# Patient Record
Sex: MALE | Race: WHITE | Employment: OTHER | URBAN - METROPOLITAN AREA
[De-identification: names, ages, dates, MRNs, and addresses within clinical notes are randomized per-mention and may not be internally consistent; named-entity substitution may affect disease eponyms.]

---

## 2017-07-06 ENCOUNTER — HOSPITAL ENCOUNTER (OUTPATIENT)
Dept: PREADMISSION TESTING | Age: 77
Discharge: HOME | End: 2017-07-06
Payer: MEDICARE

## 2017-07-06 VITALS — BODY MASS INDEX: 35.01 KG/M2 | WEIGHT: 231 LBS | HEIGHT: 68 IN

## 2017-07-06 LAB
ALBUMIN SERPL BCP-MCNC: 3.6 G/DL (ref 3.4–5)
ALBUMIN/GLOB SERPL: 1.2 {RATIO} (ref 0.8–1.7)
ALP SERPL-CCNC: 60 U/L (ref 45–117)
ALT SERPL-CCNC: 37 U/L (ref 16–61)
ANION GAP BLD CALC-SCNC: 9 MMOL/L (ref 3–18)
AST SERPL W P-5'-P-CCNC: 38 U/L (ref 15–37)
BASOPHILS # BLD AUTO: 0 K/UL (ref 0–0.06)
BASOPHILS # BLD: 1 % (ref 0–2)
BILIRUB SERPL-MCNC: 0.5 MG/DL (ref 0.2–1)
BUN SERPL-MCNC: 24 MG/DL (ref 7–18)
BUN/CREAT SERPL: 15 (ref 12–20)
CALCIUM SERPL-MCNC: 8.6 MG/DL (ref 8.5–10.1)
CHLORIDE SERPL-SCNC: 103 MMOL/L (ref 100–108)
CO2 SERPL-SCNC: 29 MMOL/L (ref 21–32)
CREAT SERPL-MCNC: 1.55 MG/DL (ref 0.6–1.3)
DIFFERENTIAL METHOD BLD: ABNORMAL
EOSINOPHIL # BLD: 0.2 K/UL (ref 0–0.4)
EOSINOPHIL NFR BLD: 4 % (ref 0–5)
ERYTHROCYTE [DISTWIDTH] IN BLOOD BY AUTOMATED COUNT: 13.5 % (ref 11.6–14.5)
GLOBULIN SER CALC-MCNC: 3 G/DL (ref 2–4)
GLUCOSE SERPL-MCNC: 155 MG/DL (ref 74–99)
HBA1C MFR BLD: 6.3 % (ref 4.5–5.6)
HCT VFR BLD AUTO: 28.4 % (ref 36–48)
HGB BLD-MCNC: 9.7 G/DL (ref 13–16)
LYMPHOCYTES # BLD AUTO: 12 % (ref 21–52)
LYMPHOCYTES # BLD: 0.6 K/UL (ref 0.9–3.6)
MCH RBC QN AUTO: 35.7 PG (ref 24–34)
MCHC RBC AUTO-ENTMCNC: 34.2 G/DL (ref 31–37)
MCV RBC AUTO: 104.4 FL (ref 74–97)
MONOCYTES # BLD: 0.7 K/UL (ref 0.05–1.2)
MONOCYTES NFR BLD AUTO: 14 % (ref 3–10)
NEUTS SEG # BLD: 3.3 K/UL (ref 1.8–8)
NEUTS SEG NFR BLD AUTO: 69 % (ref 40–73)
PLATELET # BLD AUTO: 100 K/UL (ref 135–420)
PMV BLD AUTO: 11.4 FL (ref 9.2–11.8)
POTASSIUM SERPL-SCNC: 4.2 MMOL/L (ref 3.5–5.5)
PROT SERPL-MCNC: 6.6 G/DL (ref 6.4–8.2)
RBC # BLD AUTO: 2.72 M/UL (ref 4.7–5.5)
SODIUM SERPL-SCNC: 141 MMOL/L (ref 136–145)
WBC # BLD AUTO: 4.8 K/UL (ref 4.6–13.2)

## 2017-07-06 PROCEDURE — 83036 HEMOGLOBIN GLYCOSYLATED A1C: CPT | Performed by: UROLOGY

## 2017-07-06 PROCEDURE — 80053 COMPREHEN METABOLIC PANEL: CPT | Performed by: UROLOGY

## 2017-07-06 PROCEDURE — 85025 COMPLETE CBC W/AUTO DIFF WBC: CPT | Performed by: UROLOGY

## 2017-07-06 RX ORDER — LEVOFLOXACIN 5 MG/ML
500 INJECTION, SOLUTION INTRAVENOUS ONCE
Status: CANCELLED | OUTPATIENT
Start: 2017-07-06 | End: 2017-07-06

## 2017-07-06 RX ORDER — SODIUM CHLORIDE, SODIUM LACTATE, POTASSIUM CHLORIDE, CALCIUM CHLORIDE 600; 310; 30; 20 MG/100ML; MG/100ML; MG/100ML; MG/100ML
125 INJECTION, SOLUTION INTRAVENOUS CONTINUOUS
Status: CANCELLED | OUTPATIENT
Start: 2017-07-06

## 2017-07-06 RX ORDER — NYSTATIN 100000 U/G
CREAM TOPICAL
COMMUNITY
End: 2018-06-01

## 2017-07-06 RX ORDER — TORSEMIDE 100 MG/1
100 TABLET ORAL DAILY
COMMUNITY
End: 2021-01-01

## 2017-07-06 RX ORDER — ASPIRIN 325 MG
81 TABLET ORAL DAILY
COMMUNITY
End: 2018-08-10

## 2017-07-06 RX ORDER — IMATINIB MESYLATE 400 MG/1
400 TABLET, FILM COATED ORAL DAILY
COMMUNITY
End: 2020-12-12

## 2017-07-19 ENCOUNTER — ANESTHESIA EVENT (OUTPATIENT)
Dept: SURGERY | Age: 77
End: 2017-07-19
Payer: MEDICARE

## 2017-07-20 ENCOUNTER — HOSPITAL ENCOUNTER (OUTPATIENT)
Age: 77
Setting detail: OUTPATIENT SURGERY
Discharge: HOME | End: 2017-07-20
Attending: UROLOGY | Admitting: UROLOGY
Payer: MEDICARE

## 2017-07-20 ENCOUNTER — APPOINTMENT (OUTPATIENT)
Dept: GENERAL RADIOLOGY | Age: 77
End: 2017-07-20
Attending: UROLOGY
Payer: MEDICARE

## 2017-07-20 ENCOUNTER — ANESTHESIA (OUTPATIENT)
Dept: SURGERY | Age: 77
End: 2017-07-20
Payer: MEDICARE

## 2017-07-20 VITALS
DIASTOLIC BLOOD PRESSURE: 61 MMHG | TEMPERATURE: 97.5 F | HEIGHT: 68 IN | OXYGEN SATURATION: 100 % | RESPIRATION RATE: 18 BRPM | WEIGHT: 229.7 LBS | SYSTOLIC BLOOD PRESSURE: 135 MMHG | HEART RATE: 55 BPM | BODY MASS INDEX: 34.81 KG/M2

## 2017-07-20 LAB
GLUCOSE BLD STRIP.AUTO-MCNC: 116 MG/DL (ref 70–110)
GLUCOSE BLD STRIP.AUTO-MCNC: 151 MG/DL (ref 70–110)

## 2017-07-20 PROCEDURE — 82962 GLUCOSE BLOOD TEST: CPT

## 2017-07-20 PROCEDURE — 76210000006 HC OR PH I REC 0.5 TO 1 HR: Performed by: UROLOGY

## 2017-07-20 PROCEDURE — 77030010103 HC SEAL PRT ENDOSC OCOA -B: Performed by: UROLOGY

## 2017-07-20 PROCEDURE — C1769 GUIDE WIRE: HCPCS | Performed by: UROLOGY

## 2017-07-20 PROCEDURE — 74011250636 HC RX REV CODE- 250/636: Performed by: UROLOGY

## 2017-07-20 PROCEDURE — 74011000250 HC RX REV CODE- 250

## 2017-07-20 PROCEDURE — 76210000026 HC REC RM PH II 1 TO 1.5 HR: Performed by: UROLOGY

## 2017-07-20 PROCEDURE — 77030020782 HC GWN BAIR PAWS FLX 3M -B: Performed by: UROLOGY

## 2017-07-20 PROCEDURE — 77030012508 HC MSK AIRWY LMA AMBU -A: Performed by: NURSE ANESTHETIST, CERTIFIED REGISTERED

## 2017-07-20 PROCEDURE — C1758 CATHETER, URETERAL: HCPCS | Performed by: UROLOGY

## 2017-07-20 PROCEDURE — 77030005543 HC CATH URETH FOL44 COVD -A: Performed by: UROLOGY

## 2017-07-20 PROCEDURE — 74420 UROGRAPHY RTRGR +-KUB: CPT

## 2017-07-20 PROCEDURE — 74011636320 HC RX REV CODE- 636/320: Performed by: UROLOGY

## 2017-07-20 PROCEDURE — 76060000032 HC ANESTHESIA 0.5 TO 1 HR: Performed by: UROLOGY

## 2017-07-20 PROCEDURE — 74011250636 HC RX REV CODE- 250/636

## 2017-07-20 PROCEDURE — 74011636637 HC RX REV CODE- 636/637: Performed by: ANESTHESIOLOGY

## 2017-07-20 PROCEDURE — 77030014023 HC SYR INFL LVEEN BSC -B: Performed by: UROLOGY

## 2017-07-20 PROCEDURE — C2617 STENT, NON-COR, TEM W/O DEL: HCPCS | Performed by: UROLOGY

## 2017-07-20 PROCEDURE — 77030019927 HC TBNG IRR CYSTO BAXT -A: Performed by: UROLOGY

## 2017-07-20 PROCEDURE — 77030018836 HC SOL IRR NACL ICUM -A: Performed by: UROLOGY

## 2017-07-20 PROCEDURE — 76010000138 HC OR TIME 0.5 TO 1 HR: Performed by: UROLOGY

## 2017-07-20 DEVICE — URETERAL STENT
Type: IMPLANTABLE DEVICE | Site: URETER | Status: FUNCTIONAL
Brand: POLARIS™ ULTRA

## 2017-07-20 RX ORDER — DEXAMETHASONE SODIUM PHOSPHATE 4 MG/ML
INJECTION, SOLUTION INTRA-ARTICULAR; INTRALESIONAL; INTRAMUSCULAR; INTRAVENOUS; SOFT TISSUE AS NEEDED
Status: DISCONTINUED | OUTPATIENT
Start: 2017-07-20 | End: 2017-07-20 | Stop reason: HOSPADM

## 2017-07-20 RX ORDER — MAGNESIUM SULFATE 100 %
4 CRYSTALS MISCELLANEOUS AS NEEDED
Status: DISCONTINUED | OUTPATIENT
Start: 2017-07-20 | End: 2017-07-20 | Stop reason: HOSPADM

## 2017-07-20 RX ORDER — GLYCOPYRROLATE 0.2 MG/ML
INJECTION INTRAMUSCULAR; INTRAVENOUS AS NEEDED
Status: DISCONTINUED | OUTPATIENT
Start: 2017-07-20 | End: 2017-07-20 | Stop reason: HOSPADM

## 2017-07-20 RX ORDER — PROPOFOL 10 MG/ML
INJECTION, EMULSION INTRAVENOUS AS NEEDED
Status: DISCONTINUED | OUTPATIENT
Start: 2017-07-20 | End: 2017-07-20 | Stop reason: HOSPADM

## 2017-07-20 RX ORDER — ONDANSETRON 2 MG/ML
INJECTION INTRAMUSCULAR; INTRAVENOUS AS NEEDED
Status: DISCONTINUED | OUTPATIENT
Start: 2017-07-20 | End: 2017-07-20 | Stop reason: HOSPADM

## 2017-07-20 RX ORDER — DEXTROSE 50 % IN WATER (D50W) INTRAVENOUS SYRINGE
25-50 AS NEEDED
Status: DISCONTINUED | OUTPATIENT
Start: 2017-07-20 | End: 2017-07-20 | Stop reason: HOSPADM

## 2017-07-20 RX ORDER — MIDAZOLAM HYDROCHLORIDE 1 MG/ML
INJECTION, SOLUTION INTRAMUSCULAR; INTRAVENOUS AS NEEDED
Status: DISCONTINUED | OUTPATIENT
Start: 2017-07-20 | End: 2017-07-20 | Stop reason: HOSPADM

## 2017-07-20 RX ORDER — LIDOCAINE HYDROCHLORIDE 20 MG/ML
INJECTION, SOLUTION EPIDURAL; INFILTRATION; INTRACAUDAL; PERINEURAL AS NEEDED
Status: DISCONTINUED | OUTPATIENT
Start: 2017-07-20 | End: 2017-07-20 | Stop reason: HOSPADM

## 2017-07-20 RX ORDER — NALOXONE HYDROCHLORIDE 0.4 MG/ML
0.1 INJECTION, SOLUTION INTRAMUSCULAR; INTRAVENOUS; SUBCUTANEOUS AS NEEDED
Status: DISCONTINUED | OUTPATIENT
Start: 2017-07-20 | End: 2017-07-20 | Stop reason: HOSPADM

## 2017-07-20 RX ORDER — SODIUM CHLORIDE, SODIUM LACTATE, POTASSIUM CHLORIDE, CALCIUM CHLORIDE 600; 310; 30; 20 MG/100ML; MG/100ML; MG/100ML; MG/100ML
125 INJECTION, SOLUTION INTRAVENOUS CONTINUOUS
Status: DISCONTINUED | OUTPATIENT
Start: 2017-07-20 | End: 2017-07-20 | Stop reason: HOSPADM

## 2017-07-20 RX ORDER — SODIUM CHLORIDE 0.9 % (FLUSH) 0.9 %
5-10 SYRINGE (ML) INJECTION AS NEEDED
Status: CANCELLED | OUTPATIENT
Start: 2017-07-20

## 2017-07-20 RX ORDER — LEVOFLOXACIN 5 MG/ML
500 INJECTION, SOLUTION INTRAVENOUS ONCE
Status: COMPLETED | OUTPATIENT
Start: 2017-07-20 | End: 2017-07-20

## 2017-07-20 RX ORDER — SODIUM CHLORIDE 0.9 % (FLUSH) 0.9 %
5-10 SYRINGE (ML) INJECTION AS NEEDED
Status: DISCONTINUED | OUTPATIENT
Start: 2017-07-20 | End: 2017-07-20 | Stop reason: HOSPADM

## 2017-07-20 RX ORDER — SODIUM CHLORIDE 0.9 % (FLUSH) 0.9 %
5-10 SYRINGE (ML) INJECTION EVERY 8 HOURS
Status: CANCELLED | OUTPATIENT
Start: 2017-07-20

## 2017-07-20 RX ORDER — FENTANYL CITRATE 50 UG/ML
INJECTION, SOLUTION INTRAMUSCULAR; INTRAVENOUS AS NEEDED
Status: DISCONTINUED | OUTPATIENT
Start: 2017-07-20 | End: 2017-07-20 | Stop reason: HOSPADM

## 2017-07-20 RX ORDER — HYDROMORPHONE HYDROCHLORIDE 1 MG/ML
0.5 INJECTION, SOLUTION INTRAMUSCULAR; INTRAVENOUS; SUBCUTANEOUS
Status: DISCONTINUED | OUTPATIENT
Start: 2017-07-20 | End: 2017-07-20 | Stop reason: HOSPADM

## 2017-07-20 RX ORDER — INSULIN LISPRO 100 [IU]/ML
INJECTION, SOLUTION INTRAVENOUS; SUBCUTANEOUS ONCE
Status: COMPLETED | OUTPATIENT
Start: 2017-07-20 | End: 2017-07-20

## 2017-07-20 RX ORDER — FLUMAZENIL 0.1 MG/ML
0.2 INJECTION INTRAVENOUS
Status: DISCONTINUED | OUTPATIENT
Start: 2017-07-20 | End: 2017-07-20 | Stop reason: HOSPADM

## 2017-07-20 RX ORDER — SODIUM CHLORIDE, SODIUM LACTATE, POTASSIUM CHLORIDE, CALCIUM CHLORIDE 600; 310; 30; 20 MG/100ML; MG/100ML; MG/100ML; MG/100ML
1000 INJECTION, SOLUTION INTRAVENOUS CONTINUOUS
Status: DISCONTINUED | OUTPATIENT
Start: 2017-07-20 | End: 2017-07-20 | Stop reason: HOSPADM

## 2017-07-20 RX ADMIN — SODIUM CHLORIDE, SODIUM LACTATE, POTASSIUM CHLORIDE, AND CALCIUM CHLORIDE: 600; 310; 30; 20 INJECTION, SOLUTION INTRAVENOUS at 13:13

## 2017-07-20 RX ADMIN — GLYCOPYRROLATE 0.2 MG: 0.2 INJECTION INTRAMUSCULAR; INTRAVENOUS at 12:47

## 2017-07-20 RX ADMIN — MIDAZOLAM HYDROCHLORIDE 2 MG: 1 INJECTION, SOLUTION INTRAMUSCULAR; INTRAVENOUS at 12:33

## 2017-07-20 RX ADMIN — INSULIN LISPRO 2 UNITS: 100 INJECTION, SOLUTION INTRAVENOUS; SUBCUTANEOUS at 02:00

## 2017-07-20 RX ADMIN — PROPOFOL 100 MG: 10 INJECTION, EMULSION INTRAVENOUS at 12:33

## 2017-07-20 RX ADMIN — FENTANYL CITRATE 25 MCG: 50 INJECTION, SOLUTION INTRAMUSCULAR; INTRAVENOUS at 12:41

## 2017-07-20 RX ADMIN — LIDOCAINE HYDROCHLORIDE 60 MG: 20 INJECTION, SOLUTION EPIDURAL; INFILTRATION; INTRACAUDAL; PERINEURAL at 12:33

## 2017-07-20 RX ADMIN — FENTANYL CITRATE 25 MCG: 50 INJECTION, SOLUTION INTRAMUSCULAR; INTRAVENOUS at 12:40

## 2017-07-20 RX ADMIN — LEVOFLOXACIN 500 MG: 5 INJECTION, SOLUTION INTRAVENOUS at 12:35

## 2017-07-20 RX ADMIN — FENTANYL CITRATE 25 MCG: 50 INJECTION, SOLUTION INTRAMUSCULAR; INTRAVENOUS at 13:08

## 2017-07-20 RX ADMIN — FENTANYL CITRATE 25 MCG: 50 INJECTION, SOLUTION INTRAMUSCULAR; INTRAVENOUS at 13:09

## 2017-07-20 RX ADMIN — DEXAMETHASONE SODIUM PHOSPHATE 4 MG: 4 INJECTION, SOLUTION INTRA-ARTICULAR; INTRALESIONAL; INTRAMUSCULAR; INTRAVENOUS; SOFT TISSUE at 12:47

## 2017-07-20 RX ADMIN — ONDANSETRON 4 MG: 2 INJECTION INTRAMUSCULAR; INTRAVENOUS at 12:47

## 2017-07-20 RX ADMIN — SODIUM CHLORIDE, SODIUM LACTATE, POTASSIUM CHLORIDE, AND CALCIUM CHLORIDE 125 ML/HR: 600; 310; 30; 20 INJECTION, SOLUTION INTRAVENOUS at 11:05

## 2017-07-20 NOTE — ANESTHESIA PREPROCEDURE EVALUATION
Anesthetic History     PONV     Comments: Pt has allergy to adhesives and afib rvr so we discussed the options     Review of Systems / Medical History  Patient summary reviewed, nursing notes reviewed and pertinent labs reviewed    Pulmonary  Within defined limits                 Neuro/Psych       CVA  TIA    Comments: History of CEA 8 weeks ago for left carotid but right side is approx 10 % . Doing well. Had ponv with that ga/oett.    Cardiovascular    Hypertension: well controlled        Dysrhythmias : atrial fibrillation  CAD and CABG    Exercise tolerance: >4 METS     GI/Hepatic/Renal  Within defined limits              Endo/Other    Diabetes: well controlled      Pertinent negatives: No hypothyroidism, hyperthyroidism and obesity   Other Findings              Physical Exam    Airway  Mallampati: II  TM Distance: 4 - 6 cm  Neck ROM: normal range of motion   Mouth opening: Normal     Cardiovascular    Rhythm: regular  Rate: normal         Dental  No notable dental hx       Pulmonary  Breath sounds clear to auscultation               Abdominal  GI exam deferred       Other Findings            Anesthetic Plan    ASA: 3  Anesthesia type: general          Induction: Intravenous  Anesthetic plan and risks discussed with: Patient

## 2017-07-20 NOTE — IP AVS SNAPSHOT
Alexandrea Beckham 
 
 
 [address] [place] [id number] 
[phone] Patient: Glenn Cerda MRN: [id number] You are allergic to the following Allergen Reactions Adhesive Tape-Silicones Other (comments) Raw skin Codeine Nausea and Vomiting Iodinated Contrast- Oral And Iv Dye Rash  
 patient states this was years ago - he states that he can tolerate the newer agents Recent Documentation Height Weight BMI Smoking Status 1.727 m 104.2 kg 34.93 kg/m2 Former Smoker Emergency Contacts Name Discharge Info Relation Home Work Mobile [address] [facility] CAREGIVER [3] Spouse [3] [phone] About your hospitalization You were admitted on:  2017 [facility] You were discharged on:  2017 Unit phone number:  [phone] Why you were hospitalized Your primary diagnosis was:  Not on File Providers Seen During Your Hospitalizations Provider Role Specialty Primary office phone Anna Benavidez MD Attending Provider Urology [phone] Your Primary Care Physician (PCP) Primary Care Physician Office Phone Office Fax Bambi Jaimes [phone] Follow-up Information Follow up With Details Comments Contact Info [address] [facility] 
[phone] Anna Benavidez MD  office will call [address] [facility] [address] [facility] 
[phone] Current Discharge Medication List  
  
CONTINUE these medications which have CHANGED Dose & Instructions Dispensing Information Comments Morning Noon Evening Bedtime  
 insulin glargine 100 unit/mL injection Commonly known as:  LANTUS What changed:  when to take this Your last dose was: Your next dose is:    
   
   
 Dose:  20 Units 20 Units by SubCUTAneous route nightly. Indications: TYPE 2 DIABETES MELLITUS Quantity:  1 Vial  
Refills:  0  
     
   
   
   
  
 insulin lispro 100 unit/mL injection Commonly known as:  HUMALOG What changed:  when to take this Your last dose was: Your next dose is:    
   
   
 Dose:  10 Units 10 Units by SubCUTAneous route Before breakfast, lunch, and dinner. Indications: TYPE 2 DIABETES MELLITUS Quantity:  1 Vial  
Refills:  0 CONTINUE these medications which have NOT CHANGED Dose & Instructions Dispensing Information Comments Morning Noon Evening Bedtime  
 aspirin 325 mg tablet Commonly known as:  ASPIRIN Your last dose was: Your next dose is:    
   
   
 Dose:  325 mg Take 325 mg by mouth daily. Indications: myocardial infarction prevention Refills:  0 COREG 6.25 mg tablet Generic drug:  carvedilol Your last dose was: Your next dose is:    
   
   
 Dose:  6.25 mg Take 6.25 mg by mouth two (2) times a day. Refills:  0  
     
   
   
   
  
 dilTIAZem  mg ER capsule Commonly known as:  CARDIZEM CD Your last dose was: Your next dose is:    
   
   
 Dose:  180 mg Take 1 Cap by mouth daily. Quantity:  30 Cap Refills:  0 GLEEVEC 400 mg tablet Generic drug:  imatinib Your last dose was: Your next dose is:    
   
   
 Dose:  400 mg Take 400 mg by mouth daily. 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    
   
   
 Dose:  80 mg Take 80 mg by mouth nightly. Indications: hypercholesterolemia Refills:  0  
     
   
   
   
  
 losartan 100 mg tablet Commonly known as:  COZAAR Your last dose was: Your next dose is: Dose:  100 mg Take 100 mg by mouth daily. Refills:  0  
     
   
   
   
  
 magnesium oxide 400 mg tablet Commonly known as:  MAG-OX Your last dose was: Your next dose is:    
   
   
 Dose:  400 mg Take 400 mg by mouth two (2) times a day. Refills:  0  
     
   
   
   
  
 nystatin topical cream  
Commonly known as:  MYCOSTATIN Your last dose was: Your next dose is:    
   
   
 Apply  to affected area two (2) times daily as needed for Skin Irritation. Refills:  0  
     
   
   
   
  
 sotalol 80 mg tablet Commonly known as:  [place] Your last dose was: Your next dose is:    
   
   
 Dose:  80 mg Take 80 mg by mouth two (2) times a day. Refills:  0  
     
   
   
   
  
 torsemide 100 mg tablet Commonly known as:  DEMADEX Your last dose was: Your next dose is:    
   
   
 Dose:  100 mg Take 100 mg by mouth daily. Indications: Edema, hypertension Refills:  0 Discharge Instructions Cystoscopy: What to Expect at [facility] Your Recovery A cystoscopy is a procedure that lets a doctor look inside of the bladder and the urethra. The urethra is the tube that carries urine from the bladder to outside the body. The doctor uses a thin, lighted tool called a cystoscope. Your bladder is filled with fluid. This stretches the bladder so that your doctor can look closely at the inside of your bladder. After the cystoscopy, your urethra may be sore at first, and it may burn when you urinate for the first few days after the procedure. You may feel the need to urinate more often, and your urine may be pink. These symptoms should get better in 1 or 2 days. You will probably be able to go back to most of your usual activities in 1 or 2 days. This care sheet gives you a general idea about how long it will take for you to recover. But each person recovers at a different pace.  Follow the steps below to get better as quickly as possible. How can you care for yourself at home? Activity · Rest when you feel tired. Getting enough sleep will help you recover. · Try to walk each day. Start by walking a little more than you did the day before. Bit by bit, increase the amount you walk. Walking boosts blood flow and helps prevent pneumonia and constipation. · Avoid strenuous activities, such as bicycle riding, jogging, weight lifting, or aerobic exercise, until your doctor says it is okay. · Ask your doctor when you can drive again. · Most people are able to return to work within 1 or 2 days after the procedure. · You may shower and take baths as usual. 
· Ask your doctor when it is okay for you to have sex. Diet · You can eat your normal diet. If your stomach is upset, try bland, low-fat foods like plain rice, broiled chicken, toast, and yogurt. · Drink plenty of fluids (unless your doctor tells you not to). Medicines · Take pain medicines exactly as directed. ¨ If the doctor gave you a prescription medicine for pain, take it as prescribed. ¨ If you are not taking a prescription pain medicine, ask your doctor if you can take an over-the-counter medicine. · If you think your pain medicine is making you sick to your stomach: 
¨ Take your medicine after meals (unless your doctor has told you not to). ¨ Ask your doctor for a different pain medicine. · If your doctor prescribed antibiotics, take them as directed. Do not stop taking them just because you feel better. You need to take the full course of antibiotics. Follow-up care is a key part of your treatment and safety. Be sure to make and go to all appointments, and call your doctor if you are having problems. It's also a good idea to know your test results and keep a list of the medicines you take. When should you call for help? Call 911 anytime you think you may need emergency care. For example, call if: · You passed out (lost consciousness). · You have severe trouble breathing. · You have sudden chest pain and shortness of breath, or you cough up blood. · You have severe belly pain. Call your doctor now or seek immediate medical care if: 
· You are sick to your stomach or cannot keep fluids down. · Your urine is still red or you see blood clots after you have urinated several times. · You have trouble passing urine or stool, especially if you have pain or swelling in your lower belly. · You have signs of a blood clot, such as: 
¨ Pain in your calf, back of the knee, thigh, or groin. ¨ Redness and swelling in your leg or groin. · You develop a fever or severe chills. · You have pain in your back just below your rib cage. This is called flank pain. Watch closely for changes in your health, and be sure to contact your doctor if: 
· You have pain or burning when you urinate. A burning feeling is normal for a day or two after the test, but call if it does not get better. · You have a frequent urge to urinate but can pass only small amounts of urine. · Your urine is pink, red, or cloudy, or smells bad. It is normal for the urine to have a pinkish color for a few days after the test, but call if it does not get better. Where can you learn more? Go to http://dio-mat.info/. Enter T858 in the search box to learn more about \"Cystoscopy: What to Expect at Home. \" Current as of: November 11, 2016 Content Version: 11.3 © 9082-7309 [employer]. Care instructions adapted under license by [employer] (which disclaims liability or warranty for this information). If you have questions about a medical condition or this instruction, always ask your healthcare professional. [address] any warranty or liability for your use of this information. DISCHARGE SUMMARY from Nurse The following personal items are in your possession at time of discharge: 
 
Dental Appliances: None Visual Aid: Glasses Home Medications: None Jewelry: None Clothing: Footwear, Shirt, [address], Undergarments, [place] Other Valuables: Eyeglasses (will give to wife) PATIENT INSTRUCTIONS: 
 
After general anesthesia or intravenous sedation, for 24 hours or while taking prescription Narcotics: · Limit your activities · Do not drive and operate hazardous machinery · Do not make important personal or business decisions · Do  not drink alcoholic beverages · If you have not urinated within 8 hours after discharge, please contact your surgeon on call. Report the following to your surgeon: 
· Excessive pain, swelling, redness or odor of or around the surgical area · Temperature over 100.5 · Nausea and vomiting lasting longer than 4 hours or if unable to take medications · Any signs of decreased circulation or nerve impairment to extremity: change in color, persistent  numbness, tingling, coldness or increase pain · Any questions What to do at Home: 
Recommended activity: Activity as tolerated and no driving for today, Ambulate in house, No lifting, Driving, or Strenuous exercise until advised and No driving while on analgesics. If you experience any of the following symptoms: Fever, chills, uncontrolled pain, circulation changes, active bleeding or drainage, please follow up with Dr. Ernesto Shah. *  Please give a list of your current medications to your Primary Care Provider. *  Please update this list whenever your medications are discontinued, doses are 
    changed, or new medications (including over-the-counter products) are added. *  Please carry medication information at all times in case of emergency situations. These are general instructions for a healthy lifestyle: No smoking/ No tobacco products/ Avoid exposure to second hand smoke Surgeon General's Warning:  Quitting smoking now greatly reduces serious risk to your health. Obesity, smoking, and sedentary lifestyle greatly increases your risk for illness A healthy diet, regular physical exercise & weight monitoring are important for maintaining a healthy lifestyle You may be retaining fluid if you have a history of heart failure or if you experience any of the following symptoms:  Weight gain of 3 pounds or more overnight or 5 pounds in a week, increased swelling in our hands or feet or shortness of breath while lying flat in bed. Please call your doctor as soon as you notice any of these symptoms; do not wait until your next office visit. Recognize signs and symptoms of STROKE: 
 
F-face looks uneven A-arms unable to move or move unevenly S-speech slurred or non-existent T-time-call 911 as soon as signs and symptoms begin-DO NOT go Back to bed or wait to see if you get better-TIME IS BRAIN. Warning Signs of HEART ATTACK Call 911 if you have these symptoms: 
? Chest discomfort. Most heart attacks involve discomfort in the center of the chest that lasts more than a few minutes, or that goes away and comes back. It can feel like uncomfortable pressure, squeezing, fullness, or pain. ? Discomfort in other areas of the upper body. Symptoms can include pain or discomfort in one or both arms, the back, neck, jaw, or stomach. ? Shortness of breath with or without chest discomfort. ? Other signs may include breaking out in a cold sweat, nausea, or lightheadedness. Don't wait more than five minutes to call [address]! Fast action can save your life. Calling 911 is almost always the fastest way to get lifesaving treatment. Emergency Medical Services staff can begin treatment when they arrive  up to an hour sooner than if someone gets to the hospital by car.   
 
 
The discharge information has been reviewed with the patient and caregiver. The patient and caregiver verbalized understanding. Discharge medications reviewed with the patient and caregiver and appropriate educational materials and side effects teaching were provided. Patient armband removed and shredded Discharge Orders None Introducing [facility]! [employer] introduces [employer] [facility] [facility] patient portal. Now you can access parts of your medical record, email your doctor's office, and request medication refills online. 1. In your internet browser, go to [email or web address] 2. Click on the First Time User? Click Here link in the Sign In box. You will see the New Member Sign Up page. 3. Enter your [facility] Access Code exactly as it appears below. You will not need to use this code after youve completed the sign-up process. If you do not sign up before the expiration date, you must request a new code. · [facility] Access Code: [email or web address] Expires: 10/1/2017  4:01 PM 
 
4. Enter the last four digits of your Social Security Number (xxxx) and Date of Birth (mm/dd/yyyy) as indicated and click Submit. You will be taken to the next sign-up page. 5. Create a [facility] ID. This will be your [facility] login ID and cannot be changed, so think of one that is secure and easy to remember. 6. Create a [facility] password. You can change your password at any time. 7. Enter your Password Reset Question and Answer. This can be used at a later time if you forget your password. 8. Enter your e-mail address. You will receive e-mail notification when new information is available in [address]. 9. Click Sign Up. You can now view and download portions of your medical record. 10. Click the Download Summary menu link to download a portable copy of your medical information. If you have questions, please visit the Frequently Asked Questions section of the [facility] website.  Remember, [facility] is NOT to be used for urgent needs. For medical emergencies, dial 911. Now available from your iPhone and Android! General Information Please provide this summary of care documentation to your next provider. Patient Signature:  ____________________________________________________________ Date:  ____________________________________________________________  
  
Umang Endo Provider Signature:  ____________________________________________________________ Date:  ____________________________________________________________

## 2017-07-20 NOTE — H&P
A    Urology [facility] Rama Mendoza  [address] [place] [address], [address], [address] [facility]  phone: [phone]  fax: [phone]          Patient: Hai Murphy  YOB: 1940  Date: 06/28/2017 11:00 AM   Visit Type: Office Visit  Historian: self      Assessment/Plan  # Detail Type Description    1. Assessment Hydronephrosis w/ ureteral calculous obstruction (N13.2). Patient Plan Pt with a right UVJ stone on Ct in March. He did not undergo any intervention and a repeat CT on 6/19/17 remains unchanged. I reviewed options with the pt who wanted to undergo lithotripsy, however he does not want to take a chance at having to do a repeat procedure so he would lie to undergo ureteral stone extraction. He has undergone this before as well as the lithotripsy in the past.  All risks including pain, infection, bleeding, ureteral injury, need for a stent were reviewed. He understood and agreed. Most likely we need to get at least cardiac clearance prior to the procedure. 2. Assessment Calculus of kidney (N20.0). Patient Plan Pt with 3 large renal stones on CT scan. He is asymptomatic         3. Assessment Abnormal radiologic findings on diagnostic imaging of renal pelvis, ureter, or bladder (R93.41). Patient Plan Pt's CT scan revealed. \"Stranding in the fat surrounding the mid left ureter; this segment of ureter is poorly opacified on excretory phase  imaging. Findings are suggestive of an inflammatory process. Neoplastic process is thought less likely but not entirely excluded. \"  I reviewed these finidngs and options with him. I reccomended ureterscopy to evaluate the ureter for possible neoplasm. He did not want to undergo ureteroscopy and have stent. He will repeat CT in 3 months         4. Assessment Asymptomatic microscopic hematuria (R31.21). Patient Plan Pt with microscopic hematuria. THis is most likely due to his ureteral stone. No other abnormalites noted on evaluation. This 65year old male presents for Kidney and/or Ureteral Stone, Erectile Dysfunction, BPH and Hematuria. History of Present Illness:  1. Kidney and/or Ureteral Stone      Onset was sudden. The problem is with no change. Location of pain is bilateral flank. The pain does not radiate. Recent ER visit on 08/26/2016 at [facility]. Pertinent history includes history of prior stone(s). Associated symptoms include hematuria and urinary frequency. Pertinent negatives include chills, constipation, diarrhea, fever, nausea, pain and vomiting. Additional information: 6/28/17: Pt w hx of stones. He had a CT scan in March revealing RIGHT UVJ stone repeat CT 6/19/17 unchanged  still with stne at R UVJ. .      2.  Erectile Dysfunction      The symptoms began gradually, have been moderate and are worse. The patient is here today for a follow up visit. The patient states he does have difficultly attaining an erection and has difficulty maintaining an erection. Pertinent history includes history of diabetes and hypertension but not neurologic disease. Reviewed today was a PSA taken on 09/18/2015 with findings of 0.14 ng/mL. He denies depression. Additional information: Pt cont to have erection difficulties despite trying multiple medications. He appears to have multiple sxs of hypogonadism. Testosterone today has gone down to 211. He has stopped using his axiron due to a skin rash and Testim also caused a reaction. He does not want to try injections oral therapy or testopel. At this time he states his wife is not interested at this time. 6/28/17: Did not discuss this today w patient. 3.  BPH      Onset was gradual. The problem is improving. Reviewed today was a PSA taken on 05/15/2014 with findings of 0.15 ng/mL. Pertinent history includes history of diabetes, prior prostate surgery. Associated symptoms include hematuria, urgency and urinary frequency.  Pertinent negatives include chills, constipation, fever and slow stream. Additional information: Pt with hx of BPH underwent Greenlight laser prostate 2/2012  using over 300k joules. He had a repeat Greenlight, which was done with a meatomy on 8/9/2012. Artist Laz He continues to void well. .  6/28/17:  Did not discuss this with pt today. 4.  Hematuria      The patient presents with hematuria (microscopic). The problem began gradually. The problem is unchanged. He denies pain. Pertinent history includes being at least 35years of age, history of kidney stones, prior prostate surgery and smoking but not history of bladder cancer, history of UTIs or family history of stones. Denies aggravating factors. He also complains of urinary frequency and urgency. He denies chills, fever, nausea, slow stream and vomiting. Reviewed today was a PSA taken on 02/06/2017 with findings of 0.28 ng/mL. CT Abdomen/Pelvis taken on 03/07/2017 with findings of R UVJ stone, inflammation LEFT ureter. Additional information: Pt noted to microscopic hematuria. His UA confirms blood. He denies any gross hematuria. He has hx of CLL. Stopped smoking 3 yrs ago. Smoked 1ppd. 6/28/17: He is here today to complete hematuria work up.                   PAST MEDICAL/SURGICAL HISTORY   (Detailed)    Disease/disorder Onset Date Management Date Comments    03/31/2014 quadruple bypass      03/31/2014 Coronary bypass x4 with in situ left internal mammary to left anterior descending       Carotid Artery Surgery 2017      Greenlight and meatotomy 08/10/2012    Nephrolithiasis  Greenlight and meatotomy 08/10/2012      cysto, R ureterscopy, holmium, R nephroscopy holmium, [facility] mult large bladder stones, Greenlight Prostate 02/09/2012    Nephrolithiasis  [facility] Right UVJ stone 2011      cysto, R ureterscopy, holmium, R nephroscopy holmi       DIAGNOSTICS HISTORY:  Test Ordered Interpretation Result completed   *Myocardial Perfusion Imaging Multiple Studies 03/24/2014 04/01/2014   EKG 03/24/2014 See Result  No change since last EKG. 03/24/2014   EYE EXAM & TREATMENT  No DM retinopathy See scanned report. 11/16/2015   EKG 01/20/2017 See Result  01/20/2017   CT Abdomen/Pelvis   R UVJ stone, inflammation LEFT ureter 03/07/2017     Test Ordered Ordering Comments Modifier   *Myocardial Perfusion Imaging Multiple Studies 03/24/2014     EKG 03/24/2014     EYE EXAM & TREATMENT      EKG 01/20/2017     CT Abdomen/Pelvis          PROBLEM LIST:  Problem Description Onset Date   Benign essential hypertension 06/16/2011   Pure hypercholesterolemia 05/27/2014   Chronic ischemic heart disease 05/27/2014   Testicular hypofunction 12/05/2012   Urinary bladder stone 02/21/2012   Ureteric stone 02/21/2012   High risk drug side effect 05/27/2014   Benign prostatic hypertrophy w/ urinary obstruction 02/14/2012   Thrombocytopenia    Type II diabetes mellitus uncontrolled 06/16/2011   Kidney stone 01/30/2012     Patient Status   Completed with information received for patient in a summary of care record. Medication Reconciliation  Medications reconciled today.   Medication Reviewed  Adherence Medication Name Sig Desc Elsewhere Status   taking as directed Insulin Syringe MicroFine 1 mL 27 gauge x 5/8\" use daily with insulin N Verified   taking as directed Syringe 3 mL 21 gauge x 1\" use as directed N Verified   taking as directed aspirin 325 mg tablet take 1 tablet by oral route  every day N Verified   taking as directed atorvastatin 40 mg tablet take 1 tablet by oral route  every day N Verified   taking as directed torsemide 100 mg tablet take half tablet by oral route  twice a day N Verified   taking as directed carvedilol 6.25 mg tablet TAKE 1 TABLET BY ORAL ROUTE 2 TIMES EVERY DAY WITH FOOD N Verified   taking as directed magnesium oxide 400 mg tablet TAKE 1 TABLET BY ORAL ROUTE 2 TIMES EVERY DAY N Verified   taking as directed sotalol 80 mg tablet TAKE 1 TABLET BY ORAL ROUTE 2 TIMES EVERY DAY N Verified   taking as directed Lantus Solostar 100 unit/mL (3 mL) subcutaneous insulin pen inject by  23 units subcutaneous route as per insulin protocol in am N Verified   taking as directed Humalog KwikPen 100 unit/mL subcutaneous inject by subcutaneous route per prescriber's instructions. Insulin dosing requires individualization. N Verified   taking as directed Vitamin B-12 1,000 mcg tablet take 1 tablet by mouth once daily N Verified   taking as directed Cozaar 100 mg tablet take 1 tablet by oral route  every day N Verified     Allergies  Ingredient Reaction Medication Name Comment   TAPE, OCCLUSIVE ADHESIVE blisters. CODEINE      IVP Dye Patient states he is not allergic to IVP Dye  Patient states he is not allergic to IVP Dye     (Reviewed, updated.)  Family History:  (Detailed)  Relationship Family Member Name  Age at Death Condition Onset Age Cause of Death       Family history of Renal disease  N   Maternal grandmother    Diabetes mellitus  N         Social History:  (Detailed)  Tobacco use reviewed. Preferred language is [place]. The patient does not need an . MARITAL STATUS/FAMILY/SOCIAL SUPPORT  Currently . Tobacco use status: Ex-cigarette smoker. Smoking status: Former smoker. SMOKING STATUS  Use Status Type Smoking Status Usage Per Day Years Used Total Pack Years   yes Cigarette Former smoker 1 Packs 65.00 65.00       No passive smoke exposure. ALCOHOL  There is a history of alcohol use. CAFFEINE  The patient uses caffeine: coffee and soda. - 3 cups a day. Review of Systems  System Neg/Pos Details   Constitutional Negative Chills, fever and pain. ENMT Negative Ear infections and sore throat. Eyes Negative Blurred vision, double vision and eye pain. Respiratory Negative Asthma, chronic cough, dyspnea and wheezing. Cardio Negative Chest pain. GI Negative Constipation, decreased appetite, diarrhea, nausea and vomiting.  Positive Hematuria, Urgency, Urinary frequency.     Negative Slow stream.   Endocrine Negative Cold intolerance, heat intolerance, increased thirst and weight loss. Neuro Negative Headache and tremors. Psych Negative Anxiety and depression. Integumentary Negative Itching skin and rash. MS Negative Back pain and joint pain. Hema/Lymph Negative Easy bleeding. Reproductive Negative Sexual dysfunction. Vital Signs     Height  Time ft in cm Last Measured Height Position   11:19 AM 5.0 8.00 172.72 04/17/2017 Standing     Weight/BSA/BMI  Time lb oz kg Context BMI kg/m2 BSA m2   11:19 .00  106. 141 dressed without shoes 35.58 2.26     Measured By  Time Measured by   11:19 AM Brittni Raphael       Physical Exam  Exam Findings Details   Constitutional Normal Well developed. Neck Exam Normal Inspection - Normal.   Respiratory Normal Inspection - Normal.   Extremity Normal No edema. Neurological Normal Alert and oriented to person, place and time. Cranial nerves intact. No motor or sensory deficits. Psychiatric Normal Oriented to time, place, person and situation. Appropriate mood and affect. Medications (added, continued, or stopped today)  Started Medication Directions Instruction Stopped   10/26/2016 aspirin 325 mg tablet take 1 tablet by oral route  every day     01/29/2017 atorvastatin 40 mg tablet take 1 tablet by oral route  every day     09/27/2016 Cardizem  mg capsule,extended release take 1 capsule by oral route  every day per cardio 06/28/2017   05/15/2017 carvedilol 6.25 mg tablet TAKE 1 TABLET BY ORAL ROUTE 2 TIMES EVERY DAY WITH FOOD     06/28/2017 Cozaar 100 mg tablet take 1 tablet by oral route  every day     11/30/2016 Gleevec 400 mg tablet take 1 tablet by oral route  every day with meal(s) and a large glass of water  06/28/2017 06/28/2017 Humalog KwikPen 100 unit/mL subcutaneous inject by subcutaneous route per prescriber's instructions. Insulin dosing requires individualization.      05/31/2016 Insulin Syringe MicroFine 1 mL 27 gauge x 5/8\" use daily with insulin E11.65    05/01/2017 Keflex 500 mg capsule take 1 capsule by oral route  every 12 hours  06/28/2017 06/16/2017 Lantus Solostar 100 unit/mL (3 mL) subcutaneous insulin pen inject by  23 units subcutaneous route as per insulin protocol in am     05/17/2017 magnesium oxide 400 mg tablet TAKE 1 TABLET BY ORAL ROUTE 2 TIMES EVERY DAY     09/27/2016 nystatin 100,000 unit/gram topical ointment apply by topical route 2 times every day to the affected area(s)  06/28/2017   10/26/2016 Plavix 75 mg tablet take 1 tablet by oral route  every day  06/28/2017 06/14/2017 sotalol 80 mg tablet TAKE 1 TABLET BY ORAL ROUTE 2 TIMES EVERY DAY     09/02/2016 Syringe 3 mL 21 gauge x 1\" use as directed     04/24/2017 torsemide 100 mg tablet take half tablet by oral route  twice a day     06/28/2017 Vitamin B-12 1,000 mcg tablet take 1 tablet by mouth once daily     Completed by:        Nunu Maurer  06/29/2017 9:34 PM   Document generated by:  Ashleigh Bradford 06/29/2017 09:34 PM      -----------------------------------------------------------------------------------------------------------    Electronically signed by Gera Stover MD on 07/01/2017 08:35 PM

## 2017-07-20 NOTE — BRIEF OP NOTE
BRIEF OPERATIVE NOTE    Date of Procedure: 7/20/2017   Preoperative Diagnosis: URETERAL CALCULUS W/YDRO  Postoperative Diagnosis: URETERAL CALCULUS W/YDRO    Procedure(s):  CYSTOSCOPY,RIGHT RETROGRADE PYELOGRAM, RIGHT URETEROSCOPY, STENT PLACEMENT LEFT RETROGRADE PYELOGRAM, LEFT URETEROSCOPY AND INSERTION STENTW/C-ARM - SPEC POP  Surgeon(s) and Role:     * Lolis Troy MD - Primary         Assistant Staff:       Surgical Staff:  Circ-1: Leix Mathews RN  Circ-Relief: Whitney Dyson RN  Radiology Technician: Agustin Cantu  Scrub Tech-1: Mimi Garvey  Scrub RN-1: Linus Holter, RN  Event Time In   Incision Start 1241   Incision Close 1313     Anesthesia: General   Estimated Blood Loss: none  Specimens: * No specimens in log *   Findings: Passed right ureteral stone, dilation of mid left ureter consistent with CT scan findings, on ureteroscopy this was noted to be normal anatomy   Complications: none  Implants:   Implant Name Type Inv.  Item Serial No.  Lot No. LRB No. Used Action   Arletha Glover DBL-PGTL [id number] -- POLARIS - SN/[id number]  Arletha Glover DBL-PGTL [id number] -- POLARIS N/A [employer] [facility] [id number] Right 1 Implanted   STENT URET DBL-PGTL [id number] -- POLARIS - [id number]   Arletha Glover DBL-PGTL [id number] -- Ellis Child [facility] [id number] Left 1 Implanted

## 2017-07-20 NOTE — ANESTHESIA POSTPROCEDURE EVALUATION
Post-Anesthesia Evaluation and Assessment    Cardiovascular Function/Vital Signs  Visit Vitals    /50    Pulse (!) 56    Temp 37 °C (98.6 °F)    Resp 12    Ht 5' 8\" (1.727 m)    Wt 104.2 kg (229 lb 11.2 oz)    SpO2 99%    BMI 34.93 kg/m2       Patient is status post Procedure(s):  CYSTOSCOPY,BILATERAL RETROGRADE PYELOGRAM BILATERAL URETEROSCOPY, BILATERALSTENT PLACEMENT W/C-ARM - SPEC POP. Nausea/Vomiting: Controlled. Postoperative hydration reviewed and adequate. Pain:  Pain Scale 1: Numeric (0 - 10) (07/20/17 1359)  Pain Intensity 1: 0 (07/20/17 1359)   Managed. Neurological Status:   Neuro (WDL): (P) Within Defined Limits (07/20/17 1359)   At baseline. Mental Status and Level of Consciousness: Arousable. Pulmonary Status:   O2 Device: Room air (07/20/17 1359)   Adequate oxygenation and airway patent. Complications related to anesthesia: None    Post-anesthesia assessment completed. No concerns. Patient has met all discharge requirements.     Signed By: Richa Kitchen CRNA    July 20, 2017

## 2017-07-20 NOTE — DISCHARGE INSTRUCTIONS
Cystoscopy: What to Expect at [address]    A cystoscopy is a procedure that lets a doctor look inside of the bladder and the urethra. The urethra is the tube that carries urine from the bladder to outside the body. The doctor uses a thin, lighted tool called a cystoscope. Your bladder is filled with fluid. This stretches the bladder so that your doctor can look closely at the inside of your bladder. After the cystoscopy, your urethra may be sore at first, and it may burn when you urinate for the first few days after the procedure. You may feel the need to urinate more often, and your urine may be pink. These symptoms should get better in 1 or 2 days. You will probably be able to go back to most of your usual activities in 1 or 2 days. This care sheet gives you a general idea about how long it will take for you to recover. But each person recovers at a different pace. Follow the steps below to get better as quickly as possible. How can you care for yourself at home? Activity  · Rest when you feel tired. Getting enough sleep will help you recover. · Try to walk each day. Start by walking a little more than you did the day before. Bit by bit, increase the amount you walk. Walking boosts blood flow and helps prevent pneumonia and constipation. · Avoid strenuous activities, such as bicycle riding, jogging, weight lifting, or aerobic exercise, until your doctor says it is okay. · Ask your doctor when you can drive again. · Most people are able to return to work within 1 or 2 days after the procedure. · You may shower and take baths as usual.  · Ask your doctor when it is okay for you to have sex. Diet  · You can eat your normal diet. If your stomach is upset, try bland, low-fat foods like plain rice, broiled chicken, toast, and yogurt. · Drink plenty of fluids (unless your doctor tells you not to). Medicines  · Take pain medicines exactly as directed.   ¨ If the doctor gave you a prescription medicine for pain, take it as prescribed. ¨ If you are not taking a prescription pain medicine, ask your doctor if you can take an over-the-counter medicine. · If you think your pain medicine is making you sick to your stomach:  ¨ Take your medicine after meals (unless your doctor has told you not to). ¨ Ask your doctor for a different pain medicine. · If your doctor prescribed antibiotics, take them as directed. Do not stop taking them just because you feel better. You need to take the full course of antibiotics. Follow-up care is a key part of your treatment and safety. Be sure to make and go to all appointments, and call your doctor if you are having problems. It's also a good idea to know your test results and keep a list of the medicines you take. When should you call for help? Call 911 anytime you think you may need emergency care. For example, call if:  · You passed out (lost consciousness). · You have severe trouble breathing. · You have sudden chest pain and shortness of breath, or you cough up blood. · You have severe belly pain. Call your doctor now or seek immediate medical care if:  · You are sick to your stomach or cannot keep fluids down. · Your urine is still red or you see blood clots after you have urinated several times. · You have trouble passing urine or stool, especially if you have pain or swelling in your lower belly. · You have signs of a blood clot, such as:  ¨ Pain in your calf, back of the knee, thigh, or groin. ¨ Redness and swelling in your leg or groin. · You develop a fever or severe chills. · You have pain in your back just below your rib cage. This is called flank pain. Watch closely for changes in your health, and be sure to contact your doctor if:  · You have pain or burning when you urinate. A burning feeling is normal for a day or two after the test, but call if it does not get better.   · You have a frequent urge to urinate but can pass only small amounts of urine.  · Your urine is pink, red, or cloudy, or smells bad. It is normal for the urine to have a pinkish color for a few days after the test, but call if it does not get better. Where can you learn more? Go to http://dio-mat.info/. Enter C101 in the search box to learn more about \"Cystoscopy: What to Expect at Home. \"  Current as of: November 11, 2016  Content Version: 11.3  © 8668-2945 [employer]. Care instructions adapted under license by [employer] (which disclaims liability or warranty for this information). If you have questions about a medical condition or this instruction, always ask your healthcare professional. [place] [address] any warranty or liability for your use of this information. DISCHARGE SUMMARY from Nurse    The following personal items are in your possession at time of discharge:    Dental Appliances: None  Visual Aid: Glasses     Home Medications: None  Jewelry: None  Clothing: Footwear, Shirt, Shorts, Undergarments, Hat  Other Valuables: Eyeglasses (will give to wife)             PATIENT INSTRUCTIONS:    After general anesthesia or intravenous sedation, for 24 hours or while taking prescription Narcotics:  · Limit your activities  · Do not drive and operate hazardous machinery  · Do not make important personal or business decisions  · Do  not drink alcoholic beverages  · If you have not urinated within 8 hours after discharge, please contact your surgeon on call.     Report the following to your surgeon:  · Excessive pain, swelling, redness or odor of or around the surgical area  · Temperature over 100.5  · Nausea and vomiting lasting longer than 4 hours or if unable to take medications  · Any signs of decreased circulation or nerve impairment to extremity: change in color, persistent  numbness, tingling, coldness or increase pain  · Any questions        What to do at Home:  Recommended activity: Activity as tolerated and no driving for today, Ambulate in house, No lifting, Driving, or Strenuous exercise until advised and No driving while on analgesics. If you experience any of the following symptoms: Fever, chills, uncontrolled pain, circulation changes, active bleeding or drainage, please follow up with Dr. Mahin Polo. *  Please give a list of your current medications to your Primary Care Provider. *  Please update this list whenever your medications are discontinued, doses are      changed, or new medications (including over-the-counter products) are added. *  Please carry medication information at all times in case of emergency situations. These are general instructions for a healthy lifestyle:    No smoking/ No tobacco products/ Avoid exposure to second hand smoke    Surgeon General's Warning:  Quitting smoking now greatly reduces serious risk to your health. Obesity, smoking, and sedentary lifestyle greatly increases your risk for illness    A healthy diet, regular physical exercise & weight monitoring are important for maintaining a healthy lifestyle    You may be retaining fluid if you have a history of heart failure or if you experience any of the following symptoms:  Weight gain of 3 pounds or more overnight or 5 pounds in a week, increased swelling in our hands or feet or shortness of breath while lying flat in bed. Please call your doctor as soon as you notice any of these symptoms; do not wait until your next office visit. Recognize signs and symptoms of STROKE:    F-face looks uneven    A-arms unable to move or move unevenly    S-speech slurred or non-existent    T-time-call 911 as soon as signs and symptoms begin-DO NOT go       Back to bed or wait to see if you get better-TIME IS BRAIN. Warning Signs of HEART ATTACK     Call 911 if you have these symptoms:   Chest discomfort.  Most heart attacks involve discomfort in the center of the chest that lasts more than a few minutes, or that goes away and comes back. It can feel like uncomfortable pressure, squeezing, fullness, or pain.  Discomfort in other areas of the upper body. Symptoms can include pain or discomfort in one or both arms, the back, neck, jaw, or stomach.  Shortness of breath with or without chest discomfort.  Other signs may include breaking out in a cold sweat, nausea, or lightheadedness. Don't wait more than five minutes to call 911 - MINUTES MATTER! Fast action can save your life. Calling 911 is almost always the fastest way to get lifesaving treatment. Emergency Medical Services staff can begin treatment when they arrive -- up to an hour sooner than if someone gets to the hospital by car. The discharge information has been reviewed with the patient and caregiver. The patient and caregiver verbalized understanding. Discharge medications reviewed with the patient and caregiver and appropriate educational materials and side effects teaching were provided.     Patient armband removed and shredded

## 2017-07-20 NOTE — INTERVAL H&P NOTE
H&P Update:  Moe Busby was seen and examined. History and physical has been reviewed. The patient has been examined.  There have been no significant clinical changes since the completion of the originally dated History and Physical.    Signed By: Yayo Hemphill MD     July 20, 2017 12:20 PM

## 2017-08-15 NOTE — OP NOTES
[address] [place] [address]  OPERATIVE REPORT    Name:  Alfredo Valdez  MR#:  [id number]  :  1940  Account #:  [de-identified]  Date of Adm:  2017  Date of Surgery:  2017      PREOPERATIVE DIAGNOSIS: Right ureteral calculus with  hydronephrosis and a possible filling defect in the left mid ureter. POSTOPERATIVE DIAGNOSIS: Passed right ureteral calculus,  tortuosity of the left ureter. No mass or stricture identified. PROCEDURES PERFORMED  1. Cystoscopy. 2. Right retrograde pyelogram.  3. Right ureteroscopy and stent placement. 4. Left retrograde pyelogram.  5. Left ureteroscopy and insertion of left double-J stent. SURGEON: Samina Delcid MD.    ANESTHESIA: General.    ESTIMATED BLOOD LOSS: None. SPECIMENS REMOVED: None. FINDINGS: Passed right ureteral stone with dilation of mid left ureter  consistent with CT findings. On ureteroscopy, there was noted to be a  tortuosity of the ureter, but the remainder of the anatomy was normal,  there were no tumors or masses. COMPLICATIONS: None. DESCRIPTION OF PROCEDURE: The patient brought in the  operating room, placed in supine position. After administration of  general anesthesia, he was placed in lithotomy position. The groin and  genitalia were prepped and draped in the usual sterile fashion. I began  on the patient's right side and identified the right ureteral orifice using a  21-Occitan cystoscope, I intubated the right ureteral orifice with a 5-  [place] open-ended catheter and performed a retrograde pyelogram  using Visipaque, 10 mL. There was no obvious filling defect in the  lower ureter, however, there was a small filling defect that floated up to  the kidney. It was difficult to tell if this was an air bubble or possibly the  small stone noted on CT scan.  So at this point, I placed a 0.038  Sensor wire through the open-ended catheter, removed the open-  ended catheter and then a dual-lumen catheter was used and a  second wire was placed up into the collecting system. Because I felt  that the filling defect could have been the stone that was seen on CAT  scan and this may have floated up to the kidney, I elected to use a  flexible cystoscope. I placed a flexible cystoscope over one of the  wires and passed this up into the collecting system on the right side. I  removed the wire and pyeloscopy was performed. No tumors or  masses were seen in the kidney, there were no stones identified. When removing the ureteroscope under direct vision, I did not  appreciate any abnormalities in the ureter. There was no evidence of  any residual stone. The wire remained in place. The patient most likely  passed the stone prior to this procedure. With the wire in place, I  backloaded the cystoscope and placed a 6-Ind[place]ian x 24 cm double-J  stent over the wire and a string was left attached to the wire and  brought out through the urethral meatus and secured to the penis. My  attention was then turned to the left side. The patient was noted on  CAT scan to have a possible filling defect versus stricture on CAT scan  in the mid ureter, could not rule out a possible neoplasm. I reviewed  these findings with the patient and his wife in the preoperative area  and informed them that I would perform a retrograde pyelogram in the  OR and if this did reveal any narrowing or any abnormality, I  would perform a ureteroscopy to make sure that there is no mass or  tumor and they agreed to this. so that my attention turned to the  patient's left side and I intubated the left ureteral orifice with a 5-Ind[place]ian  open-ended catheter and performed a retrograde pyelogram using  Visipaque, approximately 10 mL, and confirmed a narrowing in the  lower portion of the mid ureter. There was no other abnormality seen.  I  then used a dual-lumen catheter and placed a second wire and passed  the ureteroscope and passed a flexible ureteroscope up past the  abnormality and then removed the wire. I then performed ureteroscopy  with the flexible ureteroscope and removed the ureteroscope under  direct vision. There were no tumors or lesions or stricture noted in the  ureter. There was tortuosity and this was most likely what was seen on  CAT scan, I removed the ureteroscope and the wire remained in place. I then passed a 6 x 24 double-J stent over this wire, removed the wire  and had a good coil in the kidney and a good coil in the bladder. The  string remained attached to the stent and brought out through the  urethral meatus and secured to the penis as well. The patient's bladder  was emptied. He was then extubated and transferred to the recovery  room in stable condition.         Marley Goodson MD    ED / DLR  D:  08/15/2017   07:35  T:  08/15/2017   12:46  Job #:  [id number]

## 2018-05-30 ENCOUNTER — HOSPITAL ENCOUNTER (OUTPATIENT)
Dept: PREADMISSION TESTING | Age: 78
Discharge: HOME | End: 2018-05-30
Payer: MEDICARE

## 2018-05-30 LAB
ANION GAP SERPL CALC-SCNC: 6 MMOL/L (ref 3–18)
ATRIAL RATE: 67 BPM
BASOPHILS # BLD: 0.1 K/UL (ref 0–0.06)
BASOPHILS NFR BLD: 1 % (ref 0–2)
BUN SERPL-MCNC: 37 MG/DL (ref 7–18)
BUN/CREAT SERPL: 13 (ref 12–20)
CALCIUM SERPL-MCNC: 8.6 MG/DL (ref 8.5–10.1)
CALCULATED P AXIS, ECG09: 62 DEGREES
CALCULATED R AXIS, ECG10: 0 DEGREES
CALCULATED T AXIS, ECG11: 97 DEGREES
CHLORIDE SERPL-SCNC: 97 MMOL/L (ref 100–108)
CO2 SERPL-SCNC: 33 MMOL/L (ref 21–32)
CREAT SERPL-MCNC: 2.77 MG/DL (ref 0.6–1.3)
DIAGNOSIS, 93000: NORMAL
DIFFERENTIAL METHOD BLD: ABNORMAL
EOSINOPHIL # BLD: 0.3 K/UL (ref 0–0.4)
EOSINOPHIL NFR BLD: 4 % (ref 0–5)
ERYTHROCYTE [DISTWIDTH] IN BLOOD BY AUTOMATED COUNT: 13.6 % (ref 11.6–14.5)
EST. AVERAGE GLUCOSE BLD GHB EST-MCNC: 126 MG/DL
GLUCOSE SERPL-MCNC: 170 MG/DL (ref 74–99)
HBA1C MFR BLD: 6 % (ref 4.5–5.6)
HCT VFR BLD AUTO: 38 % (ref 36–48)
HGB BLD-MCNC: 13.1 G/DL (ref 13–16)
LYMPHOCYTES # BLD: 0.8 K/UL (ref 0.9–3.6)
LYMPHOCYTES NFR BLD: 14 % (ref 21–52)
MCH RBC QN AUTO: 35.1 PG (ref 24–34)
MCHC RBC AUTO-ENTMCNC: 34.5 G/DL (ref 31–37)
MCV RBC AUTO: 101.9 FL (ref 74–97)
MONOCYTES # BLD: 0.6 K/UL (ref 0.05–1.2)
MONOCYTES NFR BLD: 10 % (ref 3–10)
NEUTS SEG # BLD: 4 K/UL (ref 1.8–8)
NEUTS SEG NFR BLD: 71 % (ref 40–73)
P-R INTERVAL, ECG05: 224 MS
PLATELET # BLD AUTO: 123 K/UL (ref 135–420)
PMV BLD AUTO: 11.3 FL (ref 9.2–11.8)
POTASSIUM SERPL-SCNC: 3.6 MMOL/L (ref 3.5–5.5)
Q-T INTERVAL, ECG07: 406 MS
QRS DURATION, ECG06: 94 MS
QTC CALCULATION (BEZET), ECG08: 429 MS
RBC # BLD AUTO: 3.73 M/UL (ref 4.7–5.5)
SODIUM SERPL-SCNC: 136 MMOL/L (ref 136–145)
VENTRICULAR RATE, ECG03: 67 BPM
WBC # BLD AUTO: 5.7 K/UL (ref 4.6–13.2)

## 2018-05-30 PROCEDURE — 36415 COLL VENOUS BLD VENIPUNCTURE: CPT | Performed by: ANESTHESIOLOGY

## 2018-05-30 PROCEDURE — 80048 BASIC METABOLIC PNL TOTAL CA: CPT | Performed by: ANESTHESIOLOGY

## 2018-05-30 PROCEDURE — 93005 ELECTROCARDIOGRAM TRACING: CPT

## 2018-05-30 PROCEDURE — 83036 HEMOGLOBIN GLYCOSYLATED A1C: CPT | Performed by: ANESTHESIOLOGY

## 2018-05-30 PROCEDURE — 85025 COMPLETE CBC W/AUTO DIFF WBC: CPT | Performed by: ANESTHESIOLOGY

## 2018-06-06 ENCOUNTER — ANESTHESIA EVENT (OUTPATIENT)
Dept: SURGERY | Age: 78
End: 2018-06-06
Payer: MEDICARE

## 2018-06-07 ENCOUNTER — ANESTHESIA (OUTPATIENT)
Dept: SURGERY | Age: 78
End: 2018-06-07
Payer: MEDICARE

## 2018-06-07 ENCOUNTER — HOSPITAL ENCOUNTER (OUTPATIENT)
Dept: GENERAL RADIOLOGY | Age: 78
Discharge: HOME | End: 2018-06-07
Attending: UROLOGY
Payer: MEDICARE

## 2018-06-07 ENCOUNTER — HOSPITAL ENCOUNTER (OUTPATIENT)
Age: 78
Setting detail: OUTPATIENT SURGERY
Discharge: HOME | End: 2018-06-07
Attending: UROLOGY | Admitting: UROLOGY
Payer: MEDICARE

## 2018-06-07 VITALS
DIASTOLIC BLOOD PRESSURE: 60 MMHG | RESPIRATION RATE: 16 BRPM | HEART RATE: 61 BPM | OXYGEN SATURATION: 98 % | BODY MASS INDEX: 33.24 KG/M2 | SYSTOLIC BLOOD PRESSURE: 133 MMHG | HEIGHT: 69 IN | WEIGHT: 224.44 LBS | TEMPERATURE: 97.6 F

## 2018-06-07 DIAGNOSIS — N20.0 KIDNEY STONES: ICD-10-CM

## 2018-06-07 LAB
GLUCOSE BLD STRIP.AUTO-MCNC: 138 MG/DL (ref 70–110)
GLUCOSE BLD STRIP.AUTO-MCNC: 147 MG/DL (ref 70–110)

## 2018-06-07 PROCEDURE — 76010000138 HC OR TIME 0.5 TO 1 HR: Performed by: UROLOGY

## 2018-06-07 PROCEDURE — C1758 CATHETER, URETERAL: HCPCS | Performed by: UROLOGY

## 2018-06-07 PROCEDURE — 74011250636 HC RX REV CODE- 250/636: Performed by: ANESTHESIOLOGY

## 2018-06-07 PROCEDURE — C1894 INTRO/SHEATH, NON-LASER: HCPCS | Performed by: UROLOGY

## 2018-06-07 PROCEDURE — 74011250636 HC RX REV CODE- 250/636

## 2018-06-07 PROCEDURE — 74011250636 HC RX REV CODE- 250/636: Performed by: UROLOGY

## 2018-06-07 PROCEDURE — 76210000026 HC REC RM PH II 1 TO 1.5 HR: Performed by: UROLOGY

## 2018-06-07 PROCEDURE — 77030014023 HC SYR INFL LVEEN BSC -B: Performed by: UROLOGY

## 2018-06-07 PROCEDURE — 82962 GLUCOSE BLOOD TEST: CPT

## 2018-06-07 PROCEDURE — C2617 STENT, NON-COR, TEM W/O DEL: HCPCS | Performed by: UROLOGY

## 2018-06-07 PROCEDURE — 77030020782 HC GWN BAIR PAWS FLX 3M -B: Performed by: UROLOGY

## 2018-06-07 PROCEDURE — 82360 CALCULUS ASSAY QUANT: CPT | Performed by: UROLOGY

## 2018-06-07 PROCEDURE — 77030018836 HC SOL IRR NACL ICUM -A: Performed by: UROLOGY

## 2018-06-07 PROCEDURE — 74011000272 HC RX REV CODE- 272: Performed by: UROLOGY

## 2018-06-07 PROCEDURE — 74011636320 HC RX REV CODE- 636/320: Performed by: UROLOGY

## 2018-06-07 PROCEDURE — 77030010103 HC SEAL PRT ENDOSC OCOA -B: Performed by: UROLOGY

## 2018-06-07 PROCEDURE — 76060000032 HC ANESTHESIA 0.5 TO 1 HR: Performed by: UROLOGY

## 2018-06-07 PROCEDURE — 76210000006 HC OR PH I REC 0.5 TO 1 HR: Performed by: UROLOGY

## 2018-06-07 PROCEDURE — 77030038846 HC SCPE URETSCP LITHOVUE DISP BSC -H: Performed by: UROLOGY

## 2018-06-07 PROCEDURE — 74011000250 HC RX REV CODE- 250

## 2018-06-07 PROCEDURE — 77030005526 HC CATH URETH FOL 2W COVD –A: Performed by: UROLOGY

## 2018-06-07 PROCEDURE — 77030012508 HC MSK AIRWY LMA AMBU -A: Performed by: ANESTHESIOLOGY

## 2018-06-07 DEVICE — URETERAL STENT
Type: IMPLANTABLE DEVICE | Site: URETER | Status: FUNCTIONAL
Brand: POLARIS™ ULTRA

## 2018-06-07 RX ORDER — SODIUM CHLORIDE, SODIUM LACTATE, POTASSIUM CHLORIDE, CALCIUM CHLORIDE 600; 310; 30; 20 MG/100ML; MG/100ML; MG/100ML; MG/100ML
125 INJECTION, SOLUTION INTRAVENOUS CONTINUOUS
Status: DISCONTINUED | OUTPATIENT
Start: 2018-06-07 | End: 2018-06-07 | Stop reason: HOSPADM

## 2018-06-07 RX ORDER — LIDOCAINE HYDROCHLORIDE 20 MG/ML
INJECTION, SOLUTION EPIDURAL; INFILTRATION; INTRACAUDAL; PERINEURAL AS NEEDED
Status: DISCONTINUED | OUTPATIENT
Start: 2018-06-07 | End: 2018-06-07 | Stop reason: HOSPADM

## 2018-06-07 RX ORDER — SODIUM CHLORIDE 0.9 % (FLUSH) 0.9 %
5-10 SYRINGE (ML) INJECTION AS NEEDED
Status: CANCELLED | OUTPATIENT
Start: 2018-06-07

## 2018-06-07 RX ORDER — DEXTROSE 50 % IN WATER (D50W) INTRAVENOUS SYRINGE
25-50 AS NEEDED
Status: DISCONTINUED | OUTPATIENT
Start: 2018-06-07 | End: 2018-06-07 | Stop reason: HOSPADM

## 2018-06-07 RX ORDER — INSULIN LISPRO 100 [IU]/ML
INJECTION, SOLUTION INTRAVENOUS; SUBCUTANEOUS ONCE
Status: DISCONTINUED | OUTPATIENT
Start: 2018-06-07 | End: 2018-06-07 | Stop reason: HOSPADM

## 2018-06-07 RX ORDER — MIDAZOLAM HYDROCHLORIDE 1 MG/ML
INJECTION, SOLUTION INTRAMUSCULAR; INTRAVENOUS AS NEEDED
Status: DISCONTINUED | OUTPATIENT
Start: 2018-06-07 | End: 2018-06-07 | Stop reason: HOSPADM

## 2018-06-07 RX ORDER — LEVOFLOXACIN 5 MG/ML
500 INJECTION, SOLUTION INTRAVENOUS ONCE
Status: COMPLETED | OUTPATIENT
Start: 2018-06-07 | End: 2018-06-07

## 2018-06-07 RX ORDER — FENTANYL CITRATE 50 UG/ML
INJECTION, SOLUTION INTRAMUSCULAR; INTRAVENOUS AS NEEDED
Status: DISCONTINUED | OUTPATIENT
Start: 2018-06-07 | End: 2018-06-07 | Stop reason: HOSPADM

## 2018-06-07 RX ORDER — FENTANYL CITRATE 50 UG/ML
50 INJECTION, SOLUTION INTRAMUSCULAR; INTRAVENOUS
Status: DISCONTINUED | OUTPATIENT
Start: 2018-06-07 | End: 2018-06-07 | Stop reason: HOSPADM

## 2018-06-07 RX ORDER — SODIUM CHLORIDE 0.9 % (FLUSH) 0.9 %
5-10 SYRINGE (ML) INJECTION AS NEEDED
Status: DISCONTINUED | OUTPATIENT
Start: 2018-06-07 | End: 2018-06-07 | Stop reason: HOSPADM

## 2018-06-07 RX ORDER — SODIUM CHLORIDE 0.9 % (FLUSH) 0.9 %
5-10 SYRINGE (ML) INJECTION EVERY 8 HOURS
Status: CANCELLED | OUTPATIENT
Start: 2018-06-07

## 2018-06-07 RX ORDER — ONDANSETRON 2 MG/ML
INJECTION INTRAMUSCULAR; INTRAVENOUS AS NEEDED
Status: DISCONTINUED | OUTPATIENT
Start: 2018-06-07 | End: 2018-06-07 | Stop reason: HOSPADM

## 2018-06-07 RX ORDER — PROPOFOL 10 MG/ML
INJECTION, EMULSION INTRAVENOUS AS NEEDED
Status: DISCONTINUED | OUTPATIENT
Start: 2018-06-07 | End: 2018-06-07 | Stop reason: HOSPADM

## 2018-06-07 RX ADMIN — MIDAZOLAM HYDROCHLORIDE 2 MG: 1 INJECTION, SOLUTION INTRAMUSCULAR; INTRAVENOUS at 09:01

## 2018-06-07 RX ADMIN — FENTANYL CITRATE 25 MCG: 50 INJECTION, SOLUTION INTRAMUSCULAR; INTRAVENOUS at 09:32

## 2018-06-07 RX ADMIN — LEVOFLOXACIN 500 MG: 5 INJECTION, SOLUTION INTRAVENOUS at 09:01

## 2018-06-07 RX ADMIN — SODIUM CHLORIDE, SODIUM LACTATE, POTASSIUM CHLORIDE, AND CALCIUM CHLORIDE 125 ML/HR: 600; 310; 30; 20 INJECTION, SOLUTION INTRAVENOUS at 07:41

## 2018-06-07 RX ADMIN — FENTANYL CITRATE 25 MCG: 50 INJECTION, SOLUTION INTRAMUSCULAR; INTRAVENOUS at 09:28

## 2018-06-07 RX ADMIN — FENTANYL CITRATE 25 MCG: 50 INJECTION, SOLUTION INTRAMUSCULAR; INTRAVENOUS at 09:07

## 2018-06-07 RX ADMIN — PROPOFOL 80 MG: 10 INJECTION, EMULSION INTRAVENOUS at 09:09

## 2018-06-07 RX ADMIN — LIDOCAINE HYDROCHLORIDE 60 MG: 20 INJECTION, SOLUTION EPIDURAL; INFILTRATION; INTRACAUDAL; PERINEURAL at 09:09

## 2018-06-07 RX ADMIN — FENTANYL CITRATE 25 MCG: 50 INJECTION, SOLUTION INTRAMUSCULAR; INTRAVENOUS at 09:14

## 2018-06-07 RX ADMIN — ONDANSETRON 4 MG: 2 INJECTION INTRAMUSCULAR; INTRAVENOUS at 09:12

## 2018-06-07 RX ADMIN — SODIUM CHLORIDE, SODIUM LACTATE, POTASSIUM CHLORIDE, AND CALCIUM CHLORIDE: 600; 310; 30; 20 INJECTION, SOLUTION INTRAVENOUS at 09:23

## 2018-06-07 RX ADMIN — SODIUM CHLORIDE, SODIUM LACTATE, POTASSIUM CHLORIDE, AND CALCIUM CHLORIDE 125 ML/HR: 600; 310; 30; 20 INJECTION, SOLUTION INTRAVENOUS at 11:11

## 2018-06-07 NOTE — DISCHARGE INSTRUCTIONS
Manolo Barahona. Daniel Aguilar M.D. [facility]  [address] [facility], [facility]  Office: [phone]  Fax:    [phone]    PROCEDURE: Procedure(s):  CYSTOSCOPY, RIGHT RETROGRADE PYELOGRAM WITH INTERPRETATION, RIGHT URETEROSCOPY WITH DISPOSABLE SCOPE, LASER LITHOTRIPSY WITH HOLMIUM, STENT PLACEMENT WITH C-ARM, \"SPEC POP\"     Notify [facility] IMMEDIATELY if any of the following occur:     You are unable to urinate. Urgency to urinate is not uncommon.  You find yourself urinating small frequent amounts associated with severe lower abdominal discomfort.  Bright red blood with clots in the urine. Some reddish urine is not uncommon and should be treated with increasing the amount of fluids you drink.  Temperature above 101.5° and / or chills.  You are nauseous and / or vomiting and you cannot hold down any fluids.  Your pain is not controlled with the pain medication prescribed. Special Considerations:      Do not drive for at least 24 hours after the procedure and until you are no longer taking narcotic pain medication and you are able to move and react without hesitation. MEDICATIONS:  Pain   [x]  Norco®   []  Percocet® []  Dilaudid®    []  Tramadol   Antibiotics   []  Cipro   []  Keflex    [x] Levaquin   []  Bactrim DS®       Urination   []  Vesicare®   []  Flomax     Burning   [x]  Pyridium®   []  UribelTM     Nausea   []  Zofran®   []  Phenergan®     Miscellaneous   []           [x] Prescriptions Written on Chart    [] Prescriptions sent Electronically           Our office will call you tomorrow to schedule your first follow-up appointment. Please contact [place] [facility] [address] [facility] at [phone] or go to the nearest Emergency Department / Urgent Care facility for any other medical questions or concerns.     DISCHARGE SUMMARY from Nurse    PATIENT INSTRUCTIONS:    After general anesthesia or intravenous sedation, for 24 hours or while taking prescription Narcotics:  · Limit your activities  · Do not drive and operate hazardous machinery  · Do not make important personal or business decisions  · Do  not drink alcoholic beverages  · If you have not urinated within 8 hours after discharge, please contact your surgeon on call. Report the following to your surgeon:  · Excessive pain, swelling, redness or odor of or around the surgical area  · Temperature over 100.5  · Nausea and vomiting lasting longer than 4 hours or if unable to take medications  · Any signs of decreased circulation or nerve impairment to extremity: change in color, persistent  numbness, tingling, coldness or increase pain  · Any questions    What to do at Home:  Recommended activity: Activity as tolerated and no driving for today,     If you experience any of the following symptoms as above, please follow up with DR Silvano Covington at [phone]. *  Please give a list of your current medications to your Primary Care Provider. *  Please update this list whenever your medications are discontinued, doses are      changed, or new medications (including over-the-counter products) are added. *  Please carry medication information at all times in case of emergency situations. These are general instructions for a healthy lifestyle:    No smoking/ No tobacco products/ Avoid exposure to second hand smoke  Surgeon General's Warning:  Quitting smoking now greatly reduces serious risk to your health. Obesity, smoking, and sedentary lifestyle greatly increases your risk for illness    A healthy diet, regular physical exercise & weight monitoring are important for maintaining a healthy lifestyle    You may be retaining fluid if you have a history of heart failure or if you experience any of the following symptoms:  Weight gain of 3 pounds or more overnight or 5 pounds in a week, increased swelling in our hands or feet or shortness of breath while lying flat in bed.   Please call your doctor as soon as you notice any of these symptoms; do not wait until your next office visit. Recognize signs and symptoms of STROKE:    F-face looks uneven    A-arms unable to move or move unevenly    S-speech slurred or non-existent    T-time-call 911 as soon as signs and symptoms begin-DO NOT go       Back to bed or wait to see if you get better-TIME IS BRAIN. Warning Signs of HEART ATTACK     Call 911 if you have these symptoms:   Chest discomfort. Most heart attacks involve discomfort in the center of the chest that lasts more than a few minutes, or that goes away and comes back. It can feel like uncomfortable pressure, squeezing, fullness, or pain.  Discomfort in other areas of the upper body. Symptoms can include pain or discomfort in one or both arms, the back, neck, jaw, or stomach.  Shortness of breath with or without chest discomfort.  Other signs may include breaking out in a cold sweat, nausea, or lightheadedness. Don't wait more than five minutes to call 911 - MINUTES MATTER! Fast action can save your life. Calling 911 is almost always the fastest way to get lifesaving treatment. Emergency Medical Services staff can begin treatment when they arrive -- up to an hour sooner than if someone gets to the hospital by car. Patient armband removed and shredded  The discharge information has been reviewed with the patient and spouse. The patient and spouse verbalized understanding. Discharge medications reviewed with the patient and spouse and appropriate educational materials and side effects teaching were provided.   ___________________________________________________________________________________________________________________________________

## 2018-06-07 NOTE — INTERVAL H&P NOTE
H&P Update:  Jez Zayas was seen and examined. History and physical has been reviewed. The patient has been examined.  There have been no significant clinical changes since the completion of the originally dated History and Physical.    Signed By: Raquel Arenas MD     June 7, 2018 8:48 AM

## 2018-06-07 NOTE — H&P
A    Urology [address], [facility], [address] [place] [address]  phone: [phone]  fax: [phone]          Patient: Chance Jaimes  YOB: 1940  Date: 05/25/2018 8:00 AM   Visit Type: Office Visit      Assessment/Plan  # Detail Type Description    1. Assessment Calculus of kidney (N20.0). Patient Plan Pt has 2 stones in the lower pole of the right kidney. One is 14mm and one is 7mm. I reviewed tx options with him he's elected to undergo surgical intervention I will schedule him for a cysto, right ureteroscopy, laser litho and stent placement. All risks including pain, infection, bleeding, need for a stent were explained to the pt as well as a possible second procedure he understands and agrees. 2. 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 3. Assessment Other male erectile dysfunction (N52.8). 4. Assessment Asymptomatic microscopic hematuria (R31.21). This 77year old male presents for Kidney and/or Ureteral Stone, Hematuria and Erectile Dysfunction. History of Present Illness:  1. Kidney and/or Ureteral Stone      Onset was sudden. The problem is improving. Location of pain is bilateral flank. Recent ER visit on 08/26/2016 at [facility]. Pertinent history includes history of prior stone(s). Associated symptoms include hematuria and urinary frequency. Pertinent negatives include chills, constipation, diarrhea, fever, nausea, pain and vomiting. Additional information:  Pt w hx of stones. He has been having B pain L>R.  KUB reveals 4 stones overlying RIGHT kidney no obvious stones on LEFT. 5/25/18: Pt with 2 stones Lower pole RK. 14mm and 7mm stone. 2.  Hematuria      The patient presents with hematuria (microscopic). The problem began gradually. The problem is unchanged. He denies pain.  Pertinent history includes being at least 35years of age, history of kidney stones, prior prostate surgery and smoking but not history of bladder cancer, history of UTIs or family history of stones. Denies aggravating factors. He also complains of urinary frequency and urgency. He denies chills, fever, nausea, slow stream and vomiting. Reviewed today was a PSA taken on 02/06/2017 with findings of 0.28 ng/mL. CT Abdomen/Pelvis taken on 03/07/2017 with findings of R UVJ stone, inflammation LEFT ureter. Additional information: Pt noted to microscopic hematuria. His UA confirms blood. He denies any gross hematuria. He has hx of CLL. Stopped smoking 3 yrs ago. Smoked 1ppd. He completed his work up 6/28/17. He will need a urine cytology in 1 year  4/19/18  This was not reviewed today with the patient. 3.  Erectile Dysfunction      The symptoms began gradually, have been moderate and are worse. The patient is here today for a follow up visit. The patient states he does have difficultly attaining an erection and has difficulty maintaining an erection. Pertinent history includes history of diabetes and hypertension but not neurologic disease. Reviewed today was a Hematocrit taken on 01/12/2018 with findings of 36%. He also complains of difficulty achieving erections and difficulty maintaining erections but denies depression. Additional information: Pt w hx of low T, he is on IM injection this is wiorking well without side efects. His HCT is 36%. He will cont with 300mg every 3  weeks  He still has ED, used Viagra but only had headache no erections. He will cont current meds f/u 6 mo PSA HCT  4/19/18  This was not reviewed today with the patient.                   PAST MEDICAL/SURGICAL HISTORY   (Reviewed, updated)    Disease/disorder Onset Date Management Date Comments    03/31/2014 quadruple bypass      03/31/2014 Coronary bypass x4 with in situ left internal mammary to left anterior descending       Carotid Artery Surgery 2017      Greenlight and meatotomy 08/10/2012    Nephrolithiasis  Greenlight and meatotomy 08/10/2012      cysto, R ureterscopy, holmium, R nephroscopy holmium, E[facility] mult large bladder stones, Greenlight Prostate 02/09/2012    Nephrolithiasis  [facility] Right UVJ stone 2011      cysto, R ureterscopy, holmium, R nephroscopy holmi       DIAGNOSTICS HISTORY:  Test Ordered Interpretation Result completed   *Myocardial Perfusion Imaging Multiple Studies 03/24/2014 04/01/2014   EKG 03/24/2014 See Result  No change since last EKG. 03/24/2014   EYE EXAM & TREATMENT  No DM retinopathy See scanned report. 11/16/2015   EKG 01/20/2017 See Result  01/20/2017   CT Abdomen/Pelvis   R UVJ stone, inflammation LEFT ureter 03/07/2017     Test Ordered Ordering Comments Modifier   *Myocardial Perfusion Imaging Multiple Studies 03/24/2014     EKG 03/24/2014     EYE EXAM & TREATMENT      EKG 01/20/2017     CT Abdomen/Pelvis          PROBLEM LIST:  Problem Description Onset Date   Benign essential hypertension 06/16/2011   Pure hypercholesterolemia 05/27/2014   Chronic ischemic heart disease 05/27/2014   Testicular hypofunction 12/05/2012   Urinary bladder stone 02/21/2012   Ureteric stone 02/21/2012   High risk drug side effect 05/27/2014   Benign prostatic hypertrophy w/ urinary obstruction 02/14/2012   Thrombocytopenia    Type II diabetes mellitus uncontrolled 06/16/2011   Kidney stone 01/30/2012     Medication Reconciliation  Medications reconciled today.   Medication Reviewed  Adherence Medication Name Sig Desc Elsewhere Status   taking as directed Insulin Syringe MicroFine 1 mL 27 gauge x 5/8\" use daily with insulin N Verified   taking as directed Syringe 3 mL 21 gauge x 1\" use as directed N Verified   taking as directed aspirin 325 mg tablet take 1 tablet by oral route  every day N Verified   taking as directed Vitamin B-12 1,000 mcg tablet take 1 tablet by mouth once daily N Verified   taking as directed torsemide 100 mg tablet take 1 tablet by oral route in am and half tab in pm N Verified   taking as directed Coreg 12.5 mg tablet take 1 tablet by oral route 2 times every day with food N Verified   taking as directed testosterone cypionate 200 mg/mL intramuscular oil inject 1.5 milliliter by Intramuscular route  every 3 weeks N Verified   taking as directed Syringe 3 mL 21 gauge x 1\" UAD with testosterone cypionate injection N Verified   taking as directed Gleevec 400 mg tablet take 1 tablet by oral route  every day with meal(s) and a large glass of water N Verified   taking as directed magnesium oxide 400 mg tablet TAKE 1 TABLET BY ORAL ROUTE 2 TIMES EVERY DAY N Verified   taking as directed atorvastatin 40 mg tablet take 1 tablet by oral route  every day N Verified   taking as directed Norco 5 mg-325 mg tablet take 1-2 tablet by oral route  every 4 hours as needed for pain N Verified   taking as directed Percocet 5 mg-325 mg tablet take 1-2 tablet by oral route  every 4 hours as needed N Verified   taking as directed Zofran ODT 4 mg disintegrating tablet take 1 - 2 Tablet by oral route  every 8 hours and place on top of the tongue where it will dissolve, then swallow N Verified   taking as directed metolazone 2.5 mg tablet take 1 tablet by oral route  every day N Verified   taking as directed LANTUS U-100 INSULIN 10ML INJECT 23 UNITS SUBCUTANEOUSLY EVERY MORNING PER INSULIN PROTOCOL N Verified   taking as directed Keflex 500 mg capsule take 1 capsule by oral route  every 12 hours N Verified     Allergies  Ingredient Reaction Medication Name Comment   TAPE, OCCLUSIVE ADHESIVE blisters. CODEINE hives     IVP Dye Patient states he is not allergic to IVP Dye  Patient states he is not allergic to IVP Dye     (Reviewed, no changes.)  Family History:  (Reviewed, updated)  Relationship Family Member Name  Age at Death Condition Onset Age Cause of Death       Family history of Renal disease  N   Maternal grandmother    Diabetes mellitus  N         Social History:  (Reviewed, updated)  Tobacco use reviewed. Preferred language is [place].      The patient does not need an . MARITAL STATUS/FAMILY/SOCIAL SUPPORT  Currently . Tobacco use status: Ex-cigarette smoker. Smoking status: Former smoker. SMOKING STATUS  Use Status Type Smoking Status Usage Per Day Years Used Total Pack Years   yes Cigarette Former smoker 1 Packs 65.00 65.00       No passive smoke exposure. ALCOHOL  There is a history of alcohol use. Type: Scotch. 2 drinks consumed daily. CAFFEINE  The patient uses caffeine: coffee and soda. - 3 cups a day. Review of Systems  System Neg/Pos Details   Constitutional Negative Chills, fever and pain. ENMT Negative Ear infections and sore throat. Eyes Negative Blurred vision, double vision and eye pain. Respiratory Negative Asthma, chronic cough, dyspnea and wheezing. Cardio Negative Chest pain. GI Negative Constipation, decreased appetite, diarrhea, nausea and vomiting.  Positive Difficulty achieving erections, Difficulty maintaining erections, Hematuria, Urgency, Urinary frequency.  Negative Slow stream.   Endocrine Negative Cold intolerance, heat intolerance, increased thirst and weight loss. Neuro Negative Headache and tremors. Psych Negative Anxiety and depression. Integumentary Negative Itching skin and rash. MS Negative Back pain and joint pain. Hema/Lymph Negative Easy bleeding. Reproductive Negative Sexual dysfunction. Physical Exam  Exam Findings Details   Constitutional Normal Well developed. Neck Exam Normal Inspection - Normal.   Respiratory Normal Inspection - Normal.   Abdomen Normal No abdominal tenderness. Genitourinary Normal No CVA tenderness. No flank mass. No suprapubic tenderness. Extremity Normal No edema. Neurological Normal Alert and oriented to person, place and time. Cranial nerves intact. No motor or sensory deficits. Psychiatric Normal Oriented to time, place, person and situation. Appropriate mood and affect.          Medications (added, continued, or stopped today)  Started Medication Directions PRN Status PRN Reason Instruction Stopped   10/26/2016 aspirin 325 mg tablet take 1 tablet by oral route  every day N      04/02/2018 atorvastatin 40 mg tablet take 1 tablet by oral route  every day N   09/19/2021 12/18/2017 Coreg 12.5 mg tablet take 1 tablet by oral route 2 times every day with food N      03/07/2018 Gleevec 400 mg tablet take 1 tablet by oral route  every day with meal(s) and a large glass of water N      05/31/2016 Insulin Syringe MicroFine 1 mL 27 gauge x 5/8\" use daily with insulin N  E11.65    05/23/2018 Keflex 500 mg capsule take 1 capsule by oral route  every 12 hours N      04/27/2018 LANTUS U-100 INSULIN 10ML INJECT 23 UNITS SUBCUTANEOUSLY EVERY MORNING PER INSULIN PROTOCOL N      03/21/2018 magnesium oxide 400 mg tablet TAKE 1 TABLET BY ORAL ROUTE 2 TIMES EVERY DAY N      04/13/2018 metolazone 2.5 mg tablet take 1 tablet by oral route  every day N      04/06/2018 Norco 5 mg-325 mg tablet take 1-2 tablet by oral route  every 4 hours as needed for pain N      04/09/2018 Percocet 5 mg-325 mg tablet take 1-2 tablet by oral route  every 4 hours as needed N      09/02/2016 Syringe 3 mL 21 gauge x 1\" use as directed N cml     01/23/2018 Syringe 3 mL 21 gauge x 1\" UAD with testosterone cypionate injection N      01/23/2018 testosterone cypionate 200 mg/mL intramuscular oil inject 1.5 milliliter by Intramuscular route  every 3 weeks N      12/17/2017 torsemide 100 mg tablet take 1 tablet by oral route in am and half tab in pm N      06/28/2017 Vitamin B-12 1,000 mcg tablet take 1 tablet by mouth once daily N      04/09/2018 Zofran ODT 4 mg disintegrating tablet take 1 - 2 Tablet by oral route  every 8 hours and place on top of the tongue where it will dissolve, then swallow N      Completed by:        Aleksandra Quan  05/28/2018 5:02 PM   Document generated by:  Denver Appiah.  Suzette 05/28/2018 05:02 PM      -----------------------------------------------------------------------------------------------------------    Electronically signed by Jin Barry MD on 05/30/2018 06:50 AM

## 2018-06-07 NOTE — PERIOP NOTES
TRANSFER - OUT REPORT:    Verbal report given to RUDDY Mitchell (name) on Gabbie Wood  being transferred to phase 2 (unit) for routine progression of care       Report consisted of patients Situation, Background, Assessment and   Recommendations(SBAR). Information from the following report(s) SBAR, Kardex, OR Summary, Procedure Summary, Intake/Output, MAR and Recent Results was reviewed with the receiving nurse. Lines:   Peripheral IV 06/07/18 Right Forearm (Active)   Site Assessment Clean, dry, & intact 6/7/2018 10:35 AM   Phlebitis Assessment 0 6/7/2018 10:35 AM   Infiltration Assessment 0 6/7/2018 10:35 AM   Dressing Status Clean, dry, & intact 6/7/2018 10:35 AM   Dressing Type Transparent 6/7/2018 10:35 AM   Hub Color/Line Status Infusing 6/7/2018 10:35 AM   Action Taken Armboard 6/7/2018  9:19 AM        Opportunity for questions and clarification was provided.       Patient transported with:   [employer]

## 2018-06-07 NOTE — BRIEF OP NOTE
BRIEF OPERATIVE NOTE    Date of Procedure: 6/7/2018   Preoperative Diagnosis: CALCULUS OF KIDNEY  Postoperative Diagnosis: CALCULUS OF KIDNEY    Procedure(s):  CYSTOSCOPY, RIGHT RETROGRADE PYELOGRAM WITH INTERPRETATION, RIGHT URETEROSCOPY WITH DISPOSABLE SCOPE, URETEROSCOPIC STONE EXTRACTION, STENT PLACEMENT WITH C-ARM, \"SPEC POP\"   Surgeon(s) and Role:     * Jin Barry MD - Primary         Surgical Assistant: None    Surgical Staff:  Paola Augustin: David Head  Radiology Technician: Wolf Reyna  Scrub Tech-1: Donavon Rai  Scrub Tech-2: Kermit Berman  Event Time In   Incision Start 1246   Incision Close 0947     Anesthesia: General   Estimated Blood Loss: none  Specimens:   ID Type Source Tests Collected by Time Destination   1 : right kidney stones Other URETER, RIGHT  Jin Barry MD 6/7/2018 0940 Pathology      Findings: multiple stones in lower pole   Complications: none  Implants:   Implant Name Type Inv.  Item Serial No.  Lot No. LRB No. Used Action   Troy Castro DBL-PGTL [id number] Reggie Castro DBL-PGTL [id number] -- [facility] [id number] Right 1 Implanted

## 2018-06-07 NOTE — ANESTHESIA POSTPROCEDURE EVALUATION
Post-Anesthesia Evaluation and Assessment    Cardiovascular Function/Vital Signs  Visit Vitals    /46    Pulse (!) 53    Temp 36.4 °C (97.6 °F)    Resp 10    Ht 5' 8.5\" (1.74 m)    Wt 101.8 kg (224 lb 7 oz)    SpO2 97%    BMI 33.63 kg/m2       Patient is status post Procedure(s):  CYSTOSCOPY, RIGHT RETROGRADE PYELOGRAM WITH INTERPRETATION, RIGHT URETEROSCOPY WITH DISPOSABLE SCOPE, LASER LITHOTRIPSY WITH HOLMIUM, STENT PLACEMENT WITH C-ARM, \"SPEC POP\" . Nausea/Vomiting: Controlled. Postoperative hydration reviewed and adequate. Pain:  Pain Scale 1: Numeric (0 - 10) (06/07/18 1030)  Pain Intensity 1: 0 (06/07/18 1030)   Managed. Neurological Status:   Neuro (WDL): Within Defined Limits (06/07/18 1025)   At baseline. Mental Status and Level of Consciousness: Baseline and stable. Pulmonary Status:   O2 Device: Room air (06/07/18 1008)   Adequate oxygenation and airway patent. Complications related to anesthesia: None    Post-anesthesia assessment completed. No concerns. Patient has met all discharge requirements.     Signed By: Loki Flores MD

## 2018-06-07 NOTE — OP NOTES
OPERATIVE NOTE     Patient: Moe Busby MRN: [id number]  SSN: [id number]    YOB: 1940  Age: 77 y.o. Sex: male      Date of Procedure:  6/7/2018   Preoperative Diagnosis:  CALCULUS OF KIDNEY  Postoperative Diagnosis:  CALCULUS OF KIDNEY    Procedure:  Procedure(s):  CYSTOSCOPY, RIGHT RETROGRADE PYELOGRAM WITH INTERPRETATION, RIGHT URETEROSCOPY WITH DISPOSABLE SCOPE, LASER LITHOTRIPSY WITH HOLMIUM, STENT PLACEMENT WITH C-ARM, \"SPEC POP\"   Surgeon:  Surgeon(s) and Role:     * Yayo Hemphill MD - Primary  Anesthesia:  General     Findings:  Multiple stones in lower pole RIGHT kidney, NO ureteral stone that has been described on previous CT scans    Procedure Details: The patient was seen in the pre-operative area. The risks, benefits, complications, alternative treatment options, and expected outcomes were again discussed with the patient. The possibilities of reaction to medication, pain, infection, bleeding, major cardiovascular event, death, damage to surrounding structures were specifically addressed. Informed consent was then obtained. The site of surgery properly noted/marked. Patient brought in the operating room and placed   in supine position. After administration of general anesthesia, he was   placed in lithotomy position. Groin and genitalia prepped and draped in the   usual sterile fashion. I began with a 21-Tajik cystoscope. Cystourethroscopy was performed. I identified the RIGHT ureteral orifice. I was able to intubate the ureteral orifice with an open-ended   catheter and retrograde pyelogram was performed. There was no hydronephrosis or hydroureter identified. I then used a 0.038 sensor wire, intubated the open-ended catheter, placed this into the collecting system   Then using a dual-lumen catheter, a second wire was placed into the   collecting system and an 13 x 15 Navigator sheath was placed over one of   the wires. The sheath and the wire were removed.  So at this point, we had a   wire in place in the collecting system and the Navigator sheath. I then   used a flexible ureteroscope, traversed the ureter up to the collecting   system , I removed all  the stones which will be sent to pathology for analysis. At this point, on   fluoroscopy, there was no evidence of any residual stone. The wire remained   in place. I removed the ureteroscope and the Navigator sheath under direct   vision and using cystoscopic guidance, a 6 x 26 double-J stent was placed   over the wire into the collecting system. I removed the   wire. There was a good coil in the kidney and a good coil in the bladder. The bladder was emptied. The patient tolerated the procedure well. The patient was   transferred to recovery room in stable condition. Estimated Blood Loss:  Minimal  Specimens:    ID Type Source Tests Collected by Time Destination   1 : right kidney stones Other URETER, RIGHT  Joaquina Mathews MD 6/7/2018 0940 Pathology      Implants:    Implant Name Type Inv.  Item Serial No.  Lot No. LRB No. Used Kiesha Ellis DBL-PGTL [id number] José Miguel Ellis DBL-PGTL [id number] -- [facility] [id number] Right 1 Implanted       Complications:  None    Joaquina Mathews MD  6/7/2018  10:17 AM

## 2018-06-07 NOTE — IP AVS SNAPSHOT
Jenny Leiva 
 
 
 [address] [facility] [address] 
[phone] Patient: Nori David MRN: [id number] :1940 About your hospitalization You were admitted on:  2018 You last received care in the:  [facility] PHASE 2 RECOVERY You were discharged on:  2018 Why you were hospitalized Your primary diagnosis was:  Not on File Follow-up Information Follow up With Details Comments Contact Info Moe Felton [address] [facility] 
[phone] Myrtle Arriaga MD Follow up in 1 day(s) office to call patient tomorrow to schedule first post op visit [address] [facility] [address] [facility] 
[phone] Discharge Orders None A check dora indicates which time of day the medication should be taken. My Medications CHANGE how you take these medications Instructions Each Dose to Equal  
 Morning Noon Evening Bedtime  
 insulin glargine 100 unit/mL injection Commonly known as:  LANTUS What changed:   
- how much to take - when to take this Your last dose was: Your next dose is:    
   
   
 20 Units by SubCUTAneous route nightly. Indications: TYPE 2 DIABETES MELLITUS  
 20 Units CONTINUE taking these medications Instructions Each Dose to Equal  
 Morning Noon Evening Bedtime  
 aspirin 325 mg tablet Commonly known as:  ASPIRIN Your last dose was: Your next dose is: Take 81 mg by mouth daily. Indications: myocardial infarction prevention 81 mg  
    
   
   
   
  
 COREG 6.25 mg tablet Generic drug:  carvedilol Your last dose was: Your next dose is: Take 12.5 mg by mouth two (2) times a day. 12.5 mg  
    
   
   
   
  
 GLEEVEC 400 mg tablet Generic drug:  imatinib Your last dose was: Your next dose is: Take 400 mg by mouth daily. 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  
 400 mg  
    
   
   
   
  
 LIPITOR 80 mg tablet Generic drug:  atorvastatin Your last dose was: Your next dose is: Take 80 mg by mouth nightly. Indications: hypercholesterolemia 80 mg  
    
   
   
   
  
 losartan 100 mg tablet Commonly known as:  COZAAR Your last dose was: Your next dose is: Take 100 mg by mouth as needed. 100 mg  
    
   
   
   
  
 torsemide 100 mg tablet Commonly known as:  DEMADEX Your last dose was: Your next dose is: Take 100 mg by mouth daily. Indications: Edema 100 mg Discharge Instructions Benito Coronado. Glo Em M.D. [place] [address] [address] Office: [phone] Fax:    [phone] PROCEDURE: Procedure(s): 
CYSTOSCOPY, RIGHT RETROGRADE PYELOGRAM WITH INTERPRETATION, RIGHT URETEROSCOPY WITH DISPOSABLE SCOPE, LASER LITHOTRIPSY WITH HOLMIUM, STENT PLACEMENT WITH C-ARM, \"SPEC POP\" [facility] if any of the following occur: ? You are unable to urinate. Urgency to urinate is not uncommon. ? You find yourself urinating small frequent amounts associated with severe lower abdominal discomfort. ? Bright red blood with clots in the urine. Some reddish urine is not uncommon and should be treated with increasing the amount of fluids you drink. ? Temperature above 101.5° and / or chills. ? You are nauseous and / or vomiting and you cannot hold down any fluids. ? Your pain is not controlled with the pain medication prescribed. Special Considerations:  
 
? Do not drive for at least 24 hours after the procedure and until you are no longer taking narcotic pain medication and you are able to move and react without hesitation.  
 
 
MEDICATIONS: 
 Pain   [x]  Norco®   []  Percocet® []  Dilaudid®    []  Tramadol Antibiotics   []  Cipro   []  Keflex    [x] Levaquin   []  Bactrim DS® Urination   []  Vesicare®   []  Flomax Burning   [x]  Pyridium®   []  Henniker Chain Nausea   []  Zofran®   []  Phenergan® Miscellaneous   [] [x] Prescriptions Written on Chart 
 
[] Prescriptions sent Electronically Our office will call you tomorrow to schedule your first follow-up appointment. Please contact [place] [address] Urology at [phone] or go to the nearest Emergency Department / Urgent Care facility for any other medical questions or concerns. DISCHARGE SUMMARY from Nurse PATIENT INSTRUCTIONS: 
 
 
F-face looks uneven A-arms unable to move or move unevenly S-speech slurred or non-existent T-time-call 911 as soon as signs and symptoms begin-DO NOT go Back to bed or wait to see if you get better-TIME IS BRAIN. Warning Signs of HEART ATTACK Call 911 if you have these symptoms: 
? Chest discomfort. Most heart attacks involve discomfort in the center of the chest that lasts more than a few minutes, or that goes away and comes back. It can feel like uncomfortable pressure, squeezing, fullness, or pain. ? Discomfort in other areas of the upper body. Symptoms can include pain or discomfort in one or both arms, the back, neck, jaw, or stomach. ? Shortness of breath with or without chest discomfort. ? Other signs may include breaking out in a cold sweat, nausea, or lightheadedness. Don't wait more than five minutes to call [email or web address]! Fast action can save your life.  Calling 911 is almost always the fastest way to get lifesaving treatment. Emergency Medical Services staff can begin treatment when they arrive  up to an hour sooner than if someone gets to the hospital by car. Patient armband removed and shredded The discharge information has been reviewed with the patient and spouse. The patient and spouse verbalized understanding. Discharge medications reviewed with the patient and spouse and appropriate educational materials and side effects teaching were provided. ___________________________________________________________________________________________________________________________________ Introducing hospitals & HEALTH SERVICES! [employer] introduces Billdesk patient portal. Now you can access parts of your medical record, email your doctor's office, and request medication refills online. 1. In your internet browser, go to [email or web address] 2. Click on the First Time User? Click Here link in the Sign In box. You will see the New Member Sign Up page. 3. Enter your Billdesk Access Code exactly as it appears below. You will not need to use this code after youve completed the sign-up process. If you do not sign up before the expiration date, you must request a new code. · Billdesk Access Code: [email or web address] Expires: 8/28/2018  9:31 AM 
 
4. Enter the last four digits of your Social Security Number (xxxx) and Date of Birth (mm/dd/yyyy) as indicated and click Submit. You will be taken to the next sign-up page. 5. Create a Ewirelessgeart ID. This will be your Ewirelessgeart login ID and cannot be changed, so think of one that is secure and easy to remember. 6. Create a Ewirelessgeart password. You can change your password at any time. 7. Enter your Password Reset Question and Answer. This can be used at a later time if you forget your password. 8. Enter your e-mail address. You will receive e-mail notification when new information is available in [address] 9. Click Sign Up. You can now view and download portions of your medical record. 10. Click the Download Summary menu link to download a portable copy of your medical information. If you have questions, please visit the Frequently Asked Questions section of the At[employer]ot website. Remember, Prifloat is NOT to be used for urgent needs. For medical emergencies, dial 911. Now available from your iPhone and Android! Introducing Juan Zapien As a [facility] patient, I wanted to make you aware of our electronic visit tool called Juan Zapien. VictorHubHuman 24/7 allows you to connect within minutes with a medical provider 24 hours a day, seven days a week via a mobile device or tablet or logging into a secure website from your computer. You can access Juan Zapien from anywhere in the [place]. A virtual visit might be right for you when you have a simple condition and feel like you just dont want to get out of bed, or cant get away from work for an appointment, when your regular [facility] provider is not available (evenings, weekends or holidays), or when youre out of town and need minor care. Electronic visits cost only $49 and if the [employer] [facility] 24/7 provider determines a prescription is needed to treat your condition, one can be electronically transmitted to a nearby pharmacy*. Please take a moment to enroll today if you have not already done so. The enrollment process is free and takes just a few minutes. To enroll, please download the [employer] 24/7 sherin to your tablet or phone, or visit [email or web address] to enroll on your computer. And, as an [address] [place] [address] patient with a "[employer] account, the results of your visits will be scanned into your electronic medical record and your primary care provider will be able to view the scanned results.    
We urge you to continue to see your regular [facility] provider for your ongoing medical care. And while your primary care provider may not be the one available when you seek a Juan Palmerarlenefin virtual visit, the peace of mind you get from getting a real diagnosis real time can be priceless. For more information on Juan Palmerarlenefin, view our Frequently Asked Questions (FAQs) at [email or web address]. Sincerely, 
 
Roque Wells MD 
Chief Medical Officer [employer] *:  certain medications cannot be prescribed via Juan Palmertayler Providers Seen During Your Hospitalization Provider Specialty Primary office phone Stacie Stewart MD Urology [phone] Your Primary Care Physician (PCP) Primary Care Physician Office Phone Office Fax Gabriel Stroud [phone] You are allergic to the following Allergen Reactions Adhesive Tape-Silicones Other (comments) Raw skin Codeine Nausea and Vomiting Iodinated Contrast- Oral And Iv Dye Rash  
 patient states this was years ago - he states that he can tolerate the newer agents Recent Documentation Height Weight BMI Smoking Status 1.74 m 101.8 kg 33.63 kg/m2 Former Smoker Emergency Contacts Name Discharge Info Relation Home Work Mobile [address] CAREGIVER [3] Spouse [3]   [phone] Patient Belongings The following personal items are in your possession at time of discharge: 
  Dental Appliances: None                Clothing: Pants, Shirt, Footwear (with Jackelyn Lunch)    Other Valuables: Wallet, Other (comment) (and foot meter  with Jackelyn Lunch) Please provide this summary of care documentation to your next provider. Signatures-by signing, you are acknowledging that this After Visit Summary has been reviewed with you and you have received a copy. Patient Signature:  ____________________________________________________________ Date:  ____________________________________________________________  
  
Edrie Gunnels Provider Signature:  ____________________________________________________________ Date:  ____________________________________________________________

## 2018-06-07 NOTE — ANESTHESIA PREPROCEDURE EVALUATION
Anesthetic History     PONV          Review of Systems / Medical History  Patient summary reviewed, nursing notes reviewed and pertinent labs reviewed    Pulmonary  Within defined limits                 Neuro/Psych       CVA  TIA     Cardiovascular    Hypertension        Dysrhythmias : atrial fibrillation  CAD    Exercise tolerance: >4 METS     GI/Hepatic/Renal  Within defined limits              Endo/Other    Diabetes         Other Findings              Physical Exam    Airway  Mallampati: III  TM Distance: 4 - 6 cm  Neck ROM: decreased range of motion        Cardiovascular  Regular rate and rhythm,  S1 and S2 normal,  no murmur, click, rub, or gallop  Rhythm: regular  Rate: normal         Dental         Pulmonary  Breath sounds clear to auscultation               Abdominal  GI exam deferred       Other Findings            Anesthetic Plan    ASA: 3  Anesthesia type: general          Induction: Intravenous  Anesthetic plan and risks discussed with: Patient and Spouse      Patient aware of his increased cardiac risk periop

## 2018-06-07 NOTE — PERIOP NOTES
Reviewed PTA medication list with patient/caregiver and patient/caregiver denies any additional medications.  Patient admits to having a responsible adult care for them for at least 24 hours after surgery.     Dual skin assessment completed by Mariam Wagoner RN and Jasmina Alcantara RN

## 2018-06-15 LAB
CA PHOS MFR STONE: 3 %
COLOR STONE: NORMAL
COM MFR STONE: 97 %
COMMENT, 519994: NORMAL
COMPOSITION, 120440: NORMAL
DISCLAIMER, STO32L: NORMAL
NIDUS STONE QL: NORMAL
SIZE STONE: NORMAL MM
WT STONE: 91.3 MG

## 2018-08-10 ENCOUNTER — APPOINTMENT (OUTPATIENT)
Dept: GENERAL RADIOLOGY | Age: 78
End: 2018-08-10
Attending: EMERGENCY MEDICINE
Payer: MEDICARE

## 2018-08-10 ENCOUNTER — APPOINTMENT (OUTPATIENT)
Dept: NUCLEAR MEDICINE | Age: 78
End: 2018-08-10
Attending: HOSPITALIST
Payer: MEDICARE

## 2018-08-10 ENCOUNTER — HOSPITAL ENCOUNTER (OUTPATIENT)
Age: 78
Setting detail: OBSERVATION
Discharge: HOME | End: 2018-08-11
Attending: INTERNAL MEDICINE | Admitting: HOSPITALIST
Payer: MEDICARE

## 2018-08-10 ENCOUNTER — APPOINTMENT (OUTPATIENT)
Dept: NON INVASIVE DIAGNOSTICS | Age: 78
End: 2018-08-10
Attending: HOSPITALIST
Payer: MEDICARE

## 2018-08-10 DIAGNOSIS — R07.9 CHEST PAIN, UNSPECIFIED TYPE: ICD-10-CM

## 2018-08-10 DIAGNOSIS — I20.0 UNSTABLE ANGINA (HCC): Primary | ICD-10-CM

## 2018-08-10 PROBLEM — N18.30 STAGE 3 CHRONIC KIDNEY DISEASE (HCC): Status: ACTIVE | Noted: 2018-08-10

## 2018-08-10 LAB
ALBUMIN SERPL-MCNC: 3.2 G/DL (ref 3.4–5)
ALBUMIN/GLOB SERPL: 0.9 {RATIO} (ref 0.8–1.7)
ALP SERPL-CCNC: 64 U/L (ref 45–117)
ALT SERPL-CCNC: 24 U/L (ref 16–61)
ANION GAP SERPL CALC-SCNC: 3 MMOL/L (ref 3–18)
APTT PPP: 29.2 SEC (ref 23–36.4)
APTT PPP: 81.2 SEC (ref 23–36.4)
AST SERPL-CCNC: 31 U/L (ref 15–37)
BASOPHILS # BLD: 0.1 K/UL (ref 0–0.1)
BASOPHILS NFR BLD: 1 % (ref 0–2)
BILIRUB SERPL-MCNC: 0.5 MG/DL (ref 0.2–1)
BNP SERPL-MCNC: 932 PG/ML (ref 0–1800)
BUN SERPL-MCNC: 28 MG/DL (ref 7–18)
BUN/CREAT SERPL: 16 (ref 12–20)
CALCIUM SERPL-MCNC: 8.4 MG/DL (ref 8.5–10.1)
CHLORIDE SERPL-SCNC: 105 MMOL/L (ref 100–108)
CK MB CFR SERPL CALC: 1.4 % (ref 0–4)
CK MB SERPL-MCNC: 3.1 NG/ML (ref 5–25)
CK MB SERPL-MCNC: 3.6 NG/ML (ref 5–25)
CK MB SERPL-MCNC: 4 NG/ML (ref 5–25)
CK SERPL-CCNC: 225 U/L (ref 39–308)
CK SERPL-CCNC: 252 U/L (ref 39–308)
CK SERPL-CCNC: 286 U/L (ref 39–308)
CO2 SERPL-SCNC: 31 MMOL/L (ref 21–32)
CREAT SERPL-MCNC: 1.75 MG/DL (ref 0.6–1.3)
DIFFERENTIAL METHOD BLD: ABNORMAL
ECHO AO ASC DIAM: 3.53 CM
ECHO AO ROOT DIAM: 4.03 CM
ECHO AV AREA PEAK VELOCITY: 3.7 CM2
ECHO AV AREA VTI: 3.8 CM2
ECHO AV AREA/BSA PEAK VELOCITY: 1.7 CM2/M2
ECHO AV AREA/BSA VTI: 1.7 CM2/M2
ECHO AV CUSP MM: 2.19 CM
ECHO AV MEAN GRADIENT: 2.5 MMHG
ECHO AV PEAK GRADIENT: 3.9 MMHG
ECHO AV PEAK VELOCITY: 98.75 CM/S
ECHO AV VTI: 24.2 CM
ECHO IVC PROX: 2.12 CM
ECHO LA MAJOR AXIS: 5.17 CM
ECHO LA VOL 2C: 123.84 ML (ref 18–58)
ECHO LA VOL 4C: 124.27 ML (ref 18–58)
ECHO LA VOL BP: 142.46 ML (ref 18–58)
ECHO LA VOL/BSA BIPLANE: 65.08 ML/M2
ECHO LA VOLUME INDEX A2C: 56.58 ML/M2
ECHO LA VOLUME INDEX A4C: 56.77 ML/M2
ECHO LV E' LATERAL VELOCITY: 0.06 CM/S
ECHO LV E' SEPTAL VELOCITY: 0.04 CM/S
ECHO LV EDV A2C: 196.7 ML
ECHO LV EDV A4C: 200.6 ML
ECHO LV EDV BP: 201.6 ML (ref 67–155)
ECHO LV EDV INDEX A4C: 91.6 ML/M2
ECHO LV EDV INDEX BP: 92.1 ML/M2
ECHO LV EDV NDEX A2C: 89.9 ML/M2
ECHO LV EJECTION FRACTION A2C: 42 %
ECHO LV EJECTION FRACTION A4C: 38 %
ECHO LV EJECTION FRACTION BIPLANE: 39.6 % (ref 55–100)
ECHO LV ESV A2C: 115 ML
ECHO LV ESV A4C: 125.1 ML
ECHO LV ESV BP: 121.8 ML (ref 22–58)
ECHO LV ESV INDEX A2C: 52.5 ML/M2
ECHO LV ESV INDEX A4C: 57.2 ML/M2
ECHO LV ESV INDEX BP: 55.6 ML/M2
ECHO LV INTERNAL DIMENSION DIASTOLIC: 5.52 CM (ref 4.2–5.9)
ECHO LV INTERNAL DIMENSION SYSTOLIC: 4.6 CM
ECHO LV IVSD: 0.95 CM (ref 0.6–1)
ECHO LV MASS 2D: 253.3 G (ref 88–224)
ECHO LV MASS INDEX 2D: 115.7 G/M2
ECHO LV POSTERIOR WALL DIASTOLIC: 1.04 CM (ref 0.6–1)
ECHO LVOT DIAM: 2.33 CM
ECHO LVOT PEAK GRADIENT: 3 MMHG
ECHO LVOT PEAK VELOCITY: 86.64 CM/S
ECHO LVOT VTI: 21.59 CM
ECHO MV A VELOCITY: 50.65 CM/S
ECHO MV AREA PHT: 3.5 CM2
ECHO MV E DECELERATION TIME (DT): 216.7 MS
ECHO MV E VELOCITY: 0.75 CM/S
ECHO MV E/A RATIO: 0.01
ECHO MV E/E' LATERAL: 12.5
ECHO MV E/E' RATIO (AVERAGED): 15.63
ECHO MV E/E' SEPTAL: 18.75
ECHO MV PRESSURE HALF TIME (PHT): 62.8 MS
ECHO RA AREA 4C: 21 CM2
ECHO RV TAPSE: 2.9 CM (ref 1.5–2)
EOSINOPHIL # BLD: 0.3 K/UL (ref 0–0.4)
EOSINOPHIL NFR BLD: 6 % (ref 0–5)
ERYTHROCYTE [DISTWIDTH] IN BLOOD BY AUTOMATED COUNT: 13.9 % (ref 11.6–14.5)
EST. AVERAGE GLUCOSE BLD GHB EST-MCNC: 131 MG/DL
GLOBULIN SER CALC-MCNC: 3.4 G/DL (ref 2–4)
GLUCOSE BLD STRIP.AUTO-MCNC: 152 MG/DL (ref 70–110)
GLUCOSE BLD STRIP.AUTO-MCNC: 164 MG/DL (ref 70–110)
GLUCOSE SERPL-MCNC: 127 MG/DL (ref 74–99)
HBA1C MFR BLD: 6.2 % (ref 4.5–5.6)
HCT VFR BLD AUTO: 31.3 % (ref 36–48)
HGB BLD-MCNC: 10.7 G/DL (ref 13–16)
LIPASE SERPL-CCNC: 330 U/L (ref 73–393)
LYMPHOCYTES # BLD: 0.6 K/UL (ref 0.9–3.6)
LYMPHOCYTES NFR BLD: 14 % (ref 21–52)
MCH RBC QN AUTO: 34.4 PG (ref 24–34)
MCHC RBC AUTO-ENTMCNC: 34.2 G/DL (ref 31–37)
MCV RBC AUTO: 100.6 FL (ref 74–97)
MONOCYTES # BLD: 0.6 K/UL (ref 0.05–1.2)
MONOCYTES NFR BLD: 12 % (ref 3–10)
NEUTS SEG # BLD: 2.9 K/UL (ref 1.8–8)
NEUTS SEG NFR BLD: 67 % (ref 40–73)
PLATELET # BLD AUTO: 114 K/UL (ref 135–420)
PMV BLD AUTO: 10.8 FL (ref 9.2–11.8)
POTASSIUM SERPL-SCNC: 3.8 MMOL/L (ref 3.5–5.5)
PROT SERPL-MCNC: 6.6 G/DL (ref 6.4–8.2)
RBC # BLD AUTO: 3.11 M/UL (ref 4.7–5.5)
SODIUM SERPL-SCNC: 139 MMOL/L (ref 136–145)
TROPONIN I SERPL-MCNC: 0.02 NG/ML (ref 0–0.06)
TROPONIN I SERPL-MCNC: 0.03 NG/ML (ref 0–0.06)
TROPONIN I SERPL-MCNC: 0.03 NG/ML (ref 0–0.06)
WBC # BLD AUTO: 4.4 K/UL (ref 4.6–13.2)

## 2018-08-10 PROCEDURE — C8929 TTE W OR WO FOL WCON,DOPPLER: HCPCS

## 2018-08-10 PROCEDURE — 85025 COMPLETE CBC W/AUTO DIFF WBC: CPT | Performed by: EMERGENCY MEDICINE

## 2018-08-10 PROCEDURE — 83880 ASSAY OF NATRIURETIC PEPTIDE: CPT | Performed by: EMERGENCY MEDICINE

## 2018-08-10 PROCEDURE — 96365 THER/PROPH/DIAG IV INF INIT: CPT

## 2018-08-10 PROCEDURE — 74011250637 HC RX REV CODE- 250/637: Performed by: INTERNAL MEDICINE

## 2018-08-10 PROCEDURE — 85730 THROMBOPLASTIN TIME PARTIAL: CPT | Performed by: INTERNAL MEDICINE

## 2018-08-10 PROCEDURE — 82962 GLUCOSE BLOOD TEST: CPT

## 2018-08-10 PROCEDURE — 80053 COMPREHEN METABOLIC PANEL: CPT | Performed by: EMERGENCY MEDICINE

## 2018-08-10 PROCEDURE — 96375 TX/PRO/DX INJ NEW DRUG ADDON: CPT

## 2018-08-10 PROCEDURE — 71045 X-RAY EXAM CHEST 1 VIEW: CPT

## 2018-08-10 PROCEDURE — 99285 EMERGENCY DEPT VISIT HI MDM: CPT

## 2018-08-10 PROCEDURE — 94761 N-INVAS EAR/PLS OXIMETRY MLT: CPT

## 2018-08-10 PROCEDURE — 74011250637 HC RX REV CODE- 250/637: Performed by: HOSPITALIST

## 2018-08-10 PROCEDURE — 93005 ELECTROCARDIOGRAM TRACING: CPT

## 2018-08-10 PROCEDURE — 83036 HEMOGLOBIN GLYCOSYLATED A1C: CPT | Performed by: HOSPITALIST

## 2018-08-10 PROCEDURE — 74011250636 HC RX REV CODE- 250/636: Performed by: INTERNAL MEDICINE

## 2018-08-10 PROCEDURE — 99218 HC RM OBSERVATION: CPT

## 2018-08-10 PROCEDURE — 74011636637 HC RX REV CODE- 636/637: Performed by: HOSPITALIST

## 2018-08-10 PROCEDURE — 83690 ASSAY OF LIPASE: CPT | Performed by: INTERNAL MEDICINE

## 2018-08-10 PROCEDURE — 36415 COLL VENOUS BLD VENIPUNCTURE: CPT | Performed by: HOSPITALIST

## 2018-08-10 PROCEDURE — A9567 TECHNETIUM TC-99M AEROSOL: HCPCS

## 2018-08-10 PROCEDURE — 74011250636 HC RX REV CODE- 250/636: Performed by: HOSPITALIST

## 2018-08-10 PROCEDURE — 82553 CREATINE MB FRACTION: CPT | Performed by: EMERGENCY MEDICINE

## 2018-08-10 PROCEDURE — 85730 THROMBOPLASTIN TIME PARTIAL: CPT | Performed by: HOSPITALIST

## 2018-08-10 PROCEDURE — 96366 THER/PROPH/DIAG IV INF ADDON: CPT

## 2018-08-10 RX ORDER — DOCUSATE SODIUM 100 MG/1
100 CAPSULE, LIQUID FILLED ORAL 2 TIMES DAILY
Status: DISCONTINUED | OUTPATIENT
Start: 2018-08-10 | End: 2018-08-11 | Stop reason: HOSPADM

## 2018-08-10 RX ORDER — NALOXONE HYDROCHLORIDE 0.4 MG/ML
0.4 INJECTION, SOLUTION INTRAMUSCULAR; INTRAVENOUS; SUBCUTANEOUS AS NEEDED
Status: DISCONTINUED | OUTPATIENT
Start: 2018-08-10 | End: 2018-08-11 | Stop reason: HOSPADM

## 2018-08-10 RX ORDER — GUAIFENESIN 100 MG/5ML
81 LIQUID (ML) ORAL DAILY
COMMUNITY
End: 2018-09-07

## 2018-08-10 RX ORDER — ATORVASTATIN CALCIUM 20 MG/1
40 TABLET, FILM COATED ORAL DAILY
Status: DISCONTINUED | OUTPATIENT
Start: 2018-08-10 | End: 2018-08-11 | Stop reason: HOSPADM

## 2018-08-10 RX ORDER — NITROGLYCERIN 0.4 MG/1
0.4 TABLET SUBLINGUAL
Status: COMPLETED | OUTPATIENT
Start: 2018-08-10 | End: 2018-08-10

## 2018-08-10 RX ORDER — DIPHENHYDRAMINE HYDROCHLORIDE 50 MG/ML
12.5 INJECTION, SOLUTION INTRAMUSCULAR; INTRAVENOUS
Status: DISCONTINUED | OUTPATIENT
Start: 2018-08-10 | End: 2018-08-11 | Stop reason: HOSPADM

## 2018-08-10 RX ORDER — GUAIFENESIN 100 MG/5ML
81 LIQUID (ML) ORAL DAILY
Status: DISCONTINUED | OUTPATIENT
Start: 2018-08-10 | End: 2018-08-11 | Stop reason: HOSPADM

## 2018-08-10 RX ORDER — HEPARIN SODIUM 1000 [USP'U]/ML
37.5 INJECTION, SOLUTION INTRAVENOUS; SUBCUTANEOUS ONCE
Status: COMPLETED | OUTPATIENT
Start: 2018-08-10 | End: 2018-08-10

## 2018-08-10 RX ORDER — HEPARIN SODIUM 10000 [USP'U]/100ML
9.4-25 INJECTION, SOLUTION INTRAVENOUS
Status: DISCONTINUED | OUTPATIENT
Start: 2018-08-10 | End: 2018-08-11

## 2018-08-10 RX ORDER — MAGNESIUM SULFATE 100 %
4 CRYSTALS MISCELLANEOUS AS NEEDED
Status: DISCONTINUED | OUTPATIENT
Start: 2018-08-10 | End: 2018-08-11 | Stop reason: HOSPADM

## 2018-08-10 RX ORDER — INSULIN GLARGINE 100 [IU]/ML
10 INJECTION, SOLUTION SUBCUTANEOUS DAILY
Status: DISCONTINUED | OUTPATIENT
Start: 2018-08-10 | End: 2018-08-11 | Stop reason: HOSPADM

## 2018-08-10 RX ORDER — IMATINIB MESYLATE 400 MG/1
400 TABLET, FILM COATED ORAL DAILY
Status: DISCONTINUED | OUTPATIENT
Start: 2018-08-10 | End: 2018-08-11 | Stop reason: HOSPADM

## 2018-08-10 RX ORDER — INSULIN LISPRO 100 [IU]/ML
INJECTION, SOLUTION INTRAVENOUS; SUBCUTANEOUS
Status: DISCONTINUED | OUTPATIENT
Start: 2018-08-10 | End: 2018-08-11 | Stop reason: HOSPADM

## 2018-08-10 RX ORDER — DEXTROSE 50 % IN WATER (D50W) INTRAVENOUS SYRINGE
25-50 AS NEEDED
Status: DISCONTINUED | OUTPATIENT
Start: 2018-08-10 | End: 2018-08-11 | Stop reason: HOSPADM

## 2018-08-10 RX ORDER — ISOSORBIDE MONONITRATE 30 MG/1
30 TABLET, EXTENDED RELEASE ORAL DAILY
Status: DISCONTINUED | OUTPATIENT
Start: 2018-08-10 | End: 2018-08-11 | Stop reason: HOSPADM

## 2018-08-10 RX ORDER — ATORVASTATIN CALCIUM 40 MG/1
40 TABLET, FILM COATED ORAL DAILY
COMMUNITY

## 2018-08-10 RX ORDER — ACETAMINOPHEN 325 MG/1
650 TABLET ORAL
Status: DISCONTINUED | OUTPATIENT
Start: 2018-08-10 | End: 2018-08-11 | Stop reason: HOSPADM

## 2018-08-10 RX ORDER — ONDANSETRON 2 MG/ML
4 INJECTION INTRAMUSCULAR; INTRAVENOUS
Status: DISCONTINUED | OUTPATIENT
Start: 2018-08-10 | End: 2018-08-11 | Stop reason: HOSPADM

## 2018-08-10 RX ORDER — ASPIRIN 325 MG
325 TABLET ORAL
Status: DISCONTINUED | OUTPATIENT
Start: 2018-08-10 | End: 2018-08-10

## 2018-08-10 RX ORDER — HEPARIN SODIUM 10000 [USP'U]/100ML
12-25 INJECTION, SOLUTION INTRAVENOUS
Status: DISCONTINUED | OUTPATIENT
Start: 2018-08-10 | End: 2018-08-10 | Stop reason: SDUPTHER

## 2018-08-10 RX ORDER — NITROGLYCERIN 0.4 MG/1
0.4 TABLET SUBLINGUAL AS NEEDED
Status: DISCONTINUED | OUTPATIENT
Start: 2018-08-10 | End: 2018-08-11 | Stop reason: HOSPADM

## 2018-08-10 RX ORDER — LANOLIN ALCOHOL/MO/W.PET/CERES
400 CREAM (GRAM) TOPICAL DAILY
COMMUNITY
End: 2020-12-12

## 2018-08-10 RX ORDER — CARVEDILOL 12.5 MG/1
12.5 TABLET ORAL 2 TIMES DAILY
Status: DISCONTINUED | OUTPATIENT
Start: 2018-08-10 | End: 2018-08-11 | Stop reason: HOSPADM

## 2018-08-10 RX ORDER — MORPHINE SULFATE 2 MG/ML
1 INJECTION, SOLUTION INTRAMUSCULAR; INTRAVENOUS
Status: DISCONTINUED | OUTPATIENT
Start: 2018-08-10 | End: 2018-08-11 | Stop reason: HOSPADM

## 2018-08-10 RX ADMIN — INSULIN GLARGINE 10 UNITS: 100 INJECTION, SOLUTION SUBCUTANEOUS at 11:58

## 2018-08-10 RX ADMIN — ATORVASTATIN CALCIUM 40 MG: 20 TABLET, FILM COATED ORAL at 11:57

## 2018-08-10 RX ADMIN — DOCUSATE SODIUM 100 MG: 100 CAPSULE, LIQUID FILLED ORAL at 20:15

## 2018-08-10 RX ADMIN — DOCUSATE SODIUM 100 MG: 100 CAPSULE, LIQUID FILLED ORAL at 11:57

## 2018-08-10 RX ADMIN — ISOSORBIDE MONONITRATE 30 MG: 30 TABLET, EXTENDED RELEASE ORAL at 18:54

## 2018-08-10 RX ADMIN — INSULIN LISPRO 2 UNITS: 100 INJECTION, SOLUTION INTRAVENOUS; SUBCUTANEOUS at 22:13

## 2018-08-10 RX ADMIN — PERFLUTREN 1 ML: 6.52 INJECTION, SUSPENSION INTRAVENOUS at 13:49

## 2018-08-10 RX ADMIN — HEPARIN SODIUM AND DEXTROSE 9.74 UNITS/KG/HR: 10000; 5 INJECTION INTRAVENOUS at 11:09

## 2018-08-10 RX ADMIN — HEPARIN SODIUM 4000 UNITS: 1000 INJECTION, SOLUTION INTRAVENOUS; SUBCUTANEOUS at 11:13

## 2018-08-10 RX ADMIN — ALUMINUM HYDROXIDE AND MAGNESIUM HYDROXIDE 30 ML: 200; 200 SUSPENSION ORAL at 07:42

## 2018-08-10 RX ADMIN — CARVEDILOL 12.5 MG: 12.5 TABLET, FILM COATED ORAL at 11:57

## 2018-08-10 RX ADMIN — NITROGLYCERIN 0.4 MG: 0.4 TABLET SUBLINGUAL at 07:42

## 2018-08-10 RX ADMIN — CARVEDILOL 12.5 MG: 12.5 TABLET, FILM COATED ORAL at 20:14

## 2018-08-10 NOTE — H&P
History & Physical    Patient: Marily Griggs MRN: [id number]  CSN: [id number]    YOB: 1940  Age: 77 y.o. Sex: male      DOA: 8/10/2018  Primary Care Provider:  King [place] DO      Assessment/Plan     Hospital Problems  Date Reviewed: 7/20/2017          Codes Class Noted POA    * (Principal)Unstable angina ([facility] ICD-10-CM: I20.0  ICD-9-CM: 411.1  8/10/2018 Unknown        Stage 3 chronic kidney disease ICD-10-CM: N18.3  ICD-9-CM: 585.3  8/10/2018 Unknown        CML (chronic myelocytic leukemia) ([facility] ICD-10-CM: C92.10  ICD-9-CM: 205.10  9/2/2016 Yes        HTN (hypertension) ICD-10-CM: I10  ICD-9-CM: 401.9  9/2/2016 Yes        Coronary artery disease involving native coronary artery ICD-10-CM: I25.10  ICD-9-CM: 414.01  8/11/2016 Yes        Type II diabetes mellitus, uncontrolled ([facility] ICD-10-CM: E11.65  ICD-9-CM: 250.02  8/11/2016 Yes                Admit to tele for obs       Angina and hx of cad   On heparin gtt, cardiology on board. aspirin ,morphine, nc o2, nitro prn  F/u the trend  Of ce, nuclear stress test per cardiology recommendation   Will have v/q scan to r/o PE   Echo scheduled    CML   Will continue home medication         DM type II , with complication,  -on lantus, ssi, diabetic diet , hypoglycemia protocol       HTN, accelerated  Continue home medication. ckd3 stable     Full code     DVT : heparin gtt . ppi proph  CC: chest pain        HPI:     Marily Griggs is a 77 y.o. male who hx of cml , DM, HTN, cad came to ER deu to chest pain AM. He has hx of cad and stent. The chest pain was located rt side of chest, dull pain, no radiated, associated with some nausea/cough. No sob. His ekg no st seg change. cxr no acute issue. Cardiology was called and heparin gtt was recommended. His cardiology is at [place]. He was given nitro in ER and his chest pain resolved. Denies any slurred speech/headache/v/blurred vission/d/c/palpitation/gait change/bleeding.  Denies smoking/ any alcohol or drug use. Visit Vitals    /77 (BP 1 Location: Right arm, BP Patient Position: At rest)    Pulse 68    Temp 97.5 °F (36.4 °C)    Resp 20    Ht 5' 8\" (1.727 m)    Wt 106.6 kg (235 lb)    SpO2 97%    BMI 35.73 kg/m2      O2 Device: Room air      Past Medical History:   Diagnosis Date    CAD (coronary artery disease) 2014    Chronic atrial fibrillation ([facility] 9/2/2016    CML (chronic myelocytic leukemia) (HCC)     Coagulation disorder (HCC)     bruises easily    Diabetes ([facility] 2012    Hypertension     Nausea & vomiting     with most recent surgery-carotid endardarectomy    TIA (transient ischemic attack) 8/11/2016       Past Surgical History:   Procedure Laterality Date    CARDIAC SURG PROCEDURE UNLIST  3/2014    quadruple bypass    HX CAROTID ENDARTERECTOMY Left 05/2017    HX CATARACT REMOVAL Bilateral     HX CORONARY ARTERY BYPASS GRAFT      HX HERNIA REPAIR Right     hernia    HX UROLOGICAL      greenlight laser    HX UROLOGICAL      kidney stone    HX UROLOGICAL      Lithotripsy x 2       Family History   Problem Relation Age of Onset    Post-op Nausea/Vomiting Brother        Social History     Social History    Marital status:      Spouse name: N/A    Number of children: N/A    Years of education: N/A     Social History Main Topics    Smoking status: Former Smoker     Packs/day: 1.00     Years: 60.00     Quit date: 3/23/2014    Smokeless tobacco: Never Used    Alcohol use 1.2 oz/week     2 Shots of liquor per week      Comment: per day    Drug use: No    Sexual activity: No     Other Topics Concern    None     Social History Narrative       Prior to Admission medications    Medication Sig Start Date End Date Taking? Authorizing Provider   aspirin 81 mg chewable tablet Take 81 mg by mouth daily. Yes Phys Other, MD   magnesium oxide (MAG-OX) 400 mg tablet Take 400 mg by mouth daily.    Yes Phys Other, MD   atorvastatin (LIPITOR) 40 mg tablet Take 40 mg by mouth daily. Yes Phys MD Lee Ann   imatinib (GLEEVEC) 400 mg tablet Take 400 mg by mouth daily. Indications: CHRONIC PHASE [place] CHROMOSOME (+) CML    Historical Provider   torsemide (DEMADEX) 100 mg tablet Take 100 mg by mouth daily. Indications: Edema    Historical Provider   insulin glargine (LANTUS) 100 unit/mL injection 20 Units by SubCUTAneous route nightly. Indications: TYPE 2 DIABETES MELLITUS  Patient taking differently: 13 Units by SubCUTAneous route daily. Indications: type 2 diabetes mellitus, 13 units bid 9/25/16   Genna Barkley MD   carvedilol (COREG) 6.25 mg tablet Take 12.5 mg by mouth two (2) times a day. Historical Provider       Allergies   Allergen Reactions    Adhesive Tape-Silicones Other (comments)     Raw skin    Codeine Nausea and Vomiting    Iodinated Contrast- Oral And Iv Dye Rash     patient states this was years ago - he states that he can tolerate the newer agents       Review of Systems  Gen: No fever, chills, malaise, weight loss/gain. Heent: No headache, rhinorrhea, epistaxis, ear pain, hearing loss, sinus pain, neck pain/stiffness, sore throat. Heart: No chest pain, palpitations, VARGAS, pnd, or orthopnea. Resp: +cough, no  hemoptysis, wheezing and shortness of breath. GI: nausea, no vomiting, diarrhea, constipation, melena or hematochezia. : No urinary obstruction, dysuria or hematuria. Derm: No rash, new skin lesion or pruritis. Musc/skeletal: no bone or joint complains. Vasc: No edema, cyanosis or claudication. Endo: No heat/cold intolerance, no polyuria,polydipsia or polyphagia. Neuro: No unilateral weakness, numbness, tingling. No seizures. Heme: No easy bruising or bleeding.           Physical Exam:     Physical Exam:  Visit Vitals    /77 (BP 1 Location: Right arm, BP Patient Position: At rest)    Pulse 68    Temp 97.5 °F (36.4 °C)    Resp 20    Ht 5' 8\" (1.727 m)    Wt 106.6 kg (235 lb)    SpO2 97%    BMI 35.73 kg/m2      O2 Device: Room air    Temp (24hrs), Av.7 °F (36.5 °C), Min:97.5 °F (36.4 °C), Max:98 °F (36.7 °C)             General:  Awake, cooperative, no distress. Head:  Normocephalic, without obvious abnormality, atraumatic. Eyes:  Conjunctivae/corneas clear, sclera anicteric, PERRL, EOMs intact. Nose: Nares normal. No drainage or sinus tenderness. Throat: Lips, mucosa, and tongue normal. .   Neck: Supple, symmetrical, trachea midline, no adenopathy. Lungs:   Clear to auscultation bilaterally. Heart:  Regular rate and rhythm, S1, S2 normal, +murmur, no click, rub or gallop. Abdomen: Soft, non-tender. Bowel sounds normal. No masses,  No organomegaly. Extremities: Extremities normal, atraumatic, no cyanosis or edema. Pulses: 2+ and symmetric all extremities. Skin: Skin color-pink, texture, turgor normal. No rashes or lesions. Capillary refill normal    Neurologic: CNII-XII intact. No focal motor or sensory deficit.        Labs Reviewed:    BMP:   Lab Results   Component Value Date/Time     08/10/2018 06:35 AM    K 3.8 08/10/2018 06:35 AM     08/10/2018 06:35 AM    CO2 31 08/10/2018 06:35 AM    AGAP 3 08/10/2018 06:35 AM     (H) 08/10/2018 06:35 AM    BUN 28 (H) 08/10/2018 06:35 AM    CREA 1.75 (H) 08/10/2018 06:35 AM    GFRAA 46 (L) 08/10/2018 06:35 AM    GFRNA 38 (L) 08/10/2018 06:35 AM     CMP:   Lab Results   Component Value Date/Time     08/10/2018 06:35 AM    K 3.8 08/10/2018 06:35 AM     08/10/2018 06:35 AM    CO2 31 08/10/2018 06:35 AM    AGAP 3 08/10/2018 06:35 AM     (H) 08/10/2018 06:35 AM    BUN 28 (H) 08/10/2018 06:35 AM    CREA 1.75 (H) 08/10/2018 06:35 AM    GFRAA 46 (L) 08/10/2018 06:35 AM    GFRNA 38 (L) 08/10/2018 06:35 AM    CA 8.4 (L) 08/10/2018 06:35 AM    ALB 3.2 (L) 08/10/2018 06:35 AM    TP 6.6 08/10/2018 06:35 AM    GLOB 3.4 08/10/2018 06:35 AM    AGRAT 0.9 08/10/2018 06:35 AM    SGOT 31 08/10/2018 06:35 AM    ALT 24 08/10/2018 06:35 AM     CBC:   Lab Results   Component Value Date/Time    WBC 4.4 (L) 08/10/2018 06:35 AM    HGB 10.7 (L) 08/10/2018 06:35 AM    HCT 31.3 (L) 08/10/2018 06:35 AM     (L) 08/10/2018 06:35 AM     All Cardiac Markers in the last 24 hours:   Lab Results   Component Value Date/Time     08/10/2018 09:05 AM     08/10/2018 06:35 AM    CKMB 3.1 08/10/2018 09:05 AM    CKMB 3.6 (H) 08/10/2018 06:35 AM    CKND1 1.4 08/10/2018 09:05 AM    CKND1 1.4 08/10/2018 06:35 AM    TROIQ 0.03 08/10/2018 09:05 AM    TROIQ 0.03 08/10/2018 06:35 AM     Recent Glucose Results:   Lab Results   Component Value Date/Time     (H) 08/10/2018 06:35 AM     ABG: No results found for: PH, PHI, PCO2, PCO2I, PO2, PO2I, HCO3, HCO3I, FIO2, FIO2I  COAGS:   Lab Results   Component Value Date/Time    APTT 29.2 08/10/2018 06:35 AM     Liver Panel:   Lab Results   Component Value Date/Time    ALB 3.2 (L) 08/10/2018 06:35 AM    TP 6.6 08/10/2018 06:35 AM    GLOB 3.4 08/10/2018 06:35 AM    AGRAT 0.9 08/10/2018 06:35 AM    SGOT 31 08/10/2018 06:35 AM    ALT 24 08/10/2018 06:35 AM    AP 64 08/10/2018 06:35 AM     Pancreatic Markers:   Lab Results   Component Value Date/Time    LPSE 330 08/10/2018 06:35 AM       Procedures/imaging: see electronic medical records for all procedures/Xrays and details which were not copied into this note but were reviewed prior to creation of Dakota Garcia MD, Internal Medicine     CC: Roro Pérez DO

## 2018-08-10 NOTE — CONSULTS
[facility] Consult Note      Patient: Charli Oliveira MRN: [id number]  SSN: [id number]    YOB: 1940  Age: 77 y.o. Sex: male    Date of Consultation: 08/10/2018  Referring Physician: Dr. Jaswinder Nunn  Reason for Consultation: chest pain      HPI:  I was asked by Dr. Jaswinder Nunn to see this pleasant for chest pain. Mr. George Mcgarry is 77years old genetleman with history of CAD/CABG in 2014 and then cath in 2015 with patent graft and diffuse disease in native CAD. Patient presented to ER with chest pain AM. The chest pain moderate, sharp/dull, was located right side of chest,  no radiated, associated with some nausea/cough. No sob. Pt denied prolonged bed rest, ankle swelling and or sharp chest pain.   Denied palpitation, presyncope, syncope   Denied smoking/ any alcohol or drug use.      Past Medical History:   Diagnosis Date    CAD (coronary artery disease) 2014    Chronic atrial fibrillation ([facility] 9/2/2016    CML (chronic myelocytic leukemia) (HCC)     Coagulation disorder (HCC)     bruises easily    Diabetes ([facility] 2012    Hypertension     Nausea & vomiting     with most recent surgery-carotid endardarectomy    TIA (transient ischemic attack) 8/11/2016     Past Surgical History:   Procedure Laterality Date    CARDIAC SURG PROCEDURE UNLIST  3/2014    quadruple bypass    HX CAROTID ENDARTERECTOMY Left 05/2017    HX CATARACT REMOVAL Bilateral     HX CORONARY ARTERY BYPASS GRAFT      HX HERNIA REPAIR Right     hernia    HX UROLOGICAL      greenlight laser    HX UROLOGICAL      kidney stone    HX UROLOGICAL      Lithotripsy x 2     Current Facility-Administered Medications   Medication Dose Route Frequency    heparin 25,000 units in D5W 250 ml infusion  9.4-25 Units/kg/hr IntraVENous TITRATE    acetaminophen (TYLENOL) tablet 650 mg  650 mg Oral Q4H PRN    naloxone (NARCAN) injection 0.4 mg  0.4 mg IntraVENous PRN    diphenhydrAMINE (BENADRYL) injection 12.5 mg  12.5 mg IntraVENous Q4H PRN    ondansetron [facility]) injection 4 mg  4 mg IntraVENous Q4H PRN    docusate sodium (COLACE) capsule 100 mg  100 mg Oral BID    atorvastatin (LIPITOR) tablet 40 mg  40 mg Oral DAILY    aspirin chewable tablet 81 mg  81 mg Oral DAILY    imatinib (GLEEVEC) chemo tablet 400 mg (Patient Supplied)  400 mg Oral DAILY    insulin glargine (LANTUS) injection 10 Units  10 Units SubCUTAneous DAILY    carvedilol (COREG) tablet 12.5 mg  12.5 mg Oral BID    insulin lispro (HUMALOG) injection   SubCUTAneous AC&HS    glucose chewable tablet 16 g  4 Tab Oral PRN    glucagon (GLUCAGEN) injection 1 mg  1 mg IntraMUSCular PRN    dextrose (D50W) injection syrg 12.5-25 g  25-50 mL IntraVENous PRN    nitroglycerin (NITROSTAT) tablet 0.4 mg  0.4 mg SubLINGual PRN    morphine injection 1 mg  1 mg IntraVENous Q4H PRN    isosorbide mononitrate ER (IMDUR) tablet 30 mg  30 mg Oral DAILY       Allergies and Intolerances: Allergies   Allergen Reactions    Adhesive Tape-Silicones Other (comments)     Raw skin    Codeine Nausea and Vomiting    Iodinated Contrast- Oral And Iv Dye Rash     patient states this was years ago - he states that he can tolerate the newer agents       Family History:   Family History   Problem Relation Age of Onset    Post-op Nausea/Vomiting Brother        Social History:   He  reports that he quit smoking about 4 years ago. He has a 60.00 pack-year smoking history. He has never used smokeless tobacco.  He  reports that he drinks about 1.2 oz of alcohol per week       Review of Systems:     Review of Systems  Gen: No fever, chills, malaise, weight loss/gain. Heent: No headache, rhinorrhea, epistaxis, ear pain, hearing loss, sinus pain, neck pain/stiffness, sore throat. Heart: + chest pain, palpitations, VARGAS, pnd, or orthopnea. Resp: No cough, hemoptysis, wheezing and shortness of breath. GI: No nausea, vomiting, diarrhea, constipation, melena or hematochezia. : No urinary obstruction, dysuria or hematuria. Derm: No rash, new skin lesion or pruritis. Musc/skeletal: no bone or joint complains. Vasc: No edema, cyanosis or claudication. Endo: No heat/cold intolerance, no polyuria,polydipsia or polyphagia. Neuro: No unilateral weakness, numbness, tingling. No seizures. Heme: No easy bruising or bleeding. Physical:   Patient Vitals for the past 6 hrs:   Temp Pulse Resp BP SpO2   08/10/18 1617 98.2 °F (36.8 °C) 63 20 145/77 100 %   08/10/18 1301 - - - 166/71 -         Exam:   General Appearance: Comfortable, not using accessory muscles of respiration. HEENT: CT. HEAD: Atraumatic  NECK: No JVD, no thyroidomeglay. CAROTIDS: clear  LUNGS: Clear bilaterally. HEART: S1+S2     ABD: Non-tender, BS Audible    EXT: No edema, and no cysnosis. VASCULAR EXAM: Pulses are intact. PSYCHIATRIC EXAM: Mood is appropriate. MUSCULOSKELETAL: Grossly no joint deformity. NEUROLOGICAL: Motor and sensory sytem intact and Cranial nerves II-XII intact.     Review of Data:   LABS:   Lab Results   Component Value Date/Time    WBC 4.4 (L) 08/10/2018 06:35 AM    HGB 10.7 (L) 08/10/2018 06:35 AM    HCT 31.3 (L) 08/10/2018 06:35 AM    PLATELET 389 (L) 75/09/6693 06:35 AM     Lab Results   Component Value Date/Time    Sodium 139 08/10/2018 06:35 AM    Potassium 3.8 08/10/2018 06:35 AM    Chloride 105 08/10/2018 06:35 AM    CO2 31 08/10/2018 06:35 AM    Glucose 127 (H) 08/10/2018 06:35 AM    BUN 28 (H) 08/10/2018 06:35 AM    Creatinine 1.75 (H) 08/10/2018 06:35 AM     Lab Results   Component Value Date/Time    Cholesterol, total 107 08/12/2016 03:20 AM    HDL Cholesterol 56 08/12/2016 03:20 AM    LDL, calculated 26.4 08/12/2016 03:20 AM    Triglyceride 123 08/12/2016 03:20 AM     No results found for: GPT  Lab Results   Component Value Date/Time    Hemoglobin A1c 6.2 (H) 08/10/2018 06:35 AM         Cardiology Procedures:   Results for orders placed or performed during the hospital encounter of 08/10/18   EKG, 12 LEAD, INITIAL Result Value Ref Range    Ventricular Rate 73 BPM    Atrial Rate 73 BPM    P-R Interval 218 ms    QRS Duration 90 ms    Q-T Interval 404 ms    QTC Calculation (Bezet) 445 ms    Calculated P Axis 43 degrees    Calculated R Axis -28 degrees    Calculated T Axis 99 degrees    Diagnosis       Sinus rhythm with 1st degree AV block  Septal infarct , age undetermined  Abnormal ECG  When compared with ECG of 30-MAY-2018 10:13,  No significant change was found             Impression / Plan:    Patient Active Problem List   Diagnosis Code    Respiratory distress R06.03    TIA (transient ischemic attack) G45.9    Coronary artery disease involving native coronary artery I25.10    Type II diabetes mellitus, uncontrolled ([facility]) E11.65    Dyslipidemia E78.5    Pancytopenia ([facility]) D61.818    CML (chronic myelocytic leukemia) ([facility]) C92.10    HTN (hypertension) I10    Hyponatremia E87.1    Chronic atrial fibrillation ([facility]) I48.2    Neutropenic fever ([facility]) D70.9, R50.81    Bacteremia due to Klebsiella pneumoniae R78.81    Constipation K59.00    Oral thrush B37.0    Atrial fibrillation with RVR ([facility]) I48.91    Unstable angina ([facility]) I20.0    Stage 3 chronic kidney disease N18.3       A/P  1- Chest pain  2- CAD/CABG ( CABG in 2014 and cath in 2015 with patent grafts  3- Chronic systolic heart failure, EF 40%  4- CRI    Plan: rule out AMI  Serial cardiac enzyme  Start heparin  Start Imdur ER  Will maximize anti-ischemic cardiomyopathy  At this time pt prefer medical treatment over cardiac catheterization due to renal insufficiency.         Signed By: Yanira Man MD     August 10, 2018

## 2018-08-10 NOTE — ED TRIAGE NOTES
Pt to ED for intermittent chest pain, shortness of breath, and vomiting onset 6 hours ago. Pt took Aspirin 325 mg PO around 0300 hours.

## 2018-08-10 NOTE — ED PROVIDER NOTES
EMERGENCY DEPARTMENT HISTORY AND PHYSICAL EXAM    Date: 8/10/2018  Patient Name: Ja Alvarez    History of Presenting Illness     Chief Complaint   Patient presents with    Chest Pain         History Provided By: Patient    Chief Complaint: chest pain  Duration: around midnight this past night. Timing:  Intermittent  Location: chest  Severity: 4 out of 10 currently  Modifying Factors: Pt took  mg this morning. Associated Symptoms: nausea, right central 4/10 chest tightness, leg swelling, cough, and dry heaving    Additional History (Context):   7:18 AM  Ja Alvarez is a 77 y.o. male with PMHX of CML (follows oncology every other month), AFIB, DM, HTN, CAD, MI (presented with no pain), and TIA and PSHx of CABG (4 years ago) and carotid endarterectomy who presents to the emergency department C/O intermittent chest pain around midnight this past night. Pt took  mg PO around 3 AM. Pt was recently switched from  mg to ASA 81 mg 1 month ago, but due to sxs, pt took 325 mg. Associated sxs include nausea, right central 4/10 chest tightness, leg swelling, cough, and dry heaving. Pt followed by Dr. Vel Becker, Cardiology, at [facility]. Pt reports that he has gained weight recently. Allergies reported to Adhesive tape-silicones, Codeine, and Iodinated contrast oral and IV dye. Other PSHx of hernia repair, and urological procedures. Pt endorses being a former smoker, an EtOH user at 2 oz of scotch nightly, and a non recreational drug user. Pt denies abd pain, jaw pain, arm pain, dizziness, diaphoresis, hematochezia, fever, chills, and any other sxs or complaints.      PCP: Ying Dunn, DO    Current Facility-Administered Medications   Medication Dose Route Frequency Provider Last Rate Last Dose    heparin (porcine) 1,000 unit/mL injection 4,000 Units  37.5 Units/kg IntraVENous ONCE Jak Watts MD        heparin 25,000 units in D5W 250 ml infusion  9.4-25 Units/kg/hr IntraVENous TITRATE Akshat Gomes MD         Current Outpatient Prescriptions   Medication Sig Dispense Refill    aspirin 81 mg chewable tablet Take 81 mg by mouth daily.  magnesium oxide (MAG-OX) 400 mg tablet Take 400 mg by mouth daily.  atorvastatin (LIPITOR) 40 mg tablet Take 40 mg by mouth daily.  imatinib (GLEEVEC) 400 mg tablet Take 400 mg by mouth daily. 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 Indications: Edema      insulin glargine (LANTUS) 100 unit/mL injection 20 Units by SubCUTAneous route nightly. Indications: TYPE 2 DIABETES MELLITUS (Patient taking differently: 13 Units by SubCUTAneous route daily. Indications: type 2 diabetes mellitus, 13 units bid) 1 Vial 0    carvedilol (COREG) 6.25 mg tablet Take 12.5 mg by mouth two (2) times a day.          Past History     Past Medical History:  Past Medical History:   Diagnosis Date    CAD (coronary artery disease) 2014    Chronic atrial fibrillation ([facility] [address] 9/2/2016    CML (chronic myelocytic leukemia) ([facility] [address]     Coagulation disorder (HCC)     bruises easily    Diabetes ([facility] [address] 2012    Hypertension     Nausea & vomiting     with most recent surgery-carotid endardarectomy    TIA (transient ischemic attack) 8/11/2016       Past Surgical History:  Past Surgical History:   Procedure Laterality Date    CARDIAC SURG PROCEDURE UNLIST  3/2014    quadruple bypass    HX CAROTID ENDARTERECTOMY Left 05/2017    HX CATARACT REMOVAL Bilateral     HX CORONARY ARTERY BYPASS GRAFT      HX HERNIA REPAIR Right     hernia    HX UROLOGICAL      greenlight laser    HX UROLOGICAL      kidney stone    HX UROLOGICAL      Lithotripsy x 2       Family History:  Family History   Problem Relation Age of Onset    Post-op Nausea/Vomiting Brother        Social History:  Social History   Substance Use Topics    Smoking status: Former Smoker     Packs/day: 1.00     Years: 60.00     Quit date: 3/23/2014    Smokeless tobacco: Never Used    Alcohol use 1.2 oz/week     2 Shots of liquor per week      Comment: per day       Allergies: Allergies   Allergen Reactions    Adhesive Tape-Silicones Other (comments)     Raw skin    Codeine Nausea and Vomiting    Iodinated Contrast- Oral And Iv Dye Rash     patient states this was years ago - he states that he can tolerate the newer agents         Review of Systems   Review of Systems   Constitutional: Negative for chills, diaphoresis and fever.        (+) weight gain   Respiratory: Positive for cough and shortness of breath. Cardiovascular: Positive for chest pain and leg swelling. Gastrointestinal: Positive for nausea. Negative for abdominal pain and blood in stool.        (+) Dry heaving   Musculoskeletal:        (-) Jaw pain   All other systems reviewed and are negative. Physical Exam     Vitals:    08/10/18 0618 08/10/18 0742 08/10/18 0748 08/10/18 0800   BP: 183/73 170/72 130/71 139/70   Pulse: 72 66  64   Resp: 18  15 14   Temp: 97.7 °F (36.5 °C)      SpO2: 100%  98%    Weight: 106.6 kg (235 lb)      Height: 5' 8\" (1.727 m)        Physical Exam   Constitutional: He is oriented to person, place, and time. He appears well-developed and well-nourished. Obese. HENT:   Head: Normocephalic and atraumatic. Right Ear: External ear normal.   Left Ear: External ear normal.   Mouth/Throat: Oropharynx is clear and moist.   Post nasal drainage. Eyes: Conjunctivae and EOM are normal. Pupils are equal, round, and reactive to light. Neck: Normal range of motion. Neck supple. No JVD present. No tracheal deviation present. No thyromegaly present. Cervical Lymphadenopathy bilaterally. Healed scar on left neck. Cardiovascular: Normal rate, regular rhythm, normal heart sounds and intact distal pulses. Pulmonary/Chest: Effort normal and breath sounds normal.   Healed scar on midline chest.   Abdominal: Soft.  Bowel sounds are normal. He exhibits no distension and no mass. There is no tenderness. No HSM   Musculoskeletal: Normal range of motion. He exhibits edema (trace on bilateral lower extremities). He exhibits no tenderness. Lymphadenopathy:     He has no cervical adenopathy. Neurological: He is alert and oriented to person, place, and time. He has normal reflexes. No cranial nerve deficit. He exhibits normal muscle tone. Coordination normal.   No focal weakness   Skin: Skin is warm and dry. Small areas with scabs over skin with no active bleeding. Healed scars chest, midline sternotomy and left neck   Psychiatric: He has a normal mood and affect. His behavior is normal. Thought content normal.   Nursing note and vitals reviewed. Diagnostic Study Results     Labs -     Recent Results (from the past 12 hour(s))   EKG, 12 LEAD, INITIAL    Collection Time: 08/10/18  6:17 AM   Result Value Ref Range    Ventricular Rate 73 BPM    Atrial Rate 73 BPM    P-R Interval 218 ms    QRS Duration 90 ms    Q-T Interval 404 ms    QTC Calculation (Bezet) 445 ms    Calculated P Axis 43 degrees    Calculated R Axis -28 degrees    Calculated T Axis 99 degrees    Diagnosis       Sinus rhythm with 1st degree AV block  Septal infarct , age undetermined  Abnormal ECG  When compared with ECG of 30-MAY-2018 10:13,  No significant change was found     CBC WITH AUTOMATED DIFF    Collection Time: 08/10/18  6:35 AM   Result Value Ref Range    WBC 4.4 (L) 4.6 - 13.2 K/uL    RBC 3.11 (L) 4.70 - 5.50 M/uL    HGB 10.7 (L) 13.0 - 16.0 g/dL    HCT 31.3 (L) 36.0 - 48.0 %    .6 (H) 74.0 - 97.0 FL    MCH 34.4 (H) 24.0 - 34.0 PG    MCHC 34.2 31.0 - 37.0 g/dL    RDW 13.9 11.6 - 14.5 %    PLATELET 393 (L) 319 - 420 K/uL    MPV 10.8 9.2 - 11.8 FL    NEUTROPHILS 67 40 - 73 %    LYMPHOCYTES 14 (L) 21 - 52 %    MONOCYTES 12 (H) 3 - 10 %    EOSINOPHILS 6 (H) 0 - 5 %    BASOPHILS 1 0 - 2 %    ABS. NEUTROPHILS 2.9 1.8 - 8.0 K/UL    ABS. LYMPHOCYTES 0.6 (L) 0.9 - 3.6 K/UL    ABS. MONOCYTES 0.6 0.05 - 1.2 K/UL    ABS. EOSINOPHILS 0.3 0.0 - 0.4 K/UL    ABS. BASOPHILS 0.1 0.0 - 0.1 K/UL    DF AUTOMATED     METABOLIC PANEL, COMPREHENSIVE    Collection Time: 08/10/18  6:35 AM   Result Value Ref Range    Sodium 139 136 - 145 mmol/L    Potassium 3.8 3.5 - 5.5 mmol/L    Chloride 105 100 - 108 mmol/L    CO2 31 21 - 32 mmol/L    Anion gap 3 3.0 - 18 mmol/L    Glucose 127 (H) 74 - 99 mg/dL    BUN 28 (H) 7.0 - 18 MG/DL    Creatinine 1.75 (H) 0.6 - 1.3 MG/DL    BUN/Creatinine ratio 16 12 - 20      GFR est AA 46 (L) >60 ml/min/1.73m2    GFR est non-AA 38 (L) >60 ml/min/1.73m2    Calcium 8.4 (L) 8.5 - 10.1 MG/DL    Bilirubin, total 0.5 0.2 - 1.0 MG/DL    ALT (SGPT) 24 16 - 61 U/L    AST (SGOT) 31 15 - 37 U/L    Alk. phosphatase 64 45 - 117 U/L    Protein, total 6.6 6.4 - 8.2 g/dL    Albumin 3.2 (L) 3.4 - 5.0 g/dL    Globulin 3.4 2.0 - 4.0 g/dL    A-G Ratio 0.9 0.8 - 1.7     NT-PRO BNP    Collection Time: 08/10/18  6:35 AM   Result Value Ref Range    NT pro- 0 - 1800 PG/ML   CARDIAC PANEL,(CK, CKMB & TROPONIN)    Collection Time: 08/10/18  6:35 AM   Result Value Ref Range     39 - 308 U/L    CK - MB 3.6 (H) <3.6 ng/ml    CK-MB Index 1.4 0.0 - 4.0 %    Troponin-I, Qt. 0.03 0.00 - 0.06 NG/ML   LIPASE    Collection Time: 08/10/18  6:35 AM   Result Value Ref Range    Lipase 330 73 - 393 U/L       Radiologic Studies -   XR CHEST PORT   Final Result      NM LUNG VENT/PERF    (Results Pending)     CT Results  (Last 48 hours)    None        CXR Results  (Last 48 hours)               08/10/18 0645  XR CHEST PORT Final result    Impression:  Impression:   Postop changes as above, with mild cardiac enlargement. No superimposed acute   radiographic cardiopulmonary normality. Narrative:  Chest, single view       Indication: Chest pain with tightness       Comparison: Several prior chest radiographs, most recently 9/7/2016       Findings:  Portable upright AP view of the chest was obtained.  No focal pneumonic consolidation, pneumothorax, or pleural effusion. Cardiac size and   mediastinal contours are stable, with mild cardiac enlargement noted and   postoperative changes from median sternotomy. No acute osseous abnormality. Degenerative changes of each shoulder girdle noted. Surgical clips noted at the   base of the left neck. Medications given in the ED-  Medications   heparin (porcine) 1,000 unit/mL injection 4,000 Units (not administered)   heparin 25,000 units in D5W 250 ml infusion (not administered)   nitroglycerin (NITROSTAT) tablet 0.4 mg (0.4 mg SubLINGual Given 8/10/18 0742)   aluminum-magnesium hydroxide (MAALOX) oral suspension 30 mL (30 mL Oral Given 8/10/18 0742)         Medical Decision Making   I am the first provider for this patient. I reviewed the vital signs, available nursing notes, past medical history, past surgical history, family history and social history. Vital Signs-Reviewed the patient's vital signs. Pulse Oximetry Analysis - 100% on room air. Cardiac Monitor:  Rate: 68 bpm  Rhythm: sinus rhythm    EKG interpretation: (Preliminary)  7:08 AM   Sinus rhythm with 1st degree AV block at 73 bpm. No change from Previous. EKG read by Nancy Lundberg MD at 6:18 AM     Records Reviewed: Nursing Notes and Old Medical Records  Pt had a stent placed for a kidney stone on 6/7/2018. Provider Notes (Medical Decision Making): Ddx: ACS, CHF, MI, PE, pneumonia, pneumothorax, esophageal spasms, GERD, anemia, uremia. Rule out: other cardiovascular, pulmonary, or GI pathology. Procedures:  Procedures    ED Course:   7:18 AM Initial assessment performed. The patients presenting problems have been discussed, and they are in agreement with the care plan formulated and outlined with them. I have encouraged them to ask questions as they arise throughout their visit.     8:38 AM Pain is down to a 1/10 after Nitro administration with blood pressure dropping to 130 from 372 systolic, no other complaints, no focal neuro deficits entirely. Pt is fine with being admitted. Pt is comfortable in the ED.    8:46 AM Discussed patient's history, exam, and available diagnostics results with Franca Walker MD, cardiology, who agree with admitting pt to hospitalist. He will do ECHO and find pt baseline. Recommends starting Heparin iv per acs protocol.      9:06 AM Discussed patient's history, exam, and available diagnostics results with Fahad Del Castillo MD, internal medicine, who agree with admitting pt to observational telemetry. Diagnosis and Disposition       Critical Care Time: I have spent 52 minutes of critical care time involved in lab review, consultations with specialist, family decision-making, and documentation. During this entire length of time I was immediately available to the patient. Critical Care: The reason for providing this level of medical care for this critically ill patient was due a critical illness that impaired one or more vital organ systems such that there was a high probability of imminent or life threatening deterioration in the patients condition. This care involved high complexity decision making to assess, manipulate, and support vital system functions, to treat this degreee vital organ system failure and to prevent further life threatening deterioration of the patients condition. Core Measures:  For Hospitalized Patients:    1. Hospitalization Decision Time:  The decision to hospitalize the patient was made by Garcia Obando MD at 7:37 AM on 8/10/2018    2. Aspirin: Pt had  mg at home. 9:06 AM  Patient is being admitted to the hospital by Fahad Del Castillo MD. The results of their tests and reasons for their admission have been discussed with them and/or available family. They convey agreement and understanding for the need to be admitted and for their admission diagnosis. CONDITIONS ON ADMISSION:  Sepsis is not present at the time of admission. Possible coronary Thrombosis is to be determined. Pneumonia is not present at the time of admission. Wound infection is not present at the time of admission. Pressure Ulcer is not present at the time of admission. CLINICAL IMPRESSION:    1. Unstable angina (HCC)    2. Chest pain, unspecified type        PLAN:  1. ADMIT To Observation telemetry    _______________________________    Attestations: This note is prepared by Jessica Hamilton, acting as Scribe for Mandy Disla MD.    Mandy Disla MD:  The scribe's documentation has been prepared under my direction and personally reviewed by me in its entirety.   I confirm that the note above accurately reflects all work, treatment, procedures, and medical decision making performed by me.  _______________________________

## 2018-08-10 NOTE — ROUTINE PROCESS
Bedside shift change report given to Caryle Spring RN (oncoming nurse) by Kanwal Noland RN (offgoing nurse). Report included the following information SBAR, Kardex and MAR.

## 2018-08-10 NOTE — IP AVS SNAPSHOT
[address] [address] 
[phone] Patient: Kamala Barnhart MRN: [id number] :1940 About your hospitalization You were admitted on:  August 10, 2018 You last received care in the:  [address] [place] [address] You were discharged on:  2018 Why you were hospitalized Your primary diagnosis was:  Unstable Angina (Hcc) Your diagnoses also included:  Type Ii Diabetes Mellitus, Uncontrolled (Hcc), Htn (Hypertension), Coronary Artery Disease Involving Native Coronary Artery, Cml (Chronic Myelocytic Leukemia) (Hcc), Stage 3 Chronic Kidney Disease Follow-up Information Follow up With Details Comments Contact Info Danni Espinal MD   [address] [place] [place] [address] 1700 [facility] 
[phone] Moe Horta [address] 1700 [facility] 
[phone] Elkins Gee Costa DO In 3 days  21 [facility] 1700 [facility] 
[phone] 
  
 primary cardiology at [place]  In 1 week Discharge Orders None A check dora indicates which time of day the medication should be taken. My Medications START taking these medications Instructions Each Dose to Equal  
 Morning Noon Evening Bedtime  
 isosorbide mononitrate ER 30 mg tablet Commonly known as:  IMDUR Start taking on:  2018 Your last dose was: Your next dose is: Take 1 Tab by mouth daily. 30 mg  
    
   
   
   
  
 nitroglycerin 0.4 mg SL tablet Commonly known as:  NITROSTAT Your last dose was: Your next dose is:    
   
   
 1 Tab by SubLINGual route as needed for Chest Pain. Up to 3 doses. 0.4 mg  
    
   
   
   
  
  
CHANGE how you take these medications Instructions Each Dose to Equal  
 Morning Noon Evening Bedtime  
 insulin glargine 100 unit/mL injection Commonly known as:  LANTUS What changed: - how much to take - when to take this Your last dose was: Your next dose is:    
   
   
 20 Units by SubCUTAneous route nightly. Indications: TYPE 2 DIABETES MELLITUS  
 20 Units CONTINUE taking these medications Instructions Each Dose to Equal  
 Morning Noon Evening Bedtime  
 aspirin 81 mg chewable tablet Your last dose was: Your next dose is: Take 81 mg by mouth daily. 81 mg  
    
   
   
   
  
 atorvastatin 40 mg tablet Commonly known as:  LIPITOR Your last dose was: Your next dose is: Take 40 mg by mouth daily. 40 mg COREG 6.25 mg tablet Generic drug:  carvedilol Your last dose was: Your next dose is: Take 12.5 mg by mouth two (2) times a day. 12.5 mg  
    
   
   
   
  
 GLEEVEC 400 mg tablet Generic drug:  imatinib Your last dose was: Your next dose is: Take 400 mg by mouth daily. 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  
 400 mg  
    
   
   
   
  
 magnesium oxide 400 mg tablet Commonly known as:  MAG-OX Your last dose was: Your next dose is: Take 400 mg by mouth daily. 400 mg  
    
   
   
   
  
 torsemide 100 mg tablet Commonly known as:  DEMADEX Your last dose was: Your next dose is: Take 100 mg by mouth daily. Indications: Edema 100 mg Where to Get Your Medications These medications were sent to [facility] Agnieszka Smith AT [address] [place] [address], [address] [place] [address] Drive [address] Phone:  [phone]  
  isosorbide mononitrate ER 30 mg tablet  
 nitroglycerin 0.4 mg SL tablet Discharge Instructions Angina: Care Instructions Your Care Instructions You have a problem called angina. Angina happens when there is not enough blood flow to your heart muscle. Angina is a sign of coronary artery disease (CAD). CAD occurs when blood vessels that supply the heart become narrowed. Having CAD increases your risk of a heart attack. Chest pain or pressure is the most common symptom of angina. But some people have other symptoms, like: 
· Pain, pressure, or a strange feeling in the back, neck, jaw, or upper belly, or in one or both shoulders or arms. · Shortness of breath. · Nausea or vomiting. · Lightheadedness or sudden weakness. · Fast or irregular heartbeat. Women are somewhat more likely than men to have angina symptoms like shortness of breath, nausea, and back or jaw pain. Angina can be dangerous. That's why it is important to pay attention to your symptoms. Know what is typical for you, learn how to control your symptoms, and understand when you need to get treatment. A change in your usual pattern of symptoms is an emergency. It may mean that you are having a heart attack. The doctor has checked you carefully, but problems can develop later. If you notice any problems or new symptoms, get medical treatment right away. Follow-up care is a key part of your treatment and safety. Be sure to make and go to all appointments, and call your doctor if you are having problems. It's also a good idea to know your test results and keep a list of the medicines you take. How can you care for yourself at home? Medicines 
  · If your doctor has given you nitroglycerin for angina symptoms, keep it with you at all times. If you have symptoms, sit down and rest, and take the first dose of nitroglycerin as directed. If your symptoms get worse or are not getting better within 5 minutes, call 911 right away. Stay on the phone. The emergency  will give you further instructions.  
  · If your doctor advises it, take 1 low-dose aspirin a day to prevent heart attack.   · Be safe with medicines. Take your medicines exactly as prescribed. Call your doctor if you think you are having a problem with your medicine. You will get more details on the specific medicines your doctor prescribes.  
 Lifestyle changes 
  · Do not smoke. If you need help quitting, talk to your doctor about stop-smoking programs and medicines. These can increase your chances of quitting for good.  
  · Eat a heart-healthy diet that is low in saturated fat and salt, and is high in fiber. Talk to your doctor or a dietitian about healthy eating.  
  · Stay at a healthy weight. Or lose weight if you need to. Activity 
  · Talk to your doctor about a level of activity that is safe for you.  
  · If an activity causes angina symptoms, stop and rest.  
When should you call for help? Call 911 anytime you think you may need emergency care. For example, call if: 
  · You passed out (lost consciousness).  
  · You have symptoms of a heart attack. These may include: ¨ Chest pain or pressure, or a strange feeling in the chest. 
¨ Sweating. ¨ Shortness of breath. ¨ Nausea or vomiting. ¨ Pain, pressure, or a strange feeling in the back, neck, jaw, or upper belly or in one or both shoulders or arms. ¨ Lightheadedness or sudden weakness. ¨ A fast or irregular heartbeat. After you call 911, the  may tell you to chew 1 adult-strength or 2 to 4 low-dose aspirin. Wait for an ambulance. Do not try to drive yourself.  
  · You have angina symptoms that do not go away with rest or are not getting better within 5 minutes after you take a dose of nitroglycerin.  
 Call your doctor now or seek immediate medical care if: 
  · You are having angina symptoms more often than usual, or they are different or worse than usual.  
  · You feel dizzy or lightheaded, or you feel like you may faint.  
 Watch closely for changes in your health, and be sure to contact your doctor if you have any problems. Where can you learn more? Go to http://dio-mat.info/. Enter H129 in the search box to learn more about \"Angina: Care Instructions. \" Current as of: December 6, 2017 Content Version: 11.7 © 9681-8686 [employer]. Care instructions adapted under license by [employer] (which disclaims liability or warranty for this information). If you have questions about a medical condition or this instruction, always ask your healthcare professional. [place] [address] any warranty or liability for your use of this information. Patient armband removed and shredded Vaccsys Announcement We are excited to announce that we are making your provider's discharge notes available to you in Vaccsys. You will see these notes when they are completed and signed by the physician that discharged you from your recent hospital stay. If you have any questions or concerns about any information you see in Vaccsys, please call the Health Information Department where you were seen or reach out to your Primary Care Provider for more information about your plan of care. Introducing [facility]! [employer] introduces Vaccsys patient portal. Now you can access parts of your medical record, email your doctor's office, and request medication refills online. 1. In your internet browser, go to [email or web address] 2. Click on the First Time User? Click Here link in the Sign In box. You will see the New Member Sign Up page. 3. Enter your Vaccsys Access Code exactly as it appears below. You will not need to use this code after youve completed the sign-up process. If you do not sign up before the expiration date, you must request a new code. · Vaccsys Access Code: [email or web address] Expires: 8/28/2018  9:31 AM 
 
4. Enter the last four digits of your Social Security Number (xxxx) and Date of Birth (mm/dd/yyyy) as indicated and click Submit.  You will be taken to the next sign-up page. 5. Create a Kind Intelligencet ID. This will be your Dimmi login ID and cannot be changed, so think of one that is secure and easy to remember. 6. Create a [employer] password. You can change your password at any time. 7. Enter your Password Reset Question and Answer. This can be used at a later time if you forget your password. 8. Enter your e-mail address. You will receive e-mail notification when new information is available in [address] 9. Click Sign Up. You can now view and download portions of your medical record. 10. Click the Download Summary menu link to download a portable copy of your medical information. If you have questions, please visit the Frequently Asked Questions section of the Dimmi website. Remember, Dimmi is NOT to be used for urgent needs. For medical emergencies, dial 911. Now available from your iPhone and Android! Introducing Juan Zapien As a [employer] patient, I wanted to make you aware of our electronic visit tool called Juan Zapien. [employer] 24/7 allows you to connect within minutes with a medical provider 24 hours a day, seven days a week via a mobile device or tablet or logging into a secure website from your computer. You can access Juan Zapien from anywhere in the [place]. A virtual visit might be right for you when you have a simple condition and feel like you just dont want to get out of bed, or cant get away from work for an appointment, when your regular [employer] provider is not available (evenings, weekends or holidays), or when youre out of town and need minor care. Electronic visits cost only $49 and if the [employer] 24/7 provider determines a prescription is needed to treat your condition, one can be electronically transmitted to a nearby pharmacy*. Please take a moment to enroll today if you have not already done so.   The enrollment process is free and takes just a few minutes. To enroll, please download the [employer] 24/7 sherin to your tablet or phone, or visit [email or web address] to enroll on your computer. And, as an [address] [place] [address] patient with a [employer] account, the results of your visits will be scanned into your electronic medical record and your primary care provider will be able to view the scanned results. We urge you to continue to see your regular [employer] provider for your ongoing medical care. And while your primary care provider may not be the one available when you seek a [employer] virtual visit, the peace of mind you get from getting a real diagnosis real time can be priceless. For more information on [employer] view our Frequently Asked Questions (FAQs) at [email or web address]. Sincerely, 
 
Dilcia Sarmiento MD 
Chief Medical Officer [facility] Mindy Franklyn *:  certain medications cannot be prescribed via [employer] Unresulted Labs-Please follow up with your PCP about these lab tests Order Current Status EKG, 12 LEAD, INITIAL Preliminary result Providers Seen During Your Hospitalization Provider Specialty Primary office phone Josephine Stephen MD Emergency Medicine [phone] Abdias Rojo MD Internal Medicine [phone] Your Primary Care Physician (PCP) Primary Care Physician Office Phone Office Fax Olya Sun [phone] You are allergic to the following Allergen Reactions Adhesive Tape-Silicones Other (comments) Raw skin Codeine Nausea and Vomiting Iodinated Contrast- Oral And Iv Dye Rash  
 patient states this was years ago - he states that he can tolerate the newer agents Recent Documentation Height Weight BMI Smoking Status 1.727 m 106.6 kg 35.73 kg/m2 Former Smoker Emergency Contacts Name Discharge Info Relation Home Work Mobile [address] [facility] CAREGIVER [3] Spouse [3]   [phone] Patient Belongings The following personal items are in your possession at time of discharge: 
  Dental Appliances: None  Visual Aid: Glasses, At home      Home Medications: Sent home   Jewelry: Ring  Clothing: Shirt, Pants    Other Valuables: Cell Phone Please provide this summary of care documentation to your next provider. Signatures-by signing, you are acknowledging that this After Visit Summary has been reviewed with you and you have received a copy. Patient Signature:  ____________________________________________________________ Date:  ____________________________________________________________  
  
[facility] Provider Signature:  ____________________________________________________________ Date:  ____________________________________________________________

## 2018-08-10 NOTE — ROUTINE PROCESS
TRANSFER - OUT REPORT:    Verbal report given to Anel  on Irvin Lyle  being transferred to [address] [place] [address] for routine progression of care       Report consisted of patients Situation, Background, Assessment and   Recommendations(SBAR). Information from the following report(s) Cardiac Rhythm SR 1st degree AVB was reviewed with the receiving nurse. Lines:   Peripheral IV 08/10/18 Left Antecubital (Active)   Site Assessment Clean, dry, & intact 8/10/2018  6:40 AM   Phlebitis Assessment 0 8/10/2018  6:40 AM   Infiltration Assessment 0 8/10/2018  6:40 AM   Dressing Status Clean, dry, & intact 8/10/2018  6:40 AM   Dressing Type Transparent 8/10/2018  6:40 AM   Hub Color/Line Status Pink;Patent; Flushed 8/10/2018  6:40 AM   Alcohol Cap Used Yes 8/10/2018  6:40 AM        Opportunity for questions and clarification was provided.       Patient transported with:   Registered Nurse

## 2018-08-10 NOTE — PROGRESS NOTES
1100: Assumed care of from [facility], located within [facility]. Pt is alert and oriented no sign of distress. Call light within reach. Bed in lowest position call light within reach. 1145: pt to go for VQ scan but pt was eating Nuc med stated they will call for him in a little while. Pt will also have Gleevac brought from home. 1250: Bon Arteaga RN CNS came to unit to discuss concerns over Gleevac being given to this pt while on heparin drip. Dana Watson discussed her concerns with Dr. Rody Sanchez and recommended that we get an oncology consult or speak with pt's oncologists. 1770: pt refused lispro. Pt educated on need and hospital protocol.  made aware. 1833: Spoke with Oncologist Christiane Gomez regarding Tomma Peals being taken with Heparin drip. Oncologist stated it was ok to give medication together no contraindication. Just watch blood count. Dr. Adal Duran is the oncologist on call for Dr. Cecelia Camacho pt  also stated that it is ok if pt does not take medication for a couple of days. 1845: Spoke with pt regarding Gleevac and Dr. Haile Gearing that pt can take Gleevac while on Heprin drip.m Pt stated since the cardiologist is changing his medication he will hold off taking it til he goes home. Pt agreed to take lispro when Blood sugar is elevated.

## 2018-08-11 VITALS
RESPIRATION RATE: 16 BRPM | SYSTOLIC BLOOD PRESSURE: 133 MMHG | BODY MASS INDEX: 35.61 KG/M2 | TEMPERATURE: 97.8 F | HEIGHT: 68 IN | DIASTOLIC BLOOD PRESSURE: 66 MMHG | OXYGEN SATURATION: 98 % | HEART RATE: 66 BPM | WEIGHT: 235 LBS

## 2018-08-11 LAB
ANION GAP SERPL CALC-SCNC: 4 MMOL/L (ref 3–18)
APTT PPP: 79.6 SEC (ref 23–36.4)
BASOPHILS # BLD: 0 K/UL (ref 0–0.1)
BASOPHILS NFR BLD: 1 % (ref 0–2)
BUN SERPL-MCNC: 22 MG/DL (ref 7–18)
BUN/CREAT SERPL: 15 (ref 12–20)
CALCIUM SERPL-MCNC: 8.4 MG/DL (ref 8.5–10.1)
CHLORIDE SERPL-SCNC: 106 MMOL/L (ref 100–108)
CO2 SERPL-SCNC: 29 MMOL/L (ref 21–32)
CREAT SERPL-MCNC: 1.5 MG/DL (ref 0.6–1.3)
DIFFERENTIAL METHOD BLD: ABNORMAL
EOSINOPHIL # BLD: 0.3 K/UL (ref 0–0.4)
EOSINOPHIL NFR BLD: 6 % (ref 0–5)
ERYTHROCYTE [DISTWIDTH] IN BLOOD BY AUTOMATED COUNT: 14 % (ref 11.6–14.5)
GLUCOSE BLD STRIP.AUTO-MCNC: 137 MG/DL (ref 70–110)
GLUCOSE BLD STRIP.AUTO-MCNC: 198 MG/DL (ref 70–110)
GLUCOSE SERPL-MCNC: 123 MG/DL (ref 74–99)
HCT VFR BLD AUTO: 29.1 % (ref 36–48)
HGB BLD-MCNC: 10 G/DL (ref 13–16)
LYMPHOCYTES # BLD: 0.6 K/UL (ref 0.9–3.6)
LYMPHOCYTES NFR BLD: 12 % (ref 21–52)
MAGNESIUM SERPL-MCNC: 2.1 MG/DL (ref 1.6–2.6)
MCH RBC QN AUTO: 34.4 PG (ref 24–34)
MCHC RBC AUTO-ENTMCNC: 34.4 G/DL (ref 31–37)
MCV RBC AUTO: 100 FL (ref 74–97)
MONOCYTES # BLD: 0.6 K/UL (ref 0.05–1.2)
MONOCYTES NFR BLD: 14 % (ref 3–10)
NEUTS SEG # BLD: 3.1 K/UL (ref 1.8–8)
NEUTS SEG NFR BLD: 67 % (ref 40–73)
PLATELET # BLD AUTO: 104 K/UL (ref 135–420)
PMV BLD AUTO: 10.7 FL (ref 9.2–11.8)
POTASSIUM SERPL-SCNC: 3.9 MMOL/L (ref 3.5–5.5)
RBC # BLD AUTO: 2.91 M/UL (ref 4.7–5.5)
SODIUM SERPL-SCNC: 139 MMOL/L (ref 136–145)
WBC # BLD AUTO: 4.5 K/UL (ref 4.6–13.2)

## 2018-08-11 PROCEDURE — 83735 ASSAY OF MAGNESIUM: CPT | Performed by: HOSPITALIST

## 2018-08-11 PROCEDURE — 99218 HC RM OBSERVATION: CPT

## 2018-08-11 PROCEDURE — 96366 THER/PROPH/DIAG IV INF ADDON: CPT

## 2018-08-11 PROCEDURE — 36415 COLL VENOUS BLD VENIPUNCTURE: CPT | Performed by: HOSPITALIST

## 2018-08-11 PROCEDURE — 74011636637 HC RX REV CODE- 636/637: Performed by: HOSPITALIST

## 2018-08-11 PROCEDURE — 85730 THROMBOPLASTIN TIME PARTIAL: CPT | Performed by: HOSPITALIST

## 2018-08-11 PROCEDURE — 74011250636 HC RX REV CODE- 250/636: Performed by: INTERNAL MEDICINE

## 2018-08-11 PROCEDURE — 82962 GLUCOSE BLOOD TEST: CPT

## 2018-08-11 PROCEDURE — 74011250637 HC RX REV CODE- 250/637: Performed by: HOSPITALIST

## 2018-08-11 PROCEDURE — 74011250637 HC RX REV CODE- 250/637: Performed by: INTERNAL MEDICINE

## 2018-08-11 PROCEDURE — 80048 BASIC METABOLIC PNL TOTAL CA: CPT | Performed by: HOSPITALIST

## 2018-08-11 PROCEDURE — 85025 COMPLETE CBC W/AUTO DIFF WBC: CPT | Performed by: HOSPITALIST

## 2018-08-11 RX ORDER — ISOSORBIDE MONONITRATE 30 MG/1
30 TABLET, EXTENDED RELEASE ORAL DAILY
Qty: 30 TAB | Refills: 0 | Status: SHIPPED | OUTPATIENT
Start: 2018-08-12 | End: 2020-12-12

## 2018-08-11 RX ORDER — NITROGLYCERIN 0.4 MG/1
0.4 TABLET SUBLINGUAL AS NEEDED
Qty: 20 TAB | Refills: 0 | Status: SHIPPED | OUTPATIENT
Start: 2018-08-11

## 2018-08-11 RX ORDER — HEPARIN SODIUM 5000 [USP'U]/ML
5000 INJECTION, SOLUTION INTRAVENOUS; SUBCUTANEOUS EVERY 8 HOURS
Status: DISCONTINUED | OUTPATIENT
Start: 2018-08-11 | End: 2018-08-11 | Stop reason: HOSPADM

## 2018-08-11 RX ADMIN — ATORVASTATIN CALCIUM 40 MG: 20 TABLET, FILM COATED ORAL at 08:42

## 2018-08-11 RX ADMIN — DOCUSATE SODIUM 100 MG: 100 CAPSULE, LIQUID FILLED ORAL at 08:42

## 2018-08-11 RX ADMIN — CARVEDILOL 12.5 MG: 12.5 TABLET, FILM COATED ORAL at 08:42

## 2018-08-11 RX ADMIN — ASPIRIN 81 MG 81 MG: 81 TABLET ORAL at 08:42

## 2018-08-11 RX ADMIN — HEPARIN SODIUM AND DEXTROSE 9.4 UNITS/KG/HR: 10000; 5 INJECTION INTRAVENOUS at 07:30

## 2018-08-11 RX ADMIN — ISOSORBIDE MONONITRATE 30 MG: 30 TABLET, EXTENDED RELEASE ORAL at 08:41

## 2018-08-11 RX ADMIN — ACETAMINOPHEN 650 MG: 325 TABLET, FILM COATED ORAL at 06:43

## 2018-08-11 RX ADMIN — INSULIN GLARGINE 10 UNITS: 100 INJECTION, SOLUTION SUBCUTANEOUS at 08:42

## 2018-08-11 NOTE — DISCHARGE INSTRUCTIONS
Angina: Care Instructions  Your Care Instructions    You have a problem called angina. Angina happens when there is not enough blood flow to your heart muscle. Angina is a sign of coronary artery disease (CAD). CAD occurs when blood vessels that supply the heart become narrowed. Having CAD increases your risk of a heart attack. Chest pain or pressure is the most common symptom of angina. But some people have other symptoms, like:  · Pain, pressure, or a strange feeling in the back, neck, jaw, or upper belly, or in one or both shoulders or arms. · Shortness of breath. · Nausea or vomiting. · Lightheadedness or sudden weakness. · Fast or irregular heartbeat. Women are somewhat more likely than men to have angina symptoms like shortness of breath, nausea, and back or jaw pain. Angina can be dangerous. That's why it is important to pay attention to your symptoms. Know what is typical for you, learn how to control your symptoms, and understand when you need to get treatment. A change in your usual pattern of symptoms is an emergency. It may mean that you are having a heart attack. The doctor has checked you carefully, but problems can develop later. If you notice any problems or new symptoms, get medical treatment right away. Follow-up care is a key part of your treatment and safety. Be sure to make and go to all appointments, and call your doctor if you are having problems. It's also a good idea to know your test results and keep a list of the medicines you take. How can you care for yourself at home? Medicines    · If your doctor has given you nitroglycerin for angina symptoms, keep it with you at all times. If you have symptoms, sit down and rest, and take the first dose of nitroglycerin as directed. If your symptoms get worse or are not getting better within 5 minutes, call 911 right away. Stay on the phone.  The emergency  will give you further instructions.     · If your doctor advises it, take 1 low-dose aspirin a day to prevent heart attack.     · Be safe with medicines. Take your medicines exactly as prescribed. Call your doctor if you think you are having a problem with your medicine. You will get more details on the specific medicines your doctor prescribes.    Lifestyle changes    · Do not smoke. If you need help quitting, talk to your doctor about stop-smoking programs and medicines. These can increase your chances of quitting for good.     · Eat a heart-healthy diet that is low in saturated fat and salt, and is high in fiber. Talk to your doctor or a dietitian about healthy eating.     · Stay at a healthy weight. Or lose weight if you need to. Activity    · Talk to your doctor about a level of activity that is safe for you.     · If an activity causes angina symptoms, stop and rest.   When should you call for help? Call 911 anytime you think you may need emergency care. For example, call if:    · You passed out (lost consciousness).     · You have symptoms of a heart attack. These may include:  ¨ Chest pain or pressure, or a strange feeling in the chest.  ¨ Sweating. ¨ Shortness of breath. ¨ Nausea or vomiting. ¨ Pain, pressure, or a strange feeling in the back, neck, jaw, or upper belly or in one or both shoulders or arms. ¨ Lightheadedness or sudden weakness. ¨ A fast or irregular heartbeat. After you call 911, the  may tell you to chew 1 adult-strength or 2 to 4 low-dose aspirin. Wait for an ambulance.  Do not try to drive yourself.     · You have angina symptoms that do not go away with rest or are not getting better within 5 minutes after you take a dose of nitroglycerin.    Call your doctor now or seek immediate medical care if:    · You are having angina symptoms more often than usual, or they are different or worse than usual.     · You feel dizzy or lightheaded, or you feel like you may faint.    Watch closely for changes in your health, and be sure to contact your doctor if you have any problems. Where can you learn more? Go to http://dio-mat.info/. Enter H129 in the search box to learn more about \"Angina: Care Instructions. \"  Current as of: December 6, 2017  Content Version: 11.7  © 6610-8944 [employer]. Care instructions adapted under license by [employer]it (which disclaims liability or warranty for this information). If you have questions about a medical condition or this instruction, always ask your healthcare professional. [place] [address] any warranty or liability for your use of this information.   Patient armband removed and shredded

## 2018-08-11 NOTE — ROUTINE PROCESS
Dual AVS reviewed with Rory Henry RN. All medications reviewed individually with patient. Opportunities for questions and concerns provided. Patient discharged via (mode of transport ie. Car, ambulance or air transport) wheelchair  Patient's arm band appropriately discarded.

## 2018-08-11 NOTE — PROGRESS NOTES
1930: Assumed care from [facility]. Heprarin drip and recent lab values verified with the outgoing nurse. 2005: Shift assessment. 0020: Reassessment. No needs expressed at this time. 0450: aPTT is therapeutic. Order placed for next aPTT tomorrow AM.     4458: Pt reported a headache rated at 6/10. Pain was treated per MAR.    0730: No new onset of chest pain overnight. Heparin drip verified with the oncoming nurse.

## 2018-08-11 NOTE — DISCHARGE SUMMARY
Discharge Summary    Patient: Delphine Rivero MRN: [id number]  CSN: [id number]    YOB: 1940  Age: 77 y.o. Sex: male    DOA: 8/10/2018 LOS:  LOS: 0 days   Discharge Date:      Primary Care Provider:  John Pelletier DO    Admission Diagnoses: Unstable angina [facility])    Discharge Diagnoses:    Hospital Problems  Date Reviewed: 7/20/2017          Codes Class Noted POA    * (Principal)Unstable angina ([facility] ICD-10-CM: I20.0  ICD-9-CM: 411.1  8/10/2018 Unknown        Stage 3 chronic kidney disease ICD-10-CM: N18.3  ICD-9-CM: 585.3  8/10/2018 Unknown        CML (chronic myelocytic leukemia) ([facility] ICD-10-CM: C92.10  ICD-9-CM: 205.10  9/2/2016 Yes        HTN (hypertension) ICD-10-CM: I10  ICD-9-CM: 401.9  9/2/2016 Yes        Coronary artery disease involving native coronary artery ICD-10-CM: I25.10  ICD-9-CM: 414.01  8/11/2016 Yes        Type II diabetes mellitus, uncontrolled ([facility] ICD-10-CM: E11.65  ICD-9-CM: 250.02  8/11/2016 Yes              Discharge Condition: Stable    Discharge Medications:     Current Discharge Medication List      START taking these medications    Details   isosorbide mononitrate ER (IMDUR) 30 mg tablet Take 1 Tab by mouth daily. Qty: 30 Tab, Refills: 0      nitroglycerin (NITROSTAT) 0.4 mg SL tablet 1 Tab by SubLINGual route as needed for Chest Pain. Up to 3 doses. Qty: 20 Tab, Refills: 0         CONTINUE these medications which have NOT CHANGED    Details   aspirin 81 mg chewable tablet Take 81 mg by mouth daily. magnesium oxide (MAG-OX) 400 mg tablet Take 400 mg by mouth daily. atorvastatin (LIPITOR) 40 mg tablet Take 40 mg by mouth daily. imatinib (GLEEVEC) 400 mg tablet Take 400 mg by mouth daily. 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 Indications: Edema      insulin glargine (LANTUS) 100 unit/mL injection 20 Units by SubCUTAneous route nightly.  Indications: TYPE 2 DIABETES MELLITUS  Qty: 1 Vial, Refills: 0      carvedilol (COREG) 6.25 mg tablet Take 12.5 mg by mouth two (2) times a day. Procedures : none     Consults: Cardiology      PHYSICAL EXAM   Visit Vitals    /66 (BP 1 Location: Right arm, BP Patient Position: At rest)    Pulse 66    Temp 97.8 °F (36.6 °C)    Resp 16    Ht 5' 8\" (1.727 m)    Wt 106.6 kg (235 lb)    SpO2 98%    BMI 35.73 kg/m2     General: Awake, cooperative, no acute distress    HEENT: NC, Atraumatic. PERRLA, EOMI. Anicteric sclerae. Lungs:  CTA Bilaterally. No Wheezing/Rhonchi/Rales. Heart:  Regular  rhythm,  No murmur, No Rubs, No Gallops  Abdomen: Soft, Non distended, Non tender. +Bowel sounds,   Extremities: No c/c/e  Psych:   Not anxious or agitated. Neurologic:  No acute neurological deficits. Admission HPI :   Brooke Campos is a 77 y.o. male who hx of cml , DM, HTN, cad came to ER deu to chest pain AM. He has hx of cad and stent. The chest pain was located rt side of chest, dull pain, no radiated, associated with some nausea/cough. No sob. His ekg no st seg change. cxr no acute issue. Cardiology was called and heparin gtt was recommended. His cardiology is at [facility]. He was given nitro in ER and his chest pain resolved.      Denies any slurred speech/headache/v/blurred vission/d/c/palpitation/gait change/bleeding. Denies smoking/ any alcohol or drug use    Hospital Course :   Since he was admitted, heparin gtt was started for possible. acs has been ruled out. Cardiology on board, Imdur was added per cardiology. He remained chest pain free. He was cleared to be d/c home per cardiology. He needs to f/u with his primary cardiology-Dr. Annmraie Mcdonnell at [facility]. We also controlled his Dm per insulin .       Activity: Activity as tolerated    Diet: Cardiac Diet    Follow-up: pcm and cardiology     Disposition: home     Minutes spent on discharge: 45 min       Labs: Results:       Chemistry Recent Labs      08/11/18   0450  08/10/18   0635   GLU  123*  127*   NA  139  139   K  3.9  3.8   CL  106  105   CO2  29  31   BUN  22*  28*   CREA  1.50*  1.75*   CA  8.4*  8.4*   AGAP  4  3   BUCR  15  16   AP   --   64   TP   --   6.6   ALB   --   3.2*   GLOB   --   3.4   AGRAT   --   0.9      CBC w/Diff Recent Labs      08/11/18   0450  08/10/18   0635   WBC  4.5*  4.4*   RBC  2.91*  3.11*   HGB  10.0*  10.7*   HCT  29.1*  31.3*   PLT  104*  114*   GRANS  67  67   LYMPH  12*  14*   EOS  6*  6*      Cardiac Enzymes Recent Labs      08/10/18   1508  08/10/18   0905   CPK  286  225   CKND1  1.4  1.4      Coagulation Recent Labs      08/11/18   0450  08/10/18   1508   APTT  79.6*  81.2*       Lipid Panel Lab Results   Component Value Date/Time    Cholesterol, total 107 08/12/2016 03:20 AM    HDL Cholesterol 56 08/12/2016 03:20 AM    LDL, calculated 26.4 08/12/2016 03:20 AM    VLDL, calculated 24.6 08/12/2016 03:20 AM    Triglyceride 123 08/12/2016 03:20 AM    CHOL/HDL Ratio 1.9 08/12/2016 03:20 AM      BNP No results for input(s): BNPP in the last 72 hours. Liver Enzymes Recent Labs      08/10/18   0635   TP  6.6   ALB  3.2*   AP  64   SGOT  31      Thyroid Studies Lab Results   Component Value Date/Time    TSH 1.59 08/12/2016 03:20 AM            Significant Diagnostic Studies: Nm Lung Vent/perf    Result Date: 8/10/2018  Ventilation-perfusion lung scan History:  Chest pain and tightness. Evaluation for potential pulmonary embolism is requested. Comparison:  Chest radiograph performed 8/10/2018. Technique: Ventilation imaging was performed after inhalation of 0.8 millicuries of Tc 45S DTPA with a nebulizer followed by imaging in multiple projections. Perfusion imaging was performed after intravenous injection of 6.5 millicuries of Tc 65B MAA followed by imaging in multiple projections.  Injection site: Left antecubital fossa vein Findings: Ventilation imaging shows no significant ventilation defect, with a small amount of inhaled radiotracer deposited within the proximal conducting airways. Small amount of swallowed activity also demonstrated. Perfusion imaging shows mild inhomogeneity along the peripheral aspect of the lungs. No focal segmental perfusion defect is seen. No perfusion mismatches are present. Impression: Low probability for pulmonary embolism. Xr Chest Port    Result Date: 8/10/2018  Chest, single view Indication: Chest pain with tightness Comparison: Several prior chest radiographs, most recently 9/7/2016 Findings:  Portable upright AP view of the chest was obtained. No focal pneumonic consolidation, pneumothorax, or pleural effusion. Cardiac size and mediastinal contours are stable, with mild cardiac enlargement noted and postoperative changes from median sternotomy. No acute osseous abnormality. Degenerative changes of each shoulder girdle noted. Surgical clips noted at the base of the left neck. Impression: Postop changes as above, with mild cardiac enlargement. No superimposed acute radiographic cardiopulmonary normality.             [facility]     CC: Jennifer Cunningham DO

## 2018-08-11 NOTE — PROGRESS NOTES
Problem: Falls - Risk of  Goal: *Absence of Falls  Document Luigi Fall Risk and appropriate interventions in the flowsheet.    Outcome: Progressing Towards Goal  Fall Risk Interventions:            Medication Interventions: Teach patient to arise slowly, Patient to call before getting OOB    Elimination Interventions: Call light in reach

## 2018-08-11 NOTE — PROGRESS NOTES
Chart reviewed. Pt admitted in observation status for unstable angina. CM will follow for discharge planning needs. 1204  Met with pt and his wife at bedside. Pts wife will drive home. Discharge order noted. Pt independent. Pt has no discharge concerns identified. CM will cont to be available. Reason for Admission:    Per H&P, pt is a 77 y.o. male who hx of cml , DM, HTN, cad came to ER deu to chest pain AM. He has hx of cad and stent. The chest pain was located rt side of chest, dull pain, no radiated, associated with some nausea/cough. No sob. His ekg no st seg change. cxr no acute issue. Cardiology was called and heparin gtt was recommended. His cardiology is at [facility]. He was given nitro in ER and his chest pain resolved. RRAT Score:    30 high              Resources/supports as identified by patient/family:                   Top Challenges facing patient (as identified by patient/family and CM): Finances/Medication cost?      n/a              Transportation? No concerns              Support system or lack thereof? No concerns                     Living arrangements? Lives with wife            Self-care/ADLs/Cognition? Independent,  Alert and oriented          Current Advanced Directive/Advance Care Plan: On file                          Plan for utilizing home health:    TBD, Provider please consider ordering PT/OT eval to assist in discharge planning                      Likelihood of readmission: high                 Transition of Care Plan:       Discharge home with family assistance and MD follow up          Care Management Interventions  PCP Verified by CM: Yes  Mode of Transport at Discharge:  Other (see comment) (wife)  Transition of Care Consult (CM Consult): Discharge Planning  Current Support Network: Lives with Spouse  Plan discussed with Pt/Family/Caregiver: Yes  Discharge Location  Discharge Placement: Home with family assistance

## 2018-08-11 NOTE — PROGRESS NOTES
Cardiology Progress Note        Patient: Alysa Weiss        Sex: male          DOA: 8/10/2018  YOB: 1940      Age:  77 y.o.        LOS:  LOS: 0 days   Assessment/Plan     Principal Problem:    Unstable angina ([facility] (8/10/2018)    Active Problems:    Coronary artery disease involving native coronary artery (8/11/2016)      Type II diabetes mellitus, uncontrolled ([facility] (8/11/2016)      CML (chronic myelocytic leukemia) ([facility] (9/2/2016)      HTN (hypertension) (9/2/2016)      Stage 3 chronic kidney disease (8/10/2018)        Plan:  Chest pain improved  Doing well on Imdur  Will maximize medical treatment  Pt can be discharge to day and follow up with PMD and Cardiologist.  Discussed with patient and wife                    Subjective:    cc:  Chest pain        REVIEW OF SYSTEMS:     General: No fevers or chills. Cardiovascular: No chest pain or pressure. No palpitations. No ankle swelling  Pulmonary: No SOB, orthopnea, PND  Gastrointestinal: No nausea, vomiting or diarrhea      Objective:      Visit Vitals    /66 (BP 1 Location: Right arm, BP Patient Position: At rest)    Pulse 66    Temp 97.8 °F (36.6 °C)    Resp 16    Ht 5' 8\" (1.727 m)    Wt 106.6 kg (235 lb)    SpO2 98%    BMI 35.73 kg/m2     Body mass index is 35.73 kg/(m^2). Physical Exam:  General Appearance: Comfortable, not using accessory muscles of respiration. NECK: No JVD, no thyroidomeglay. LUNGS: Clear bilaterally. HEART: S1+S2 audible,    ABD: Non-tender, BS Audible    EXT: No edema, and no cysnosis. VASCULAR EXAM: Pulses are intact. PSYCHIATRIC EXAM: Mood is appropriate. MUSCULOSKELETAL: Grossly no joint deformity. NEUROLOGICAL: No motor and sensory deficit, Cranial nerves II-XII intact.   Medication:  Current Facility-Administered Medications   Medication Dose Route Frequency    heparin (porcine) injection 5,000 Units  5,000 Units SubCUTAneous Q8H    acetaminophen (TYLENOL) tablet 650 mg  650 mg Oral Q4H PRN    naloxone (NARCAN) injection 0.4 mg  0.4 mg IntraVENous PRN    diphenhydrAMINE (BENADRYL) injection 12.5 mg  12.5 mg IntraVENous Q4H PRN    ondansetron (ZOFRAN) injection 4 mg  4 mg IntraVENous Q4H PRN    docusate sodium (COLACE) capsule 100 mg  100 mg Oral BID    atorvastatin (LIPITOR) tablet 40 mg  40 mg Oral DAILY    aspirin chewable tablet 81 mg  81 mg Oral DAILY    imatinib (GLEEVEC) chemo tablet 400 mg (Patient Supplied)  400 mg Oral DAILY    insulin glargine (LANTUS) injection 10 Units  10 Units SubCUTAneous DAILY    carvedilol (COREG) tablet 12.5 mg  12.5 mg Oral BID    insulin lispro (HUMALOG) injection   SubCUTAneous AC&HS    glucose chewable tablet 16 g  4 Tab Oral PRN    glucagon (GLUCAGEN) injection 1 mg  1 mg IntraMUSCular PRN    dextrose (D50W) injection syrg 12.5-25 g  25-50 mL IntraVENous PRN    nitroglycerin (NITROSTAT) tablet 0.4 mg  0.4 mg SubLINGual PRN    morphine injection 1 mg  1 mg IntraVENous Q4H PRN    isosorbide mononitrate ER (IMDUR) tablet 30 mg  30 mg Oral DAILY               Lab/Data Reviewed:  Procedures/imaging: see electronic medical records for all procedures/Xrays   and details which were not copied into this note but were reviewed prior to creation of Plan       All lab results for the last 24 hours reviewed.      Recent Labs      08/11/18   0450  08/10/18   0635   WBC  4.5*  4.4*   HGB  10.0*  10.7*   HCT  29.1*  31.3*   PLT  104*  114*     Recent Labs      08/11/18 0450  08/10/18   0635   NA  139  139   K  3.9  3.8   CL  106  105   CO2  29  31   GLU  123*  127*   BUN  22*  28*   CREA  1.50*  1.75*   CA  8.4*  8.4*       Signed By: Jazzmine Ca MD     August 11, 2018

## 2018-09-02 LAB
ATRIAL RATE: 73 BPM
CALCULATED P AXIS, ECG09: 43 DEGREES
CALCULATED R AXIS, ECG10: -28 DEGREES
CALCULATED T AXIS, ECG11: 99 DEGREES
DIAGNOSIS, 93000: NORMAL
P-R INTERVAL, ECG05: 218 MS
Q-T INTERVAL, ECG07: 404 MS
QRS DURATION, ECG06: 90 MS
QTC CALCULATION (BEZET), ECG08: 445 MS
VENTRICULAR RATE, ECG03: 73 BPM

## 2018-09-06 ENCOUNTER — HOSPITAL ENCOUNTER (OUTPATIENT)
Age: 78
Setting detail: OBSERVATION
Discharge: HOME | End: 2018-09-07
Attending: EMERGENCY MEDICINE | Admitting: HOSPITALIST
Payer: MEDICARE

## 2018-09-06 ENCOUNTER — APPOINTMENT (OUTPATIENT)
Dept: CT IMAGING | Age: 78
End: 2018-09-06
Attending: EMERGENCY MEDICINE
Payer: MEDICARE

## 2018-09-06 ENCOUNTER — APPOINTMENT (OUTPATIENT)
Dept: MRI IMAGING | Age: 78
End: 2018-09-06
Attending: EMERGENCY MEDICINE
Payer: MEDICARE

## 2018-09-06 DIAGNOSIS — E86.0 DEHYDRATION: ICD-10-CM

## 2018-09-06 DIAGNOSIS — I10 ELEVATED BLOOD PRESSURE READING WITH DIAGNOSIS OF HYPERTENSION: ICD-10-CM

## 2018-09-06 DIAGNOSIS — N18.9 CHRONIC KIDNEY DISEASE, UNSPECIFIED CKD STAGE: ICD-10-CM

## 2018-09-06 DIAGNOSIS — R73.9 HYPERGLYCEMIA: ICD-10-CM

## 2018-09-06 DIAGNOSIS — G45.9 TRANSIENT CEREBRAL ISCHEMIA, UNSPECIFIED TYPE: Primary | ICD-10-CM

## 2018-09-06 LAB
ANION GAP SERPL CALC-SCNC: 7 MMOL/L (ref 3–18)
APPEARANCE UR: CLEAR
APTT PPP: 28.6 SEC (ref 23–36.4)
BACTERIA URNS QL MICRO: NEGATIVE /HPF
BASOPHILS # BLD: 0.1 K/UL (ref 0–0.1)
BASOPHILS NFR BLD: 1 % (ref 0–2)
BILIRUB UR QL: NEGATIVE
BUN SERPL-MCNC: 22 MG/DL (ref 7–18)
BUN/CREAT SERPL: 11 (ref 12–20)
CALCIUM SERPL-MCNC: 8.6 MG/DL (ref 8.5–10.1)
CHLORIDE SERPL-SCNC: 102 MMOL/L (ref 100–108)
CK MB CFR SERPL CALC: 1.9 % (ref 0–4)
CK MB SERPL-MCNC: 3.8 NG/ML (ref 5–25)
CK SERPL-CCNC: 196 U/L (ref 39–308)
CO2 SERPL-SCNC: 29 MMOL/L (ref 21–32)
COLOR UR: YELLOW
CREAT SERPL-MCNC: 1.95 MG/DL (ref 0.6–1.3)
DIFFERENTIAL METHOD BLD: ABNORMAL
EOSINOPHIL # BLD: 0.4 K/UL (ref 0–0.4)
EOSINOPHIL NFR BLD: 8 % (ref 0–5)
EPITH CASTS URNS QL MICRO: NORMAL /LPF (ref 0–5)
ERYTHROCYTE [DISTWIDTH] IN BLOOD BY AUTOMATED COUNT: 13.5 % (ref 11.6–14.5)
GLUCOSE BLD STRIP.AUTO-MCNC: 139 MG/DL (ref 70–110)
GLUCOSE BLD STRIP.AUTO-MCNC: 169 MG/DL (ref 70–110)
GLUCOSE BLD STRIP.AUTO-MCNC: 265 MG/DL (ref 70–110)
GLUCOSE SERPL-MCNC: 222 MG/DL (ref 74–99)
GLUCOSE UR STRIP.AUTO-MCNC: 250 MG/DL
HCT VFR BLD AUTO: 32.2 % (ref 36–48)
HGB BLD-MCNC: 11.1 G/DL (ref 13–16)
HGB UR QL STRIP: NEGATIVE
INR PPP: 1.1 (ref 0.8–1.2)
KETONES UR QL STRIP.AUTO: NEGATIVE MG/DL
LEUKOCYTE ESTERASE UR QL STRIP.AUTO: NEGATIVE
LYMPHOCYTES # BLD: 0.8 K/UL (ref 0.9–3.6)
LYMPHOCYTES NFR BLD: 17 % (ref 21–52)
MAGNESIUM SERPL-MCNC: 1.9 MG/DL (ref 1.6–2.6)
MCH RBC QN AUTO: 34.7 PG (ref 24–34)
MCHC RBC AUTO-ENTMCNC: 34.5 G/DL (ref 31–37)
MCV RBC AUTO: 100.6 FL (ref 74–97)
MONOCYTES # BLD: 0.6 K/UL (ref 0.05–1.2)
MONOCYTES NFR BLD: 12 % (ref 3–10)
NEUTS SEG # BLD: 2.8 K/UL (ref 1.8–8)
NEUTS SEG NFR BLD: 62 % (ref 40–73)
NITRITE UR QL STRIP.AUTO: NEGATIVE
PH UR STRIP: 7 [PH] (ref 5–8)
PLATELET # BLD AUTO: 105 K/UL (ref 135–420)
PMV BLD AUTO: 10.9 FL (ref 9.2–11.8)
POTASSIUM SERPL-SCNC: 4.1 MMOL/L (ref 3.5–5.5)
PROT UR STRIP-MCNC: ABNORMAL MG/DL
PROTHROMBIN TIME: 14.1 SEC (ref 11.5–15.2)
RBC # BLD AUTO: 3.2 M/UL (ref 4.7–5.5)
RBC #/AREA URNS HPF: NORMAL /HPF (ref 0–5)
SODIUM SERPL-SCNC: 138 MMOL/L (ref 136–145)
SP GR UR REFRACTOMETRY: 1.01 (ref 1–1.03)
TROPONIN I SERPL-MCNC: 0.02 NG/ML (ref 0–0.06)
UROBILINOGEN UR QL STRIP.AUTO: 0.2 EU/DL (ref 0.2–1)
WBC # BLD AUTO: 4.6 K/UL (ref 4.6–13.2)
WBC URNS QL MICRO: NORMAL /HPF (ref 0–5)

## 2018-09-06 PROCEDURE — 77030021566 MRA NECK W CONT

## 2018-09-06 PROCEDURE — 96372 THER/PROPH/DIAG INJ SC/IM: CPT

## 2018-09-06 PROCEDURE — 74011250636 HC RX REV CODE- 250/636: Performed by: EMERGENCY MEDICINE

## 2018-09-06 PROCEDURE — 74011636637 HC RX REV CODE- 636/637: Performed by: HOSPITALIST

## 2018-09-06 PROCEDURE — 70551 MRI BRAIN STEM W/O DYE: CPT

## 2018-09-06 PROCEDURE — 96361 HYDRATE IV INFUSION ADD-ON: CPT

## 2018-09-06 PROCEDURE — 80048 BASIC METABOLIC PNL TOTAL CA: CPT | Performed by: EMERGENCY MEDICINE

## 2018-09-06 PROCEDURE — 99218 HC RM OBSERVATION: CPT

## 2018-09-06 PROCEDURE — 83735 ASSAY OF MAGNESIUM: CPT | Performed by: EMERGENCY MEDICINE

## 2018-09-06 PROCEDURE — 85025 COMPLETE CBC W/AUTO DIFF WBC: CPT | Performed by: EMERGENCY MEDICINE

## 2018-09-06 PROCEDURE — 85730 THROMBOPLASTIN TIME PARTIAL: CPT | Performed by: EMERGENCY MEDICINE

## 2018-09-06 PROCEDURE — 70450 CT HEAD/BRAIN W/O DYE: CPT

## 2018-09-06 PROCEDURE — 82962 GLUCOSE BLOOD TEST: CPT

## 2018-09-06 PROCEDURE — 81001 URINALYSIS AUTO W/SCOPE: CPT | Performed by: EMERGENCY MEDICINE

## 2018-09-06 PROCEDURE — 74011250636 HC RX REV CODE- 250/636: Performed by: HOSPITALIST

## 2018-09-06 PROCEDURE — 85610 PROTHROMBIN TIME: CPT | Performed by: EMERGENCY MEDICINE

## 2018-09-06 PROCEDURE — 70544 MR ANGIOGRAPHY HEAD W/O DYE: CPT

## 2018-09-06 PROCEDURE — A9575 INJ GADOTERATE MEGLUMI 0.1ML: HCPCS | Performed by: EMERGENCY MEDICINE

## 2018-09-06 PROCEDURE — 99285 EMERGENCY DEPT VISIT HI MDM: CPT

## 2018-09-06 PROCEDURE — 82553 CREATINE MB FRACTION: CPT | Performed by: EMERGENCY MEDICINE

## 2018-09-06 PROCEDURE — 93005 ELECTROCARDIOGRAM TRACING: CPT

## 2018-09-06 PROCEDURE — 96374 THER/PROPH/DIAG INJ IV PUSH: CPT

## 2018-09-06 PROCEDURE — 84484 ASSAY OF TROPONIN QUANT: CPT | Performed by: EMERGENCY MEDICINE

## 2018-09-06 PROCEDURE — 74011250637 HC RX REV CODE- 250/637: Performed by: HOSPITALIST

## 2018-09-06 RX ORDER — LORAZEPAM 2 MG/ML
2 INJECTION INTRAMUSCULAR ONCE
Status: COMPLETED | OUTPATIENT
Start: 2018-09-06 | End: 2018-09-06

## 2018-09-06 RX ORDER — GADOTERATE MEGLUMINE 376.9 MG/ML
20 INJECTION INTRAVENOUS
Status: COMPLETED | OUTPATIENT
Start: 2018-09-06 | End: 2018-09-06

## 2018-09-06 RX ORDER — CLOPIDOGREL BISULFATE 75 MG/1
300 TABLET ORAL
Status: DISCONTINUED | OUTPATIENT
Start: 2018-09-06 | End: 2018-09-06

## 2018-09-06 RX ORDER — LANOLIN ALCOHOL/MO/W.PET/CERES
400 CREAM (GRAM) TOPICAL DAILY
Status: DISCONTINUED | OUTPATIENT
Start: 2018-09-07 | End: 2018-09-07 | Stop reason: HOSPADM

## 2018-09-06 RX ORDER — TORSEMIDE 20 MG/1
100 TABLET ORAL DAILY
Status: DISCONTINUED | OUTPATIENT
Start: 2018-09-07 | End: 2018-09-07 | Stop reason: HOSPADM

## 2018-09-06 RX ORDER — ISOSORBIDE MONONITRATE 30 MG/1
30 TABLET, EXTENDED RELEASE ORAL DAILY
Status: DISCONTINUED | OUTPATIENT
Start: 2018-09-07 | End: 2018-09-07 | Stop reason: HOSPADM

## 2018-09-06 RX ORDER — CARVEDILOL 12.5 MG/1
12.5 TABLET ORAL 2 TIMES DAILY
Status: DISCONTINUED | OUTPATIENT
Start: 2018-09-06 | End: 2018-09-07 | Stop reason: HOSPADM

## 2018-09-06 RX ORDER — HEPARIN SODIUM 5000 [USP'U]/ML
5000 INJECTION, SOLUTION INTRAVENOUS; SUBCUTANEOUS EVERY 8 HOURS
Status: DISCONTINUED | OUTPATIENT
Start: 2018-09-06 | End: 2018-09-07 | Stop reason: HOSPADM

## 2018-09-06 RX ORDER — GUAIFENESIN 100 MG/5ML
81 LIQUID (ML) ORAL DAILY
Status: DISCONTINUED | OUTPATIENT
Start: 2018-09-07 | End: 2018-09-07 | Stop reason: HOSPADM

## 2018-09-06 RX ORDER — CLOPIDOGREL 300 MG/1
300 TABLET, FILM COATED ORAL ONCE
Status: COMPLETED | OUTPATIENT
Start: 2018-09-06 | End: 2018-09-06

## 2018-09-06 RX ORDER — INSULIN GLARGINE 100 [IU]/ML
20 INJECTION, SOLUTION SUBCUTANEOUS
Status: DISCONTINUED | OUTPATIENT
Start: 2018-09-06 | End: 2018-09-07 | Stop reason: HOSPADM

## 2018-09-06 RX ORDER — SODIUM CHLORIDE 9 MG/ML
100 INJECTION, SOLUTION INTRAVENOUS CONTINUOUS
Status: DISCONTINUED | OUTPATIENT
Start: 2018-09-06 | End: 2018-09-07 | Stop reason: HOSPADM

## 2018-09-06 RX ORDER — IMATINIB MESYLATE 400 MG/1
400 TABLET, FILM COATED ORAL DAILY
Status: DISCONTINUED | OUTPATIENT
Start: 2018-09-07 | End: 2018-09-07 | Stop reason: HOSPADM

## 2018-09-06 RX ORDER — ATORVASTATIN CALCIUM 20 MG/1
40 TABLET, FILM COATED ORAL DAILY
Status: DISCONTINUED | OUTPATIENT
Start: 2018-09-07 | End: 2018-09-07 | Stop reason: HOSPADM

## 2018-09-06 RX ADMIN — SODIUM CHLORIDE 100 ML/HR: 900 INJECTION, SOLUTION INTRAVENOUS at 10:52

## 2018-09-06 RX ADMIN — CLOPIDOGREL BISULFATE 300 MG: 300 TABLET, FILM COATED ORAL at 17:49

## 2018-09-06 RX ADMIN — HEPARIN SODIUM 5000 UNITS: 5000 INJECTION, SOLUTION INTRAVENOUS; SUBCUTANEOUS at 17:48

## 2018-09-06 RX ADMIN — SODIUM CHLORIDE 100 ML/HR: 900 INJECTION, SOLUTION INTRAVENOUS at 22:20

## 2018-09-06 RX ADMIN — GADOTERATE MEGLUMINE 20 ML: 376.9 INJECTION INTRAVENOUS at 14:42

## 2018-09-06 RX ADMIN — LORAZEPAM 2 MG: 2 INJECTION INTRAMUSCULAR at 13:30

## 2018-09-06 RX ADMIN — INSULIN GLARGINE 20 UNITS: 100 INJECTION, SOLUTION SUBCUTANEOUS at 22:20

## 2018-09-06 RX ADMIN — CARVEDILOL 12.5 MG: 12.5 TABLET, FILM COATED ORAL at 22:20

## 2018-09-06 NOTE — ED TRIAGE NOTES
Patient reports that he is having blurred vision and had trouble speaking/slurred vision this morning.

## 2018-09-06 NOTE — CONSULTS
NEUROLOGY ER CONSULTATION NOTE Patient: Kamala Barnhart MRN: [id number]  CSN: [id number] YOB: 1940  Age: 77 y.o. Sex: male DOA: 9/6/2018 LOS:  LOS: 0 days Requesting Physician: Dr. Lazaro King Reason for Consultation: transient speech difficulty HISTORY OF PRESENT ILLNESS:  
Kamala Barnhart is a 58-year-old gentleman with history of diabetes, hyperlipidemia, hypertension, CAD and CML, who presented with transient speech difficulty. He had a similar episode in 2016. He also says he had one more episode a month ago. He was not able to bring his words out for about 20 minutes. He didnt recognize any lateralized weakness or numbness. He didnt recognize any facial droop. He was alone. The patient takes aspirin 81 mg daily and took extra 325 mg today after the event. He was seen by teleneurology and was not regarded to be a tPA candidate. There is an unclear history of proximal atrial fibrillation/flutter in the past. He underwent a Holter monitoring as outpatient but it didnt show presence of atrial fibrillation per outside records. His head CT didnt show any acute events. PAST MEDICAL HISTORY: 
Past Medical History:  
Diagnosis Date  CAD (coronary artery disease) 2014  Chronic atrial fibrillation ([address] 9/2/2016  CML (chronic myelocytic leukemia) [facility]  Coagulation disorder ([address] bruises easily  Diabetes ([facility] [address] 2012  Hypertension  Nausea & vomiting   
 with most recent surgery-carotid endardarectomy  TIA (transient ischemic attack) 8/11/2016 PAST SURGICAL HISTORY: 
Past Surgical History:  
Procedure Laterality Date  CARDIAC SURG PROCEDURE UNLIST  3/2014  
 quadruple bypass  HX CAROTID ENDARTERECTOMY Left 05/2017  HX CATARACT REMOVAL Bilateral   
 HX CORONARY ARTERY BYPASS GRAFT    
 HX HERNIA REPAIR Right   
 hernia  HX UROLOGICAL    
 greenlight laser  HX UROLOGICAL    
 kidney stone  HX UROLOGICAL  Lithotripsy x 2  
 
 FAMILY HISTORY: 
Family History Problem Relation Age of Onset  Post-op Nausea/Vomiting Brother SOCIAL HISTORY: 
Social History Social History  Marital status:  Spouse name: N/A  
 Number of children: N/A  
 Years of education: N/A Social History Main Topics  Smoking status: Former Smoker Packs/day: 1.00 Years: 60.00 Quit date: 3/23/2014  Smokeless tobacco: Never Used  Alcohol use 1.2 oz/week 2 Shots of liquor per week Comment: per day  Drug use: No  
 Sexual activity: No  
 
Other Topics Concern  None Social History Narrative MEDICATIONS: 
Current Facility-Administered Medications Medication Dose Route Frequency  0.9% sodium chloride infusion  100 mL/hr IntraVENous CONTINUOUS Current Outpatient Prescriptions Medication Sig  
 isosorbide mononitrate ER (IMDUR) 30 mg tablet Take 1 Tab by mouth daily.  nitroglycerin (NITROSTAT) 0.4 mg SL tablet 1 Tab by SubLINGual route as needed for Chest Pain. Up to 3 doses.  aspirin 81 mg chewable tablet Take 81 mg by mouth daily.  magnesium oxide (MAG-OX) 400 mg tablet Take 400 mg by mouth daily.  atorvastatin (LIPITOR) 40 mg tablet Take 40 mg by mouth daily.  imatinib (GLEEVEC) 400 mg tablet Take 400 mg by mouth daily. 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 Indications: Edema  insulin glargine (LANTUS) 100 unit/mL injection 20 Units by SubCUTAneous route nightly. Indications: TYPE 2 DIABETES MELLITUS (Patient taking differently: 13 Units by SubCUTAneous route daily. Indications: type 2 diabetes mellitus, 13 units bid)  carvedilol (COREG) 6.25 mg tablet Take 12.5 mg by mouth two (2) times a day. Prior to Admission medications Medication Sig Start Date End Date Taking? Authorizing Provider  
isosorbide mononitrate ER (IMDUR) 30 mg tablet Take 1 Tab by mouth daily. 8/12/18   Doc Mckeon MD  
nitroglycerin (NITROSTAT) 0.4 mg SL tablet 1 Tab by SubLINGual route as needed for Chest Pain. Up to 3 doses. 8/11/18   Doc Mckeon MD  
aspirin 81 mg chewable tablet Take 81 mg by mouth daily. Stephan Nance MD  
magnesium oxide (MAG-OX) 400 mg tablet Take 400 mg by mouth daily. Stephan Nance MD  
atorvastatin (LIPITOR) 40 mg tablet Take 40 mg by mouth daily. Stephan Nance MD  
imatinib (GLEEVEC) 400 mg tablet Take 400 mg by mouth daily. 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  
torsemide (DEMADEX) 100 mg tablet Take 100 mg by mouth daily. Indications: Edema    Historical Provider  
insulin glargine (LANTUS) 100 unit/mL injection 20 Units by SubCUTAneous route nightly. Indications: TYPE 2 DIABETES MELLITUS Patient taking differently: 13 Units by SubCUTAneous route daily. Indications: type 2 diabetes mellitus, 13 units bid 9/25/16   Alicia Ferreira MD  
carvedilol (COREG) 6.25 mg tablet Take 12.5 mg by mouth two (2) times a day. Historical Provider ALLERGIES: 
Allergies Allergen Reactions  Adhesive Tape-Silicones Other (comments) Raw skin  Codeine Nausea and Vomiting  Iodinated Contrast- Oral And Iv Dye Rash  
  patient states this was years ago - he states that he can tolerate the newer agents Review of Systems GENERAL: No fevers or chills. HEENT: No change in vision, earache, tinnitus, sore throat or sinus congestion. NECK: No pain or stiffness. CARDIOVASCULAR: No chest pain or pressure. No palpitations. PULMONARY: No shortness of breath, cough or wheeze. GASTROINTESTINAL: No abdominal pain, nausea, vomiting or diarrhea. GENITOURINARY: No urinary frequency, urgency, hesitancy or dysuria. MUSCULOSKELETAL: No joint or muscle pain, no back pain, no recent trauma. DERMATOLOGIC: No rash, no itching, no lesions. ENDOCRINE: No polyuria, polydipsia, no heat or cold intolerance. HEMATOLOGICAL: No anemia or easy bruising or bleeding. NEUROLOGIC: No headache, seizures, numbness, tingling or weakness. See HPI PHYSICAL EXAMINATION:  
 
Visit Vitals  /71  Pulse 62  Temp 97.5 °F (36.4 °C)  Resp 17  Ht 5' 8\" (1.727 m)  Wt 103.4 kg (228 lb) Comment: wife stated  SpO2 93%  BMI 34.67 kg/m2 O2 Device: Room air GENERAL: Pleasant, in no apparent distress. HEENT: Moist mucous membranes, sclerae anicteric, scalp is atraumatic. CVS: Regular rate and rhythm, no murmurs or gallops. No carotid bruits. PULMONARY: Clear to auscultation bilaterally. No rales or rhonchi. No wheezing. EXTREMITIES: Normal range of motion at all sites. No deformities. ABDOMEN: Soft, nontender. SKIN: No rashes or ecchymoses. Warm and dry. NEUROLOGIC: Alert and oriented x3. Speech is fluent without any aphasia or dysarthria. Cranial nerves: Face is symmetric with symmetric smile. Facial sensation is intact. Extraocular movements are intact with no nystagmus. Visual fields are full to confrontation. PERRL. Tongue is midline. Palate elevates symmetrically. Hearing intact to speech. Sternocleidomastoid and trapezius strengths are full bilaterally. Motor: Normal tone and normal bulk on all four extremities. Strength is full on all four segmentally. There is no pronator drift or orbiting. Sensory: Intact to pinprick and touch on all four. Normal vibratory sensation on toes bilaterally. Coordination: Intact coordination with finger-nose-finger bilaterally. Normal fine movements. No bradykinesia detected. Deep tendon reflexes: 2+ at biceps, brachioradialis, decreased at patella and ankles bilaterally. Labs: Results:  
   
Chemistry Recent Labs  
   09/06/18 
 1026 GLU  222* NA  138  
K  4.1 CL  102 CO2  29 BUN  22* CREA  1.95* CA  8.6 AGAP  7  
BUCR  11* CBC w/Diff Recent Labs  
   09/06/18 
 1026 WBC  4.6  
RBC  3.20* HGB  11.1*  
HCT  32.2*  
 PLT  105* GRANS  62 LYMPH  17* EOS  8* Cardiac Enzymes Recent Labs  
   09/06/18 
 1026 CPK  196 CKND1  1.9 Coagulation Recent Labs  
   09/06/18 
 1026 PTP  14.1 INR  1.1 APTT  28.6 Lipid Panel Lab Results Component Value Date/Time Cholesterol, total 107 08/12/2016 03:20 AM  
 HDL Cholesterol 56 08/12/2016 03:20 AM  
 LDL, calculated 26.4 08/12/2016 03:20 AM  
 VLDL, calculated 24.6 08/12/2016 03:20 AM  
 Triglyceride 123 08/12/2016 03:20 AM  
 CHOL/HDL Ratio 1.9 08/12/2016 03:20 AM  
  
BNP No results for input(s): BNPP in the last 72 hours. Liver Enzymes No results for input(s): TP, ALB, TBIL, AP, SGOT, GPT in the last 72 hours. No lab exists for component: DBIL Thyroid Studies Lab Results Component Value Date/Time TSH 1.59 08/12/2016 03:20 AM  
    
 
Radiology: 
Nm Lung Vent/perf Result Date: 8/10/2018 Ventilation-perfusion lung scan History:  Chest pain and tightness. Evaluation for potential pulmonary embolism is requested. Comparison:  Chest radiograph performed 8/10/2018. Technique: Ventilation imaging was performed after inhalation of 0.8 millicuries of Tc 19M DTPA with a nebulizer followed by imaging in multiple projections. Perfusion imaging was performed after intravenous injection of 6.5 millicuries of Tc 17E MAA followed by imaging in multiple projections. Injection site: Left antecubital fossa vein Findings: Ventilation imaging shows no significant ventilation defect, with a small amount of inhaled radiotracer deposited within the proximal conducting airways. Small amount of swallowed activity also demonstrated. Perfusion imaging shows mild inhomogeneity along the peripheral aspect of the lungs. No focal segmental perfusion defect is seen. No perfusion mismatches are present. Impression: Low probability for pulmonary embolism. Ct Head Wo Cont Result Date: 9/6/2018 EXAM: CT head INDICATION: Slurred speech. COMPARISON: CT 8/11/16 and MRI brain 8/12/2016 TECHNIQUE: Axial CT imaging of the head was performed without intravenous contrast. Sagittal and coronal reconstructions were obtained. One or more dose reduction techniques were used on this CT: automated exposure control, adjustment of the mAs and/or kVp according to patient's size, and iterative reconstruction techniques. The specific techniques utilized on this CT exam have been documented in the patient's electronic medical record. _______________ FINDINGS: BRAIN AND POSTERIOR FOSSA: The sulci, folia, ventricles and basal cisterns are within normal limits for the patient?s age. There is no intracranial hemorrhage, mass effect, or midline shift. Minimal low-attenuation changes deep white matter compatible with chronic microvascular insufficiency. Moderate cortical volume loss but within normal range for age. EXTRA-AXIAL SPACES AND MENINGES: There are no abnormal extra-axial fluid collections. CALVARIUM: Intact. SINUSES: Clear. OTHER: None. _______________ IMPRESSION: No acute intracranial abnormalities. Stable findings. CRITICAL RESULT: Critical result called to Dr. Manjula Borges at 10:43 AM on 9/6/2018. Results clearly understood and acknowledged. Xr Chest [facility] Result Date: 8/10/2018 Chest, single view Indication: Chest pain with tightness Comparison: Several prior chest radiographs, most recently 9/7/2016 Findings:  Portable upright AP view of the chest was obtained. No focal pneumonic consolidation, pneumothorax, or pleural effusion. Cardiac size and mediastinal contours are stable, with mild cardiac enlargement noted and postoperative changes from median sternotomy. No acute osseous abnormality. Degenerative changes of each shoulder girdle noted. Surgical clips noted at the base of the left neck. Impression: Postop changes as above, with mild cardiac enlargement.  No superimposed acute radiographic cardiopulmonary normality. ASSESSMENT/IMPRESSION: 
The clinical presentation is suggestive of transient ischemic attack with speech difficulty. The patient was on aspirin when the event took place, therefore, he would be regarded as aspirin failure. I like to start the patient clopidogrel. He needs to limit monitoring and possibly Holter monitoring is outpatient to rule out atrial fibrillation RECOMMENDATIONS: 
1. Start clopidogrel 75 mg with a loading dose of 300 mg today 2. Admit to telemetry with tele monitoring 3. Neuro checks per stroke protocol 4. Permissive hypertension for now (do not treat if BP is not >200/110, prefer prn labetolol 5mg) 5. IV normal saline continuous infusion 100-125 ml/h 6. Euglycemia with sliding scale insulin 7. Euthermia with acetaminophen 650mg Q6h prn for fever 8. Avoid urinary catheters please. 9. MRI brain and MRA of head and neck without contrast 
10. Echocardiogram with bubble study 11. Lipid panel. Continue atorvastatin 40 mg daily for now. 12. Hemoglobin A1c 
13. SCDs and DVT prophylaxis 14. PT/OT and SLP consults I will follow the patient. Please do not hesitate to return with any questions. 
 
------------------------------------ 
Anais Lorenzo MD 
9/6/2018 12:57 PM

## 2018-09-06 NOTE — ED NOTES
Looked for IV access/blood draw while pt in CT. Pt with scarring to veins. Did not want delay return to ED to look for IV site. Returned to ED to try IV access on L arm. Access obtained to [facility] in ED.

## 2018-09-06 NOTE — ROUTINE PROCESS
Bedside and Verbal shift change report given to Herbert Varma (oncoming nurse) by Angus Asencio (offgoing nurse). Report included the following information SBAR, Kardex, Intake/Output, MAR and Cardiac Rhythm NSR w/1st AVB, PVcs.

## 2018-09-06 NOTE — IP AVS SNAPSHOT
[address] [address] 
[phone] Patient: Charli Oliveira MRN: [id number] :1940 About your hospitalization You were admitted on:  2018 You last received care in the:  [facility] You were discharged on:  2018 Why you were hospitalized Your primary diagnosis was:  Not on File Your diagnoses also included:  Tia (Transient Ischemic Attack), Hyperglycemia, Elevated Blood Pressure Reading With Diagnosis Of Hypertension Follow-up Information Follow up With Details Comments Contact Info [facility]oe Ramesh [address] [facility] 
[phone] Itzel Abreu MD   97 [facility] [address] [facility] 150 
[phone] Patient prefers to schedule his own follow-up appointments. Discharge Orders None A check dora indicates which time of day the medication should be taken. My Medications START taking these medications Instructions Each Dose to Equal  
 Morning Noon Evening Bedtime  
 clopidogrel 75 mg Tab Commonly known as:  PLAVIX Your last dose was: Your next dose is: Take 1 Tab by mouth daily. 75 mg CHANGE how you take these medications Instructions Each Dose to Equal  
 Morning Noon Evening Bedtime  
 insulin glargine 100 unit/mL injection Commonly known as:  LANTUS What changed:   
- how much to take - when to take this Your last dose was: Your next dose is:    
   
   
 20 Units by SubCUTAneous route nightly. Indications: TYPE 2 DIABETES MELLITUS  
 20 Units CONTINUE taking these medications Instructions Each Dose to Equal  
 Morning Noon Evening Bedtime  
 atorvastatin 40 mg tablet Commonly known as:  LIPITOR Your last dose was: Your next dose is: Take 40 mg by mouth daily. 40 mg COREG 6.25 mg tablet Generic drug:  carvedilol Your last dose was: Your next dose is: Take 12.5 mg by mouth two (2) times a day. 12.5 mg  
    
   
   
   
  
 GLEEVEC 400 mg tablet Generic drug:  imatinib Your last dose was: Your next dose is: Take 400 mg by mouth daily. 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  
 400 mg  
    
   
   
   
  
 isosorbide mononitrate ER 30 mg tablet Commonly known as:  IMDUR Your last dose was: Your next dose is: Take 1 Tab by mouth daily. 30 mg  
    
   
   
   
  
 magnesium oxide 400 mg tablet Commonly known as:  MAG-OX Your last dose was: Your next dose is: Take 400 mg by mouth daily. 400 mg  
    
   
   
   
  
 nitroglycerin 0.4 mg SL tablet Commonly known as:  NITROSTAT Your last dose was: Your next dose is:    
   
   
 1 Tab by SubLINGual route as needed for Chest Pain. Up to 3 doses. 0.4 mg  
    
   
   
   
  
 torsemide 100 mg tablet Commonly known as:  DEMADEX Your last dose was: Your next dose is: Take 100 mg by mouth daily. Indications: Edema 100 mg  
    
   
   
   
  
  
STOP taking these medications   
 aspirin 81 mg chewable tablet Where to Get Your Medications These medications were sent to [facility], [facility] [address] AT [facility] [address]  [address] [place] [address], [address] [place] [address] Drive [address] Phone:  [phone]  
  clopidogrel 75 mg Tab Discharge Instructions Transient Ischemic Attack: Care Instructions Your Care Instructions A transient ischemic attack (TIA) is when blood flow to a part of your brain is blocked for a short time.  A TIA is like a stroke but usually lasts only a few minutes. A TIA does not cause lasting brain damage. Any vision problems, slurred speech, or other symptoms usually go away in 10 to 20 minutes. But they may last for up to 24 hours. TIAs are often warning signs of a stroke. Some people who have a TIA may have a stroke in the future. A stroke can cause symptoms like those of a TIA. But a stroke causes lasting damage to your brain. You can take steps to help prevent a stroke. One thing you can do is get early treatment. If you have other new symptoms, or if your symptoms do not get better, go back to the emergency room or call your doctor right away. Getting treatment right away may prevent long-term brain damage caused by a stroke. The doctor has checked you carefully, but problems can develop later. If you notice any problems or new symptoms, get medical treatment right away. Follow-up care is a key part of your treatment and safety. Be sure to make and go to all appointments, and call your doctor if you are having problems. It's also a good idea to know your test results and keep a list of the medicines you take. How can you care for yourself at home? Medicines 
  · Be safe with medicines. Take your medicines exactly as prescribed. Call your doctor if you think you are having a problem with your medicine.  
  · If you take a blood thinner, such as aspirin, be sure you get instructions about how to take your medicine safely. Blood thinners can cause serious bleeding problems.  
  · Call your doctor if you are not able to take your medicines for any reason.  
  · Do not take any over-the-counter medicines or herbal products without talking to your doctor first.  
  · If you take birth control pills or hormone therapy, talk to your doctor. Ask if these treatments are right for you.  
 Lifestyle changes 
  · Do not smoke. If you need help quitting, talk to your doctor about stop-smoking programs and medicines.   · Be active. If your doctor recommends it, get more exercise. Walking is a good choice. Bit by bit, increase the amount you walk every day. Try for at least 30 minutes on most days of the week. You also may want to swim, bike, or do other activities.  
  · Eat heart-healthy foods. These include fruits, vegetables, high-fiber foods, fish, and foods that are low in sodium, saturated fat, and trans fat.  
  · Stay at a healthy weight. Lose weight if you need to.  
  · Limit alcohol to 2 drinks a day for men and 1 drink a day for women.  
 Staying healthy 
  · Manage other health problems such as diabetes, high blood pressure, and high cholesterol.  
  · Get the flu vaccine every year. When should you call for help? Call 911 anytime you think you may need emergency care. For example, call if: 
  · You have new or worse symptoms of a stroke. These may include: 
¨ Sudden numbness, tingling, weakness, or loss of movement in your face, arm, or leg, especially on only one side of your body. ¨ Sudden vision changes. ¨ Sudden trouble speaking. ¨ Sudden confusion or trouble understanding simple statements. ¨ Sudden problems with walking or balance. ¨ A sudden, severe headache that is different from past headaches. Call 911 even if these symptoms go away in a few minutes.  
  · You feel like you are having another TIA.  
 Watch closely for changes in your health, and be sure to contact your doctor if you have any problems. Where can you learn more? Go to http://dio-mat.info/. Enter [phone] in the search box to learn more about \"Transient Ischemic Attack: Care Instructions. \" Current as of: November 21, 2017 Content Version: 11.7 © 2249-9396 bCODE. Care instructions adapted under license by [employer] (which disclaims liability or warranty for this information).  If you have questions about a medical condition or this instruction, always ask your healthcare professional. [place] [address] any warranty or liability for your use of this information. Patient armband removed and shredded MyChart Activation Thank you for requesting access to [employer]. Please follow the instructions below to securely access and download your online medical record. [employer] allows you to send messages to your doctor, view your test results, renew your prescriptions, schedule appointments, and more. How Do I Sign Up? 1. In your internet browser, go to [email or web address] 
2. Click on the First Time User? Click Here link in the Sign In box. You will be redirect to the New Member Sign Up page. 3. Enter your [employer] Access Code exactly as it appears below. You will not need to use this code after youve completed the sign-up process. If you do not sign up before the expiration date, you must request a new code. [employer] Access Code: [email or web address] Expires: 2018 10:17 AM (This is the date your [employer] access code will ) 4. Enter the last four digits of your Social Security Number (xxxx) and Date of Birth (mm/dd/yyyy) as indicated and click Submit. You will be taken to the next sign-up page. 5. Create a [employer] ID. This will be your [employer] login ID and cannot be changed, so think of one that is secure and easy to remember. 6. Create a [employer] password. You can change your password at any time. 7. Enter your Password Reset Question and Answer. This can be used at a later time if you forget your password. 8. Enter your e-mail address. You will receive e-mail notification when new information is available in [address] 9. Click Sign Up. You can now view and download portions of your medical record. 10. Click the Download Summary menu link to download a portable copy of your medical information. Additional Information If you have questions, please visit the Frequently Asked Questions section of the [employer] website at [email or web address] Remember, MyChart is NOT to be used for urgent needs. For medical emergencies, dial 911. Dual AVS reviewed with Karen Patino RN. All medications reviewed individually with patient. Opportunities for questions and concerns provided. Patient discharged via (mode of transport ie. Car, ambulance or air transport) car  Patient's arm band appropriately discarded. DISCHARGE SUMMARY from Nurse PATIENT INSTRUCTIONS: 
 
 
F-face looks uneven A-arms unable to move or move unevenly S-speech slurred or non-existent T-time-call 911 as soon as signs and symptoms begin-DO NOT go Back to bed or wait to see if you get better-TIME IS BRAIN. Warning Signs of HEART ATTACK Call 911 if you have these symptoms: 
? Chest discomfort. Most heart attacks involve discomfort in the center of the chest that lasts more than a few minutes, or that goes away and comes back. It can feel like uncomfortable pressure, squeezing, fullness, or pain. ? Discomfort in other areas of the upper body. Symptoms can include pain or discomfort in one or both arms, the back, neck, jaw, or stomach. ? Shortness of breath with or without chest discomfort. ? Other signs may include breaking out in a cold sweat, nausea, or lightheadedness. Don't wait more than five minutes to call [address]! Fast action can save your life. Calling 911 is almost always the fastest way to get lifesaving treatment. Emergency Medical Services staff can begin treatment when they arrive  up to an hour sooner than if someone gets to the hospital by car. The discharge information has been reviewed with the patient. The patient verbalized understanding.  
Discharge medications reviewed with the patient and appropriate educational materials and side effects teaching were provided. ___________________________________________________________________________________________________________________________________ MyChart Announcement We are excited to announce that we are making your provider's discharge notes available to you in Media[employer]. You will see these notes when they are completed and signed by the physician that discharged you from your recent hospital stay. If you have any questions or concerns about any information you see in MediaBoost, please call the Health Information Department where you were seen or reach out to your Primary Care Provider for more information about your plan of care. Introducing [facility]! Joanna Nunn introduces MediaBoost patient portal. Now you can access parts of your medical record, email your doctor's office, and request medication refills online. 1. In your internet browser, go to [email or web address] 2. Click on the First Time User? Click Here link in the Sign In box. You will see the New Member Sign Up page. 3. Enter your Media[employer] Access Code exactly as it appears below. You will not need to use this code after youve completed the sign-up process. If you do not sign up before the expiration date, you must request a new code. · Media[employer]ost Access Code: [email or web address] Expires: 12/5/2018 10:17 AM 
 
4. Enter the last four digits of your Social Security Number (xxxx) and Date of Birth (mm/dd/yyyy) as indicated and click Submit. You will be taken to the next sign-up page. 5. Create a MediaBoost ID. This will be your MediaBoost login ID and cannot be changed, so think of one that is secure and easy to remember. 6. Create a MyChart password. You can change your password at any time. 7. Enter your Password Reset Question and Answer. This can be used at a later time if you forget your password. 8. Enter your e-mail address. You will receive e-mail notification when new information is available in [address] 9. Click Sign Up. You can now view and download portions of your medical record. 10. Click the Download Summary menu link to download a portable copy of your medical information. If you have questions, please visit the Frequently Asked Questions section of the MyChart website. Remember, [facility] is NOT to be used for urgent needs. For medical emergencies, dial 911. Now available from your iPhone and Android! Introducing Juan Zapien As a [facility] patient, I wanted to make you aware of our electronic visit tool called Juan Zapien. [facility] 24/7 allows you to connect within minutes with a medical provider 24 hours a day, seven days a week via a mobile device or tablet or logging into a secure website from your computer. You can access Juan Zapien from anywhere in the [place]. A virtual visit might be right for you when you have a simple condition and feel like you just dont want to get out of bed, or cant get away from work for an appointment, when your regular [facility] provider is not available (evenings, weekends or holidays), or when youre out of town and need minor care. Electronic visits cost only $49 and if the [facility] 24/7 provider determines a prescription is needed to treat your condition, one can be electronically transmitted to a nearby pharmacy*. Please take a moment to enroll today if you have not already done so. The enrollment process is free and takes just a few minutes. To enroll, please download the [facility] 24/7 sherin to your tablet or phone, or visit [email or web address] to enroll on your computer.    
And, as an [address] [place] [address] patient with a Netlog account, the results of your visits will be scanned into your electronic medical record and your primary care provider will be able to view the scanned results. We urge you to continue to see your regular [facility]  provider for your ongoing medical care. And while your primary care provider may not be the one available when you seek a [facility] virtual visit, the peace of mind you get from getting a real diagnosis real time can be priceless. For more information on Kuli Kuli, view our Frequently Asked Questions (FAQs) at [email or web address]. Sincerely, 
 
Ritchie Kraus MD 
Chief Medical Officer Adama Estes *:  certain medications cannot be prescribed via [facility]uli Kuli Unresulted Labs-Please follow up with your PCP about these lab tests Order Current Status EKG, 12 LEAD, INITIAL Preliminary result Providers Seen During Your Hospitalization Provider Specialty Primary office phone Mercedes Lynn,  Emergency Medicine [phone] Bret Neil MD [facility] [phone] Your Primary Care Physician (PCP) Primary Care Physician Office Phone Office Fax Nixon Body [phone] You are allergic to the following Allergen Reactions Adhesive Tape-Silicones Other (comments) Raw skin Codeine Nausea and Vomiting Iodinated Contrast- Oral And Iv Dye Rash  
 patient states this was years ago - he states that he can tolerate the newer agents Recent Documentation Height Weight BMI Smoking Status 1.727 m 106.6 kg 35.73 kg/m2 Former Smoker Emergency Contacts Name Discharge Info Relation Home Work Mobile [address] CAREGIVER [3] Spouse [3]   [phone] Patient Belongings The following personal items are in your possession at time of discharge: 
  Dental Appliances: None  Visual Aid: None      Home Medications: None   Jewelry: None  Clothing: With patient    Other Valuables: None Please provide this summary of care documentation to your next provider. Signatures-by signing, you are acknowledging that this After Visit Summary has been reviewed with you and you have received a copy. Patient Signature:  ____________________________________________________________ Date:  ____________________________________________________________  
  
Gwenyth Miles Provider Signature:  ____________________________________________________________ Date:  ____________________________________________________________

## 2018-09-06 NOTE — H&P
History & Physical 
 
Patient: Krystian Abel MRN: [id number]  CSN: [id number] YOB: 1940  Age: 77 y.o. Sex: male DOA: 9/6/2018 Primary Care Provider:  Ginger Elder DO Assessment/Plan Patient Active Problem List  
Diagnosis Code  Respiratory distress R06.03  
 TIA (transient ischemic attack) G45.9  Coronary artery disease involving native coronary artery I25.10  Type II diabetes mellitus, uncontrolled ([facility] [address] E11.65  Dyslipidemia E78.5  Pancytopenia ([facility] [address] M75.368  CML (chronic myelocytic leukemia) ([facility]) C92.10  
 HTN (hypertension) I10  
 Hyponatremia E87.1  Chronic atrial fibrillation ([facility]) I48.2  Neutropenic fever ([facility]) D70.9, R50.81  Bacteremia due to Klebsiella pneumoniae R78.81  
 Constipation K59.00  
 Oral thrush B37.0  Atrial fibrillation with RVR ([facility]) I48.91  
 Unstable angina ([facility]) I20.0  Stage 3 chronic kidney disease N18.3  Hyperglycemia R73.9  Elevated blood pressure reading with diagnosis of hypertension I10 Admit to telemetry MRI/ MRA of head with no acute findings Echo 2d doppler done 08/2018. Will not repeat. Discussed with neurology, will give plavix loading dose and daily 75 mg from tomorrow. Stop aspirin. Will use antihypertensive only if blood pressure systolic is above 891 and diastolic above 333 Symptoms resolved. Will keep the patient on telemetry to rule out any arrythmias CKD - stage 3, monitor renal function. DM - continue lantus. CML - continue gleevec. CAD - continue home meds DVT prophylaxis Estimated length of stay : 1-2 days CC: slurred speech HPI:  
 
Krystian Abel is a 77 y.o. male who has past history of DM, HTN, CKD3, CAD, CML, presents to ER with concerns of speech difficulty. Patient reports that this morning he was not able to bring words, episode lasted about 20-30 mins. No unilateral weakness or numbness.  No facial droop, no vision changes. He took extra 325 mg of aspirin. In ER CODE S called and he was not found to be candidate for tPA. Past Medical History:  
Diagnosis Date  CAD (coronary artery disease) 2014  Chronic atrial fibrillation ([facility] [address] 9/2/2016  CML (chronic myelocytic leukemia) ([facility] [address]  Coagulation disorder ([facility] [address] bruises easily  Diabetes ([facility] [address] 2012  Hypertension  Nausea & vomiting   
 with most recent surgery-carotid endardarectomy  TIA (transient ischemic attack) 8/11/2016 Past Surgical History:  
Procedure Laterality Date  CARDIAC SURG PROCEDURE UNLIST  3/2014  
 quadruple bypass  HX CAROTID ENDARTERECTOMY Left 05/2017  HX CATARACT REMOVAL Bilateral   
 HX CORONARY ARTERY BYPASS GRAFT    
 HX HERNIA REPAIR Right   
 hernia  HX UROLOGICAL    
 greenlight laser  HX UROLOGICAL    
 kidney stone  HX UROLOGICAL Lithotripsy x 2 Family History Problem Relation Age of Onset  Post-op Nausea/Vomiting Brother Social History Social History  Marital status:  Spouse name: N/A  
 Number of children: N/A  
 Years of education: N/A Social History Main Topics  Smoking status: Former Smoker Packs/day: 1.00 Years: 60.00 Quit date: 3/23/2014  Smokeless tobacco: Never Used  Alcohol use 1.2 oz/week 2 Shots of liquor per week Comment: per day  Drug use: No  
 Sexual activity: No  
 
Other Topics Concern  None Social History Narrative Prior to Admission medications Medication Sig Start Date End Date Taking? Authorizing Provider  
isosorbide mononitrate ER (IMDUR) 30 mg tablet Take 1 Tab by mouth daily. 8/12/18   Isabella Narayan MD  
nitroglycerin (NITROSTAT) 0.4 mg SL tablet 1 Tab by SubLINGual route as needed for Chest Pain. Up to 3 doses. 8/11/18   Isabella Narayan MD  
aspirin 81 mg chewable tablet Take 81 mg by mouth daily.     Stephan Nance MD  
 magnesium oxide (MAG-OX) 400 mg tablet Take 400 mg by mouth daily. Stephan Nance MD  
atorvastatin (LIPITOR) 40 mg tablet Take 40 mg by mouth daily. Stephan Nance MD  
imatinib (GLEEVEC) 400 mg tablet Take 400 mg by mouth daily. 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  
torsemide (DEMADEX) 100 mg tablet Take 100 mg by mouth daily. Indications: Edema    Historical Provider  
insulin glargine (LANTUS) 100 unit/mL injection 20 Units by SubCUTAneous route nightly. Indications: TYPE 2 DIABETES MELLITUS Patient taking differently: 13 Units by SubCUTAneous route daily. Indications: type 2 diabetes mellitus, 13 units bid 9/25/16   Benjamin Guzman MD  
carvedilol (COREG) 6.25 mg tablet Take 12.5 mg by mouth two (2) times a day. Historical Provider Allergies Allergen Reactions  Adhesive Tape-Silicones Other (comments) Raw skin  Codeine Nausea and Vomiting  Iodinated Contrast- Oral And Iv Dye Rash  
  patient states this was years ago - he states that he can tolerate the newer agents Review of Systems Gen: No fever, chills, malaise, weight loss/gain. Heent: No headache, rhinorrhea, epistaxis, ear pain, hearing loss, sinus pain, neck pain/stiffness, sore throat. Heart: No chest pain, palpitations, VARGAS, pnd, or orthopnea. Resp: No cough, hemoptysis, wheezing and shortness of breath. GI: No nausea, vomiting, diarrhea, constipation, melena or hematochezia. : No urinary obstruction, dysuria or hematuria. Derm: No rash, new skin lesion or pruritis. Musc/skeletal: no bone or joint complains. Vasc: No edema, cyanosis or claudication. Endo: No heat/cold intolerance, no polyuria,polydipsia or polyphagia. Neuro: see above Heme: No easy bruising or bleeding. Physical Exam:  
 
Physical Exam: 
Visit Vitals  /85  Pulse 68  Temp 97.5 °F (36.4 °C)  Resp 20  
 Ht 5' 8\" (1.727 m)  Wt 103.4 kg (228 lb) Comment: wife stated  SpO2 100%  BMI 34.67 kg/m2 O2 Device: Room air Temp (24hrs), Av.5 °F (36.4 °C), Min:97.5 °F (36.4 °C), Max:97.5 °F (36.4 °C) 
  701 - 1900 In: -  
Out: 500 [Urine:500] General:  Awake, cooperative, no distress. Head:  Normocephalic, without obvious abnormality, atraumatic. Eyes:  Conjunctivae/corneas clear, sclera anicteric, PERRL, EOMs intact. Nose: Nares normal. No drainage or sinus tenderness. Throat: Lips, mucosa, and tongue normal.   
Neck: Supple, symmetrical, trachea midline, no adenopathy. Lungs:   Clear to auscultation bilaterally. Heart:  Regular rate and rhythm, S1, S2 normal, no murmur, click, rub or gallop. Abdomen: Soft, non-tender. Bowel sounds normal. No masses,  No organomegaly. Extremities: Extremities normal, atraumatic, no cyanosis or edema. Capillary refill normal.  
Pulses: 2+ and symmetric all extremities. Skin: Skin color pink, turgor normal. No rashes or lesions Neurological Exam:  
Mental Status:  Alert , co-operative , memory intact . Cranial Nerves:  Intact visual fields. PERRL, EOM's full, no nystagmus, no ptosis. Facial sensation is normal. Facial movement is symmetric. Palate is midline. Normal sternocleidomastoid strength. Tongue is midline. Hearing is intact bilaterally. Motor:  5/5 strength in upper and lower proximal and distal muscles. Normal bulk and tone. Reflexes:  Deep tendon reflexes 2+/4 and symmetrical.   
Sensory:  Normal and symmetric bilaterally. Gait:  Not tested. Cerebellar:  No cerebellar signs present. Labs Reviewed: 
 
CMP:  
Lab Results Component Value Date/Time   2018 10:26 AM  
 K 4.1 2018 10:26 AM  
  2018 10:26 AM  
 CO2 29 2018 10:26 AM  
 AGAP 7 2018 10:26 AM  
  (H) 2018 10:26 AM  
 BUN 22 (H) 2018 10:26 AM  
 CREA 1.95 (H) 2018 10:26 AM  
 GFRAA 40 (L) 09/06/2018 10:26 AM  
 GFRNA 33 (L) 09/06/2018 10:26 AM  
 CA 8.6 09/06/2018 10:26 AM  
 MG 1.9 09/06/2018 10:26 AM  
 
CBC:  
Lab Results Component Value Date/Time WBC 4.6 09/06/2018 10:26 AM  
 HGB 11.1 (L) 09/06/2018 10:26 AM  
 HCT 32.2 (L) 09/06/2018 10:26 AM  
  (L) 09/06/2018 10:26 AM  
 
All Cardiac Markers in the last 24 hours:  
Lab Results Component Value Date/Time  09/06/2018 10:26 AM  
 CKMB 3.8 (H) 09/06/2018 10:26 AM  
 CKND1 1.9 09/06/2018 10:26 AM  
 TROIQ 0.02 09/06/2018 10:26 AM  
 
 
 
Procedures/imaging: see electronic medical records for all procedures/Xrays and details which were not copied into this note but were reviewed prior to creation of Plan CC: Haily oCsta DO

## 2018-09-06 NOTE — PROGRESS NOTES
Problem: Patient Education: Go to Patient Education Activity Goal: Patient/Family Education Outcome: Progressing Towards Goal 
Stroke Education provided to patient and spouse/SO and the following topics were discussed 1. Patients personal risk factors for stroke are hypertension, hyperlipidemia, diabetes mellitus and  prior TIA 2. Warning signs of Stroke: * Sudden numbness or weakness of the face, arm or leg, especially on one side of The body * Sudden confusion, trouble speaking or understanding * Sudden trouble seeing in one or both eyes * Sudden trouble walking, dizziness, loss of balance or coordination * Sudden severe headache with no known cause 3. Importance of activation Emergency Medical Services ( 9-1-1 ) immediately if experience any warning signs of stroke. 4. Be sure and schedule a follow-up appointment with your primary care doctor or any specialists as instructed. 5. You must take medicine every day to treat your risk factors for stroke. Be sure to take your medicines exactly as your doctor tells you: no more, no less. Know what your medicines are for , what they do. Anti-thrombotics /anticoagulants can help prevent strokes. You are taking the following medicine(s)  Aspirin, Plavix, Lipitor 6. Smoking and second-hand smoke greatly increase your risk of stroke, cardiovascular disease and death. Smoking history does not smoke 7. Information provided was BS Stroke Education Binder, Stroke Handouts or Verbal Education 8. Documentation of teaching completed in Patient Education Activity and on Care Plan with teaching response noted? yes

## 2018-09-06 NOTE — IP AVS SNAPSHOT
[address] [address] 
[phone] Patient: Alessandro Marie MRN: [id number] :1940 A check dora indicates which time of day the medication should be taken. My Medications START taking these medications Instructions Each Dose to Equal  
 Morning Noon Evening Bedtime  
 clopidogrel 75 mg Tab Commonly known as:  PLAVIX Your last dose was: Your next dose is: Take 1 Tab by mouth daily. 75 mg CHANGE how you take these medications Instructions Each Dose to Equal  
 Morning Noon Evening Bedtime  
 insulin glargine 100 unit/mL injection Commonly known as:  LANTUS What changed:   
- how much to take - when to take this Your last dose was: Your next dose is:    
   
   
 20 Units by SubCUTAneous route nightly. Indications: TYPE 2 DIABETES MELLITUS  
 20 Units CONTINUE taking these medications Instructions Each Dose to Equal  
 Morning Noon Evening Bedtime  
 atorvastatin 40 mg tablet Commonly known as:  LIPITOR Your last dose was: Your next dose is: Take 40 mg by mouth daily. 40 mg COREG 6.25 mg tablet Generic drug:  carvedilol Your last dose was: Your next dose is: Take 12.5 mg by mouth two (2) times a day. 12.5 mg  
    
   
   
   
  
 GLEEVEC 400 mg tablet Generic drug:  imatinib Your last dose was: Your next dose is: Take 400 mg by mouth daily. 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  
 400 mg  
    
   
   
   
  
 isosorbide mononitrate ER 30 mg tablet Commonly known as:  IMDUR Your last dose was: Your next dose is: Take 1 Tab by mouth daily. 30 mg  
    
   
   
   
  
 magnesium oxide 400 mg tablet Commonly known as:  MAG-OX Your last dose was: Your next dose is: Take 400 mg by mouth daily. 400 mg  
    
   
   
   
  
 nitroglycerin 0.4 mg SL tablet Commonly known as:  NITROSTAT Your last dose was: Your next dose is:    
   
   
 1 Tab by SubLINGual route as needed for Chest Pain. Up to 3 doses. 0.4 mg  
    
   
   
   
  
 torsemide 100 mg tablet Commonly known as:  DEMADEX Your last dose was: Your next dose is: Take 100 mg by mouth daily. Indications: Edema 100 mg  
    
   
   
   
  
  
STOP taking these medications   
 aspirin 81 mg chewable tablet Where to Get Your Medications These medications were sent to [facility] [address] [address] [facility] [address]  [address] [place] [address], [address] [place] [address] Drive [address] Phone:  [phone]  
  clopidogrel 75 mg Tab

## 2018-09-06 NOTE — ED NOTES
TRANSFER - OUT REPORT: 
 
Verbal report given to Shannon Norman RN(name) on Trever Rangel  being transferred to Room 359(unit) for routine progression of care Report consisted of patients Situation, Background, Assessment and  
Recommendations(SBAR). Information from the following report(s) SBAR, Kardex and MAR was reviewed with the receiving nurse. Lines:  
Peripheral IV 09/06/18 Left Antecubital (Active) Opportunity for questions and clarification was provided. Patient transported with: 
 Monitor Registered Nurse

## 2018-09-06 NOTE — ED PROVIDER NOTES
EMERGENCY DEPARTMENT HISTORY AND PHYSICAL EXAM 
 
Date: 9/6/2018 Patient Name: Ja Alvarez History of Presenting Illness Chief Complaint Patient presents with  Dysarthria  Blurred Vision History Provided By: Patient Chief Complaint: blurred vision and dysarthria Duration: 45 Minutes Timing:  Improving Location: eyes and speech (neuro) Quality: altered vision and speech Severity: 0 out of 10 pain Associated Symptoms: slurred speech and HA Additional History (Context):  
10:18 AM 
Ja Alvarez is a 77 y.o. male with PMHX of CML, chronic AFIB, coagulation disorder, DM, HTN, CAD, and TIA who presents to the emergency department C/O improved blurred vision onset 45 minutes ago. Associated sxs include slurred speech (resolved) and HA this morning (resolved). Pt took  mg PO this morning. Allergies reported to Adhesive Silicone tape, Codeine, and Iodinated contrast. PSHx of CABG, carotid endarterectomy, hernia repair and kidney stone surgery. Pt endorses being a former smoker, an EtOH user, and a non recreational drug user. Pt denies any other sxs or complaints. PCP: Ying Dunn DO 
 
Current Facility-Administered Medications Medication Dose Route Frequency Provider Last Rate Last Dose  
 0.9% sodium chloride infusion  100 mL/hr IntraVENous CONTINUOUS Altagracia Monreal  mL/hr at 09/06/18 1052 100 mL/hr at 09/06/18 1052 Current Outpatient Prescriptions Medication Sig Dispense Refill  isosorbide mononitrate ER (IMDUR) 30 mg tablet Take 1 Tab by mouth daily. 30 Tab 0  
 nitroglycerin (NITROSTAT) 0.4 mg SL tablet 1 Tab by SubLINGual route as needed for Chest Pain. Up to 3 doses. 20 Tab 0  
 aspirin 81 mg chewable tablet Take 81 mg by mouth daily.  magnesium oxide (MAG-OX) 400 mg tablet Take 400 mg by mouth daily.  atorvastatin (LIPITOR) 40 mg tablet Take 40 mg by mouth daily.  imatinib (GLEEVEC) 400 mg tablet Take 400 mg by mouth daily. 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 Indications: Edema  insulin glargine (LANTUS) 100 unit/mL injection 20 Units by SubCUTAneous route nightly. Indications: TYPE 2 DIABETES MELLITUS (Patient taking differently: 13 Units by SubCUTAneous route daily. Indications: type 2 diabetes mellitus, 13 units bid) 1 Vial 0  
 carvedilol (COREG) 6.25 mg tablet Take 12.5 mg by mouth two (2) times a day. Past History Past Medical History: 
Past Medical History:  
Diagnosis Date  CAD (coronary artery disease) 2014  Chronic atrial fibrillation ([address] 9/2/2016  CML [facility]chronic myelocytic [facility]  Coagulation disorder ([facility] bruises easily  Diabetes ([facility] 2012  Hypertension  Nausea & vomiting   
 with most recent surgery-carotid endardarectomy  TIA (transient ischemic attack) 8/11/2016 Past Surgical History: 
Past Surgical History:  
Procedure Laterality Date  CARDIAC SURG PROCEDURE UNLIST  3/2014  
 quadruple bypass  HX CAROTID ENDARTERECTOMY Left 05/2017  HX CATARACT REMOVAL Bilateral   
 HX CORONARY ARTERY BYPASS GRAFT    
 HX HERNIA REPAIR Right   
 hernia  HX UROLOGICAL    
 greenlight laser  HX UROLOGICAL    
 kidney stone  HX UROLOGICAL Lithotripsy x 2 Family History: 
Family History Problem Relation Age of Onset  Post-op Nausea/Vomiting Brother Social History: 
Social History Substance Use Topics  Smoking status: Former Smoker Packs/day: 1.00 Years: 60.00 Quit date: 3/23/2014  Smokeless tobacco: Never Used  Alcohol use 1.2 oz/week 2 Shots of liquor per week Comment: per day Allergies: Allergies Allergen Reactions  Adhesive Tape-Silicones Other (comments) Raw skin  Codeine Nausea and Vomiting  Iodinated Contrast- Oral And Iv Dye Rash patient states this was years ago - he states that he can tolerate the newer agents Review of Systems Review of Systems Constitutional: Negative for fever. HENT: Negative for sore throat. Eyes: Positive for visual disturbance. Negative for redness. Respiratory: Negative for shortness of breath and wheezing. Cardiovascular: Negative for chest pain. Gastrointestinal: Negative for abdominal pain and nausea. Endocrine: Negative for polyuria. Genitourinary: Negative for dysuria. Musculoskeletal: Negative for arthralgias and neck stiffness. Skin: Negative for rash. Neurological: Positive for speech difficulty and headaches. All other systems reviewed and are negative. Physical Exam  
 
Vitals:  
 09/06/18 1033 09/06/18 1100 09/06/18 1130 09/06/18 1200 BP:  177/76 154/66 182/71 Pulse: 72 65 69 62 Resp: 14 20 22 17 Temp:      
SpO2: 100% 100% 100% 93% Weight:      
Height:      
 
Physical Exam  
Constitutional: He is oriented to person, place, and time. He appears well-developed and well-nourished. No distress. HENT:  
Head: Normocephalic and atraumatic. Mouth/Throat: Oropharynx is clear and moist.  
Eyes: Conjunctivae are normal. Pupils are equal, round, and reactive to light. No scleral icterus. Visual fields intact. Neck: Normal range of motion. Neck supple. Cardiovascular: Intact distal pulses. Capillary refill < 3 seconds Pulmonary/Chest: Effort normal and breath sounds normal. No respiratory distress. He has no wheezes. Abdominal: Soft. Bowel sounds are normal. He exhibits no distension. There is no tenderness. Musculoskeletal: Normal range of motion. He exhibits no edema. Lymphadenopathy:  
  He has no cervical adenopathy. Neurological: He is alert and oriented to person, place, and time. No cranial nerve deficit. Sensation intact bilaterally. Strength 5/5 in upper and lower extremities. No drift. No cerebellar ataxia on finger to nose test. No slurred speech. No facial droop. Skin: Skin is warm and dry. He is not diaphoretic. Psychiatric: He has a normal mood and affect. His behavior is normal.  
Nursing note and vitals reviewed. Diagnostic Study Results Labs - Recent Results (from the past 12 hour(s)) GLUCOSE, POC Collection Time: 09/06/18 10:20 AM  
Result Value Ref Range Glucose (POC) 265 (H) 70 - 110 mg/dL CBC WITH AUTOMATED DIFF Collection Time: 09/06/18 10:26 AM  
Result Value Ref Range WBC 4.6 4.6 - 13.2 K/uL  
 RBC 3.20 (L) 4.70 - 5.50 M/uL  
 HGB 11.1 (L) 13.0 - 16.0 g/dL HCT 32.2 (L) 36.0 - 48.0 % .6 (H) 74.0 - 97.0 FL  
 MCH 34.7 (H) 24.0 - 34.0 PG  
 MCHC 34.5 31.0 - 37.0 g/dL  
 RDW 13.5 11.6 - 14.5 % PLATELET 452 (L) 063 - 420 K/uL MPV 10.9 9.2 - 11.8 FL  
 NEUTROPHILS 62 40 - 73 % LYMPHOCYTES 17 (L) 21 - 52 % MONOCYTES 12 (H) 3 - 10 % EOSINOPHILS 8 (H) 0 - 5 % BASOPHILS 1 0 - 2 %  
 ABS. NEUTROPHILS 2.8 1.8 - 8.0 K/UL  
 ABS. LYMPHOCYTES 0.8 (L) 0.9 - 3.6 K/UL  
 ABS. MONOCYTES 0.6 0.05 - 1.2 K/UL  
 ABS. EOSINOPHILS 0.4 0.0 - 0.4 K/UL  
 ABS. BASOPHILS 0.1 0.0 - 0.1 K/UL  
 DF AUTOMATED METABOLIC PANEL, BASIC Collection Time: 09/06/18 10:26 AM  
Result Value Ref Range Sodium 138 136 - 145 mmol/L Potassium 4.1 3.5 - 5.5 mmol/L Chloride 102 100 - 108 mmol/L  
 CO2 29 21 - 32 mmol/L Anion gap 7 3.0 - 18 mmol/L Glucose 222 (H) 74 - 99 mg/dL BUN 22 (H) 7.0 - 18 MG/DL Creatinine 1.95 (H) 0.6 - 1.3 MG/DL  
 BUN/Creatinine ratio 11 (L) 12 - 20 GFR est AA 40 (L) >60 ml/min/1.73m2 GFR est non-AA 33 (L) >60 ml/min/1.73m2 Calcium 8.6 8.5 - 10.1 MG/DL PROTHROMBIN TIME + INR Collection Time: 09/06/18 10:26 AM  
Result Value Ref Range Prothrombin time 14.1 11.5 - 15.2 sec INR 1.1 0.8 - 1.2 CKMB PROFILE Collection Time: 09/06/18 10:26 AM  
Result Value Ref Range  39 - 308 U/L  
 CK - MB 3.8 (H) <3.6 ng/ml CK-MB Index 1.9 0.0 - 4.0 % MAGNESIUM Collection Time: 09/06/18 10:26 AM  
Result Value Ref Range Magnesium 1.9 1.6 - 2.6 mg/dL PTT Collection Time: 09/06/18 10:26 AM  
Result Value Ref Range aPTT 28.6 23.0 - 36.4 SEC  
TROPONIN I Collection Time: 09/06/18 10:26 AM  
Result Value Ref Range Troponin-I, Qt. 0.02 0.00 - 0.06 NG/ML  
EKG, 12 LEAD, INITIAL Collection Time: 09/06/18 10:43 AM  
Result Value Ref Range Ventricular Rate 65 BPM  
 Atrial Rate 65 BPM  
 P-R Interval 232 ms QRS Duration 90 ms Q-T Interval 412 ms QTC Calculation (Bezet) 428 ms Calculated P Axis 29 degrees Calculated R Axis -28 degrees Calculated T Axis 116 degrees Diagnosis Sinus rhythm with 1st degree AV block with frequent premature ventricular  
complexes Left ventricular hypertrophy with repolarization abnormality Cannot rule out Septal infarct (cited on or before 10-AUG-2018) Abnormal ECG When compared with ECG of 10-AUG-2018 06:17, 
premature ventricular complexes are now present URINALYSIS W/ RFLX MICROSCOPIC Collection Time: 09/06/18 11:55 AM  
Result Value Ref Range Color YELLOW Appearance CLEAR Specific gravity 1.011 1.005 - 1.030    
 pH (UA) 7.0 5.0 - 8.0 Protein TRACE (A) NEG mg/dL Glucose 250 (A) NEG mg/dL Ketone NEGATIVE  NEG mg/dL Bilirubin NEGATIVE  NEG Blood NEGATIVE  NEG Urobilinogen 0.2 0.2 - 1.0 EU/dL Nitrites NEGATIVE  NEG Leukocyte Esterase NEGATIVE  NEG Radiologic Studies -  
CT HEAD WO CONT Final Result MRI BRAIN WO CONT    (Results Pending) MRA NECK W WO CONT    (Results Pending) MRA BRAIN WO CONT    (Results Pending) CT Results  (Last 48 hours) 09/06/18 1028  CT HEAD WO CONT Final result Impression:  IMPRESSION:  
   
No acute intracranial abnormalities. Stable findings. CRITICAL RESULT: Critical result called to Dr. Nury Gusman at 10:43 AM on 9/6/2018. Results clearly understood and acknowledged. Narrative:  EXAM: CT head INDICATION: Slurred speech. COMPARISON: CT 8/11/16 and MRI brain 8/12/2016 TECHNIQUE: Axial CT imaging of the head was performed without intravenous  
contrast. Sagittal and coronal reconstructions were obtained. One or more dose reduction techniques were used on this CT: automated exposure  
control, adjustment of the mAs and/or kVp according to patient's size, and  
iterative reconstruction techniques. The specific techniques utilized on this CT  
exam have been documented in the patient's electronic medical record.  
   
_______________ FINDINGS:  
   
BRAIN AND POSTERIOR FOSSA: The sulci, folia, ventricles and basal cisterns are  
within normal limits for the patient?s age. There is no intracranial hemorrhage,  
mass effect, or midline shift. Minimal low-attenuation changes deep white matter  
compatible with chronic microvascular insufficiency. Moderate cortical volume  
loss but within normal range for age. EXTRA-AXIAL SPACES AND MENINGES: There are no abnormal extra-axial fluid  
collections. CALVARIUM: Intact. SINUSES: Clear. OTHER: None.  
   
_______________ CXR Results  (Last 48 hours) None Medications given in the ED- Medications  
0.9% sodium chloride infusion (100 mL/hr IntraVENous New Bag 9/6/18 1052) Medical Decision Making I am the first provider for this patient. I reviewed the vital signs, available nursing notes, past medical history, past surgical history, family history and social history. Vital Signs-Reviewed the patient's vital signs. Pulse Oximetry Analysis - 99% on room air. Cardiac Monitor: 
Rate: 63 bpm 
Rhythm: sinus rhythm EKG interpretation: (Preliminary) 10:45 AM  
 NSR at 65 bpm with first degree AV block. Frequent PVC's. Nl QTc. Nl QRS duration. No ST elevation. No ST depressions. EKG read by Zaid Shultz DO  
 
Records Reviewed: Nursing Notes and Old Medical Records Provider Notes (Medical Decision Making): DDx: TIA, CVA, metabolic, migraine. Called Code Stroke. Teleneurology consult, CT head, and CXR done. Sx's now resolved. Pt had taken ASA at home pta. Procedures: 
Procedures ED Course:  
10:18 AM Initial assessment performed. The patients presenting problems have been discussed, and they are in agreement with the care plan formulated and outlined with them. I have encouraged them to ask questions as they arise throughout their visit. 10:19 AM Code S called overhead. Code S protocol initiated. 10:23 AM Discussed patient's history, exam, and available diagnostics results with Fauzia Edwards MD, teleneurology, who accepts consult and will evaluate pt. 
 
10:42 AM Dr. Elver Borden requests CTA head and neck while in the ER. Admit with in house neurology consult. Requests an MRI of the brain only without contrast which is non emergent. 10:45 AM I spoke with Dr. Jamie Briggs, radiology, states there is no acute hemorrhage or abnormality and is stable from previous. 10:46 AM Informed Dr. Elver Borden about IV contrast allergy. He states instead to just get MRI brain without contrast and MRA brain and neck. 11:35 AM Discussed patient's history, exam, and available diagnostics results with Dylan Krause MD, neurology, who agree with obtaining MRI and MRA and admitting pt. 12:44 PM Discussed patient's history, exam, and available diagnostics results with Beatrice Syed MD, internal medicine, who agree with admitting pt to observation. Diagnosis and Disposition Core Measures: 
For Hospitalized Patients: 
 
1. Hospitalization Decision Time: The decision to hospitalize the patient was made by Zaid Shultz DO at 12:44 PM on 9/6/2018 2. Aspirin: Pt took  mg and also an 81 mg tablet at home PTA. 12:44 PM  Patient is being admitted to the hospital by Ritu Velasquez MD. The results of their tests and reasons for their admission have been discussed with them and/or available family. They convey agreement and understanding for the need to be admitted and for their admission diagnosis. CONDITIONS ON ADMISSION: 
Sepsis is not present at the time of admission. Thrombosis is not present at the time of admission. Urinary Tract Infection is not present at the time of admission. Pneumonia is not present at the time of admission. MRSA is not present at the time of admission. CLINICAL IMPRESSION: 
 
1. Transient cerebral ischemia, unspecified type 2. Hyperglycemia 3. Elevated blood pressure reading with diagnosis of hypertension 4. Dehydration 5. Chronic kidney disease, unspecified CKD stage PLAN: 
1. ADMIT to Observation 
_______________________________ Attestations: This note is prepared by Erum Whitley, acting as Scribe for Courtney Hernandez DO. Courtney Hernandez DO:  The scribe's documentation has been prepared under my direction and personally reviewed by me in its entirety. I confirm that the note above accurately reflects all work, treatment, procedures, and medical decision making performed by me. 
_______________________________

## 2018-09-06 NOTE — ROUTINE PROCESS
Pt given swallow evaluation. Pt able to consume applesauce and drink water with straw, without difficulty.

## 2018-09-06 NOTE — PROGRESS NOTES
1021: Responded to Code S called in the ED at 1020. Followed pt to CT. Pt did not want to slide over and offered to stand and sit on the CT table. Patient did not show evidence of weaaness or dizziness in the transfer. Currently he does not have any symptoms,, as they have resolved since he came in.to ED. Symptoms started around 0930 with blurred vision and difficulty finding words. 1100: Speaking with patient about plan of care. He admitted to having word finding difficulty a few days ago. He did not let any one know about this today.

## 2018-09-07 VITALS
DIASTOLIC BLOOD PRESSURE: 68 MMHG | HEART RATE: 76 BPM | SYSTOLIC BLOOD PRESSURE: 149 MMHG | WEIGHT: 235.01 LBS | OXYGEN SATURATION: 100 % | RESPIRATION RATE: 18 BRPM | HEIGHT: 68 IN | BODY MASS INDEX: 35.62 KG/M2 | TEMPERATURE: 97.7 F

## 2018-09-07 LAB
ANION GAP SERPL CALC-SCNC: 11 MMOL/L (ref 3–18)
BUN SERPL-MCNC: 16 MG/DL (ref 7–18)
BUN/CREAT SERPL: 10 (ref 12–20)
CALCIUM SERPL-MCNC: 8.3 MG/DL (ref 8.5–10.1)
CHLORIDE SERPL-SCNC: 106 MMOL/L (ref 100–108)
CO2 SERPL-SCNC: 22 MMOL/L (ref 21–32)
CREAT SERPL-MCNC: 1.54 MG/DL (ref 0.6–1.3)
ERYTHROCYTE [DISTWIDTH] IN BLOOD BY AUTOMATED COUNT: 13.5 % (ref 11.6–14.5)
GLUCOSE BLD STRIP.AUTO-MCNC: 83 MG/DL (ref 70–110)
GLUCOSE SERPL-MCNC: 78 MG/DL (ref 74–99)
HCT VFR BLD AUTO: 30.6 % (ref 36–48)
HGB BLD-MCNC: 10.6 G/DL (ref 13–16)
MCH RBC QN AUTO: 34.9 PG (ref 24–34)
MCHC RBC AUTO-ENTMCNC: 34.6 G/DL (ref 31–37)
MCV RBC AUTO: 100.7 FL (ref 74–97)
PLATELET # BLD AUTO: 104 K/UL (ref 135–420)
PMV BLD AUTO: 11.3 FL (ref 9.2–11.8)
POTASSIUM SERPL-SCNC: 3.8 MMOL/L (ref 3.5–5.5)
RBC # BLD AUTO: 3.04 M/UL (ref 4.7–5.5)
SODIUM SERPL-SCNC: 139 MMOL/L (ref 136–145)
WBC # BLD AUTO: 4.6 K/UL (ref 4.6–13.2)

## 2018-09-07 PROCEDURE — 74011250637 HC RX REV CODE- 250/637: Performed by: HOSPITALIST

## 2018-09-07 PROCEDURE — 74011250636 HC RX REV CODE- 250/636: Performed by: HOSPITALIST

## 2018-09-07 PROCEDURE — 96361 HYDRATE IV INFUSION ADD-ON: CPT

## 2018-09-07 PROCEDURE — 74011250636 HC RX REV CODE- 250/636: Performed by: EMERGENCY MEDICINE

## 2018-09-07 PROCEDURE — 36415 COLL VENOUS BLD VENIPUNCTURE: CPT | Performed by: HOSPITALIST

## 2018-09-07 PROCEDURE — 80048 BASIC METABOLIC PNL TOTAL CA: CPT | Performed by: HOSPITALIST

## 2018-09-07 PROCEDURE — 99218 HC RM OBSERVATION: CPT

## 2018-09-07 PROCEDURE — 82962 GLUCOSE BLOOD TEST: CPT

## 2018-09-07 PROCEDURE — 85027 COMPLETE CBC AUTOMATED: CPT | Performed by: HOSPITALIST

## 2018-09-07 PROCEDURE — 96372 THER/PROPH/DIAG INJ SC/IM: CPT

## 2018-09-07 RX ORDER — CLOPIDOGREL BISULFATE 75 MG/1
75 TABLET ORAL DAILY
Qty: 30 TAB | Refills: 2 | Status: SHIPPED | OUTPATIENT
Start: 2018-09-07 | End: 2020-12-12

## 2018-09-07 RX ADMIN — SODIUM CHLORIDE 100 ML/HR: 900 INJECTION, SOLUTION INTRAVENOUS at 05:46

## 2018-09-07 RX ADMIN — Medication 400 MG: at 10:52

## 2018-09-07 RX ADMIN — TORSEMIDE 100 MG: 20 TABLET ORAL at 10:51

## 2018-09-07 RX ADMIN — ASPIRIN 81 MG: 81 TABLET, CHEWABLE ORAL at 10:52

## 2018-09-07 RX ADMIN — ISOSORBIDE MONONITRATE 30 MG: 30 TABLET, EXTENDED RELEASE ORAL at 10:52

## 2018-09-07 RX ADMIN — CARVEDILOL 12.5 MG: 12.5 TABLET, FILM COATED ORAL at 10:52

## 2018-09-07 RX ADMIN — HEPARIN SODIUM 5000 UNITS: 5000 INJECTION, SOLUTION INTRAVENOUS; SUBCUTANEOUS at 03:06

## 2018-09-07 RX ADMIN — ATORVASTATIN CALCIUM 40 MG: 20 TABLET, FILM COATED ORAL at 10:52

## 2018-09-07 RX ADMIN — HEPARIN SODIUM 5000 UNITS: 5000 INJECTION, SOLUTION INTRAVENOUS; SUBCUTANEOUS at 10:53

## 2018-09-07 NOTE — PROGRESS NOTES
800 Assumed care of patient from Lizandro Rodrigez RN 
 
9365 Assessment completed, patient is alert and oriented x's 4, no c/o pain. NSR on the monitor with a HR in the 70's. Lung fields are clear throughout on RA, 02 sat sustained at 100% with no cough noted. Patient is tolerating a cardiac diet without any N/V or gastric distention. Patient is voiding in the urinal without any complications. Scheduled medications administered as ordered without any complications. Patient is currently sitting up on side of bed with spouse at side anticipating discharge home, no s/s of any distress, VSS, call bell and personal belongings within reach, will continue to monitor [address] discharge instructions explained to patient and spouse, understanding verbalized during answer/question session. INT and telemetry monitor removed. Education included TIA, s/s of bleeding while on blood thinners and when to come back to be seen. Patient escorted out of facility in stable condition via wheelchair

## 2018-09-07 NOTE — DISCHARGE SUMMARY
Discharge Summary Patient: Saurav Ann MRN: [id number]  CSN: [id number] YOB: 1940  Age: 77 y.o. Sex: male DOA: 9/6/2018 LOS:  LOS: 0 days   Discharge Date:   
 
Primary Care Provider:  Amado Pompa DO Admission Diagnoses: TIA (transient ischemic attack) Hyperglycemia Elevated blood pressure reading with diagnosis of hypertension Discharge Diagnoses:   
Problem List as of 9/7/2018  Date Reviewed: 7/20/2017 Codes Class Noted - Resolved Hyperglycemia ICD-10-CM: R73.9 ICD-9-CM: 790.29  9/6/2018 - Present Elevated blood pressure reading with diagnosis of hypertension ICD-10-CM: I10 
ICD-9-CM: 401.9  9/6/2018 - Present Stage 3 chronic kidney disease ICD-10-CM: N18.3 ICD-9-CM: 585.3  8/10/2018 - Present Pancytopenia ([facility] ICD-10-CM: E45.072 ICD-9-CM: 284.19  9/2/2016 - Present CML (chronic myelocytic leukemia) ([facility].) ICD-10-CM: C92.10 ICD-9-CM: 205.10  9/2/2016 - Present HTN (hypertension) ICD-10-CM: I10 
ICD-9-CM: 401.9  9/2/2016 - Present Chronic atrial fibrillation (HCC) ICD-10-CM: E00.3 ICD-9-CM: 427.31  9/2/2016 - Present  
   
 TIA (transient ischemic attack) ICD-10-CM: G45.9 ICD-9-CM: 435.9  8/11/2016 - Present Coronary artery disease involving native coronary artery ICD-10-CM: I25.10 ICD-9-CM: 414.01  8/11/2016 - Present Type II diabetes mellitus, uncontrolled ([facility] ICD-10-CM: E11.65 ICD-9-CM: 250.02  8/11/2016 - Present Dyslipidemia ICD-10-CM: E78.5 ICD-9-CM: 272.4  8/11/2016 - Present Discharge Medications:    
Current Discharge Medication List  
  
START taking these medications Details  
clopidogrel (PLAVIX) 75 mg tab Take 1 Tab by mouth daily. Qty: 30 Tab, Refills: 2 CONTINUE these medications which have NOT CHANGED Details  
isosorbide mononitrate ER (IMDUR) 30 mg tablet Take 1 Tab by mouth daily. Qty: 30 Tab, Refills: 0 nitroglycerin (NITROSTAT) 0.4 mg SL tablet 1 Tab by SubLINGual route as needed for Chest Pain. Up to 3 doses. Qty: 20 Tab, Refills: 0  
  
magnesium oxide (MAG-OX) 400 mg tablet Take 400 mg by mouth daily. atorvastatin (LIPITOR) 40 mg tablet Take 40 mg by mouth daily. imatinib (GLEEVEC) 400 mg tablet Take 400 mg by mouth daily. 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  
  
torsemide (DEMADEX) 100 mg tablet Take 100 mg by mouth daily. Indications: Edema  
  
insulin glargine (LANTUS) 100 unit/mL injection 20 Units by SubCUTAneous route nightly. Indications: TYPE 2 DIABETES MELLITUS Qty: 1 Vial, Refills: 0  
  
carvedilol (COREG) 6.25 mg tablet Take 12.5 mg by mouth two (2) times a day. STOP taking these medications  
  
 aspirin 81 mg chewable tablet Comments:  
Reason for Stopping:   
   
  
 
 
Discharge Condition: Good Consults: Neurology PHYSICAL EXAM  
Visit Vitals  /68 (BP 1 Location: Right arm, BP Patient Position: At rest)  Pulse 76  Temp 97.7 °F (36.5 °C)  Resp 18  Ht 5' 8\" (1.727 m)  Wt 106.6 kg (235 lb 0.2 oz)  SpO2 100%  BMI 35.73 kg/m2 General: Awake, cooperative, no acute distress   
HEENT: NC, Atraumatic. PERRLA, EOMI. Anicteric sclerae. Lungs:  CTA Bilaterally. No Wheezing/Rhonchi/Rales. Heart:  Regular  rhythm,  No murmur, No Rubs, No Gallops Abdomen: Soft, Non distended, Non tender. +Bowel sounds, Extremities: No c/c/e Psych:   Not anxious or agitated. Neurologic:  No acute neurological deficits. Admission HPI : Mahad Pham is a 77 y.o. male who has past history of DM, HTN, CKD3, CAD, CML, presents to ER with concerns of speech difficulty. Patient reports that this morning he was not able to bring words, episode lasted about 20-30 mins. No unilateral weakness or numbness. No facial droop, no vision changes. He took extra 325 mg of aspirin. In ER CODE S called and he was not found to be candidate for tPA. Hospital Course :  
TIA - patient admitted to telemetry, seen and followed by neurology, his MRI/ MRA of head with no acute findings Echo 2d doppler done 08/2018 with normal LVF. His telemetry monitor did not show any rhythm changes except for PVCs. Neurology recommended plavix daily. He was given loading dose in the hospital. Neurology recommended event monitor, will arrange follow up with cardiology. For his chronic stable medical problems we continued with his home medications. Activity: Activity as tolerated Diet: Diabetic Diet Follow-up: PCP, cardiology, neurology Disposition: home Minutes spent on discharge: 40 Labs: Results:  
   
Chemistry Recent Labs  
   09/07/18 
 0434  09/06/18 
 1026 GLU  78  222* NA  139  138  
K  3.8  4.1 CL  106  102 CO2  22  29 BUN  16  22* CREA  1.54*  1.95* CA  8.3*  8.6 AGAP  11  7 BUCR  10*  11* CBC w/Diff Recent Labs  
   09/07/18 
 0434  09/06/18 
 1026 WBC  4.6  4.6  
RBC  3.04*  3.20* HGB  10.6*  11.1*  
HCT  30.6*  32.2*  
PLT  104*  105* GRANS   --   62  
LYMPH   --   17* EOS   --   8* Cardiac Enzymes Recent Labs  
   09/06/18 
 1026 CPK  196 CKND1  1.9 Coagulation Recent Labs  
   09/06/18 
 1026 PTP  14.1 INR  1.1 APTT  28.6 Lipid Panel Lab Results Component Value Date/Time Cholesterol, total 107 08/12/2016 03:20 AM  
 HDL Cholesterol 56 08/12/2016 03:20 AM  
 LDL, calculated 26.4 08/12/2016 03:20 AM  
 VLDL, calculated 24.6 08/12/2016 03:20 AM  
 Triglyceride 123 08/12/2016 03:20 AM  
 CHOL/HDL Ratio 1.9 08/12/2016 03:20 AM  
  
BNP No results for input(s): BNPP in the last 72 hours. Liver Enzymes No results for input(s): TP, ALB, TBIL, AP, SGOT, GPT in the last 72 hours. No lab exists for component: DBIL Thyroid Studies Lab Results Component Value Date/Time  TSH 1.59 08/12/2016 03:20 AM  
    
 
 
 Significant Diagnostic Studies: Nm Lung Vent/perf Result Date: 8/10/2018 Ventilation-perfusion lung scan History:  Chest pain and tightness. Evaluation for potential pulmonary embolism is requested. Comparison:  Chest radiograph performed 8/10/2018. Technique: Ventilation imaging was performed after inhalation of 0.8 millicuries of Tc 01I DTPA with a nebulizer followed by imaging in multiple projections. Perfusion imaging was performed after intravenous injection of 6.5 millicuries of Tc 12S MAA followed by imaging in multiple projections. Injection site: Left antecubital fossa vein Findings: Ventilation imaging shows no significant ventilation defect, with a small amount of inhaled radiotracer deposited within the proximal conducting airways. Small amount of swallowed activity also demonstrated. Perfusion imaging shows mild inhomogeneity along the peripheral aspect of the lungs. No focal segmental perfusion defect is seen. No perfusion mismatches are present. Impression: Low probability for pulmonary embolism. Mra Brain Wo Cont Result Date: 9/6/2018 HISTORY: Transient speech difficulty, TIA versus CVA Correlation brain MRI same day, comparison MRA 8/12/2016 PROCEDURE: 3-D time-of-flight MRA brain performed with multiple MIP reconstructions obtained. FINDINGS: Normal-appearing distal ICAs without stenosis. Patent left posterior communicating artery with small left P1 segment. No stenosis or occlusion distal vertebral or basilar arteries. Patent anterior communicating artery with developmentally small left A1 segment. No new cerebral artery filling defect stenosis or occlusion. No evidence of aneurysm or vascular malformation. IMPRESSION: Normal brain MRA. No significant change. Mra Neck W Cont Result Date: 9/6/2018 HISTORY: Transient speech difficulty, TIA versus CVA Correlation brain MRA same day PROCEDURE: 3-D time-of-flight MRA neck performed with coronal acquisition following IV administration of gadolinium, with multiple MIP reconstructions obtained. FINDINGS: Classic 3 vessel arch anatomy with no significant proximal arch vessels stenosis. Mild extrinsic plaque proximal bilateral ICAs, 0% diameter stenosis proximal ICA by NASCET criteria bilaterally. No significant common carotid or internal carotid artery stenosis. Codominant vertebral arteries with suggestion of possible mild to moderate left-sided and moderate right-sided stenosis at the origin of the bilateral vertebral arteries although may be exaggerated due to artifact and tortuosity. Remainder of bilateral vertebral and basilar arteries unremarkable. Visualized [place] anatomy unremarkable. IMPRESSION: 1. No significant carotid stenosis. 2. Questionable mild to moderate stenosis origin bilateral vertebral arteries, may be exaggerated by artifact, remainder vertebral basilar system unremarkable. Mri Brain Wo Cont Result Date: 9/6/2018 CLINICAL INDICATION/HISTORY:Transient difficulty with speech, diabetes, hypertension, TIA versus CVA COMPARISON: Correlation CT head same day, comparison brain MRA 8/12/2016 TECHNIQUE: Sagittal spin echo T1, axial T1, T2, FLAIR, diffusion, and T2 gradient sequences, and coronal T1 and T2 sequences of the brain were obtained. FINDINGS:  Diffusion:  There are no areas of restricted diffusion, no evidence of an acute infarction. Brain parenchyma:  No evidence of intracranial mass hemorrhage edema or cortical signal abnormality. Minimal periventricular and deep white matter increased T2 and FLAIR signal foci nonspecific. Ventricles and sulci:  Mild diffuse central and cortical volume loss. Extra-axial:  No extra-axial fluid collection or mass is noted. Brain vasculature:  No vascular abnormality is appreciated on this routine brain MR examination.  Craniocervical junction:  Normal. Skull base, extracranial and calvarium: Previous bilateral lens implants. Likely retention cyst inferior right maxillary sinus with mild ethmoid sinus and maxillary sinus mucosal thickening. Minimal increased T2 signal left mastoid air cells. IACs unremarkable. Partial empty sella. No skull abnormality. Impression: 1. No evidence of acute infarct or other acute intracranial finding. 2. Very mild bilateral deep white matter signal changes nonspecific, possible chronic microvascular ischemic disease. 3. Mild diffuse volume loss. Ct Head Wo Cont Result Date: 9/6/2018 EXAM: CT head INDICATION: Slurred speech. COMPARISON: CT 8/11/16 and MRI brain 8/12/2016 TECHNIQUE: Axial CT imaging of the head was performed without intravenous contrast. Sagittal and coronal reconstructions were obtained. One or more dose reduction techniques were used on this CT: automated exposure control, adjustment of the mAs and/or kVp according to patient's size, and iterative reconstruction techniques. The specific techniques utilized on this CT exam have been documented in the patient's electronic medical record. _______________ FINDINGS: BRAIN AND POSTERIOR FOSSA: The sulci, folia, ventricles and basal cisterns are within normal limits for the patient?s age. There is no intracranial hemorrhage, mass effect, or midline shift. Minimal low-attenuation changes deep white matter compatible with chronic microvascular insufficiency. Moderate cortical volume loss but within normal range for age. EXTRA-AXIAL SPACES AND MENINGES: There are no abnormal extra-axial fluid collections. CALVARIUM: Intact. SINUSES: Clear. OTHER: None. _______________ IMPRESSION: No acute intracranial abnormalities. Stable findings. CRITICAL RESULT: Critical result called to Dr. Tato Castillo at 10:43 AM on 9/6/2018. Results clearly understood and acknowledged. Xr Chest [facility] Result Date: 8/10/2018 Chest, single view Indication: Chest pain with tightness Comparison: Several prior chest radiographs, most recently 9/7/2016 Findings:  Portable upright AP view of the chest was obtained. No focal pneumonic consolidation, pneumothorax, or pleural effusion. Cardiac size and mediastinal contours are stable, with mild cardiac enlargement noted and postoperative changes from median sternotomy. No acute osseous abnormality. Degenerative changes of each shoulder girdle noted. Surgical clips noted at the base of the left neck. Impression: Postop changes as above, with mild cardiac enlargement. No superimposed acute radiographic cardiopulmonary normality. No results found for this or any previous visit.  
 
 
 
CC: Haily Costa, DO

## 2018-09-07 NOTE — DISCHARGE INSTRUCTIONS
Transient Ischemic Attack: Care Instructions  Your Care Instructions    A transient ischemic attack (TIA) is when blood flow to a part of your brain is blocked for a short time. A TIA is like a stroke but usually lasts only a few minutes. A TIA does not cause lasting brain damage. Any vision problems, slurred speech, or other symptoms usually go away in 10 to 20 minutes. But they may last for up to 24 hours. TIAs are often warning signs of a stroke. Some people who have a TIA may have a stroke in the future. A stroke can cause symptoms like those of a TIA. But a stroke causes lasting damage to your brain. You can take steps to help prevent a stroke. One thing you can do is get early treatment. If you have other new symptoms, or if your symptoms do not get better, go back to the emergency room or call your doctor right away. Getting treatment right away may prevent long-term brain damage caused by a stroke. The doctor has checked you carefully, but problems can develop later. If you notice any problems or new symptoms, get medical treatment right away. Follow-up care is a key part of your treatment and safety. Be sure to make and go to all appointments, and call your doctor if you are having problems. It's also a good idea to know your test results and keep a list of the medicines you take. How can you care for yourself at home? Medicines    · Be safe with medicines. Take your medicines exactly as prescribed. Call your doctor if you think you are having a problem with your medicine.     · If you take a blood thinner, such as aspirin, be sure you get instructions about how to take your medicine safely.  Blood thinners can cause serious bleeding problems.     · Call your doctor if you are not able to take your medicines for any reason.     · Do not take any over-the-counter medicines or herbal products without talking to your doctor first.     · If you take birth control pills or hormone therapy, talk to your doctor. Ask if these treatments are right for you.    Lifestyle changes    · Do not smoke. If you need help quitting, talk to your doctor about stop-smoking programs and medicines.     · Be active. If your doctor recommends it, get more exercise. Walking is a good choice. Bit by bit, increase the amount you walk every day. Try for at least 30 minutes on most days of the week. You also may want to swim, bike, or do other activities.     · Eat heart-healthy foods. These include fruits, vegetables, high-fiber foods, fish, and foods that are low in sodium, saturated fat, and trans fat.     · Stay at a healthy weight. Lose weight if you need to.     · Limit alcohol to 2 drinks a day for men and 1 drink a day for women.    Staying healthy    · Manage other health problems such as diabetes, high blood pressure, and high cholesterol.     · Get the flu vaccine every year. When should you call for help? Call 911 anytime you think you may need emergency care. For example, call if:    · You have new or worse symptoms of a stroke. These may include:  ¨ Sudden numbness, tingling, weakness, or loss of movement in your face, arm, or leg, especially on only one side of your body. ¨ Sudden vision changes. ¨ Sudden trouble speaking. ¨ Sudden confusion or trouble understanding simple statements. ¨ Sudden problems with walking or balance. ¨ A sudden, severe headache that is different from past headaches. Call 911 even if these symptoms go away in a few minutes.     · You feel like you are having another TIA.    Watch closely for changes in your health, and be sure to contact your doctor if you have any problems. Where can you learn more? Go to http://dio-mat.info/. Enter [phone] in the search box to learn more about \"Transient Ischemic Attack: Care Instructions. \"  Current as of: November 21, 2017  Content Version: 11.7  © 6773-7866 [employer].  Care instructions adapted under license by [employer] (which disclaims liability or warranty for this information). If you have questions about a medical condition or this instruction, always ask your healthcare professional. [place] [address] any warranty or liability for your use of this information. Patient armband removed and shredded  MyChart Activation    Thank you for requesting access to [employer]. Please follow the instructions below to securely access and download your online medical record. [employer] allows you to send messages to your doctor, view your test results, renew your prescriptions, schedule appointments, and more. How Do I Sign Up? 1. In your internet browser, go to [email or web address]  2. Click on the First Time User? Click Here link in the Sign In box. You will be redirect to the New Member Sign Up page. 3. Enter your [employer] Access Code exactly as it appears below. You will not need to use this code after youve completed the sign-up process. If you do not sign up before the expiration date, you must request a new code. [employer] Access Code: [email or web address]  Expires: 2018 10:17 AM (This is the date your [employer] access code will )    4. Enter the last four digits of your Social Security Number (xxxx) and Date of Birth (mm/dd/yyyy) as indicated and click Submit. You will be taken to the next sign-up page. 5. Create a [employer] ID. This will be your [employer] login ID and cannot be changed, so think of one that is secure and easy to remember. 6. Create a [employer] password. You can change your password at any time. 7. Enter your Password Reset Question and Answer. This can be used at a later time if you forget your password. 8. Enter your e-mail address. You will receive e-mail notification when new information is available in [address] 9. Click Sign Up. You can now view and download portions of your medical record.   10. Click the Download Summary menu link to download a portable copy of your medical information. Additional Information    If you have questions, please visit the Frequently Asked Questions section of the [employer] website at [email or web address] Remember, MyChart is NOT to be used for urgent needs. For medical emergencies, dial 911. Dual AVS reviewed with Santiago Olsen RN. All medications reviewed individually with patient. Opportunities for questions and concerns provided. Patient discharged via (mode of transport ie. Car, ambulance or air transport) car  Patient's arm band appropriately discarded. DISCHARGE SUMMARY from Nurse    PATIENT INSTRUCTIONS:    After general anesthesia or intravenous sedation, for 24 hours or while taking prescription Narcotics:  · Limit your activities  · Do not drive and operate hazardous machinery  · Do not make important personal or business decisions  · Do  not drink alcoholic beverages  · If you have not urinated within 8 hours after discharge, please contact your surgeon on call. Report the following to your surgeon:  · Excessive pain, swelling, redness or odor of or around the surgical area  · Temperature over 100.5  · Nausea and vomiting lasting longer than 4 hours or if unable to take medications  · Any signs of decreased circulation or nerve impairment to extremity: change in color, persistent  numbness, tingling, coldness or increase pain  · Any questions    What to do at Home:  Recommended activity: Activity as tolerated    *  Please give a list of your current medications to your Primary Care Provider. *  Please update this list whenever your medications are discontinued, doses are      changed, or new medications (including over-the-counter products) are added. *  Please carry medication information at all times in case of emergency situations.     These are general instructions for a healthy lifestyle:    No smoking/ No tobacco products/ Avoid exposure to second hand smoke   [facility] Warning:  Quitting smoking now greatly reduces serious risk to your health. Obesity, smoking, and sedentary lifestyle greatly increases your risk for illness    A healthy diet, regular physical exercise & weight monitoring are important for maintaining a healthy lifestyle    You may be retaining fluid if you have a history of heart failure or if you experience any of the following symptoms:  Weight gain of 3 pounds or more overnight or 5 pounds in a week, increased swelling in our hands or feet or shortness of breath while lying flat in bed. Please call your doctor as soon as you notice any of these symptoms; do not wait until your next office visit. Recognize signs and symptoms of STROKE:    F-face looks uneven    A-arms unable to move or move unevenly    S-speech slurred or non-existent    T-time-call 911 as soon as signs and symptoms begin-DO NOT go       Back to bed or wait to see if you get better-TIME IS BRAIN. Warning Signs of HEART ATTACK     Call 911 if you have these symptoms:   Chest discomfort. Most heart attacks involve discomfort in the center of the chest that lasts more than a few minutes, or that goes away and comes back. It can feel like uncomfortable pressure, squeezing, fullness, or pain.  Discomfort in other areas of the upper body. Symptoms can include pain or discomfort in one or both arms, the back, neck, jaw, or stomach.  Shortness of breath with or without chest discomfort.  Other signs may include breaking out in a cold sweat, nausea, or lightheadedness. Don't wait more than five minutes to call 911 - MINUTES MATTER! Fast action can save your life. Calling 911 is almost always the fastest way to get lifesaving treatment. Emergency Medical Services staff can begin treatment when they arrive -- up to an hour sooner than if someone gets to the hospital by car. The discharge information has been reviewed with the patient. The patient verbalized understanding.   Discharge medications reviewed with the patient and appropriate educational materials and side effects teaching were provided.   ___________________________________________________________________________________________________________________________________

## 2018-09-07 NOTE — PROGRESS NOTES
NEUROLOGY PROGRESS NOTE Patient: Irvin Barrera        Sex: male          DOA: 9/6/2018 YOB: 1940      Age:  77 y.o.         LOS: 0 days Identification: 
[id number] gentleman with history of diabetes, hyperlipidemia, hypertension, CAD and CML, who presented with transient speech difficulty. SUBJECTIVE:  
Pt stable. No new complaints. No afib on tele. Stroke Work-up: 
Brain MRI: 1. No evidence of acute infarct or other acute intracranial finding. 2. Very mild bilateral deep white matter signal changes nonspecific, possible chronic microvascular ischemic disease. 3. Mild diffuse volume loss. CT Head: No acute intracranial abnormalities. Stable findings. MRA Head: Normal brain MRA. No significant change. MRA Neck: 1. No significant carotid stenosis. 2. Questionable mild to moderate stenosis origin bilateral vertebral arteries, may be exaggerated by artifact, remainder vertebral basilar system unremarkable. EKG: Sinus rhythm with 1st degree AV block with frequent premature ventricular complexes Echocardiogram: · Technically difficult study due to patient's body habitus. Definity contrast was given to enhance imaging. · Left ventricular moderately decreased systolic function. Estimated left ventricular ejection fraction is 41 - 45%. Left ventricular global hypokinesis. Moderate (grade 2) left ventricular diastolic dysfunction. · Left atrial cavity size is severely dilated. · Right atrial cavity size is mildly dilated. · Mild aortic valve sclerosis with no significant stenosis. Mild aortic valve regurgitation is present. · Mild mitral annular calcification. Mild mitral valve regurgitation. · Mild pulmonic valve regurgitation is present. Lipid panel:  
Lab Results Component Value Date/Time  Cholesterol, total 107 08/12/2016 03:20 AM  
 HDL Cholesterol 56 08/12/2016 03:20 AM  
 LDL, calculated 26.4 08/12/2016 03:20 AM  
 VLDL, calculated 24.6 08/12/2016 03:20 AM  
 Triglyceride 123 08/12/2016 03:20 AM  
 CHOL/HDL Ratio 1.9 08/12/2016 03:20 AM  
 
HbA1c:  
Lab Results Component Value Date/Time Hemoglobin A1c 6.2 (H) 08/10/2018 06:35 AM  
 
REVIEW OF SYSTEMS: Denies chest pain, abdominal pain, nausea or vomiting. No fever or chills. OBJECTIVE:  
  
Visit Vitals  /70  Pulse 62  Temp 97.7 °F (36.5 °C)  Resp 18  Ht 5' 8\" (1.727 m)  Wt 106.6 kg (235 lb 0.2 oz)  SpO2 100%  BMI 35.73 kg/m2 Physical Exam: 
GEN: Alert, NAD 
EYES: conjunctiva normal, lids with out lesions HEENT: MMM. HEART: RRR +S1 +S2 LUNGS: CTA B/L no rales or rhonchi. ABDOMEN: Soft, non-tender. EXTREMITIES: No edema cyanosis SKIN: no rashes or skin breakdown, no nodules NEURO: Alert, oriented x3. Speech is fluent. Cranials: Face is symmetric. Smiles symmetrically. EOMI, VFF. Tongue is midline. Motor: Full strength at all sites. No pronator drift. Sensory: Intact to crude touch on all four. Coordination: Intact with no dysmetria during FNF. Current Facility-Administered Medications Medication Dose Route Frequency  0.9% sodium chloride infusion  100 mL/hr IntraVENous CONTINUOUS  
 aspirin chewable tablet 81 mg  81 mg Oral DAILY  atorvastatin (LIPITOR) tablet 40 mg  40 mg Oral DAILY  carvedilol (COREG) tablet 12.5 mg  12.5 mg Oral BID  imatinib (GLEEVEC) chemo tablet 400 mg (Patient Supplied)  400 mg Oral DAILY  insulin glargine (LANTUS) injection 20 Units  20 Units SubCUTAneous QHS  isosorbide mononitrate ER (IMDUR) tablet 30 mg  30 mg Oral DAILY  magnesium oxide (MAG-OX) tablet 400 mg  400 mg Oral DAILY  torsemide (DEMADEX) tablet 100 mg  100 mg Oral DAILY  heparin (porcine) injection 5,000 Units  5,000 Units SubCUTAneous Q8H Laboratory Recent Results (from the past 24 hour(s)) GLUCOSE, POC Collection Time: 09/06/18 10:20 AM  
Result Value Ref Range Glucose (POC) 265 (H) 70 - 110 mg/dL CBC WITH AUTOMATED DIFF  
 Collection Time: 09/06/18 10:26 AM  
Result Value Ref Range WBC 4.6 4.6 - 13.2 K/uL  
 RBC 3.20 (L) 4.70 - 5.50 M/uL  
 HGB 11.1 (L) 13.0 - 16.0 g/dL HCT 32.2 (L) 36.0 - 48.0 % .6 (H) 74.0 - 97.0 FL  
 MCH 34.7 (H) 24.0 - 34.0 PG  
 MCHC 34.5 31.0 - 37.0 g/dL  
 RDW 13.5 11.6 - 14.5 % PLATELET 993 (L) 309 - 420 K/uL MPV 10.9 9.2 - 11.8 FL  
 NEUTROPHILS 62 40 - 73 % LYMPHOCYTES 17 (L) 21 - 52 % MONOCYTES 12 (H) 3 - 10 % EOSINOPHILS 8 (H) 0 - 5 % BASOPHILS 1 0 - 2 %  
 ABS. NEUTROPHILS 2.8 1.8 - 8.0 K/UL  
 ABS. LYMPHOCYTES 0.8 (L) 0.9 - 3.6 K/UL  
 ABS. MONOCYTES 0.6 0.05 - 1.2 K/UL  
 ABS. EOSINOPHILS 0.4 0.0 - 0.4 K/UL  
 ABS. BASOPHILS 0.1 0.0 - 0.1 K/UL  
 DF AUTOMATED METABOLIC PANEL, BASIC Collection Time: 09/06/18 10:26 AM  
Result Value Ref Range Sodium 138 136 - 145 mmol/L Potassium 4.1 3.5 - 5.5 mmol/L Chloride 102 100 - 108 mmol/L  
 CO2 29 21 - 32 mmol/L Anion gap 7 3.0 - 18 mmol/L Glucose 222 (H) 74 - 99 mg/dL BUN 22 (H) 7.0 - 18 MG/DL Creatinine 1.95 (H) 0.6 - 1.3 MG/DL  
 BUN/Creatinine ratio 11 (L) 12 - 20 GFR est AA 40 (L) >60 ml/min/1.73m2 GFR est non-AA 33 (L) >60 ml/min/1.73m2 Calcium 8.6 8.5 - 10.1 MG/DL PROTHROMBIN TIME + INR Collection Time: 09/06/18 10:26 AM  
Result Value Ref Range Prothrombin time 14.1 11.5 - 15.2 sec INR 1.1 0.8 - 1.2 CKMB PROFILE Collection Time: 09/06/18 10:26 AM  
Result Value Ref Range  39 - 308 U/L  
 CK - MB 3.8 (H) <3.6 ng/ml CK-MB Index 1.9 0.0 - 4.0 % MAGNESIUM Collection Time: 09/06/18 10:26 AM  
Result Value Ref Range Magnesium 1.9 1.6 - 2.6 mg/dL PTT Collection Time: 09/06/18 10:26 AM  
Result Value Ref Range aPTT 28.6 23.0 - 36.4 SEC  
TROPONIN I Collection Time: 09/06/18 10:26 AM  
Result Value Ref Range Troponin-I, Qt. 0.02 0.00 - 0.06 NG/ML  
EKG, 12 LEAD, INITIAL Collection Time: 09/06/18 10:43 AM  
Result Value Ref Range Ventricular Rate 65 BPM  
 Atrial Rate 65 BPM  
 P-R Interval 232 ms QRS Duration 90 ms Q-T Interval 412 ms QTC Calculation (Bezet) 428 ms Calculated P Axis 29 degrees Calculated R Axis -28 degrees Calculated T Axis 116 degrees Diagnosis Sinus rhythm with 1st degree AV block with frequent premature ventricular  
complexes Left ventricular hypertrophy with repolarization abnormality Cannot rule out Septal infarct (cited on or before 10-AUG-2018) Abnormal ECG When compared with ECG of 10-AUG-2018 06:17, 
premature ventricular complexes are now present URINALYSIS W/ RFLX MICROSCOPIC Collection Time: 09/06/18 11:55 AM  
Result Value Ref Range Color YELLOW Appearance CLEAR Specific gravity 1.011 1.005 - 1.030    
 pH (UA) 7.0 5.0 - 8.0 Protein TRACE (A) NEG mg/dL Glucose 250 (A) NEG mg/dL Ketone NEGATIVE  NEG mg/dL Bilirubin NEGATIVE  NEG Blood NEGATIVE  NEG Urobilinogen 0.2 0.2 - 1.0 EU/dL Nitrites NEGATIVE  NEG Leukocyte Esterase NEGATIVE  NEG    
URINE MICROSCOPIC ONLY Collection Time: 09/06/18 11:55 AM  
Result Value Ref Range WBC NONE 0 - 5 /hpf  
 RBC 0 to 2 0 - 5 /hpf Epithelial cells FEW 0 - 5 /lpf Bacteria NEGATIVE  NEG /hpf  
GLUCOSE, POC Collection Time: 09/06/18  4:53 PM  
Result Value Ref Range Glucose (POC) 169 (H) 70 - 110 mg/dL GLUCOSE, POC Collection Time: 09/06/18  9:56 PM  
Result Value Ref Range Glucose (POC) 139 (H) 70 - 110 mg/dL METABOLIC PANEL, BASIC Collection Time: 09/07/18  4:34 AM  
Result Value Ref Range Sodium 139 136 - 145 mmol/L Potassium 3.8 3.5 - 5.5 mmol/L Chloride 106 100 - 108 mmol/L  
 CO2 22 21 - 32 mmol/L Anion gap 11 3.0 - 18 mmol/L Glucose 78 74 - 99 mg/dL BUN 16 7.0 - 18 MG/DL Creatinine 1.54 (H) 0.6 - 1.3 MG/DL  
 BUN/Creatinine ratio 10 (L) 12 - 20 GFR est AA 53 (L) >60 ml/min/1.73m2 GFR est non-AA 44 (L) >60 ml/min/1.73m2 Calcium 8.3 (L) 8.5 - 10.1 MG/DL  
CBC W/O DIFF Collection Time: 09/07/18  4:34 AM  
Result Value Ref Range WBC 4.6 4.6 - 13.2 K/uL  
 RBC 3.04 (L) 4.70 - 5.50 M/uL  
 HGB 10.6 (L) 13.0 - 16.0 g/dL HCT 30.6 (L) 36.0 - 48.0 % .7 (H) 74.0 - 97.0 FL  
 MCH 34.9 (H) 24.0 - 34.0 PG  
 MCHC 34.6 31.0 - 37.0 g/dL  
 RDW 13.5 11.6 - 14.5 % PLATELET 975 (L) 339 - 420 K/uL MPV 11.3 9.2 - 11.8 FL  
GLUCOSE, POC Collection Time: 09/07/18  6:24 AM  
Result Value Ref Range Glucose (POC) 83 70 - 110 mg/dL Radiology: 
Nm Lung Vent/perf Result Date: 8/10/2018 Ventilation-perfusion lung scan History:  Chest pain and tightness. Evaluation for potential pulmonary embolism is requested. Comparison:  Chest radiograph performed 8/10/2018. Technique: Ventilation imaging was performed after inhalation of 0.8 millicuries of Tc 63D DTPA with a nebulizer followed by imaging in multiple projections. Perfusion imaging was performed after intravenous injection of 6.5 millicuries of Tc 14E MAA followed by imaging in multiple projections. Injection site: Left antecubital fossa vein Findings: Ventilation imaging shows no significant ventilation defect, with a small amount of inhaled radiotracer deposited within the proximal conducting airways. Small amount of swallowed activity also demonstrated. Perfusion imaging shows mild inhomogeneity along the peripheral aspect of the lungs. No focal segmental perfusion defect is seen. No perfusion mismatches are present. Impression: Low probability for pulmonary embolism. Mra Brain Wo Cont Result Date: 9/6/2018 HISTORY: Transient speech difficulty, TIA versus CVA Correlation brain MRI same day, comparison MRA 8/12/2016 PROCEDURE: 3-D time-of-flight MRA brain performed with multiple MIP reconstructions obtained. FINDINGS: Normal-appearing distal ICAs without stenosis.  Patent left posterior communicating artery with small left P1 segment. No stenosis or occlusion distal vertebral or basilar arteries. Patent anterior communicating artery with developmentally small left A1 segment. No new cerebral artery filling defect stenosis or occlusion. No evidence of aneurysm or vascular malformation. IMPRESSION: Normal brain MRA. No significant change. Mra Neck W Cont Result Date: 9/6/2018 HISTORY: Transient speech difficulty, TIA versus CVA Correlation brain MRA same day PROCEDURE: 3-D time-of-flight MRA neck performed with coronal acquisition following IV administration of gadolinium, with multiple MIP reconstructions obtained. FINDINGS: Classic 3 vessel arch anatomy with no significant proximal arch vessels stenosis. Mild extrinsic plaque proximal bilateral ICAs, 0% diameter stenosis proximal ICA by NASCET criteria bilaterally. No significant common carotid or internal carotid artery stenosis. Codominant vertebral arteries with suggestion of possible mild to moderate left-sided and moderate right-sided stenosis at the origin of the bilateral vertebral arteries although may be exaggerated due to artifact and tortuosity. Remainder of bilateral vertebral and basilar arteries unremarkable. Visualized [place] anatomy unremarkable. IMPRESSION: 1. No significant carotid stenosis. 2. Questionable mild to moderate stenosis origin bilateral vertebral arteries, may be exaggerated by artifact, remainder vertebral basilar system unremarkable. Mri Brain Wo Cont Result Date: 9/6/2018 CLINICAL INDICATION/HISTORY:Transient difficulty with speech, diabetes, hypertension, TIA versus CVA COMPARISON: Correlation CT head same day, comparison brain MRA 8/12/2016 TECHNIQUE: Sagittal spin echo T1, axial T1, T2, FLAIR, diffusion, and T2 gradient sequences, and coronal T1 and T2 sequences of the brain were obtained.  FINDINGS:  Diffusion:  There are no areas of restricted diffusion, no evidence of an acute infarction. Brain parenchyma:  No evidence of intracranial mass hemorrhage edema or cortical signal abnormality. Minimal periventricular and deep white matter increased T2 and FLAIR signal foci nonspecific. Ventricles and sulci:  Mild diffuse central and cortical volume loss. Extra-axial:  No extra-axial fluid collection or mass is noted. Brain vasculature:  No vascular abnormality is appreciated on this routine brain MR examination. Craniocervical junction:  Normal. Skull base, extracranial and calvarium:  Previous bilateral lens implants. Likely retention cyst inferior right maxillary sinus with mild ethmoid sinus and maxillary sinus mucosal thickening. Minimal increased T2 signal left mastoid air cells. IACs unremarkable. Partial empty sella. No skull abnormality. Impression: 1. No evidence of acute infarct or other acute intracranial finding. 2. Very mild bilateral deep white matter signal changes nonspecific, possible chronic microvascular ischemic disease. 3. Mild diffuse volume loss. Ct Head Wo Cont Result Date: 9/6/2018 EXAM: CT head INDICATION: Slurred speech. COMPARISON: CT 8/11/16 and MRI brain 8/12/2016 TECHNIQUE: Axial CT imaging of the head was performed without intravenous contrast. Sagittal and coronal reconstructions were obtained. One or more dose reduction techniques were used on this CT: automated exposure control, adjustment of the mAs and/or kVp according to patient's size, and iterative reconstruction techniques. The specific techniques utilized on this CT exam have been documented in the patient's electronic medical record. _______________ FINDINGS: BRAIN AND POSTERIOR FOSSA: The sulci, folia, ventricles and basal cisterns are within normal limits for the patient?s age. There is no intracranial hemorrhage, mass effect, or midline shift.  Minimal low-attenuation changes deep white matter compatible with chronic microvascular insufficiency. Moderate cortical volume loss but within normal range for age. EXTRA-AXIAL SPACES AND MENINGES: There are no abnormal extra-axial fluid collections. CALVARIUM: Intact. SINUSES: Clear. OTHER: None. _______________ IMPRESSION: No acute intracranial abnormalities. Stable findings. CRITICAL RESULT: Critical result called to Dr. Lazaro King at 10:43 AM on 9/6/2018. Results clearly understood and acknowledged. Xr Chest [facility] Result Date: 8/10/2018 Chest, single view Indication: Chest pain with tightness Comparison: Several prior chest radiographs, most recently 9/7/2016 Findings:  Portable upright AP view of the chest was obtained. No focal pneumonic consolidation, pneumothorax, or pleural effusion. Cardiac size and mediastinal contours are stable, with mild cardiac enlargement noted and postoperative changes from median sternotomy. No acute osseous abnormality. Degenerative changes of each shoulder girdle noted. Surgical clips noted at the base of the left neck. Impression: Postop changes as above, with mild cardiac enlargement. No superimposed acute radiographic cardiopulmonary normality. ASSESSMENT/IMPRESSION:  
Transient ischemic attack with speech difficulty. This is his 3rd episode and there is possibility of a complex partial seizure as well. We will follow him regularly as outpatient. He needs to undergo Holter monitoring for 30 days. PLAN/RECOMMENDATIONS: 
1. Continue Plavix 75mg daily 2. Atorvastatin 40mg daily 3. Arrange Holter monitoring for 30 days. 4. PT/OT and Ok to discharge home from neuro perspective. Neurology signs off. I will follow the patient as outpatient. Please do not hesitate to return with any questions.  
 
Signed: 
Yamini Gordon MD 
9/7/2018 
7:48 AM

## 2018-09-10 NOTE — PROGRESS NOTES
Oral and Written notification given to patient and/or caregiver informing them that they are currently an Outpatient receiving care in our facility. Outpatient services include Observation Services under [address] and [facility] requirements.

## 2018-10-07 LAB
ATRIAL RATE: 65 BPM
CALCULATED P AXIS, ECG09: 29 DEGREES
CALCULATED R AXIS, ECG10: -28 DEGREES
CALCULATED T AXIS, ECG11: 116 DEGREES
DIAGNOSIS, 93000: NORMAL
P-R INTERVAL, ECG05: 232 MS
Q-T INTERVAL, ECG07: 412 MS
QRS DURATION, ECG06: 90 MS
QTC CALCULATION (BEZET), ECG08: 428 MS
VENTRICULAR RATE, ECG03: 65 BPM

## 2019-05-24 ENCOUNTER — HOSPITAL ENCOUNTER (EMERGENCY)
Age: 79
Discharge: HOME | End: 2019-05-24
Attending: EMERGENCY MEDICINE
Payer: MEDICARE

## 2019-05-24 ENCOUNTER — APPOINTMENT (OUTPATIENT)
Dept: VASCULAR SURGERY | Age: 79
End: 2019-05-24
Attending: EMERGENCY MEDICINE
Payer: MEDICARE

## 2019-05-24 ENCOUNTER — APPOINTMENT (OUTPATIENT)
Dept: GENERAL RADIOLOGY | Age: 79
End: 2019-05-24
Attending: EMERGENCY MEDICINE
Payer: MEDICARE

## 2019-05-24 VITALS
BODY MASS INDEX: 38.19 KG/M2 | TEMPERATURE: 97.5 F | SYSTOLIC BLOOD PRESSURE: 159 MMHG | DIASTOLIC BLOOD PRESSURE: 77 MMHG | OXYGEN SATURATION: 100 % | RESPIRATION RATE: 18 BRPM | HEIGHT: 68 IN | HEART RATE: 63 BPM | WEIGHT: 252 LBS

## 2019-05-24 DIAGNOSIS — I50.9 ACUTE ON CHRONIC CONGESTIVE HEART FAILURE, UNSPECIFIED HEART FAILURE TYPE (HCC): ICD-10-CM

## 2019-05-24 DIAGNOSIS — E87.70 HYPERVOLEMIA, UNSPECIFIED HYPERVOLEMIA TYPE: Primary | ICD-10-CM

## 2019-05-24 LAB
ALBUMIN SERPL-MCNC: 3.2 G/DL (ref 3.4–5)
ALBUMIN/GLOB SERPL: 1 {RATIO} (ref 0.8–1.7)
ALP SERPL-CCNC: 55 U/L (ref 45–117)
ALT SERPL-CCNC: 24 U/L (ref 16–61)
ANION GAP SERPL CALC-SCNC: 7 MMOL/L (ref 3–18)
APPEARANCE UR: CLEAR
AST SERPL-CCNC: 30 U/L (ref 15–37)
ATRIAL RATE: 74 BPM
BACTERIA URNS QL MICRO: NEGATIVE /HPF
BASOPHILS # BLD: 0.1 K/UL (ref 0–0.1)
BASOPHILS NFR BLD: 1 % (ref 0–2)
BILIRUB SERPL-MCNC: 0.6 MG/DL (ref 0.2–1)
BILIRUB UR QL: NEGATIVE
BNP SERPL-MCNC: 1342 PG/ML (ref 0–1800)
BUN SERPL-MCNC: 25 MG/DL (ref 7–18)
BUN/CREAT SERPL: 13 (ref 12–20)
CALCIUM SERPL-MCNC: 8.3 MG/DL (ref 8.5–10.1)
CALCULATED P AXIS, ECG09: 45 DEGREES
CALCULATED R AXIS, ECG10: -29 DEGREES
CALCULATED T AXIS, ECG11: 96 DEGREES
CHLORIDE SERPL-SCNC: 106 MMOL/L (ref 100–108)
CK MB CFR SERPL CALC: 2.4 % (ref 0–4)
CK MB SERPL-MCNC: 5.6 NG/ML (ref 5–25)
CK SERPL-CCNC: 230 U/L (ref 39–308)
CO2 SERPL-SCNC: 26 MMOL/L (ref 21–32)
COLOR UR: YELLOW
CREAT SERPL-MCNC: 1.95 MG/DL (ref 0.6–1.3)
DIAGNOSIS, 93000: NORMAL
DIFFERENTIAL METHOD BLD: ABNORMAL
EOSINOPHIL # BLD: 0.2 K/UL (ref 0–0.4)
EOSINOPHIL NFR BLD: 3 % (ref 0–5)
EPITH CASTS URNS QL MICRO: NORMAL /LPF (ref 0–5)
ERYTHROCYTE [DISTWIDTH] IN BLOOD BY AUTOMATED COUNT: 13.8 % (ref 11.6–14.5)
GLOBULIN SER CALC-MCNC: 3.3 G/DL (ref 2–4)
GLUCOSE SERPL-MCNC: 122 MG/DL (ref 74–99)
GLUCOSE UR STRIP.AUTO-MCNC: NEGATIVE MG/DL
HCT VFR BLD AUTO: 34.6 % (ref 36–48)
HGB BLD-MCNC: 11.6 G/DL (ref 13–16)
HGB UR QL STRIP: NEGATIVE
KETONES UR QL STRIP.AUTO: NEGATIVE MG/DL
LEUKOCYTE ESTERASE UR QL STRIP.AUTO: NEGATIVE
LYMPHOCYTES # BLD: 0.6 K/UL (ref 0.9–3.6)
LYMPHOCYTES NFR BLD: 10 % (ref 21–52)
MAGNESIUM SERPL-MCNC: 2.8 MG/DL (ref 1.6–2.6)
MCH RBC QN AUTO: 35.6 PG (ref 24–34)
MCHC RBC AUTO-ENTMCNC: 33.5 G/DL (ref 31–37)
MCV RBC AUTO: 106.1 FL (ref 74–97)
MONOCYTES # BLD: 0.6 K/UL (ref 0.05–1.2)
MONOCYTES NFR BLD: 10 % (ref 3–10)
NEUTS SEG # BLD: 4.8 K/UL (ref 1.8–8)
NEUTS SEG NFR BLD: 76 % (ref 40–73)
NITRITE UR QL STRIP.AUTO: NEGATIVE
P-R INTERVAL, ECG05: 224 MS
PH UR STRIP: 7.5 [PH] (ref 5–8)
PLATELET # BLD AUTO: 123 K/UL (ref 135–420)
PMV BLD AUTO: 10.7 FL (ref 9.2–11.8)
POTASSIUM SERPL-SCNC: 4.2 MMOL/L (ref 3.5–5.5)
PROT SERPL-MCNC: 6.5 G/DL (ref 6.4–8.2)
PROT UR STRIP-MCNC: ABNORMAL MG/DL
Q-T INTERVAL, ECG07: 396 MS
QRS DURATION, ECG06: 86 MS
QTC CALCULATION (BEZET), ECG08: 439 MS
RBC # BLD AUTO: 3.26 M/UL (ref 4.7–5.5)
RBC #/AREA URNS HPF: NORMAL /HPF (ref 0–5)
SODIUM SERPL-SCNC: 139 MMOL/L (ref 136–145)
SP GR UR REFRACTOMETRY: 1.01 (ref 1–1.03)
TROPONIN I SERPL-MCNC: 0.03 NG/ML (ref 0–0.04)
UROBILINOGEN UR QL STRIP.AUTO: 0.2 EU/DL (ref 0.2–1)
VENTRICULAR RATE, ECG03: 74 BPM
WBC # BLD AUTO: 6.3 K/UL (ref 4.6–13.2)
WBC URNS QL MICRO: NORMAL /HPF (ref 0–5)

## 2019-05-24 PROCEDURE — 99285 EMERGENCY DEPT VISIT HI MDM: CPT

## 2019-05-24 PROCEDURE — 74011250636 HC RX REV CODE- 250/636: Performed by: EMERGENCY MEDICINE

## 2019-05-24 PROCEDURE — 96374 THER/PROPH/DIAG INJ IV PUSH: CPT

## 2019-05-24 PROCEDURE — 82550 ASSAY OF CK (CPK): CPT

## 2019-05-24 PROCEDURE — 81001 URINALYSIS AUTO W/SCOPE: CPT

## 2019-05-24 PROCEDURE — 71045 X-RAY EXAM CHEST 1 VIEW: CPT

## 2019-05-24 PROCEDURE — 93970 EXTREMITY STUDY: CPT

## 2019-05-24 PROCEDURE — 80053 COMPREHEN METABOLIC PANEL: CPT

## 2019-05-24 PROCEDURE — 83735 ASSAY OF MAGNESIUM: CPT

## 2019-05-24 PROCEDURE — 74011250637 HC RX REV CODE- 250/637: Performed by: EMERGENCY MEDICINE

## 2019-05-24 PROCEDURE — 93005 ELECTROCARDIOGRAM TRACING: CPT

## 2019-05-24 PROCEDURE — 83880 ASSAY OF NATRIURETIC PEPTIDE: CPT

## 2019-05-24 PROCEDURE — 85025 COMPLETE CBC W/AUTO DIFF WBC: CPT

## 2019-05-24 RX ORDER — CLOPIDOGREL BISULFATE 75 MG/1
75 TABLET ORAL
Status: COMPLETED | OUTPATIENT
Start: 2019-05-24 | End: 2019-05-24

## 2019-05-24 RX ORDER — ISOSORBIDE MONONITRATE 30 MG/1
30 TABLET, EXTENDED RELEASE ORAL
Status: COMPLETED | OUTPATIENT
Start: 2019-05-24 | End: 2019-05-24

## 2019-05-24 RX ORDER — CARVEDILOL 12.5 MG/1
12.5 TABLET ORAL
Status: COMPLETED | OUTPATIENT
Start: 2019-05-24 | End: 2019-05-24

## 2019-05-24 RX ORDER — CARVEDILOL 12.5 MG/1
12.5 TABLET ORAL 2 TIMES DAILY
Status: DISCONTINUED | OUTPATIENT
Start: 2019-05-24 | End: 2019-05-24

## 2019-05-24 RX ORDER — LANOLIN ALCOHOL/MO/W.PET/CERES
400 CREAM (GRAM) TOPICAL DAILY
Status: DISCONTINUED | OUTPATIENT
Start: 2019-05-24 | End: 2019-05-24

## 2019-05-24 RX ORDER — CLOPIDOGREL BISULFATE 75 MG/1
75 TABLET ORAL DAILY
Status: DISCONTINUED | OUTPATIENT
Start: 2019-05-24 | End: 2019-05-24

## 2019-05-24 RX ORDER — FUROSEMIDE 10 MG/ML
100 INJECTION INTRAMUSCULAR; INTRAVENOUS
Status: COMPLETED | OUTPATIENT
Start: 2019-05-24 | End: 2019-05-24

## 2019-05-24 RX ORDER — LANOLIN ALCOHOL/MO/W.PET/CERES
400 CREAM (GRAM) TOPICAL
Status: COMPLETED | OUTPATIENT
Start: 2019-05-24 | End: 2019-05-24

## 2019-05-24 RX ORDER — ISOSORBIDE MONONITRATE 30 MG/1
30 TABLET, EXTENDED RELEASE ORAL DAILY
Status: DISCONTINUED | OUTPATIENT
Start: 2019-05-24 | End: 2019-05-24

## 2019-05-24 RX ADMIN — CLOPIDOGREL BISULFATE 75 MG: 75 TABLET ORAL at 09:19

## 2019-05-24 RX ADMIN — CARVEDILOL 12.5 MG: 12.5 TABLET, FILM COATED ORAL at 09:19

## 2019-05-24 RX ADMIN — Medication 400 MG: at 09:19

## 2019-05-24 RX ADMIN — ISOSORBIDE MONONITRATE 30 MG: 30 TABLET, EXTENDED RELEASE ORAL at 10:19

## 2019-05-24 RX ADMIN — FUROSEMIDE 100 MG: 10 INJECTION, SOLUTION INTRAMUSCULAR; INTRAVENOUS at 09:22

## 2019-05-24 NOTE — DISCHARGE INSTRUCTIONS

## 2019-05-24 NOTE — ED PROVIDER NOTES
EMERGENCY DEPARTMENT HISTORY AND PHYSICAL EXAM 
 
Date: 5/24/2019 Patient Name: Callum Bradshaw History of Presenting Illness Chief Complaint Patient presents with  Leg Swelling  Shortness of Breath History Provided By: Patient and Patient's Wife 
 
8:58 AM 
Callum Bradshaw is a [de-identified] y.o. male with PMHX of CHF, CML, hypertension who presents to the emergency department C/O increasing lower extremity edema and shortness of breath. Patient states the symptoms are worse with exertion and have developed over the last couple days in spite of compliance with his diuretics. He states he is followed by Dr. Alexandria Gutierrez with cardiology. He denies any chest pain, abdominal pain, fever, any other complaints. He has not taken his medications this morning yet because he had not eaten yet. He states he is gained about 3 pounds in the last 2 days in spite of decreasing his fluid intake. PCP: Pooja Glover, DO 
 
Current Outpatient Medications Medication Sig Dispense Refill  clopidogrel (PLAVIX) 75 mg tab Take 1 Tab by mouth daily. 30 Tab 2  
 isosorbide mononitrate ER (IMDUR) 30 mg tablet Take 1 Tab by mouth daily. 30 Tab 0  
 nitroglycerin (NITROSTAT) 0.4 mg SL tablet 1 Tab by SubLINGual route as needed for Chest Pain. Up to 3 doses. 20 Tab 0  
 magnesium oxide (MAG-OX) 400 mg tablet Take 400 mg by mouth daily.  atorvastatin (LIPITOR) 40 mg tablet Take 40 mg by mouth daily.  imatinib (GLEEVEC) 400 mg tablet Take 400 mg by mouth daily. 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 Indications: Edema  insulin glargine (LANTUS) 100 unit/mL injection 20 Units by SubCUTAneous route nightly. Indications: TYPE 2 DIABETES MELLITUS (Patient taking differently: 13 Units by SubCUTAneous route daily.  Indications: type 2 diabetes mellitus, 13 units bid) 1 Vial 0  
  carvedilol (COREG) 6.25 mg tablet Take 12.5 mg by mouth two (2) times a day. Past History Past Medical History: 
Past Medical History:  
Diagnosis Date  CAD (coronary artery disease)   Chronic atrial fibrillation ([facility] [address] 2016  CML (chronic myelocytic leukemia) ([facility] [address]  Coagulation disorder ([facility] [address] bruises easily  Diabetes ([facility] [address] 2012  Hypertension  Nausea & vomiting   
 with most recent surgery-carotid endardarectomy  TIA (transient ischemic attack) 2016 Past Surgical History: 
Past Surgical History:  
Procedure Laterality Date  CARDIAC SURG PROCEDURE UNLIST  3/2014  
 quadruple bypass  HX CAROTID ENDARTERECTOMY Left 2017  HX CATARACT REMOVAL Bilateral   
 HX CORONARY ARTERY BYPASS GRAFT    
 HX HERNIA REPAIR Right   
 hernia  HX UROLOGICAL    
 greenlight laser  HX UROLOGICAL    
 kidney stone  HX UROLOGICAL Lithotripsy x 2 Family History: 
Family History Problem Relation Age of Onset  Post-op Nausea/Vomiting Brother Social History: 
Social History Tobacco Use  Smoking status: Former Smoker Packs/day: 1.00 Years: 60.00 Pack years: 60.00 Last attempt to quit: 3/23/2014 Years since quittin.1  Smokeless tobacco: Never Used Substance Use Topics  Alcohol use: Yes Alcohol/week: 1.2 oz Types: 2 Shots of liquor per week Comment: per day  Drug use: No  
 
 
Allergies: Allergies Allergen Reactions  Adhesive Tape-Silicones Other (comments) Raw skin  Codeine Nausea and Vomiting  Iodinated Contrast- Oral And Iv Dye Rash  
  patient states this was years ago - he states that he can tolerate the newer agents Review of Systems Review of Systems Constitutional: Negative for fever. Respiratory: Positive for shortness of breath. Cardiovascular: Positive for leg swelling. Negative for chest pain. Gastrointestinal: Positive for abdominal distention. Negative for abdominal pain. All other systems reviewed and are negative. Physical Exam  
 
Vitals:  
 05/24/19 0853 05/24/19 0900 05/24/19 1019 BP: 160/66 186/86 159/77 Pulse: 73 77 63 Resp: 18 18 Temp: 97.5 °F (36.4 °C) SpO2: 100% 100% Weight: 114.3 kg (252 lb) Height: 5' 8\" (1.727 m) Physical Exam 
 
Nursing notes and vital signs reviewed Constitutional: Non toxic appearing, mild distress Head: Normocephalic, Atraumatic Eyes: EOMI Neck: Supple Cardiovascular: Regular rate and rhythm, no murmurs, rubs, or gallops Chest: Normal work of breathing and chest excursion bilaterally Lungs: Diminished with crackles in the bases to ausculation bilaterally Abdomen: Soft, non tender, + distended, normoactive bowel sounds Back: No evidence of trauma or deformity Extremities: No evidence of trauma or deformity, severe bilateral LE edema Skin: Warm and dry, normal cap refill Neuro: Alert and appropriate Psychiatric: Normal mood and affect Diagnostic Study Results Labs - Recent Results (from the past 12 hour(s)) CBC WITH AUTOMATED DIFF Collection Time: 05/24/19  9:10 AM  
Result Value Ref Range WBC 6.3 4.6 - 13.2 K/uL  
 RBC 3.26 (L) 4.70 - 5.50 M/uL  
 HGB 11.6 (L) 13.0 - 16.0 g/dL HCT 34.6 (L) 36.0 - 48.0 % .1 (H) 74.0 - 97.0 FL  
 MCH 35.6 (H) 24.0 - 34.0 PG  
 MCHC 33.5 31.0 - 37.0 g/dL  
 RDW 13.8 11.6 - 14.5 % PLATELET 290 (L) 222 - 420 K/uL MPV 10.7 9.2 - 11.8 FL  
 NEUTROPHILS 76 (H) 40 - 73 % LYMPHOCYTES 10 (L) 21 - 52 % MONOCYTES 10 3 - 10 % EOSINOPHILS 3 0 - 5 % BASOPHILS 1 0 - 2 %  
 ABS. NEUTROPHILS 4.8 1.8 - 8.0 K/UL  
 ABS. LYMPHOCYTES 0.6 (L) 0.9 - 3.6 K/UL  
 ABS. MONOCYTES 0.6 0.05 - 1.2 K/UL  
 ABS. EOSINOPHILS 0.2 0.0 - 0.4 K/UL  
 ABS. BASOPHILS 0.1 0.0 - 0.1 K/UL  
 DF AUTOMATED METABOLIC PANEL, COMPREHENSIVE  Collection Time: 05/24/19  9:10 AM  
 Result Value Ref Range Sodium 139 136 - 145 mmol/L Potassium 4.2 3.5 - 5.5 mmol/L Chloride 106 100 - 108 mmol/L  
 CO2 26 21 - 32 mmol/L Anion gap 7 3.0 - 18 mmol/L Glucose 122 (H) 74 - 99 mg/dL BUN 25 (H) 7.0 - 18 MG/DL Creatinine 1.95 (H) 0.6 - 1.3 MG/DL  
 BUN/Creatinine ratio 13 12 - 20 GFR est AA 40 (L) >60 ml/min/1.73m2 GFR est non-AA 33 (L) >60 ml/min/1.73m2 Calcium 8.3 (L) 8.5 - 10.1 MG/DL Bilirubin, total 0.6 0.2 - 1.0 MG/DL  
 ALT (SGPT) 24 16 - 61 U/L  
 AST (SGOT) 30 15 - 37 U/L Alk. phosphatase 55 45 - 117 U/L Protein, total 6.5 6.4 - 8.2 g/dL Albumin 3.2 (L) 3.4 - 5.0 g/dL Globulin 3.3 2.0 - 4.0 g/dL A-G Ratio 1.0 0.8 - 1.7 NT-PRO BNP Collection Time: 05/24/19  9:10 AM  
Result Value Ref Range NT pro-BNP 1,342 0 - 1,800 PG/ML  
MAGNESIUM Collection Time: 05/24/19  9:10 AM  
Result Value Ref Range Magnesium 2.8 (H) 1.6 - 2.6 mg/dL CARDIAC PANEL,(CK, CKMB & TROPONIN) Collection Time: 05/24/19  9:10 AM  
Result Value Ref Range  39 - 308 U/L  
 CK - MB 5.6 (H) <3.6 ng/ml CK-MB Index 2.4 0.0 - 4.0 % Troponin-I, QT 0.03 0.0 - 0.045 NG/ML  
EKG, 12 LEAD, INITIAL Collection Time: 05/24/19  9:21 AM  
Result Value Ref Range Ventricular Rate 74 BPM  
 Atrial Rate 74 BPM  
 P-R Interval 224 ms QRS Duration 86 ms  
 Q-T Interval 396 ms QTC Calculation (Bezet) 439 ms Calculated P Axis 45 degrees Calculated R Axis -29 degrees Calculated T Axis 96 degrees Diagnosis Sinus rhythm with 1st degree AV block with Possible premature atrial  
complexes with aberrant conduction Septal infarct (cited on or before 10-AUG-2018) Abnormal ECG When compared with ECG of 06-SEP-2018 10:43, 
premature ventricular complexes are no longer present 
aberrant conduction is now present Confirmed by Valentino Dew MD, -- ([id number]) on 5/24/2019 11:17:12 AM 
  
URINALYSIS W/ RFLX MICROSCOPIC  Collection Time: 05/24/19 10:20 AM  
 Result Value Ref Range Color YELLOW Appearance CLEAR Specific gravity 1.008 1.005 - 1.030    
 pH (UA) 7.5 5.0 - 8.0 Protein TRACE (A) NEG mg/dL Glucose NEGATIVE  NEG mg/dL Ketone NEGATIVE  NEG mg/dL Bilirubin NEGATIVE  NEG Blood NEGATIVE  NEG Urobilinogen 0.2 0.2 - 1.0 EU/dL Nitrites NEGATIVE  NEG Leukocyte Esterase NEGATIVE  NEG    
URINE MICROSCOPIC ONLY Collection Time: 05/24/19 10:20 AM  
Result Value Ref Range WBC 0 to 3 0 - 5 /hpf  
 RBC 0 to 3 0 - 5 /hpf Epithelial cells FEW 0 - 5 /lpf Bacteria NEGATIVE  NEG /hpf Radiologic Studies -  
XR CHEST PORT Final Result Stable enlarged cardiac silhouette with central pulmonary vascular congestion. Left lung base is obscured likely by prominent cardiac silhouette. Cannot  
exclude small effusion and/or underlying nonspecific airspace disease. CT Results  (Last 48 hours) None CXR Results  (Last 48 hours) 05/24/19 0923  XR CHEST PORT Final result Impression:     
Stable enlarged cardiac silhouette with central pulmonary vascular congestion. Left lung base is obscured likely by prominent cardiac silhouette. Cannot  
exclude small effusion and/or underlying nonspecific airspace disease. Narrative:  EXAM: One-view chest  
   
CLINICAL HISTORY: Short of breath ,  
   
COMPARISON: None FINDINGS:  
   
Frontal view of the chest demonstrate prominent central pulmonary vascular  
markings and questionable airspace disease versus effusion the left lung base,  
partially obscured by the cardiac silhouette. Cardiac silhouette is enlarged,  
stable in size and contour. No acute bony or soft tissue abnormality. Medications given in the ED- Medications  
carvedilol (COREG) tablet 12.5 mg (12.5 mg Oral Given 5/24/19 0919)  
clopidogrel (PLAVIX) tablet 75 mg (75 mg Oral Given 5/24/19 0919) isosorbide mononitrate ER (IMDUR) tablet 30 mg (30 mg Oral Given 5/24/19 1019)  
magnesium oxide (MAG-OX) tablet 400 mg (400 mg Oral Given 5/24/19 0919) furosemide (LASIX) injection 100 mg (100 mg IntraVENous Given 5/24/19 0922) Medical Decision Making I am the first provider for this patient. I reviewed the vital signs, available nursing notes, past medical history, past surgical history, family history and social history. Vital Signs-Reviewed the patient's vital signs. Pulse Oximetry Analysis -100 % on room air Cardiac Monitor: 
Rate: 66 bpm 
Rhythm: Normal sinus EKG interpretation: (Preliminary) EKG read by Dr. Jania Harris at 9:27 AM 
Sinus rhythm at a rate of 74 bpm, NH interval of 224 ms, QRS duration of 86 ms, no significant change when compared to prior from September 6, 2018 Records Reviewed: Nursing Notes, Old Medical Records and Previous electrocardiograms Provider Notes (Medical Decision Making): Idalmis York is a [de-identified] y.o. male presenting for hypervolemia in the setting of CHF. Much improved here after IV Lasix and is eager for discharge. Discussed with his cardiologist.  Will discharge with early cardiology and PCP follow-up and return precautions. Advised patient to stop taking his magnesium until he sees his doctor next week. Patient and wife understand and agree with this plan. Procedures: 
Procedures ED Course:  
10:29 AM 
Patient has put out 650 mL and states he feels significantly better CONSULT NOTE:  
10:40 AM 
Dr. Jania Harris spoke with Dr. Alfred Lantigua Specialty: Cardiology Discussed pt's hx, disposition, and available diagnostic and imaging results over the telephone. Reviewed care plans. Recommends discharge with outpatient follow-up. Diagnosis and Disposition DISCHARGE NOTE: 
 
Gray Simmonds Apley's  results have been reviewed with him. He has been counseled regarding his diagnosis, treatment, and plan.   He verbally conveys understanding and agreement of the signs, symptoms, diagnosis, treatment and prognosis and additionally agrees to follow up as discussed. He also agrees with the care-plan and conveys that all of his questions have been answered. I have also provided discharge instructions for him that include: educational information regarding their diagnosis and treatment, and list of reasons why they would want to return to the ED prior to their follow-up appointment, should his condition change. He has been provided with education for proper emergency department utilization. CLINICAL IMPRESSION: 
 
1. Hypervolemia, unspecified hypervolemia type 2. Acute on chronic congestive heart failure, unspecified heart failure type ([facility] [address] PLAN: 
1. D/C Home 2. Current Discharge Medication List  
  
 
3. Follow-up Information Follow up With Specialties Details Why Contact Info [facility], DO Family Practice Schedule an appointment as soon as possible for a visit  [address] [facility] [address] [facility] 
[phone] Dayanara Thompson MD Cardiology, Internal Medicine Schedule an appointment as soon as possible for a visit  [address] [facility] [address] [facility] 
[phone] [facility] EMERGENCY DEPT Emergency Medicine  If symptoms worsen [address] [place] [id number] 
[phone]  
  
 
_______________________________ Please note that this dictation was completed with [employer], the computer voice recognition software. Quite often unanticipated grammatical, syntax, homophones, and other interpretive errors are inadvertently transcribed by the computer software. Please disregard these errors. Please excuse any errors that have escaped final proofreading.

## 2020-12-12 ENCOUNTER — HOSPITAL ENCOUNTER (EMERGENCY)
Age: 80
Discharge: HOME | End: 2020-12-12
Attending: EMERGENCY MEDICINE
Payer: MEDICARE

## 2020-12-12 ENCOUNTER — APPOINTMENT (OUTPATIENT)
Dept: GENERAL RADIOLOGY | Age: 80
End: 2020-12-12
Attending: EMERGENCY MEDICINE
Payer: MEDICARE

## 2020-12-12 VITALS
DIASTOLIC BLOOD PRESSURE: 83 MMHG | WEIGHT: 193 LBS | HEIGHT: 68 IN | TEMPERATURE: 97.3 F | SYSTOLIC BLOOD PRESSURE: 163 MMHG | RESPIRATION RATE: 22 BRPM | OXYGEN SATURATION: 100 % | BODY MASS INDEX: 29.25 KG/M2 | HEART RATE: 70 BPM

## 2020-12-12 DIAGNOSIS — I50.9 CHRONIC CONGESTIVE HEART FAILURE, UNSPECIFIED HEART FAILURE TYPE (HCC): ICD-10-CM

## 2020-12-12 DIAGNOSIS — Z78.9 ON SUPPLEMENTAL OXYGEN BY NASAL CANNULA: ICD-10-CM

## 2020-12-12 DIAGNOSIS — R06.02 SOB (SHORTNESS OF BREATH): ICD-10-CM

## 2020-12-12 DIAGNOSIS — J18.9 COMMUNITY ACQUIRED PNEUMONIA, UNSPECIFIED LATERALITY: Primary | ICD-10-CM

## 2020-12-12 LAB
ALBUMIN SERPL-MCNC: 3.1 G/DL (ref 3.4–5)
ALBUMIN/GLOB SERPL: 0.8 {RATIO} (ref 0.8–1.7)
ALP SERPL-CCNC: 104 U/L (ref 45–117)
ALT SERPL-CCNC: 35 U/L (ref 16–61)
ANION GAP SERPL CALC-SCNC: 4 MMOL/L (ref 3–18)
AST SERPL-CCNC: 26 U/L (ref 10–38)
BASOPHILS # BLD: 0.7 K/UL (ref 0–0.1)
BASOPHILS NFR BLD: 5 % (ref 0–2)
BILIRUB SERPL-MCNC: 0.5 MG/DL (ref 0.2–1)
BNP SERPL-MCNC: 4196 PG/ML (ref 0–1800)
BUN SERPL-MCNC: 31 MG/DL (ref 7–18)
BUN/CREAT SERPL: 24 (ref 12–20)
CALCIUM SERPL-MCNC: 9.4 MG/DL (ref 8.5–10.1)
CHLORIDE SERPL-SCNC: 104 MMOL/L (ref 100–111)
CK MB CFR SERPL CALC: 5.9 % (ref 0–4)
CK MB SERPL-MCNC: 4.8 NG/ML (ref 5–25)
CK SERPL-CCNC: 81 U/L (ref 39–308)
CO2 SERPL-SCNC: 28 MMOL/L (ref 21–32)
CREAT SERPL-MCNC: 1.31 MG/DL (ref 0.6–1.3)
DIFFERENTIAL METHOD BLD: ABNORMAL
EOSINOPHIL # BLD: 0.2 K/UL (ref 0–0.4)
EOSINOPHIL NFR BLD: 1 % (ref 0–5)
ERYTHROCYTE [DISTWIDTH] IN BLOOD BY AUTOMATED COUNT: 14 % (ref 11.6–14.5)
GLOBULIN SER CALC-MCNC: 3.7 G/DL (ref 2–4)
GLUCOSE SERPL-MCNC: 157 MG/DL (ref 74–99)
HCT VFR BLD AUTO: 41.9 % (ref 36–48)
HGB BLD-MCNC: 14 G/DL (ref 13–16)
LYMPHOCYTES # BLD: 1.3 K/UL (ref 0.9–3.6)
LYMPHOCYTES NFR BLD: 9 % (ref 21–52)
MCH RBC QN AUTO: 31.5 PG (ref 24–34)
MCHC RBC AUTO-ENTMCNC: 33.4 G/DL (ref 31–37)
MCV RBC AUTO: 94.2 FL (ref 74–97)
MONOCYTES # BLD: 1.3 K/UL (ref 0.05–1.2)
MONOCYTES NFR BLD: 9 % (ref 3–10)
NEUTS SEG # BLD: 11 K/UL (ref 1.8–8)
NEUTS SEG NFR BLD: 76 % (ref 40–73)
PLATELET # BLD AUTO: 136 K/UL (ref 135–420)
PMV BLD AUTO: 11.7 FL (ref 9.2–11.8)
POTASSIUM SERPL-SCNC: 4.9 MMOL/L (ref 3.5–5.5)
PROT SERPL-MCNC: 6.8 G/DL (ref 6.4–8.2)
RBC # BLD AUTO: 4.45 M/UL (ref 4.7–5.5)
SODIUM SERPL-SCNC: 136 MMOL/L (ref 136–145)
TROPONIN I SERPL-MCNC: 0.02 NG/ML (ref 0–0.04)
WBC # BLD AUTO: 14.5 K/UL (ref 4.6–13.2)

## 2020-12-12 PROCEDURE — 74011250636 HC RX REV CODE- 250/636: Performed by: EMERGENCY MEDICINE

## 2020-12-12 PROCEDURE — 87635 SARS-COV-2 COVID-19 AMP PRB: CPT

## 2020-12-12 PROCEDURE — 96366 THER/PROPH/DIAG IV INF ADDON: CPT

## 2020-12-12 PROCEDURE — 85025 COMPLETE CBC W/AUTO DIFF WBC: CPT

## 2020-12-12 PROCEDURE — 82550 ASSAY OF CK (CPK): CPT

## 2020-12-12 PROCEDURE — 80053 COMPREHEN METABOLIC PANEL: CPT

## 2020-12-12 PROCEDURE — 99285 EMERGENCY DEPT VISIT HI MDM: CPT

## 2020-12-12 PROCEDURE — 71046 X-RAY EXAM CHEST 2 VIEWS: CPT

## 2020-12-12 PROCEDURE — 83880 ASSAY OF NATRIURETIC PEPTIDE: CPT

## 2020-12-12 PROCEDURE — 94762 N-INVAS EAR/PLS OXIMTRY CONT: CPT

## 2020-12-12 PROCEDURE — 96365 THER/PROPH/DIAG IV INF INIT: CPT

## 2020-12-12 PROCEDURE — 93005 ELECTROCARDIOGRAM TRACING: CPT

## 2020-12-12 RX ORDER — LEVOFLOXACIN 750 MG/1
750 TABLET ORAL DAILY
Qty: 10 TAB | Refills: 0 | Status: SHIPPED | OUTPATIENT
Start: 2020-12-13 | End: 2020-12-23

## 2020-12-12 RX ORDER — LEVOFLOXACIN 5 MG/ML
750 INJECTION, SOLUTION INTRAVENOUS ONCE
Status: COMPLETED | OUTPATIENT
Start: 2020-12-12 | End: 2020-12-12

## 2020-12-12 RX ADMIN — LEVOFLOXACIN 750 MG: 5 INJECTION, SOLUTION INTRAVENOUS at 16:57

## 2020-12-12 NOTE — ED PROVIDER NOTES
EMERGENCY DEPARTMENT HISTORY AND PHYSICAL EXAM 
 
Date: 12/12/2020 Patient Name: Jannet Martinez History of Presenting Illness Chief Complaint Patient presents with  Shortness of Breath  Fatigue History Provided By: Patient Jannet Martinez is a 79 y.o. male who presents to the emergency department C/O shortness of breath, tightness in his chest and generalized weakness/fatigue. Patient states his been feeling this way for the last several days. He does admit to a slight cough that is productive with some yellow sputum. States he does have a history of cardiac bypass about 7 years ago. States he does have a history of some congestive heart failure and was admitted to the [facility] about 6 months ago to get fluid off of his lungs. He also notes he does have history of pneumonia several years ago. He denies any fevers. States that the shortness of breath is worse on exertion. He states recently within the last month he was started on oxygen at home at 2 L. States he is not sure specifically why. PCP: Melonie Morton, DO 
 
Current Outpatient Medications Medication Sig Dispense Refill  [START ON 12/13/2020] levoFLOXacin (Levaquin) 750 mg tablet Take 1 Tab by mouth daily for 10 days. 10 Tab 0  
 atorvastatin (LIPITOR) 40 mg tablet Take 40 mg by mouth daily.  torsemide (DEMADEX) 100 mg tablet Take 100 mg by mouth daily. Indications: Edema  carvedilol (COREG) 6.25 mg tablet Take 12.5 mg by mouth two (2) times a day.  nitroglycerin (NITROSTAT) 0.4 mg SL tablet 1 Tab by SubLINGual route as needed for Chest Pain. Up to 3 doses. 20 Tab 0 Past History Past Medical History: 
Past Medical History:  
Diagnosis Date  CAD (coronary artery disease) 2014  Chronic atrial fibrillation ([address] 9/2/2016  CML (chronic myelocytic [facility]) [facility]  Coagulation disorder ([facility] bruises easily  Diabetes ([facility] 2012  Hypertension  Nausea & vomiting with most recent surgery-carotid endardarectomy  TIA (transient ischemic attack) 2016 Past Surgical History: 
Past Surgical History:  
Procedure Laterality Date  CARDIAC SURG PROCEDURE UNLIST  3/2014  
 quadruple bypass  HX CAROTID ENDARTERECTOMY Left 2017  HX CATARACT REMOVAL Bilateral   
 HX CORONARY ARTERY BYPASS GRAFT    
 HX HERNIA REPAIR Right   
 hernia  HX UROLOGICAL    
 greenlight laser  HX UROLOGICAL    
 kidney stone  HX UROLOGICAL Lithotripsy x 2 Family History: 
Family History Problem Relation Age of Onset  Post-op Nausea/Vomiting Brother Social History: 
Social History Tobacco Use  Smoking status: Former Smoker Packs/day: 1.00 Years: 60.00 Pack years: 60.00 Last attempt to quit: 3/23/2014 Years since quittin.7  Smokeless tobacco: Never Used Substance Use Topics  Alcohol use: Yes Alcohol/week: 2.0 standard drinks Types: 2 Shots of liquor per week Comment: per day  Drug use: No  
 
 
Allergies: Allergies Allergen Reactions  Adhesive Tape-Silicones Other (comments) Raw skin  Codeine Nausea and Vomiting  Iodinated Contrast Media Rash  
  patient states this was years ago - he states that he can tolerate the newer agents Review of Systems Review of Systems Constitutional: Negative for fever. Respiratory: Positive for cough, chest tightness and shortness of breath. Cardiovascular: Negative for chest pain and leg swelling. Gastrointestinal: Negative for abdominal pain. All other systems reviewed and are negative. All other systems reviewed and are negative. Physical Exam  
 
Vitals:  
 20 1630 20 1645 20 1700 20 1739 BP: (!) 169/77 (!) 195/82 (!) 163/83 Pulse: 67 75 70 Resp: 23 24 22 Temp:      
SpO2: 99%  100% 100% Weight:      
Height:      
 
Physical Exam 
 
Nursing notes and vital signs reviewed Airway: intact, speaking normally Breathing: No apparent distress, no cyanosis Circulation: Peripheral pulses equal 
 
Constitutional: Non toxic appearing, no acute distress, appearing stated age HEENT: 
Head: Normocephalic, Atraumatic Eyes: PERRL, EOMI, No conjunctival injection Ears: external ears normal 
Nose: No rhinorrhea, external nose normal 
Throat: mucous membranes moist 
Neck: symmetric, trachea midline, no obvious swelling, no JVD Cardiovascular: Regular rate and rhythm, no murmurs Lungs: Clear to ausculation bilaterally, No stridor, Normal work of breathing and chest excursion bilaterally Abdomen: Soft, non tender, non distended, normoactive bowel sounds, No rigidity, no peritoneal signs Musculoskeletal:  No evidence of obvious deformity to the back, neck or extremities, no LE edema Skin: Warm, dry, No obvious rashes Neuro: Alert and oriented x 3, CN 2-12 intact, normal speech, strength and sensation full and symmetric bilaterally Psychiatric: Normal mood and affect Diagnostic Study Results Labs - Recent Results (from the past 72 hour(s)) EKG, 12 LEAD, INITIAL Collection Time: 12/12/20  3:17 PM  
Result Value Ref Range Ventricular Rate 66 BPM  
 Atrial Rate 66 BPM  
 P-R Interval 234 ms QRS Duration 86 ms  
 Q-T Interval 398 ms QTC Calculation (Bezet) 417 ms Calculated P Axis 50 degrees Calculated R Axis -8 degrees Calculated T Axis 151 degrees Diagnosis Sinus rhythm with 1st degree AV block ST & T wave abnormality, consider lateral ischemia Abnormal ECG When compared with ECG of 24-MAY-2019 09:21, 
aberrant conduction is no longer present ST now depressed in Lateral leads T wave inversion more evident in Lateral leads CBC WITH AUTOMATED DIFF Collection Time: 12/12/20  4:00 PM  
Result Value Ref Range WBC 14.5 (H) 4.6 - 13.2 K/uL  
 RBC 4.45 (L) 4.70 - 5.50 M/uL  
 HGB 14.0 13.0 - 16.0 g/dL HCT 41.9 36.0 - 48.0 % MCV 94.2 74.0 - 97.0 FL  
 MCH 31.5 24.0 - 34.0 PG  
 MCHC 33.4 31.0 - 37.0 g/dL  
 RDW 14.0 11.6 - 14.5 % PLATELET 277 455 - 369 K/uL MPV 11.7 9.2 - 11.8 FL  
 NEUTROPHILS 76 (H) 40 - 73 % LYMPHOCYTES 9 (L) 21 - 52 % MONOCYTES 9 3 - 10 % EOSINOPHILS 1 0 - 5 % BASOPHILS 5 (H) 0 - 2 %  
 ABS. NEUTROPHILS 11.0 (H) 1.8 - 8.0 K/UL  
 ABS. LYMPHOCYTES 1.3 0.9 - 3.6 K/UL  
 ABS. MONOCYTES 1.3 (H) 0.05 - 1.2 K/UL  
 ABS. EOSINOPHILS 0.2 0.0 - 0.4 K/UL  
 ABS. BASOPHILS 0.7 (H) 0.0 - 0.1 K/UL  
 DF AUTOMATED METABOLIC PANEL, COMPREHENSIVE Collection Time: 12/12/20  4:00 PM  
Result Value Ref Range Sodium 136 136 - 145 mmol/L Potassium 4.9 3.5 - 5.5 mmol/L Chloride 104 100 - 111 mmol/L  
 CO2 28 21 - 32 mmol/L Anion gap 4 3.0 - 18 mmol/L Glucose 157 (H) 74 - 99 mg/dL BUN 31 (H) 7.0 - 18 MG/DL Creatinine 1.31 (H) 0.6 - 1.3 MG/DL  
 BUN/Creatinine ratio 24 (H) 12 - 20 GFR est AA >60 >60 ml/min/1.73m2 GFR est non-AA 53 (L) >60 ml/min/1.73m2 Calcium 9.4 8.5 - 10.1 MG/DL Bilirubin, total 0.5 0.2 - 1.0 MG/DL  
 ALT (SGPT) 35 16 - 61 U/L  
 AST (SGOT) 26 10 - 38 U/L Alk. phosphatase 104 45 - 117 U/L Protein, total 6.8 6.4 - 8.2 g/dL Albumin 3.1 (L) 3.4 - 5.0 g/dL Globulin 3.7 2.0 - 4.0 g/dL A-G Ratio 0.8 0.8 - 1.7 CARDIAC PANEL,(CK, CKMB & TROPONIN) Collection Time: 12/12/20  4:00 PM  
Result Value Ref Range CK - MB 4.8 (H) <3.6 ng/ml CK-MB Index 5.9 (H) 0.0 - 4.0 % CK 81 39 - 308 U/L Troponin-I, QT 0.02 0.0 - 0.045 NG/ML  
NT-PRO BNP Collection Time: 12/12/20  4:00 PM  
Result Value Ref Range NT pro-BNP 4,196 (H) 0 - 1,800 PG/ML  
SARS-COV-2 Collection Time: 12/12/20  5:01 PM  
Result Value Ref Range SARS-CoV-2 PENDING Specimen source Nasopharyngeal    
 Specimen type NP Swab Radiologic Studies -  
XR CHEST PA LAT Final Result IMPRESSION:  
  
Bilateral small pleural effusions are present with adjacent atelectasis/infiltrate CT Results  (Last 48 hours) None CXR Results  (Last 48 hours)  
          
 12/12/20 1538  XR CHEST PA LAT Final result Impression:  IMPRESSION:  
   
Bilateral small pleural effusions are present with adjacent  
atelectasis/infiltrate Narrative:  EXAM: Portable Chest  
   
CLINICAL INDICATION: shortness of breath  
-Additional: None COMPARISON: 05/24/19 TECHNIQUE: AP portable view at 1519  
   
______________ FINDINGS:  
   
HEART AND MEDIASTINUM: Heart size is stable. Status post median sternotomy. LUNGS AND AIRWAYS: Density is seen at the lung bases. Compatible with bilateral  
pleural effusions with adjacent atelectasis/infiltrate PLEURA: No pneumothorax BONES: Chronic spondylosis OTHER: None.  
   
______________ Medications given in the ED- Medications  
levoFLOXacin (LEVAQUIN) 750 mg in D5W IVPB (0 mg IntraVENous IV Completed 12/12/20 7640) Medical Decision Making I reviewed the vital signs, available nursing notes, past medical history, past surgical history, family history and social history. Vital Signs interpretation- I have reviewed the patient's vital signs. Pulse Oximetry interpretation - 100% on 2L NC Cardiac Monitor interpretation: 
Rate: 70 bpm 
Rhythm: sinus EKG interpretation: (Preliminary) EKG interpretation by Dr. Delia Barber Rate 66 sinus rhythm with first-degree AV block, nonspecific ST changes in the lateral leads, with some T wave inversions in the lateral leads Records Reviewed: Nursing Notes and Old Medical Records Procedures: 
Procedures ED Course & MDM:  
Patient's chest x-ray shows mild bilateral pulmonary edema with possible overlying infiltrate. He does have an elevated white blood cell count of 14.5. A blood culture was obtained and he was given Levaquin for coverage of community-acquired infection in the lung.   His BNP is noted to be elevated but his troponin is negative. At this point he has no chest pain. States he does have a follow-up appointment with his cardiologist as well as his primary care physician and is comfortable going home to continue his treatment. I recommended he continue with his Lasix and take it twice a day for the next 3 days to then contact his cardiologist for further recommendations and to take his antibiotics as directed otherwise come back to emergency department if symptoms worsen. He understands and agrees to plan Diagnosis and Disposition DISCHARGE NOTE: 
 
Unice Latus Apley's  results have been reviewed with him. He has been counseled regarding his diagnosis, treatment, and plan. He verbally conveys understanding and agreement of the signs, symptoms, diagnosis, treatment and prognosis and additionally agrees to follow up as discussed. He also agrees with the care-plan and conveys that all of his questions have been answered. I have also provided discharge instructions for him that include: educational information regarding their diagnosis and treatment, and list of reasons why they would want to return to the ED prior to their follow-up appointment, should his condition change. He has been provided with education for proper emergency department utilization. CLINICAL IMPRESSION: 
 
1. Community acquired pneumonia, unspecified laterality 2. Chronic congestive heart failure, unspecified heart failure type ([address] 3. SOB (shortness of breath) 4. On supplemental oxygen by nasal cannula PLAN: 
1. D/C Home 2. Discharge Medication List as of 12/12/2020  6:01 PM  
  
START taking these medications Details  
levoFLOXacin (Levaquin) 750 mg tablet Take 1 Tab by mouth daily for 10 days. , Normal, Disp-10 Tab,R-0 CONTINUE these medications which have NOT CHANGED Details  
atorvastatin (LIPITOR) 40 mg tablet Take 40 mg by mouth daily. , Historical Med  
  
 torsemide (DEMADEX) 100 mg tablet Take 100 mg by mouth daily. Indications: Edema, Historical Med  
  
carvedilol (COREG) 6.25 mg tablet Take 12.5 mg by mouth two (2) times a day., Historical Med  
  
nitroglycerin (NITROSTAT) 0.4 mg SL tablet 1 Tab by SubLINGual route as needed for Chest Pain. Up to 3 doses. , Normal, Disp-20 Tab, R-0  
  
  
 
3. Follow-up Information Follow up With Specialties Details Why Contact Info Elizabeth Tian, DO Family Medicine Schedule an appointment as soon as possible for a visit   [address] [place] [address] 
[phone] Your cardiologist  Schedule an appointment as soon as possible for a visit  for chf   
  
 
_______________________________ Please note that this dictation was completed with [employer], the computer voice recognition software. Quite often unanticipated grammatical, syntax, homophones, and other interpretive errors are inadvertently transcribed by the computer software. Please disregard these errors. Please excuse any errors that have escaped final proofreading.

## 2020-12-13 ENCOUNTER — PATIENT OUTREACH (OUTPATIENT)
Dept: CASE MANAGEMENT | Age: 80
End: 2020-12-13

## 2020-12-13 LAB
ATRIAL RATE: 66 BPM
CALCULATED P AXIS, ECG09: 50 DEGREES
CALCULATED R AXIS, ECG10: -8 DEGREES
CALCULATED T AXIS, ECG11: 151 DEGREES
DIAGNOSIS, 93000: NORMAL
P-R INTERVAL, ECG05: 234 MS
Q-T INTERVAL, ECG07: 398 MS
QRS DURATION, ECG06: 86 MS
QTC CALCULATION (BEZET), ECG08: 417 MS
VENTRICULAR RATE, ECG03: 66 BPM

## 2020-12-13 NOTE — PROGRESS NOTES
Contacted patient for COVID Suspect follow up. Call within 2 business days of discharge: Yes   Attempted to reach patient by telephone. Made two call attempts. No answer. Left HIPPA compliant message introducing myself, role and reason for call. Requested return call. Contact information provided. Made Several attempts to reach patient. Attempts were unsuccessful. Episode resolved.

## 2020-12-13 NOTE — PROGRESS NOTES
Patient contacted regarding COVID-19 COVID Suspect. Discussed COVID-19 related testing which was pending at this time. Test results were pending. Patient informed of results, if available? n/a. Outreach made within 2 business days of discharge: Yes Care Transition Nurse/ Ambulatory Care Manager/ LPN Care Coordinator contacted the patient by telephone to perform post discharge assessment. Verified name and  with patient as identifiers. Provided introduction to self, and explanation of the CTN/ACM/LPN role, and reason for call due to risk factors for infection and/or exposure to COVID-19. Symptoms reviewed with patient who verbalized the following symptoms: cough, no new symptoms, no worsening symptoms and Denies SOB, Chest pain, diarrhea, HA, fever at this time. Due to no new or worsening symptoms encounter was not routed to provider for escalation. Discussed follow-up appointments. If no appointment was previously scheduled, appointment scheduling offered: yes [facility] follow up appointment(s): No future appointments. Non-[facility] follow up appointment(s): n/a Patient Encouraged to f/up with PCP Advance Care Planning:  
Does patient have an Advance Directive: currently not on file; education provided Patient has following risk factors of: diabetes and HTN, CAD, Leukemia. CTN/ACM/LPN reviewed discharge instructions, medical action plan and red flags such as increased shortness of breath, increasing fever and signs of decompensation with patient who verbalized understanding. Discussed exposure protocols and quarantine with CDC Guidelines What to do if you are sick with coronavirus disease .  Patient was given an opportunity for questions and concerns.  The patient agrees to contact the [facility] exposure line [phone], [facility] department [address]  ([phone]) and PCP office for questions related to their healthcare. CTN/ACM provided contact information for future needs. Reviewed and educated patient on any new and changed medications related to discharge diagnosis. Patient/family/caregiver given information for [employer] and agrees to enroll Patient denies Patient's preferred e-mail:  n/a Patient's preferred phone number: n/a Based on Loop alert triggers, patient will be contacted by nurse care manager for worsening symptoms. Plan for follow-up call in 1-2 days based on severity of symptoms and risk factors.

## 2020-12-13 NOTE — ED NOTES
I have reviewed discharge instructions with the patient. The patient verbalized understanding. Patient ARMBAND removed @ bedside and placed in shredder. Wife @ bedside for d/c ride home. Patient discharged with his home 18.

## 2020-12-15 ENCOUNTER — PATIENT OUTREACH (OUTPATIENT)
Dept: CASE MANAGEMENT | Age: 80
End: 2020-12-15

## 2020-12-15 LAB
SARS-COV-2, COV2NT: NOT DETECTED
SOURCE, COVRS: NORMAL
SPECIMEN TYPE, XMCV1T: NORMAL

## 2020-12-15 NOTE — PROGRESS NOTES
Patient contacted regarding COVID-19 risk and screening. Discussed COVID-19 related testing which was available at this time. Test results were negative. Patient informed of results, if available? yes Care Transition Nurse/ Ambulatory Care Manager/ LPN Care Coordinator contacted the patient by telephone to perform follow-up assessment. Verified name and  with patient as identifiers. Patient has following risk factors of: diabetes and CAD, HTN. Symptoms reviewed with patient who verbalized the following symptoms: no new symptoms, no worsening symptoms and Denies SOB, Chest Pain, Fever. Steve Brinks Steve Brinks Steve Brinks Patient Feels better after course of Levaquin. Due to no new or worsening symptoms encounter was not routed to provider for escalation. Education provided regarding infection prevention, and signs and symptoms of COVID-19 and when to seek medical attention with patient who verbalized understanding. Discussed exposure protocols and quarantine from [address] you at higher risk for severe illness  and given an opportunity for questions and concerns. The patient agrees to contact the COVID-19 hotline [phone] or PCP office for questions related to their healthcare. CTN/ACM/LPN provided contact information for future reference. From CDC: Are you at higher risk for severe illness?  Wash your hands often.  Avoid close contact (6 feet, which is about two arm lengths) with people who are sick.  Put distance between yourself and other people if COVID-19 is spreading in your community.  Clean and disinfect frequently touched surfaces.  Avoid all cruise travel and non-essential air travel.  Call your healthcare professional if you have concerns about COVID-19 and your underlying condition or if you are sick. For more information on steps you can take to protect yourself, see CDC's How to Protect Yourself Plan for follow-up call in 7-14 days based on severity of symptoms and risk factors.

## 2020-12-26 ENCOUNTER — PATIENT OUTREACH (OUTPATIENT)
Dept: CASE MANAGEMENT | Age: 80
End: 2020-12-26

## 2020-12-26 NOTE — PROGRESS NOTES
Patient resolved from [address] episode on 12/26/2020. Discussed COVID-19 related testing which was available at this time. Test results were negative. Patient informed of results, if available? yes Patient/family has been provided the following resources and education related to COVID-19:  
           
          Signs, symptoms and red flags related to COVID-19 CDC exposure and quarantine guidelines [facility] exposure contact - [phone] Contact for their local Department of Health Patient currently reports that the following symptoms have improved:  Patient states he feels wonderful not experiencing any viral sympotms at this time. Brenden Forman No further outreach scheduled with this LPN. Episode of Care resolved. Patient has this LPN contact information if future needs arise.

## 2021-01-01 ENCOUNTER — HOME CARE VISIT (OUTPATIENT)
Dept: HOME HEALTH SERVICES | Facility: HOME HEALTH | Age: 81
End: 2021-01-01
Payer: MEDICARE

## 2021-01-01 ENCOUNTER — HOME CARE VISIT (OUTPATIENT)
Dept: SCHEDULING | Facility: HOME HEALTH | Age: 81
End: 2021-01-01
Payer: MEDICARE

## 2021-01-01 ENCOUNTER — APPOINTMENT (OUTPATIENT)
Dept: NON INVASIVE DIAGNOSTICS | Age: 81
DRG: 871 | End: 2021-01-01
Attending: INTERNAL MEDICINE
Payer: MEDICARE

## 2021-01-01 ENCOUNTER — HOSPITAL ENCOUNTER (INPATIENT)
Dept: ULTRASOUND IMAGING | Age: 81
Discharge: HOME | DRG: 871 | End: 2021-07-15
Attending: HOSPITALIST
Payer: MEDICARE

## 2021-01-01 ENCOUNTER — HOSPITAL ENCOUNTER (INPATIENT)
Dept: ULTRASOUND IMAGING | Age: 81
Discharge: HOME | DRG: 871 | End: 2021-07-14
Attending: STUDENT IN AN ORGANIZED HEALTH CARE EDUCATION/TRAINING PROGRAM
Payer: MEDICARE

## 2021-01-01 ENCOUNTER — HOME HEALTH ADMISSION (OUTPATIENT)
Dept: HOME HEALTH SERVICES | Facility: HOME HEALTH | Age: 81
End: 2021-01-01
Payer: MEDICARE

## 2021-01-01 ENCOUNTER — APPOINTMENT (OUTPATIENT)
Dept: GENERAL RADIOLOGY | Age: 81
DRG: 871 | End: 2021-01-01
Attending: EMERGENCY MEDICINE
Payer: MEDICARE

## 2021-01-01 ENCOUNTER — TRANSCRIBE ORDER (OUTPATIENT)
Dept: SCHEDULING | Age: 81
End: 2021-01-01

## 2021-01-01 ENCOUNTER — HOSPITAL ENCOUNTER (INPATIENT)
Age: 81
LOS: 12 days | Discharge: HOME HEALTH | DRG: 871 | End: 2021-07-23
Attending: EMERGENCY MEDICINE | Admitting: INTERNAL MEDICINE
Payer: MEDICARE

## 2021-01-01 VITALS
RESPIRATION RATE: 18 BRPM | DIASTOLIC BLOOD PRESSURE: 70 MMHG | TEMPERATURE: 97 F | SYSTOLIC BLOOD PRESSURE: 111 MMHG | HEART RATE: 100 BPM | OXYGEN SATURATION: 98 %

## 2021-01-01 VITALS
SYSTOLIC BLOOD PRESSURE: 111 MMHG | TEMPERATURE: 98.2 F | HEART RATE: 78 BPM | DIASTOLIC BLOOD PRESSURE: 62 MMHG | RESPIRATION RATE: 16 BRPM | OXYGEN SATURATION: 99 %

## 2021-01-01 VITALS
OXYGEN SATURATION: 100 % | RESPIRATION RATE: 16 BRPM | DIASTOLIC BLOOD PRESSURE: 69 MMHG | HEIGHT: 68 IN | WEIGHT: 185.5 LBS | SYSTOLIC BLOOD PRESSURE: 119 MMHG | HEART RATE: 87 BPM | TEMPERATURE: 97.4 F | BODY MASS INDEX: 28.11 KG/M2

## 2021-01-01 VITALS
OXYGEN SATURATION: 97 % | HEART RATE: 110 BPM | TEMPERATURE: 97.4 F | DIASTOLIC BLOOD PRESSURE: 86 MMHG | SYSTOLIC BLOOD PRESSURE: 132 MMHG

## 2021-01-01 VITALS
SYSTOLIC BLOOD PRESSURE: 109 MMHG | DIASTOLIC BLOOD PRESSURE: 75 MMHG | TEMPERATURE: 97.8 F | OXYGEN SATURATION: 92 % | HEART RATE: 97 BPM | RESPIRATION RATE: 16 BRPM

## 2021-01-01 VITALS
HEART RATE: 95 BPM | TEMPERATURE: 97.8 F | OXYGEN SATURATION: 99 % | DIASTOLIC BLOOD PRESSURE: 68 MMHG | SYSTOLIC BLOOD PRESSURE: 108 MMHG

## 2021-01-01 VITALS
SYSTOLIC BLOOD PRESSURE: 110 MMHG | OXYGEN SATURATION: 96 % | RESPIRATION RATE: 17 BRPM | HEART RATE: 80 BPM | DIASTOLIC BLOOD PRESSURE: 70 MMHG | TEMPERATURE: 97.2 F

## 2021-01-01 VITALS
RESPIRATION RATE: 16 BRPM | HEART RATE: 82 BPM | TEMPERATURE: 98.2 F | DIASTOLIC BLOOD PRESSURE: 71 MMHG | OXYGEN SATURATION: 100 % | SYSTOLIC BLOOD PRESSURE: 115 MMHG

## 2021-01-01 VITALS
SYSTOLIC BLOOD PRESSURE: 138 MMHG | TEMPERATURE: 98.4 F | RESPIRATION RATE: 17 BRPM | DIASTOLIC BLOOD PRESSURE: 70 MMHG | HEART RATE: 61 BPM | OXYGEN SATURATION: 98 %

## 2021-01-01 VITALS — HEART RATE: 83 BPM | DIASTOLIC BLOOD PRESSURE: 68 MMHG | OXYGEN SATURATION: 100 % | SYSTOLIC BLOOD PRESSURE: 114 MMHG

## 2021-01-01 VITALS
DIASTOLIC BLOOD PRESSURE: 78 MMHG | OXYGEN SATURATION: 96 % | TEMPERATURE: 98.6 F | RESPIRATION RATE: 16 BRPM | SYSTOLIC BLOOD PRESSURE: 128 MMHG | HEART RATE: 83 BPM

## 2021-01-01 VITALS
DIASTOLIC BLOOD PRESSURE: 67 MMHG | HEART RATE: 67 BPM | SYSTOLIC BLOOD PRESSURE: 100 MMHG | TEMPERATURE: 98.4 F | OXYGEN SATURATION: 99 % | RESPIRATION RATE: 16 BRPM

## 2021-01-01 VITALS
RESPIRATION RATE: 18 BRPM | OXYGEN SATURATION: 100 % | HEART RATE: 93 BPM | TEMPERATURE: 97 F | DIASTOLIC BLOOD PRESSURE: 74 MMHG | SYSTOLIC BLOOD PRESSURE: 121 MMHG

## 2021-01-01 VITALS
OXYGEN SATURATION: 98 % | TEMPERATURE: 98.2 F | DIASTOLIC BLOOD PRESSURE: 67 MMHG | RESPIRATION RATE: 16 BRPM | HEART RATE: 74 BPM | SYSTOLIC BLOOD PRESSURE: 118 MMHG

## 2021-01-01 VITALS
OXYGEN SATURATION: 100 % | DIASTOLIC BLOOD PRESSURE: 60 MMHG | RESPIRATION RATE: 16 BRPM | SYSTOLIC BLOOD PRESSURE: 122 MMHG | TEMPERATURE: 97.3 F | HEART RATE: 72 BPM

## 2021-01-01 VITALS
OXYGEN SATURATION: 99 % | TEMPERATURE: 97.5 F | DIASTOLIC BLOOD PRESSURE: 68 MMHG | HEART RATE: 68 BPM | SYSTOLIC BLOOD PRESSURE: 112 MMHG

## 2021-01-01 VITALS — OXYGEN SATURATION: 99 % | DIASTOLIC BLOOD PRESSURE: 58 MMHG | SYSTOLIC BLOOD PRESSURE: 120 MMHG | HEART RATE: 108 BPM

## 2021-01-01 DIAGNOSIS — Z71.89 GOALS OF CARE, COUNSELING/DISCUSSION: ICD-10-CM

## 2021-01-01 DIAGNOSIS — J18.9 PNEUMONIA OF BOTH LUNGS DUE TO INFECTIOUS ORGANISM, UNSPECIFIED PART OF LUNG: ICD-10-CM

## 2021-01-01 DIAGNOSIS — I48.91 ATRIAL FIBRILLATION WITH RVR (HCC): ICD-10-CM

## 2021-01-01 DIAGNOSIS — R60.9 PERIPHERAL EDEMA: ICD-10-CM

## 2021-01-01 DIAGNOSIS — R60.0 EDEMA OF BOTH LOWER EXTREMITIES: ICD-10-CM

## 2021-01-01 DIAGNOSIS — R53.81 DEBILITY: ICD-10-CM

## 2021-01-01 DIAGNOSIS — A41.9 SEPSIS, DUE TO UNSPECIFIED ORGANISM, UNSPECIFIED WHETHER ACUTE ORGAN DYSFUNCTION PRESENT (HCC): ICD-10-CM

## 2021-01-01 DIAGNOSIS — C92.10 CML (CHRONIC MYELOCYTIC LEUKEMIA) (HCC): Primary | ICD-10-CM

## 2021-01-01 DIAGNOSIS — E11.65 UNCONTROLLED TYPE 2 DIABETES MELLITUS WITH HYPERGLYCEMIA (HCC): Primary | ICD-10-CM

## 2021-01-01 DIAGNOSIS — J18.9 PNEUMONIA DUE TO INFECTIOUS ORGANISM, UNSPECIFIED LATERALITY, UNSPECIFIED PART OF LUNG: ICD-10-CM

## 2021-01-01 LAB
ALBUMIN SERPL-MCNC: 2.8 G/DL (ref 3.4–5)
ALBUMIN SERPL-MCNC: 2.9 G/DL (ref 3.4–5)
ALBUMIN SERPL-MCNC: 3.3 G/DL (ref 3.4–5)
ALBUMIN/GLOB SERPL: 0.8 {RATIO} (ref 0.8–1.7)
ALBUMIN/GLOB SERPL: 0.9 {RATIO} (ref 0.8–1.7)
ALBUMIN/GLOB SERPL: 0.9 {RATIO} (ref 0.8–1.7)
ALP SERPL-CCNC: 103 U/L (ref 45–117)
ALP SERPL-CCNC: 106 U/L (ref 45–117)
ALP SERPL-CCNC: 133 U/L (ref 45–117)
ALT SERPL-CCNC: 17 U/L (ref 16–61)
ALT SERPL-CCNC: 20 U/L (ref 16–61)
ALT SERPL-CCNC: 22 U/L (ref 16–61)
ANION GAP SERPL CALC-SCNC: 11 MMOL/L (ref 3–18)
ANION GAP SERPL CALC-SCNC: 2 MMOL/L (ref 3–18)
ANION GAP SERPL CALC-SCNC: 2 MMOL/L (ref 3–18)
ANION GAP SERPL CALC-SCNC: 4 MMOL/L (ref 3–18)
ANION GAP SERPL CALC-SCNC: 5 MMOL/L (ref 3–18)
ANION GAP SERPL CALC-SCNC: 6 MMOL/L (ref 3–18)
ANION GAP SERPL CALC-SCNC: 6 MMOL/L (ref 3–18)
ANION GAP SERPL CALC-SCNC: 8 MMOL/L (ref 3–18)
APPEARANCE UR: CLEAR
APTT PPP: 27.1 SEC (ref 23–36.4)
AST SERPL-CCNC: 22 U/L (ref 10–38)
AST SERPL-CCNC: 30 U/L (ref 10–38)
AST SERPL-CCNC: 34 U/L (ref 10–38)
ATRIAL RATE: 111 BPM
AV R PG: 35.86 MMHG
BACTERIA SPEC CULT: NORMAL
BACTERIA SPEC CULT: NORMAL
BACTERIA URNS QL MICRO: NORMAL /HPF
BASOPHILS # BLD: 0 K/UL (ref 0–0.1)
BASOPHILS # BLD: 0.5 K/UL (ref 0–0.1)
BASOPHILS # BLD: 1 K/UL (ref 0–0.1)
BASOPHILS # BLD: 1.3 K/UL (ref 0–0.1)
BASOPHILS # BLD: 1.8 K/UL (ref 0–0.1)
BASOPHILS # BLD: 2 K/UL (ref 0–0.1)
BASOPHILS NFR BLD: 0 % (ref 0–2)
BASOPHILS NFR BLD: 1 % (ref 0–2)
BASOPHILS NFR BLD: 2 % (ref 0–2)
BASOPHILS NFR BLD: 3 % (ref 0–2)
BASOPHILS NFR BLD: 4 % (ref 0–2)
BASOPHILS NFR BLD: 4 % (ref 0–2)
BILIRUB SERPL-MCNC: 0.3 MG/DL (ref 0.2–1)
BILIRUB SERPL-MCNC: 0.5 MG/DL (ref 0.2–1)
BILIRUB SERPL-MCNC: 0.5 MG/DL (ref 0.2–1)
BILIRUB UR QL: NEGATIVE
BNP SERPL-MCNC: ABNORMAL PG/ML (ref 0–1800)
BNP SERPL-MCNC: ABNORMAL PG/ML (ref 0–1800)
BUN SERPL-MCNC: 35 MG/DL (ref 7–18)
BUN SERPL-MCNC: 36 MG/DL (ref 7–18)
BUN SERPL-MCNC: 36 MG/DL (ref 7–18)
BUN SERPL-MCNC: 37 MG/DL (ref 7–18)
BUN SERPL-MCNC: 37 MG/DL (ref 7–18)
BUN SERPL-MCNC: 38 MG/DL (ref 7–18)
BUN SERPL-MCNC: 41 MG/DL (ref 7–18)
BUN SERPL-MCNC: 41 MG/DL (ref 7–18)
BUN SERPL-MCNC: 43 MG/DL (ref 7–18)
BUN SERPL-MCNC: 45 MG/DL (ref 7–18)
BUN SERPL-MCNC: 46 MG/DL (ref 7–18)
BUN SERPL-MCNC: 50 MG/DL (ref 7–18)
BUN/CREAT SERPL: 21 (ref 12–20)
BUN/CREAT SERPL: 21 (ref 12–20)
BUN/CREAT SERPL: 22 (ref 12–20)
BUN/CREAT SERPL: 22 (ref 12–20)
BUN/CREAT SERPL: 23 (ref 12–20)
BUN/CREAT SERPL: 25 (ref 12–20)
BUN/CREAT SERPL: 25 (ref 12–20)
BUN/CREAT SERPL: 26 (ref 12–20)
BUN/CREAT SERPL: 28 (ref 12–20)
BUN/CREAT SERPL: 29 (ref 12–20)
CALCIUM SERPL-MCNC: 8.4 MG/DL (ref 8.5–10.1)
CALCIUM SERPL-MCNC: 8.6 MG/DL (ref 8.5–10.1)
CALCIUM SERPL-MCNC: 8.8 MG/DL (ref 8.5–10.1)
CALCIUM SERPL-MCNC: 9 MG/DL (ref 8.5–10.1)
CALCIUM SERPL-MCNC: 9.1 MG/DL (ref 8.5–10.1)
CALCIUM SERPL-MCNC: 9.2 MG/DL (ref 8.5–10.1)
CALCIUM SERPL-MCNC: 9.3 MG/DL (ref 8.5–10.1)
CALCIUM SERPL-MCNC: 9.5 MG/DL (ref 8.5–10.1)
CALCULATED R AXIS, ECG10: -42 DEGREES
CALCULATED T AXIS, ECG11: 153 DEGREES
CHLORIDE SERPL-SCNC: 100 MMOL/L (ref 100–111)
CHLORIDE SERPL-SCNC: 101 MMOL/L (ref 100–111)
CHLORIDE SERPL-SCNC: 102 MMOL/L (ref 100–111)
CHLORIDE SERPL-SCNC: 103 MMOL/L (ref 100–111)
CHLORIDE SERPL-SCNC: 104 MMOL/L (ref 100–111)
CHLORIDE SERPL-SCNC: 104 MMOL/L (ref 100–111)
CHLORIDE SERPL-SCNC: 98 MMOL/L (ref 100–111)
CHLORIDE SERPL-SCNC: 98 MMOL/L (ref 100–111)
CHLORIDE SERPL-SCNC: 99 MMOL/L (ref 100–111)
CHLORIDE UR-SCNC: 78 MMOL/L (ref 55–125)
CK MB CFR SERPL CALC: 3 % (ref 0–4)
CK MB CFR SERPL CALC: 3.4 % (ref 0–4)
CK MB CFR SERPL CALC: 3.7 % (ref 0–4)
CK MB SERPL-MCNC: 1.8 NG/ML (ref 5–25)
CK MB SERPL-MCNC: 1.9 NG/ML (ref 5–25)
CK MB SERPL-MCNC: 2.9 NG/ML (ref 5–25)
CK SERPL-CCNC: 53 U/L (ref 39–308)
CK SERPL-CCNC: 63 U/L (ref 39–308)
CK SERPL-CCNC: 79 U/L (ref 39–308)
CO2 SERPL-SCNC: 25 MMOL/L (ref 21–32)
CO2 SERPL-SCNC: 31 MMOL/L (ref 21–32)
CO2 SERPL-SCNC: 32 MMOL/L (ref 21–32)
CO2 SERPL-SCNC: 33 MMOL/L (ref 21–32)
CO2 SERPL-SCNC: 35 MMOL/L (ref 21–32)
CO2 SERPL-SCNC: 36 MMOL/L (ref 21–32)
CO2 SERPL-SCNC: 36 MMOL/L (ref 21–32)
CO2 SERPL-SCNC: 37 MMOL/L (ref 21–32)
CO2 SERPL-SCNC: 37 MMOL/L (ref 21–32)
COLOR UR: YELLOW
COVID-19 RAPID TEST, COVR: NOT DETECTED
CREAT SERPL-MCNC: 1.48 MG/DL (ref 0.6–1.3)
CREAT SERPL-MCNC: 1.52 MG/DL (ref 0.6–1.3)
CREAT SERPL-MCNC: 1.56 MG/DL (ref 0.6–1.3)
CREAT SERPL-MCNC: 1.58 MG/DL (ref 0.6–1.3)
CREAT SERPL-MCNC: 1.63 MG/DL (ref 0.6–1.3)
CREAT SERPL-MCNC: 1.65 MG/DL (ref 0.6–1.3)
CREAT SERPL-MCNC: 1.67 MG/DL (ref 0.6–1.3)
CREAT SERPL-MCNC: 1.67 MG/DL (ref 0.6–1.3)
CREAT SERPL-MCNC: 1.69 MG/DL (ref 0.6–1.3)
CREAT SERPL-MCNC: 1.75 MG/DL (ref 0.6–1.3)
CREAT SERPL-MCNC: 1.8 MG/DL (ref 0.6–1.3)
CREAT SERPL-MCNC: 1.9 MG/DL (ref 0.6–1.3)
CREAT UR-MCNC: 50 MG/DL (ref 30–125)
DIAGNOSIS, 93000: NORMAL
DIFFERENTIAL METHOD BLD: ABNORMAL
ECHO AO ROOT DIAM: 3.36 CM
ECHO AR MAX VEL PISA: 299.43 CM/S
ECHO AV AREA PEAK VELOCITY: 2.98 CM2
ECHO AV AREA VTI: 3.9 CM2
ECHO AV AREA/BSA PEAK VELOCITY: 1.5 CM2/M2
ECHO AV AREA/BSA VTI: 1.9 CM2/M2
ECHO AV MEAN GRADIENT: 2.11 MMHG
ECHO AV PEAK GRADIENT: 3.03 MMHG
ECHO AV PEAK VELOCITY: 87.07 CM/S
ECHO AV REGURGITANT PHT: 793.91 MS
ECHO AV VTI: 13.64 CM
ECHO EST RA PRESSURE: 8 MMHG
ECHO IVC PROX: 2.6 CM
ECHO LA MAJOR AXIS: 5.27 CM
ECHO LA MINOR AXIS: 2.6 CM
ECHO LA VOL 2C: 57.03 ML (ref 18–58)
ECHO LA VOL 4C: 100.58 ML (ref 18–58)
ECHO LA VOL BP: 106.39 ML (ref 18–58)
ECHO LA VOL/BSA BIPLANE: 52.41 ML/M2 (ref 16–28)
ECHO LA VOLUME INDEX A2C: 28.09 ML/M2 (ref 16–28)
ECHO LA VOLUME INDEX A4C: 49.55 ML/M2 (ref 16–28)
ECHO LV E' LATERAL VELOCITY: 5 CM/S
ECHO LV E' SEPTAL VELOCITY: 4 CM/S
ECHO LV EDV A2C: 212.32 ML
ECHO LV EDV A4C: 107.86 ML
ECHO LV EDV BP: 151.02 ML (ref 67–155)
ECHO LV EDV INDEX A4C: 53.1 ML/M2
ECHO LV EDV INDEX BP: 74.4 ML/M2
ECHO LV EDV NDEX A2C: 104.6 ML/M2
ECHO LV EJECTION FRACTION A2C: 21 PERCENT
ECHO LV EJECTION FRACTION A4C: 12 PERCENT
ECHO LV EJECTION FRACTION BIPLANE: 14.7 PERCENT (ref 55–100)
ECHO LV ESV A2C: 168.17 ML
ECHO LV ESV A4C: 95.28 ML
ECHO LV ESV BP: 128.81 ML (ref 22–58)
ECHO LV ESV INDEX A2C: 82.8 ML/M2
ECHO LV ESV INDEX A4C: 46.9 ML/M2
ECHO LV ESV INDEX BP: 63.5 ML/M2
ECHO LV INTERNAL DIMENSION DIASTOLIC: 6.03 CM (ref 4.2–5.9)
ECHO LV INTERNAL DIMENSION SYSTOLIC: 5.66 CM
ECHO LV IVSD: 1.02 CM (ref 0.6–1)
ECHO LV MASS 2D: 252.2 G (ref 88–224)
ECHO LV MASS INDEX 2D: 124.2 G/M2 (ref 49–115)
ECHO LV POSTERIOR WALL DIASTOLIC: 1 CM (ref 0.6–1)
ECHO LVOT DIAM: 2.3 CM
ECHO LVOT PEAK GRADIENT: 1.55 MMHG
ECHO LVOT PEAK VELOCITY: 62.19 CM/S
ECHO LVOT SV: 53.2 ML
ECHO LVOT VTI: 12.77 CM
ECHO MV E DECELERATION TIME (DT): 163 MS
ECHO MV E VELOCITY: 68 CM/S
ECHO MV E/E' LATERAL: 13.6
ECHO MV E/E' RATIO (AVERAGED): 15.3
ECHO MV E/E' SEPTAL: 17
ECHO MV REGURGITANT VTIA: 133 CM
ECHO RA AREA 4C: 30.1 CM2
ECHO RV INTERNAL DIMENSION: 3.69 CM
ECHO RV TAPSE: 1 CM (ref 1.5–2)
ECHO TV MEAN GRADIENT: 48.24 MMHG
ECHO TV REGURGITANT MAX VELOCITY: 422.43 CM/S
ECHO TV REGURGITANT PEAK GRADIENT: 47.33 MMHG
EOSINOPHIL # BLD: 0 K/UL (ref 0–0.4)
EOSINOPHIL # BLD: 0.5 K/UL (ref 0–0.4)
EOSINOPHIL # BLD: 0.5 K/UL (ref 0–0.4)
EOSINOPHIL # BLD: 0.9 K/UL (ref 0–0.4)
EOSINOPHIL # BLD: 1.3 K/UL (ref 0–0.4)
EOSINOPHIL NFR BLD: 0 % (ref 0–5)
EOSINOPHIL NFR BLD: 1 % (ref 0–5)
EOSINOPHIL NFR BLD: 1 % (ref 0–5)
EOSINOPHIL NFR BLD: 2 % (ref 0–5)
EOSINOPHIL NFR BLD: 3 % (ref 0–5)
EPITH CASTS URNS QL MICRO: NORMAL /LPF (ref 0–5)
ERYTHROCYTE [DISTWIDTH] IN BLOOD BY AUTOMATED COUNT: 16.1 % (ref 11.6–14.5)
ERYTHROCYTE [DISTWIDTH] IN BLOOD BY AUTOMATED COUNT: 16.3 % (ref 11.6–14.5)
ERYTHROCYTE [DISTWIDTH] IN BLOOD BY AUTOMATED COUNT: 16.6 % (ref 11.6–14.5)
EST. AVERAGE GLUCOSE BLD GHB EST-MCNC: 157 MG/DL
GLOBULIN SER CALC-MCNC: 3.2 G/DL (ref 2–4)
GLOBULIN SER CALC-MCNC: 3.4 G/DL (ref 2–4)
GLOBULIN SER CALC-MCNC: 4 G/DL (ref 2–4)
GLUCOSE BLD STRIP.AUTO-MCNC: 125 MG/DL (ref 70–110)
GLUCOSE BLD STRIP.AUTO-MCNC: 127 MG/DL (ref 70–110)
GLUCOSE BLD STRIP.AUTO-MCNC: 136 MG/DL (ref 70–110)
GLUCOSE BLD STRIP.AUTO-MCNC: 137 MG/DL (ref 70–110)
GLUCOSE BLD STRIP.AUTO-MCNC: 140 MG/DL (ref 70–110)
GLUCOSE BLD STRIP.AUTO-MCNC: 140 MG/DL (ref 70–110)
GLUCOSE BLD STRIP.AUTO-MCNC: 143 MG/DL (ref 70–110)
GLUCOSE BLD STRIP.AUTO-MCNC: 148 MG/DL (ref 70–110)
GLUCOSE BLD STRIP.AUTO-MCNC: 149 MG/DL (ref 70–110)
GLUCOSE BLD STRIP.AUTO-MCNC: 159 MG/DL (ref 70–110)
GLUCOSE BLD STRIP.AUTO-MCNC: 164 MG/DL (ref 70–110)
GLUCOSE BLD STRIP.AUTO-MCNC: 165 MG/DL (ref 70–110)
GLUCOSE BLD STRIP.AUTO-MCNC: 167 MG/DL (ref 70–110)
GLUCOSE BLD STRIP.AUTO-MCNC: 170 MG/DL (ref 70–110)
GLUCOSE BLD STRIP.AUTO-MCNC: 171 MG/DL (ref 70–110)
GLUCOSE BLD STRIP.AUTO-MCNC: 181 MG/DL (ref 70–110)
GLUCOSE BLD STRIP.AUTO-MCNC: 183 MG/DL (ref 70–110)
GLUCOSE BLD STRIP.AUTO-MCNC: 184 MG/DL (ref 70–110)
GLUCOSE BLD STRIP.AUTO-MCNC: 187 MG/DL (ref 70–110)
GLUCOSE BLD STRIP.AUTO-MCNC: 195 MG/DL (ref 70–110)
GLUCOSE BLD STRIP.AUTO-MCNC: 213 MG/DL (ref 70–110)
GLUCOSE BLD STRIP.AUTO-MCNC: 226 MG/DL (ref 70–110)
GLUCOSE BLD STRIP.AUTO-MCNC: 230 MG/DL (ref 70–110)
GLUCOSE BLD STRIP.AUTO-MCNC: 230 MG/DL (ref 70–110)
GLUCOSE BLD STRIP.AUTO-MCNC: 242 MG/DL (ref 70–110)
GLUCOSE SERPL-MCNC: 125 MG/DL (ref 74–99)
GLUCOSE SERPL-MCNC: 129 MG/DL (ref 74–99)
GLUCOSE SERPL-MCNC: 134 MG/DL (ref 74–99)
GLUCOSE SERPL-MCNC: 136 MG/DL (ref 74–99)
GLUCOSE SERPL-MCNC: 139 MG/DL (ref 74–99)
GLUCOSE SERPL-MCNC: 139 MG/DL (ref 74–99)
GLUCOSE SERPL-MCNC: 150 MG/DL (ref 74–99)
GLUCOSE SERPL-MCNC: 153 MG/DL (ref 74–99)
GLUCOSE SERPL-MCNC: 160 MG/DL (ref 74–99)
GLUCOSE SERPL-MCNC: 182 MG/DL (ref 74–99)
GLUCOSE SERPL-MCNC: 189 MG/DL (ref 74–99)
GLUCOSE SERPL-MCNC: 228 MG/DL (ref 74–99)
GLUCOSE UR STRIP.AUTO-MCNC: NEGATIVE MG/DL
HBA1C MFR BLD: 7.1 % (ref 4.2–5.6)
HCT VFR BLD AUTO: 34.3 % (ref 36–48)
HCT VFR BLD AUTO: 35.1 % (ref 36–48)
HCT VFR BLD AUTO: 38 % (ref 36–48)
HCT VFR BLD AUTO: 38.5 % (ref 36–48)
HCT VFR BLD AUTO: 39.7 % (ref 36–48)
HCT VFR BLD AUTO: 40.4 % (ref 36–48)
HCT VFR BLD AUTO: 41.8 % (ref 36–48)
HCT VFR BLD AUTO: 47.7 % (ref 36–48)
HGB BLD-MCNC: 10.2 G/DL (ref 13–16)
HGB BLD-MCNC: 10.8 G/DL (ref 13–16)
HGB BLD-MCNC: 11.3 G/DL (ref 13–16)
HGB BLD-MCNC: 11.5 G/DL (ref 13–16)
HGB BLD-MCNC: 11.8 G/DL (ref 13–16)
HGB BLD-MCNC: 11.9 G/DL (ref 13–16)
HGB BLD-MCNC: 12.2 G/DL (ref 13–16)
HGB BLD-MCNC: 14.1 G/DL (ref 13–16)
HGB UR QL STRIP: ABNORMAL
HYALINE CASTS URNS QL MICRO: NORMAL /LPF (ref 0–2)
INR PPP: 1.3 (ref 0.8–1.2)
KETONES UR QL STRIP.AUTO: NEGATIVE MG/DL
LACTATE SERPL-SCNC: 1.3 MMOL/L (ref 0.4–2)
LEUKOCYTE ESTERASE UR QL STRIP.AUTO: NEGATIVE
LVOT MG: 0.84 MMHG
LYMPHOCYTES # BLD: 0.9 K/UL (ref 0.9–3.6)
LYMPHOCYTES # BLD: 1 K/UL (ref 0.9–3.6)
LYMPHOCYTES # BLD: 1.8 K/UL (ref 0.9–3.6)
LYMPHOCYTES # BLD: 2.5 K/UL (ref 0.9–3.6)
LYMPHOCYTES # BLD: 2.6 K/UL (ref 0.9–3.6)
LYMPHOCYTES # BLD: 2.8 K/UL (ref 0.9–3.6)
LYMPHOCYTES # BLD: 3.7 K/UL (ref 0.9–3.6)
LYMPHOCYTES # BLD: 5.6 K/UL (ref 0.9–3.6)
LYMPHOCYTES NFR BLD: 13 % (ref 21–52)
LYMPHOCYTES NFR BLD: 2 % (ref 21–52)
LYMPHOCYTES NFR BLD: 2 % (ref 21–52)
LYMPHOCYTES NFR BLD: 4 % (ref 21–52)
LYMPHOCYTES NFR BLD: 5 % (ref 21–52)
LYMPHOCYTES NFR BLD: 5 % (ref 21–52)
LYMPHOCYTES NFR BLD: 6 % (ref 21–52)
LYMPHOCYTES NFR BLD: 7 % (ref 21–52)
MAGNESIUM SERPL-MCNC: 2.1 MG/DL (ref 1.6–2.6)
MAGNESIUM SERPL-MCNC: 2.3 MG/DL (ref 1.6–2.6)
MAGNESIUM SERPL-MCNC: 2.3 MG/DL (ref 1.6–2.6)
MCH RBC QN AUTO: 26.3 PG (ref 24–34)
MCH RBC QN AUTO: 26.5 PG (ref 24–34)
MCH RBC QN AUTO: 26.6 PG (ref 24–34)
MCH RBC QN AUTO: 26.7 PG (ref 24–34)
MCH RBC QN AUTO: 27 PG (ref 24–34)
MCH RBC QN AUTO: 27.1 PG (ref 24–34)
MCHC RBC AUTO-ENTMCNC: 29.2 G/DL (ref 31–37)
MCHC RBC AUTO-ENTMCNC: 29.2 G/DL (ref 31–37)
MCHC RBC AUTO-ENTMCNC: 29.6 G/DL (ref 31–37)
MCHC RBC AUTO-ENTMCNC: 29.7 G/DL (ref 31–37)
MCHC RBC AUTO-ENTMCNC: 29.7 G/DL (ref 31–37)
MCHC RBC AUTO-ENTMCNC: 29.9 G/DL (ref 31–37)
MCHC RBC AUTO-ENTMCNC: 30 G/DL (ref 31–37)
MCHC RBC AUTO-ENTMCNC: 30.8 G/DL (ref 31–37)
MCV RBC AUTO: 88.2 FL (ref 74–97)
MCV RBC AUTO: 88.4 FL (ref 74–97)
MCV RBC AUTO: 89.1 FL (ref 74–97)
MCV RBC AUTO: 89.5 FL (ref 74–97)
MCV RBC AUTO: 89.6 FL (ref 74–97)
MCV RBC AUTO: 90 FL (ref 74–97)
MCV RBC AUTO: 91 FL (ref 74–97)
MCV RBC AUTO: 91.3 FL (ref 74–97)
METAMYELOCYTES NFR BLD MANUAL: 1 %
METAMYELOCYTES NFR BLD MANUAL: 2 %
METAMYELOCYTES NFR BLD MANUAL: 3 %
METAMYELOCYTES NFR BLD MANUAL: 3 %
METAMYELOCYTES NFR BLD MANUAL: 4 %
METAMYELOCYTES NFR BLD MANUAL: 6 %
MONOCYTES # BLD: 0.5 K/UL (ref 0.05–1.2)
MONOCYTES # BLD: 1.9 K/UL (ref 0.05–1.2)
MONOCYTES # BLD: 2.6 K/UL (ref 0.05–1.2)
MONOCYTES # BLD: 3.6 K/UL (ref 0.05–1.2)
MONOCYTES # BLD: 4.9 K/UL (ref 0.05–1.2)
MONOCYTES # BLD: 5.2 K/UL (ref 0.05–1.2)
MONOCYTES # BLD: 5.5 K/UL (ref 0.05–1.2)
MONOCYTES # BLD: 5.8 K/UL (ref 0.05–1.2)
MONOCYTES NFR BLD: 1 % (ref 3–10)
MONOCYTES NFR BLD: 10 % (ref 3–10)
MONOCYTES NFR BLD: 11 % (ref 3–10)
MONOCYTES NFR BLD: 12 % (ref 3–10)
MONOCYTES NFR BLD: 13 % (ref 3–10)
MONOCYTES NFR BLD: 4 % (ref 3–10)
MONOCYTES NFR BLD: 6 % (ref 3–10)
MONOCYTES NFR BLD: 7 % (ref 3–10)
MYELOCYTES NFR BLD MANUAL: 12 %
MYELOCYTES NFR BLD MANUAL: 2 %
MYELOCYTES NFR BLD MANUAL: 2 %
MYELOCYTES NFR BLD MANUAL: 25 %
MYELOCYTES NFR BLD MANUAL: 5 %
MYELOCYTES NFR BLD MANUAL: 6 %
MYELOCYTES NFR BLD MANUAL: 9 %
NEUTS BAND NFR BLD MANUAL: 22 % (ref 0–5)
NEUTS BAND NFR BLD MANUAL: 3 % (ref 0–5)
NEUTS BAND NFR BLD MANUAL: 4 % (ref 0–5)
NEUTS BAND NFR BLD MANUAL: 4 % (ref 0–5)
NEUTS BAND NFR BLD MANUAL: 6 % (ref 0–5)
NEUTS BAND NFR BLD MANUAL: 7 % (ref 0–5)
NEUTS SEG # BLD: 30.1 K/UL (ref 1.8–8)
NEUTS SEG # BLD: 32.8 K/UL (ref 1.8–8)
NEUTS SEG # BLD: 33.5 K/UL (ref 1.8–8)
NEUTS SEG # BLD: 34.2 K/UL (ref 1.8–8)
NEUTS SEG # BLD: 34.3 K/UL (ref 1.8–8)
NEUTS SEG # BLD: 36.3 K/UL (ref 1.8–8)
NEUTS SEG # BLD: 36.5 K/UL (ref 1.8–8)
NEUTS SEG # BLD: 38.5 K/UL (ref 1.8–8)
NEUTS SEG NFR BLD: 60 % (ref 40–73)
NEUTS SEG NFR BLD: 61 % (ref 40–73)
NEUTS SEG NFR BLD: 63 % (ref 40–73)
NEUTS SEG NFR BLD: 65 % (ref 40–73)
NEUTS SEG NFR BLD: 67 % (ref 40–73)
NEUTS SEG NFR BLD: 67 % (ref 40–73)
NEUTS SEG NFR BLD: 73 % (ref 40–73)
NEUTS SEG NFR BLD: 75 % (ref 40–73)
NITRITE UR QL STRIP.AUTO: NEGATIVE
PH UR STRIP: 5 [PH] (ref 5–8)
PHOSPHATE SERPL-MCNC: 4.4 MG/DL (ref 2.5–4.9)
PLATELET # BLD AUTO: 105 K/UL (ref 135–420)
PLATELET # BLD AUTO: 108 K/UL (ref 135–420)
PLATELET # BLD AUTO: 109 K/UL (ref 135–420)
PLATELET # BLD AUTO: 114 K/UL (ref 135–420)
PLATELET # BLD AUTO: 116 K/UL (ref 135–420)
PLATELET # BLD AUTO: 122 K/UL (ref 135–420)
PLATELET # BLD AUTO: 123 K/UL (ref 135–420)
PLATELET # BLD AUTO: 135 K/UL (ref 135–420)
PLATELET COMMENTS,PCOM: ABNORMAL
PMV BLD AUTO: 12.7 FL (ref 9.2–11.8)
PMV BLD AUTO: 13.1 FL (ref 9.2–11.8)
PMV BLD AUTO: 13.1 FL (ref 9.2–11.8)
PMV BLD AUTO: 13.4 FL (ref 9.2–11.8)
PMV BLD AUTO: 13.6 FL (ref 9.2–11.8)
PMV BLD AUTO: 13.7 FL (ref 9.2–11.8)
PMV BLD AUTO: 13.8 FL (ref 9.2–11.8)
POTASSIUM SERPL-SCNC: 3.4 MMOL/L (ref 3.5–5.5)
POTASSIUM SERPL-SCNC: 3.7 MMOL/L (ref 3.5–5.5)
POTASSIUM SERPL-SCNC: 3.9 MMOL/L (ref 3.5–5.5)
POTASSIUM SERPL-SCNC: 4 MMOL/L (ref 3.5–5.5)
POTASSIUM SERPL-SCNC: 4.1 MMOL/L (ref 3.5–5.5)
POTASSIUM SERPL-SCNC: 4.1 MMOL/L (ref 3.5–5.5)
POTASSIUM SERPL-SCNC: 4.2 MMOL/L (ref 3.5–5.5)
POTASSIUM SERPL-SCNC: 4.3 MMOL/L (ref 3.5–5.5)
POTASSIUM SERPL-SCNC: 4.4 MMOL/L (ref 3.5–5.5)
PROMYELOCYTES NFR BLD MANUAL: 1 %
PROMYELOCYTES NFR BLD MANUAL: 1 %
PROMYELOCYTES NFR BLD MANUAL: 3 %
PROMYELOCYTES NFR BLD MANUAL: 3 %
PROT SERPL-MCNC: 6 G/DL (ref 6.4–8.2)
PROT SERPL-MCNC: 6.3 G/DL (ref 6.4–8.2)
PROT SERPL-MCNC: 7.3 G/DL (ref 6.4–8.2)
PROT UR STRIP-MCNC: 300 MG/DL
PROT UR-MCNC: 47 MG/DL
PROT/CREAT UR-RTO: 0.9
PROTHROMBIN TIME: 16.2 SEC (ref 11.5–15.2)
Q-T INTERVAL, ECG07: 294 MS
QRS DURATION, ECG06: 88 MS
QTC CALCULATION (BEZET), ECG08: 397 MS
RBC # BLD AUTO: 3.88 M/UL (ref 4.35–5.65)
RBC # BLD AUTO: 3.98 M/UL (ref 4.35–5.65)
RBC # BLD AUTO: 4.24 M/UL (ref 4.35–5.65)
RBC # BLD AUTO: 4.32 M/UL (ref 4.35–5.65)
RBC # BLD AUTO: 4.41 M/UL (ref 4.35–5.65)
RBC # BLD AUTO: 4.44 M/UL (ref 4.35–5.65)
RBC # BLD AUTO: 4.58 M/UL (ref 4.35–5.65)
RBC # BLD AUTO: 5.33 M/UL (ref 4.35–5.65)
RBC #/AREA URNS HPF: NORMAL /HPF (ref 0–5)
RBC MORPH BLD: ABNORMAL
SERVICE CMNT-IMP: NORMAL
SERVICE CMNT-IMP: NORMAL
SODIUM SERPL-SCNC: 136 MMOL/L (ref 136–145)
SODIUM SERPL-SCNC: 137 MMOL/L (ref 136–145)
SODIUM SERPL-SCNC: 138 MMOL/L (ref 136–145)
SODIUM SERPL-SCNC: 138 MMOL/L (ref 136–145)
SODIUM SERPL-SCNC: 139 MMOL/L (ref 136–145)
SODIUM SERPL-SCNC: 140 MMOL/L (ref 136–145)
SODIUM SERPL-SCNC: 141 MMOL/L (ref 136–145)
SODIUM UR-SCNC: 52 MMOL/L (ref 20–110)
SOURCE, COVRS: NORMAL
SP GR UR REFRACTOMETRY: 1.02 (ref 1–1.03)
TROPONIN I SERPL-MCNC: <0.02 NG/ML (ref 0–0.04)
TSH SERPL DL<=0.05 MIU/L-ACNC: 1.34 UIU/ML (ref 0.36–3.74)
UROBILINOGEN UR QL STRIP.AUTO: 1 EU/DL (ref 0.2–1)
VENTRICULAR RATE, ECG03: 110 BPM
WBC # BLD AUTO: 43 K/UL (ref 4.6–13.2)
WBC # BLD AUTO: 43.2 K/UL (ref 4.6–13.2)
WBC # BLD AUTO: 44.3 K/UL (ref 4.6–13.2)
WBC # BLD AUTO: 46.4 K/UL (ref 4.6–13.2)
WBC # BLD AUTO: 49 K/UL (ref 4.6–13.2)
WBC # BLD AUTO: 50 K/UL (ref 4.6–13.2)
WBC # BLD AUTO: 51.1 K/UL (ref 4.6–13.2)
WBC # BLD AUTO: 53.4 K/UL (ref 4.6–13.2)
WBC MORPH BLD: ABNORMAL
WBC URNS QL MICRO: NORMAL /HPF (ref 0–5)

## 2021-01-01 PROCEDURE — 71045 X-RAY EXAM CHEST 1 VIEW: CPT

## 2021-01-01 PROCEDURE — G0152 HHCP-SERV OF OT,EA 15 MIN: HCPCS

## 2021-01-01 PROCEDURE — 74011636637 HC RX REV CODE- 636/637: Performed by: HOSPITALIST

## 2021-01-01 PROCEDURE — 3331090002 HH PPS REVENUE DEBIT

## 2021-01-01 PROCEDURE — 74011250636 HC RX REV CODE- 250/636: Performed by: INTERNAL MEDICINE

## 2021-01-01 PROCEDURE — 74011250637 HC RX REV CODE- 250/637: Performed by: HOSPITALIST

## 2021-01-01 PROCEDURE — 36415 COLL VENOUS BLD VENIPUNCTURE: CPT

## 2021-01-01 PROCEDURE — 3331090001 HH PPS REVENUE CREDIT

## 2021-01-01 PROCEDURE — 85025 COMPLETE CBC W/AUTO DIFF WBC: CPT

## 2021-01-01 PROCEDURE — 74011000250 HC RX REV CODE- 250: Performed by: INTERNAL MEDICINE

## 2021-01-01 PROCEDURE — 82962 GLUCOSE BLOOD TEST: CPT

## 2021-01-01 PROCEDURE — 80048 BASIC METABOLIC PNL TOTAL CA: CPT

## 2021-01-01 PROCEDURE — 74011250637 HC RX REV CODE- 250/637: Performed by: STUDENT IN AN ORGANIZED HEALTH CARE EDUCATION/TRAINING PROGRAM

## 2021-01-01 PROCEDURE — 74011250637 HC RX REV CODE- 250/637: Performed by: INTERNAL MEDICINE

## 2021-01-01 PROCEDURE — G0299 HHS/HOSPICE OF RN EA 15 MIN: HCPCS

## 2021-01-01 PROCEDURE — 74011000258 HC RX REV CODE- 258: Performed by: HOSPITALIST

## 2021-01-01 PROCEDURE — 65660000000 HC RM CCU STEPDOWN

## 2021-01-01 PROCEDURE — 99232 SBSQ HOSP IP/OBS MODERATE 35: CPT | Performed by: NURSE PRACTITIONER

## 2021-01-01 PROCEDURE — 74011000258 HC RX REV CODE- 258: Performed by: STUDENT IN AN ORGANIZED HEALTH CARE EDUCATION/TRAINING PROGRAM

## 2021-01-01 PROCEDURE — 97116 GAIT TRAINING THERAPY: CPT

## 2021-01-01 PROCEDURE — 82553 CREATINE MB FRACTION: CPT

## 2021-01-01 PROCEDURE — G0157 HHC PT ASSISTANT EA 15: HCPCS

## 2021-01-01 PROCEDURE — 74011250636 HC RX REV CODE- 250/636: Performed by: STUDENT IN AN ORGANIZED HEALTH CARE EDUCATION/TRAINING PROGRAM

## 2021-01-01 PROCEDURE — 97535 SELF CARE MNGMENT TRAINING: CPT

## 2021-01-01 PROCEDURE — G0151 HHCP-SERV OF PT,EA 15 MIN: HCPCS

## 2021-01-01 PROCEDURE — 84100 ASSAY OF PHOSPHORUS: CPT

## 2021-01-01 PROCEDURE — 400013 HH SOC

## 2021-01-01 PROCEDURE — 99285 EMERGENCY DEPT VISIT HI MDM: CPT

## 2021-01-01 PROCEDURE — G0495 RN CARE TRAIN/EDU IN HH: HCPCS

## 2021-01-01 PROCEDURE — 77010033678 HC OXYGEN DAILY

## 2021-01-01 PROCEDURE — 74011250636 HC RX REV CODE- 250/636: Performed by: HOSPITALIST

## 2021-01-01 PROCEDURE — 74011250637 HC RX REV CODE- 250/637: Performed by: FAMILY MEDICINE

## 2021-01-01 PROCEDURE — 81001 URINALYSIS AUTO W/SCOPE: CPT

## 2021-01-01 PROCEDURE — 85610 PROTHROMBIN TIME: CPT

## 2021-01-01 PROCEDURE — 76770 US EXAM ABDO BACK WALL COMP: CPT

## 2021-01-01 PROCEDURE — 97166 OT EVAL MOD COMPLEX 45 MIN: CPT

## 2021-01-01 PROCEDURE — 96374 THER/PROPH/DIAG INJ IV PUSH: CPT

## 2021-01-01 PROCEDURE — 74011636637 HC RX REV CODE- 636/637: Performed by: INTERNAL MEDICINE

## 2021-01-01 PROCEDURE — 87635 SARS-COV-2 COVID-19 AMP PRB: CPT

## 2021-01-01 PROCEDURE — 400018 HH-NO PAY CLAIM PROCEDURE

## 2021-01-01 PROCEDURE — 99221 1ST HOSP IP/OBS SF/LOW 40: CPT | Performed by: NURSE PRACTITIONER

## 2021-01-01 PROCEDURE — 83880 ASSAY OF NATRIURETIC PEPTIDE: CPT

## 2021-01-01 PROCEDURE — 84443 ASSAY THYROID STIM HORMONE: CPT

## 2021-01-01 PROCEDURE — 80053 COMPREHEN METABOLIC PANEL: CPT

## 2021-01-01 PROCEDURE — 94640 AIRWAY INHALATION TREATMENT: CPT

## 2021-01-01 PROCEDURE — 83605 ASSAY OF LACTIC ACID: CPT

## 2021-01-01 PROCEDURE — 74011000250 HC RX REV CODE- 250: Performed by: HOSPITALIST

## 2021-01-01 PROCEDURE — 93005 ELECTROCARDIOGRAM TRACING: CPT

## 2021-01-01 PROCEDURE — 74011000250 HC RX REV CODE- 250: Performed by: EMERGENCY MEDICINE

## 2021-01-01 PROCEDURE — 82550 ASSAY OF CK (CPK): CPT

## 2021-01-01 PROCEDURE — 97530 THERAPEUTIC ACTIVITIES: CPT

## 2021-01-01 PROCEDURE — 82436 ASSAY OF URINE CHLORIDE: CPT

## 2021-01-01 PROCEDURE — 83735 ASSAY OF MAGNESIUM: CPT

## 2021-01-01 PROCEDURE — 84484 ASSAY OF TROPONIN QUANT: CPT

## 2021-01-01 PROCEDURE — 87040 BLOOD CULTURE FOR BACTERIA: CPT

## 2021-01-01 PROCEDURE — 83036 HEMOGLOBIN GLYCOSYLATED A1C: CPT

## 2021-01-01 PROCEDURE — 84156 ASSAY OF PROTEIN URINE: CPT

## 2021-01-01 PROCEDURE — 76705 ECHO EXAM OF ABDOMEN: CPT

## 2021-01-01 PROCEDURE — 76450000000

## 2021-01-01 PROCEDURE — C8929 TTE W OR WO FOL WCON,DOPPLER: HCPCS

## 2021-01-01 PROCEDURE — 96375 TX/PRO/DX INJ NEW DRUG ADDON: CPT

## 2021-01-01 PROCEDURE — 97161 PT EVAL LOW COMPLEX 20 MIN: CPT

## 2021-01-01 PROCEDURE — 85730 THROMBOPLASTIN TIME PARTIAL: CPT

## 2021-01-01 PROCEDURE — 74011250636 HC RX REV CODE- 250/636: Performed by: EMERGENCY MEDICINE

## 2021-01-01 PROCEDURE — 84300 ASSAY OF URINE SODIUM: CPT

## 2021-01-01 RX ORDER — POLYETHYLENE GLYCOL 3350 17 G/17G
17 POWDER, FOR SOLUTION ORAL DAILY
Status: DISCONTINUED | OUTPATIENT
Start: 2021-01-01 | End: 2021-01-01 | Stop reason: HOSPADM

## 2021-01-01 RX ORDER — ACETAZOLAMIDE 250 MG/1
250 TABLET ORAL ONCE
Status: COMPLETED | OUTPATIENT
Start: 2021-01-01 | End: 2021-01-01

## 2021-01-01 RX ORDER — AMOXICILLIN 250 MG
2 CAPSULE ORAL DAILY PRN
Status: DISCONTINUED | OUTPATIENT
Start: 2021-01-01 | End: 2021-01-01

## 2021-01-01 RX ORDER — POTASSIUM CHLORIDE 20 MEQ/1
40 TABLET, EXTENDED RELEASE ORAL
Status: COMPLETED | OUTPATIENT
Start: 2021-01-01 | End: 2021-01-01

## 2021-01-01 RX ORDER — DILTIAZEM HYDROCHLORIDE 5 MG/ML
0.25 INJECTION INTRAVENOUS
Status: COMPLETED | OUTPATIENT
Start: 2021-01-01 | End: 2021-01-01

## 2021-01-01 RX ORDER — FUROSEMIDE 10 MG/ML
40 INJECTION INTRAMUSCULAR; INTRAVENOUS EVERY 12 HOURS
Status: DISCONTINUED | OUTPATIENT
Start: 2021-01-01 | End: 2021-01-01

## 2021-01-01 RX ORDER — FUROSEMIDE 10 MG/ML
40 INJECTION INTRAMUSCULAR; INTRAVENOUS DAILY
Status: DISCONTINUED | OUTPATIENT
Start: 2021-01-01 | End: 2021-01-01

## 2021-01-01 RX ORDER — MILRINONE LACTATE 0.2 MG/ML
0.38 INJECTION, SOLUTION INTRAVENOUS CONTINUOUS
Status: DISCONTINUED | OUTPATIENT
Start: 2021-01-01 | End: 2021-01-01

## 2021-01-01 RX ORDER — ACETAMINOPHEN 325 MG/1
650 TABLET ORAL
Status: DISCONTINUED | OUTPATIENT
Start: 2021-01-01 | End: 2021-01-01 | Stop reason: HOSPADM

## 2021-01-01 RX ORDER — POLYETHYLENE GLYCOL 3350 17 G/17G
17 POWDER, FOR SOLUTION ORAL DAILY PRN
Status: DISCONTINUED | OUTPATIENT
Start: 2021-01-01 | End: 2021-01-01 | Stop reason: SDUPTHER

## 2021-01-01 RX ORDER — ONDANSETRON 2 MG/ML
4 INJECTION INTRAMUSCULAR; INTRAVENOUS
Status: DISCONTINUED | OUTPATIENT
Start: 2021-01-01 | End: 2021-01-01 | Stop reason: SDUPTHER

## 2021-01-01 RX ORDER — ENOXAPARIN SODIUM 100 MG/ML
1 INJECTION SUBCUTANEOUS EVERY 12 HOURS
Status: DISCONTINUED | OUTPATIENT
Start: 2021-01-01 | End: 2021-01-01

## 2021-01-01 RX ORDER — FUROSEMIDE 10 MG/ML
60 INJECTION INTRAMUSCULAR; INTRAVENOUS EVERY 12 HOURS
Status: DISCONTINUED | OUTPATIENT
Start: 2021-01-01 | End: 2021-01-01

## 2021-01-01 RX ORDER — GABAPENTIN 100 MG/1
100 CAPSULE ORAL
Qty: 30 CAPSULE | Refills: 0 | Status: SHIPPED | OUTPATIENT
Start: 2021-01-01

## 2021-01-01 RX ORDER — OXYCODONE HYDROCHLORIDE 5 MG/1
5 TABLET ORAL
Status: DISCONTINUED | OUTPATIENT
Start: 2021-01-01 | End: 2021-01-01 | Stop reason: HOSPADM

## 2021-01-01 RX ORDER — TAMSULOSIN HYDROCHLORIDE 0.4 MG/1
0.4 CAPSULE ORAL DAILY
Status: DISCONTINUED | OUTPATIENT
Start: 2021-01-01 | End: 2021-01-01 | Stop reason: HOSPADM

## 2021-01-01 RX ORDER — SODIUM CHLORIDE 0.9 % (FLUSH) 0.9 %
5-40 SYRINGE (ML) INJECTION EVERY 8 HOURS
Status: DISCONTINUED | OUTPATIENT
Start: 2021-01-01 | End: 2021-01-01 | Stop reason: SDUPTHER

## 2021-01-01 RX ORDER — FUROSEMIDE 10 MG/ML
40 INJECTION INTRAMUSCULAR; INTRAVENOUS
Status: COMPLETED | OUTPATIENT
Start: 2021-01-01 | End: 2021-01-01

## 2021-01-01 RX ORDER — FUROSEMIDE 10 MG/ML
80 INJECTION INTRAMUSCULAR; INTRAVENOUS EVERY 12 HOURS
Status: DISCONTINUED | OUTPATIENT
Start: 2021-01-01 | End: 2021-01-01

## 2021-01-01 RX ORDER — FUROSEMIDE 10 MG/ML
20 INJECTION INTRAMUSCULAR; INTRAVENOUS ONCE
Status: COMPLETED | OUTPATIENT
Start: 2021-01-01 | End: 2021-01-01

## 2021-01-01 RX ORDER — IPRATROPIUM BROMIDE AND ALBUTEROL SULFATE 2.5; .5 MG/3ML; MG/3ML
3 SOLUTION RESPIRATORY (INHALATION)
Status: DISCONTINUED | OUTPATIENT
Start: 2021-01-01 | End: 2021-01-01 | Stop reason: HOSPADM

## 2021-01-01 RX ORDER — SODIUM CHLORIDE 0.9 % (FLUSH) 0.9 %
5-40 SYRINGE (ML) INJECTION AS NEEDED
Status: DISCONTINUED | OUTPATIENT
Start: 2021-01-01 | End: 2021-01-01 | Stop reason: HOSPADM

## 2021-01-01 RX ORDER — HEPARIN SODIUM 5000 [USP'U]/ML
5000 INJECTION, SOLUTION INTRAVENOUS; SUBCUTANEOUS EVERY 8 HOURS
Status: DISCONTINUED | OUTPATIENT
Start: 2021-01-01 | End: 2021-01-01

## 2021-01-01 RX ORDER — POTASSIUM CHLORIDE 20 MEQ/1
40 TABLET, EXTENDED RELEASE ORAL ONCE
Status: COMPLETED | OUTPATIENT
Start: 2021-01-01 | End: 2021-01-01

## 2021-01-01 RX ORDER — ENOXAPARIN SODIUM 100 MG/ML
1.5 INJECTION SUBCUTANEOUS EVERY 24 HOURS
Status: DISCONTINUED | OUTPATIENT
Start: 2021-01-01 | End: 2021-01-01

## 2021-01-01 RX ORDER — SODIUM CHLORIDE 0.9 % (FLUSH) 0.9 %
5-40 SYRINGE (ML) INJECTION EVERY 8 HOURS
Status: DISCONTINUED | OUTPATIENT
Start: 2021-01-01 | End: 2021-01-01 | Stop reason: HOSPADM

## 2021-01-01 RX ORDER — ACETAMINOPHEN 650 MG/1
650 SUPPOSITORY RECTAL
Status: DISCONTINUED | OUTPATIENT
Start: 2021-01-01 | End: 2021-01-01 | Stop reason: HOSPADM

## 2021-01-01 RX ORDER — CARVEDILOL 25 MG/1
25 TABLET ORAL 2 TIMES DAILY
Qty: 60 TABLET | Refills: 0 | Status: SHIPPED | OUTPATIENT
Start: 2021-01-01

## 2021-01-01 RX ORDER — FAMOTIDINE 20 MG/1
20 TABLET, FILM COATED ORAL 2 TIMES DAILY
Status: DISCONTINUED | OUTPATIENT
Start: 2021-01-01 | End: 2021-01-01

## 2021-01-01 RX ORDER — SODIUM CHLORIDE 0.9 % (FLUSH) 0.9 %
5-40 SYRINGE (ML) INJECTION AS NEEDED
Status: DISCONTINUED | OUTPATIENT
Start: 2021-01-01 | End: 2021-01-01 | Stop reason: SDUPTHER

## 2021-01-01 RX ORDER — INSULIN LISPRO 100 [IU]/ML
INJECTION, SOLUTION INTRAVENOUS; SUBCUTANEOUS
Status: DISCONTINUED | OUTPATIENT
Start: 2021-01-01 | End: 2021-01-01 | Stop reason: HOSPADM

## 2021-01-01 RX ORDER — POTASSIUM CHLORIDE 20 MEQ/1
20 TABLET, EXTENDED RELEASE ORAL ONCE
Status: COMPLETED | OUTPATIENT
Start: 2021-01-01 | End: 2021-01-01

## 2021-01-01 RX ORDER — SODIUM CHLORIDE 0.9 % (FLUSH) 0.9 %
5-10 SYRINGE (ML) INJECTION AS NEEDED
Status: DISCONTINUED | OUTPATIENT
Start: 2021-01-01 | End: 2021-01-01 | Stop reason: SDUPTHER

## 2021-01-01 RX ORDER — ACETAZOLAMIDE 250 MG/1
250 TABLET ORAL DAILY
Status: DISCONTINUED | OUTPATIENT
Start: 2021-01-01 | End: 2021-01-01 | Stop reason: HOSPADM

## 2021-01-01 RX ORDER — FUROSEMIDE 40 MG/1
80 TABLET ORAL DAILY
Status: DISCONTINUED | OUTPATIENT
Start: 2021-01-01 | End: 2021-01-01

## 2021-01-01 RX ORDER — AMLODIPINE BESYLATE 5 MG/1
5 TABLET ORAL DAILY
COMMUNITY
End: 2021-01-01

## 2021-01-01 RX ORDER — IMATINIB MESYLATE 100 MG/1
100 TABLET, FILM COATED ORAL DAILY
COMMUNITY
End: 2021-01-01

## 2021-01-01 RX ORDER — METOLAZONE 5 MG/1
5 TABLET ORAL DAILY
Status: DISCONTINUED | OUTPATIENT
Start: 2021-01-01 | End: 2021-01-01

## 2021-01-01 RX ORDER — ADHESIVE BANDAGE
30 BANDAGE TOPICAL DAILY PRN
Status: DISCONTINUED | OUTPATIENT
Start: 2021-01-01 | End: 2021-01-01 | Stop reason: HOSPADM

## 2021-01-01 RX ORDER — LANOLIN ALCOHOL/MO/W.PET/CERES
2000 CREAM (GRAM) TOPICAL DAILY
COMMUNITY
End: 2021-01-01

## 2021-01-01 RX ORDER — LEVOFLOXACIN 5 MG/ML
750 INJECTION, SOLUTION INTRAVENOUS EVERY 24 HOURS
Status: DISCONTINUED | OUTPATIENT
Start: 2021-01-01 | End: 2021-01-01

## 2021-01-01 RX ORDER — AMLODIPINE BESYLATE 5 MG/1
5 TABLET ORAL DAILY
Status: DISCONTINUED | OUTPATIENT
Start: 2021-01-01 | End: 2021-01-01

## 2021-01-01 RX ORDER — TAMSULOSIN HYDROCHLORIDE 0.4 MG/1
0.4 CAPSULE ORAL DAILY
COMMUNITY

## 2021-01-01 RX ORDER — VITAMIN E 268 MG
400 CAPSULE ORAL DAILY
Status: DISCONTINUED | OUTPATIENT
Start: 2021-01-01 | End: 2021-01-01 | Stop reason: HOSPADM

## 2021-01-01 RX ORDER — ACETAMINOPHEN 325 MG/1
650 TABLET ORAL
Status: DISCONTINUED | OUTPATIENT
Start: 2021-01-01 | End: 2021-01-01 | Stop reason: SDUPTHER

## 2021-01-01 RX ORDER — ASPIRIN 81 MG/1
81 TABLET ORAL DAILY
COMMUNITY

## 2021-01-01 RX ORDER — AMOXICILLIN 250 MG
1 CAPSULE ORAL DAILY
Status: DISCONTINUED | OUTPATIENT
Start: 2021-01-01 | End: 2021-01-01 | Stop reason: SDUPTHER

## 2021-01-01 RX ORDER — CARVEDILOL 12.5 MG/1
25 TABLET ORAL 2 TIMES DAILY
Status: DISCONTINUED | OUTPATIENT
Start: 2021-01-01 | End: 2021-01-01 | Stop reason: HOSPADM

## 2021-01-01 RX ORDER — IMATINIB MESYLATE 100 MG/1
100 TABLET, FILM COATED ORAL DAILY
Status: DISCONTINUED | OUTPATIENT
Start: 2021-01-01 | End: 2021-01-01 | Stop reason: HOSPADM

## 2021-01-01 RX ORDER — LANOLIN ALCOHOL/MO/W.PET/CERES
2000 CREAM (GRAM) TOPICAL DAILY
Status: DISCONTINUED | OUTPATIENT
Start: 2021-01-01 | End: 2021-01-01 | Stop reason: HOSPADM

## 2021-01-01 RX ORDER — ATORVASTATIN CALCIUM 20 MG/1
40 TABLET, FILM COATED ORAL DAILY
Status: DISCONTINUED | OUTPATIENT
Start: 2021-01-01 | End: 2021-01-01 | Stop reason: HOSPADM

## 2021-01-01 RX ORDER — FAMOTIDINE 20 MG/1
20 TABLET, FILM COATED ORAL DAILY
Status: DISCONTINUED | OUTPATIENT
Start: 2021-01-01 | End: 2021-01-01 | Stop reason: HOSPADM

## 2021-01-01 RX ORDER — AMOXICILLIN 250 MG
2 CAPSULE ORAL DAILY
Status: COMPLETED | OUTPATIENT
Start: 2021-01-01 | End: 2021-01-01

## 2021-01-01 RX ORDER — GUAIFENESIN 600 MG/1
600 TABLET, EXTENDED RELEASE ORAL EVERY 12 HOURS
Status: DISCONTINUED | OUTPATIENT
Start: 2021-01-01 | End: 2021-01-01 | Stop reason: HOSPADM

## 2021-01-01 RX ORDER — FUROSEMIDE 40 MG/1
80 TABLET ORAL 2 TIMES DAILY
Status: DISCONTINUED | OUTPATIENT
Start: 2021-01-01 | End: 2021-01-01

## 2021-01-01 RX ORDER — ACETAZOLAMIDE 250 MG/1
250 TABLET ORAL 2 TIMES DAILY
Status: DISCONTINUED | OUTPATIENT
Start: 2021-01-01 | End: 2021-01-01

## 2021-01-01 RX ORDER — ASPIRIN 81 MG/1
81 TABLET ORAL DAILY
Status: DISCONTINUED | OUTPATIENT
Start: 2021-01-01 | End: 2021-01-01 | Stop reason: HOSPADM

## 2021-01-01 RX ORDER — FUROSEMIDE 20 MG/1
20 TABLET ORAL 2 TIMES DAILY
COMMUNITY
End: 2021-01-01

## 2021-01-01 RX ORDER — ENOXAPARIN SODIUM 100 MG/ML
40 INJECTION SUBCUTANEOUS EVERY 24 HOURS
Status: DISCONTINUED | OUTPATIENT
Start: 2021-01-01 | End: 2021-01-01

## 2021-01-01 RX ORDER — ENOXAPARIN SODIUM 100 MG/ML
40 INJECTION SUBCUTANEOUS DAILY
Status: DISCONTINUED | OUTPATIENT
Start: 2021-01-01 | End: 2021-01-01

## 2021-01-01 RX ORDER — ONDANSETRON 2 MG/ML
4 INJECTION INTRAMUSCULAR; INTRAVENOUS
Status: DISCONTINUED | OUTPATIENT
Start: 2021-01-01 | End: 2021-01-01 | Stop reason: HOSPADM

## 2021-01-01 RX ORDER — FUROSEMIDE 40 MG/1
80 TABLET ORAL DAILY
Status: DISCONTINUED | OUTPATIENT
Start: 2021-01-01 | End: 2021-01-01 | Stop reason: HOSPADM

## 2021-01-01 RX ORDER — FUROSEMIDE 80 MG/1
80 TABLET ORAL DAILY
Qty: 30 TABLET | Refills: 0 | OUTPATIENT
Start: 2021-01-01 | End: 2022-01-01

## 2021-01-01 RX ORDER — VITAMIN E 268 MG
400 CAPSULE ORAL DAILY
COMMUNITY

## 2021-01-01 RX ORDER — GABAPENTIN 100 MG/1
100 CAPSULE ORAL
Status: DISCONTINUED | OUTPATIENT
Start: 2021-01-01 | End: 2021-01-01 | Stop reason: HOSPADM

## 2021-01-01 RX ORDER — ACETAZOLAMIDE 250 MG/1
250 TABLET ORAL DAILY
Qty: 30 TABLET | Refills: 0 | Status: SHIPPED | OUTPATIENT
Start: 2021-01-01 | End: 2021-01-01

## 2021-01-01 RX ORDER — ONDANSETRON 4 MG/1
4 TABLET, ORALLY DISINTEGRATING ORAL
Status: DISCONTINUED | OUTPATIENT
Start: 2021-01-01 | End: 2021-01-01 | Stop reason: HOSPADM

## 2021-01-01 RX ORDER — FUROSEMIDE 10 MG/ML
20 INJECTION INTRAMUSCULAR; INTRAVENOUS EVERY 12 HOURS
Status: DISCONTINUED | OUTPATIENT
Start: 2021-01-01 | End: 2021-01-01

## 2021-01-01 RX ADMIN — CYANOCOBALAMIN TAB 1000 MCG 2000 MCG: 1000 TAB at 08:16

## 2021-01-01 RX ADMIN — APIXABAN 2.5 MG: 2.5 TABLET, FILM COATED ORAL at 22:48

## 2021-01-01 RX ADMIN — ACETAMINOPHEN 650 MG: 325 TABLET ORAL at 16:07

## 2021-01-01 RX ADMIN — GUAIFENESIN 600 MG: 600 TABLET, EXTENDED RELEASE ORAL at 22:36

## 2021-01-01 RX ADMIN — ACETAMINOPHEN 650 MG: 325 TABLET ORAL at 09:55

## 2021-01-01 RX ADMIN — CARVEDILOL 25 MG: 12.5 TABLET, FILM COATED ORAL at 08:15

## 2021-01-01 RX ADMIN — FUROSEMIDE 80 MG: 10 INJECTION, SOLUTION INTRAMUSCULAR; INTRAVENOUS at 23:02

## 2021-01-01 RX ADMIN — WATER 1 G: 1 INJECTION INTRAMUSCULAR; INTRAVENOUS; SUBCUTANEOUS at 10:58

## 2021-01-01 RX ADMIN — GUAIFENESIN 600 MG: 600 TABLET, EXTENDED RELEASE ORAL at 22:49

## 2021-01-01 RX ADMIN — Medication 400 UNITS: at 09:55

## 2021-01-01 RX ADMIN — FAMOTIDINE 20 MG: 20 TABLET ORAL at 09:07

## 2021-01-01 RX ADMIN — AZITHROMYCIN MONOHYDRATE 500 MG: 500 INJECTION, POWDER, LYOPHILIZED, FOR SOLUTION INTRAVENOUS at 10:42

## 2021-01-01 RX ADMIN — IMATINIB MESYLATE 100 MG: 100 TABLET, FILM COATED ORAL at 10:54

## 2021-01-01 RX ADMIN — APIXABAN 2.5 MG: 2.5 TABLET, FILM COATED ORAL at 08:41

## 2021-01-01 RX ADMIN — Medication 10 ML: at 22:12

## 2021-01-01 RX ADMIN — Medication 10 ML: at 22:51

## 2021-01-01 RX ADMIN — ACETAMINOPHEN 650 MG: 325 TABLET ORAL at 04:25

## 2021-01-01 RX ADMIN — MILRINONE LACTATE IN DEXTROSE 0.38 MCG/KG/MIN: 200 INJECTION, SOLUTION INTRAVENOUS at 21:45

## 2021-01-01 RX ADMIN — MILRINONE LACTATE IN DEXTROSE 0.38 MCG/KG/MIN: 200 INJECTION, SOLUTION INTRAVENOUS at 04:21

## 2021-01-01 RX ADMIN — GUAIFENESIN 600 MG: 600 TABLET, EXTENDED RELEASE ORAL at 08:27

## 2021-01-01 RX ADMIN — APIXABAN 2.5 MG: 2.5 TABLET, FILM COATED ORAL at 22:36

## 2021-01-01 RX ADMIN — ASPIRIN 81 MG: 81 TABLET, COATED ORAL at 08:34

## 2021-01-01 RX ADMIN — FUROSEMIDE 20 MG: 10 INJECTION, SOLUTION INTRAMUSCULAR; INTRAVENOUS at 08:34

## 2021-01-01 RX ADMIN — ASPIRIN 81 MG: 81 TABLET, COATED ORAL at 09:07

## 2021-01-01 RX ADMIN — ENOXAPARIN SODIUM 40 MG: 40 INJECTION SUBCUTANEOUS at 16:07

## 2021-01-01 RX ADMIN — Medication 10 ML: at 14:00

## 2021-01-01 RX ADMIN — MAGNESIUM HYDROXIDE 30 ML: 400 SUSPENSION ORAL at 19:38

## 2021-01-01 RX ADMIN — CYANOCOBALAMIN TAB 1000 MCG 2000 MCG: 1000 TAB at 08:24

## 2021-01-01 RX ADMIN — ACETAMINOPHEN 650 MG: 325 TABLET ORAL at 20:18

## 2021-01-01 RX ADMIN — FUROSEMIDE 20 MG: 10 INJECTION, SOLUTION INTRAMUSCULAR; INTRAVENOUS at 15:33

## 2021-01-01 RX ADMIN — FAMOTIDINE 20 MG: 20 TABLET ORAL at 09:58

## 2021-01-01 RX ADMIN — ATORVASTATIN CALCIUM 40 MG: 20 TABLET, FILM COATED ORAL at 09:58

## 2021-01-01 RX ADMIN — MILRINONE LACTATE IN DEXTROSE 0.38 MCG/KG/MIN: 200 INJECTION, SOLUTION INTRAVENOUS at 18:21

## 2021-01-01 RX ADMIN — CARVEDILOL 25 MG: 12.5 TABLET, FILM COATED ORAL at 21:20

## 2021-01-01 RX ADMIN — INSULIN LISPRO 6 UNITS: 100 INJECTION, SOLUTION INTRAVENOUS; SUBCUTANEOUS at 21:22

## 2021-01-01 RX ADMIN — TAMSULOSIN HYDROCHLORIDE 0.4 MG: 0.4 CAPSULE ORAL at 08:23

## 2021-01-01 RX ADMIN — ONDANSETRON 4 MG: 2 INJECTION INTRAMUSCULAR; INTRAVENOUS at 12:57

## 2021-01-01 RX ADMIN — ATORVASTATIN CALCIUM 40 MG: 20 TABLET, FILM COATED ORAL at 08:41

## 2021-01-01 RX ADMIN — GUAIFENESIN 600 MG: 600 TABLET, EXTENDED RELEASE ORAL at 10:41

## 2021-01-01 RX ADMIN — GUAIFENESIN 600 MG: 600 TABLET, EXTENDED RELEASE ORAL at 09:12

## 2021-01-01 RX ADMIN — FUROSEMIDE 80 MG: 10 INJECTION, SOLUTION INTRAMUSCULAR; INTRAVENOUS at 10:01

## 2021-01-01 RX ADMIN — ENOXAPARIN SODIUM 90 MG: 100 INJECTION SUBCUTANEOUS at 08:22

## 2021-01-01 RX ADMIN — FUROSEMIDE 40 MG: 10 INJECTION, SOLUTION INTRAMUSCULAR; INTRAVENOUS at 22:09

## 2021-01-01 RX ADMIN — DOXYCYCLINE 100 MG: 100 INJECTION, POWDER, LYOPHILIZED, FOR SOLUTION INTRAVENOUS at 08:22

## 2021-01-01 RX ADMIN — WATER 1 G: 1 INJECTION INTRAMUSCULAR; INTRAVENOUS; SUBCUTANEOUS at 10:45

## 2021-01-01 RX ADMIN — INSULIN LISPRO 3 UNITS: 100 INJECTION, SOLUTION INTRAVENOUS; SUBCUTANEOUS at 07:10

## 2021-01-01 RX ADMIN — Medication 10 ML: at 10:43

## 2021-01-01 RX ADMIN — CYANOCOBALAMIN TAB 1000 MCG 2000 MCG: 1000 TAB at 08:41

## 2021-01-01 RX ADMIN — GUAIFENESIN 600 MG: 600 TABLET, EXTENDED RELEASE ORAL at 22:48

## 2021-01-01 RX ADMIN — GUAIFENESIN 600 MG: 600 TABLET, EXTENDED RELEASE ORAL at 23:01

## 2021-01-01 RX ADMIN — Medication 10 ML: at 22:49

## 2021-01-01 RX ADMIN — FUROSEMIDE 80 MG: 40 TABLET ORAL at 08:41

## 2021-01-01 RX ADMIN — Medication 10 ML: at 15:23

## 2021-01-01 RX ADMIN — POTASSIUM CHLORIDE 40 MEQ: 20 TABLET, EXTENDED RELEASE ORAL at 10:08

## 2021-01-01 RX ADMIN — Medication 10 ML: at 22:09

## 2021-01-01 RX ADMIN — INSULIN LISPRO 3 UNITS: 100 INJECTION, SOLUTION INTRAVENOUS; SUBCUTANEOUS at 16:30

## 2021-01-01 RX ADMIN — ATORVASTATIN CALCIUM 40 MG: 20 TABLET, FILM COATED ORAL at 08:34

## 2021-01-01 RX ADMIN — GUAIFENESIN 600 MG: 600 TABLET, EXTENDED RELEASE ORAL at 08:23

## 2021-01-01 RX ADMIN — FUROSEMIDE 80 MG: 10 INJECTION, SOLUTION INTRAMUSCULAR; INTRAVENOUS at 08:32

## 2021-01-01 RX ADMIN — ENOXAPARIN SODIUM 90 MG: 100 INJECTION SUBCUTANEOUS at 22:09

## 2021-01-01 RX ADMIN — ASPIRIN 81 MG: 81 TABLET, COATED ORAL at 09:54

## 2021-01-01 RX ADMIN — TAMSULOSIN HYDROCHLORIDE 0.4 MG: 0.4 CAPSULE ORAL at 09:36

## 2021-01-01 RX ADMIN — CARVEDILOL 25 MG: 12.5 TABLET, FILM COATED ORAL at 21:43

## 2021-01-01 RX ADMIN — MILRINONE LACTATE IN DEXTROSE 0.38 MCG/KG/MIN: 200 INJECTION, SOLUTION INTRAVENOUS at 08:12

## 2021-01-01 RX ADMIN — ASPIRIN 81 MG: 81 TABLET, COATED ORAL at 09:58

## 2021-01-01 RX ADMIN — FUROSEMIDE 40 MG: 10 INJECTION INTRAMUSCULAR; INTRAVENOUS at 21:52

## 2021-01-01 RX ADMIN — WATER 1 G: 1 INJECTION INTRAMUSCULAR; INTRAVENOUS; SUBCUTANEOUS at 11:29

## 2021-01-01 RX ADMIN — POLYETHYLENE GLYCOL 3350 17 G: 17 POWDER, FOR SOLUTION ORAL at 09:37

## 2021-01-01 RX ADMIN — Medication 10 ML: at 17:13

## 2021-01-01 RX ADMIN — CARVEDILOL 25 MG: 12.5 TABLET, FILM COATED ORAL at 08:35

## 2021-01-01 RX ADMIN — ATORVASTATIN CALCIUM 40 MG: 20 TABLET, FILM COATED ORAL at 08:31

## 2021-01-01 RX ADMIN — TAMSULOSIN HYDROCHLORIDE 0.4 MG: 0.4 CAPSULE ORAL at 09:56

## 2021-01-01 RX ADMIN — Medication 10 ML: at 13:02

## 2021-01-01 RX ADMIN — TAMSULOSIN HYDROCHLORIDE 0.4 MG: 0.4 CAPSULE ORAL at 08:35

## 2021-01-01 RX ADMIN — ASPIRIN 81 MG: 81 TABLET, COATED ORAL at 09:36

## 2021-01-01 RX ADMIN — GUAIFENESIN 600 MG: 600 TABLET, EXTENDED RELEASE ORAL at 08:31

## 2021-01-01 RX ADMIN — Medication 10 ML: at 21:53

## 2021-01-01 RX ADMIN — GUAIFENESIN 600 MG: 600 TABLET, EXTENDED RELEASE ORAL at 08:16

## 2021-01-01 RX ADMIN — FUROSEMIDE 40 MG: 10 INJECTION INTRAMUSCULAR; INTRAVENOUS at 21:43

## 2021-01-01 RX ADMIN — CARVEDILOL 25 MG: 12.5 TABLET, FILM COATED ORAL at 09:58

## 2021-01-01 RX ADMIN — FUROSEMIDE 80 MG: 10 INJECTION, SOLUTION INTRAMUSCULAR; INTRAVENOUS at 09:12

## 2021-01-01 RX ADMIN — GUAIFENESIN 600 MG: 600 TABLET, EXTENDED RELEASE ORAL at 22:11

## 2021-01-01 RX ADMIN — FUROSEMIDE 40 MG: 10 INJECTION, SOLUTION INTRAMUSCULAR; INTRAVENOUS at 09:17

## 2021-01-01 RX ADMIN — ACETAZOLAMIDE 250 MG: 250 TABLET ORAL at 12:34

## 2021-01-01 RX ADMIN — WATER 1 G: 1 INJECTION INTRAMUSCULAR; INTRAVENOUS; SUBCUTANEOUS at 10:42

## 2021-01-01 RX ADMIN — ATORVASTATIN CALCIUM 40 MG: 20 TABLET, FILM COATED ORAL at 09:55

## 2021-01-01 RX ADMIN — GUAIFENESIN 600 MG: 600 TABLET, EXTENDED RELEASE ORAL at 08:28

## 2021-01-01 RX ADMIN — ENOXAPARIN SODIUM 40 MG: 40 INJECTION SUBCUTANEOUS at 15:56

## 2021-01-01 RX ADMIN — IMATINIB MESYLATE 100 MG: 100 TABLET, FILM COATED ORAL at 08:22

## 2021-01-01 RX ADMIN — FAMOTIDINE 20 MG: 20 TABLET ORAL at 08:31

## 2021-01-01 RX ADMIN — INSULIN LISPRO 3 UNITS: 100 INJECTION, SOLUTION INTRAVENOUS; SUBCUTANEOUS at 06:57

## 2021-01-01 RX ADMIN — CARVEDILOL 25 MG: 12.5 TABLET, FILM COATED ORAL at 21:48

## 2021-01-01 RX ADMIN — IMATINIB MESYLATE 100 MG: 100 TABLET, FILM COATED ORAL at 08:26

## 2021-01-01 RX ADMIN — FUROSEMIDE 40 MG: 10 INJECTION, SOLUTION INTRAMUSCULAR; INTRAVENOUS at 21:21

## 2021-01-01 RX ADMIN — GABAPENTIN 100 MG: 100 CAPSULE ORAL at 01:45

## 2021-01-01 RX ADMIN — MILRINONE LACTATE IN DEXTROSE 0.38 MCG/KG/MIN: 200 INJECTION, SOLUTION INTRAVENOUS at 09:54

## 2021-01-01 RX ADMIN — POLYETHYLENE GLYCOL 3350 17 G: 17 POWDER, FOR SOLUTION ORAL at 09:00

## 2021-01-01 RX ADMIN — ACETAMINOPHEN 650 MG: 325 TABLET ORAL at 02:51

## 2021-01-01 RX ADMIN — MILRINONE LACTATE IN DEXTROSE 0.2 MCG/KG/MIN: 200 INJECTION, SOLUTION INTRAVENOUS at 08:41

## 2021-01-01 RX ADMIN — FAMOTIDINE 20 MG: 20 TABLET ORAL at 08:16

## 2021-01-01 RX ADMIN — ENOXAPARIN SODIUM 90 MG: 100 INJECTION SUBCUTANEOUS at 08:28

## 2021-01-01 RX ADMIN — CARVEDILOL 25 MG: 12.5 TABLET, FILM COATED ORAL at 09:08

## 2021-01-01 RX ADMIN — FAMOTIDINE 20 MG: 20 TABLET ORAL at 08:26

## 2021-01-01 RX ADMIN — IMATINIB MESYLATE 100 MG: 100 TABLET, FILM COATED ORAL at 09:58

## 2021-01-01 RX ADMIN — FAMOTIDINE 20 MG: 20 TABLET ORAL at 08:23

## 2021-01-01 RX ADMIN — IMATINIB MESYLATE 100 MG: 100 TABLET, FILM COATED ORAL at 08:33

## 2021-01-01 RX ADMIN — MILRINONE LACTATE IN DEXTROSE 0.38 MCG/KG/MIN: 200 INJECTION, SOLUTION INTRAVENOUS at 22:10

## 2021-01-01 RX ADMIN — TAMSULOSIN HYDROCHLORIDE 0.4 MG: 0.4 CAPSULE ORAL at 08:31

## 2021-01-01 RX ADMIN — FAMOTIDINE 20 MG: 20 TABLET ORAL at 08:27

## 2021-01-01 RX ADMIN — TAMSULOSIN HYDROCHLORIDE 0.4 MG: 0.4 CAPSULE ORAL at 09:57

## 2021-01-01 RX ADMIN — CARVEDILOL 25 MG: 12.5 TABLET, FILM COATED ORAL at 08:31

## 2021-01-01 RX ADMIN — ATORVASTATIN CALCIUM 40 MG: 20 TABLET, FILM COATED ORAL at 08:16

## 2021-01-01 RX ADMIN — TAMSULOSIN HYDROCHLORIDE 0.4 MG: 0.4 CAPSULE ORAL at 08:16

## 2021-01-01 RX ADMIN — Medication 10 ML: at 16:07

## 2021-01-01 RX ADMIN — GUAIFENESIN 600 MG: 600 TABLET, EXTENDED RELEASE ORAL at 21:48

## 2021-01-01 RX ADMIN — Medication 10 ML: at 06:43

## 2021-01-01 RX ADMIN — MILRINONE LACTATE IN DEXTROSE 0.2 MCG/KG/MIN: 200 INJECTION, SOLUTION INTRAVENOUS at 18:14

## 2021-01-01 RX ADMIN — FUROSEMIDE 40 MG: 10 INJECTION INTRAMUSCULAR; INTRAVENOUS at 09:56

## 2021-01-01 RX ADMIN — ENOXAPARIN SODIUM 90 MG: 100 INJECTION SUBCUTANEOUS at 23:17

## 2021-01-01 RX ADMIN — Medication 10 ML: at 06:58

## 2021-01-01 RX ADMIN — MILRINONE LACTATE IN DEXTROSE 0.38 MCG/KG/MIN: 200 INJECTION, SOLUTION INTRAVENOUS at 07:01

## 2021-01-01 RX ADMIN — CYANOCOBALAMIN TAB 1000 MCG 2000 MCG: 1000 TAB at 10:12

## 2021-01-01 RX ADMIN — ASPIRIN 81 MG: 81 TABLET, COATED ORAL at 08:15

## 2021-01-01 RX ADMIN — FUROSEMIDE 40 MG: 10 INJECTION, SOLUTION INTRAMUSCULAR; INTRAVENOUS at 08:23

## 2021-01-01 RX ADMIN — DOXYCYCLINE 100 MG: 100 INJECTION, POWDER, LYOPHILIZED, FOR SOLUTION INTRAVENOUS at 23:02

## 2021-01-01 RX ADMIN — ACETAZOLAMIDE 250 MG: 250 TABLET ORAL at 09:36

## 2021-01-01 RX ADMIN — ACETAMINOPHEN 650 MG: 325 TABLET ORAL at 11:18

## 2021-01-01 RX ADMIN — PHENOL 1 SPRAY: 1.5 LIQUID ORAL at 04:26

## 2021-01-01 RX ADMIN — INSULIN LISPRO 6 UNITS: 100 INJECTION, SOLUTION INTRAVENOUS; SUBCUTANEOUS at 11:57

## 2021-01-01 RX ADMIN — IMATINIB MESYLATE 100 MG: 100 TABLET, FILM COATED ORAL at 09:57

## 2021-01-01 RX ADMIN — CARVEDILOL 25 MG: 12.5 TABLET, FILM COATED ORAL at 09:36

## 2021-01-01 RX ADMIN — ACETAZOLAMIDE 250 MG: 250 TABLET ORAL at 08:40

## 2021-01-01 RX ADMIN — MAGNESIUM HYDROXIDE 30 ML: 400 SUSPENSION ORAL at 08:41

## 2021-01-01 RX ADMIN — MAGNESIUM HYDROXIDE 30 ML: 400 SUSPENSION ORAL at 12:42

## 2021-01-01 RX ADMIN — CYANOCOBALAMIN TAB 1000 MCG 2000 MCG: 1000 TAB at 08:31

## 2021-01-01 RX ADMIN — INSULIN LISPRO 3 UNITS: 100 INJECTION, SOLUTION INTRAVENOUS; SUBCUTANEOUS at 17:32

## 2021-01-01 RX ADMIN — FAMOTIDINE 20 MG: 20 TABLET ORAL at 09:12

## 2021-01-01 RX ADMIN — ACETAMINOPHEN 650 MG: 325 TABLET ORAL at 17:11

## 2021-01-01 RX ADMIN — Medication 10 ML: at 16:00

## 2021-01-01 RX ADMIN — FAMOTIDINE 20 MG: 20 TABLET ORAL at 09:56

## 2021-01-01 RX ADMIN — AZITHROMYCIN MONOHYDRATE 500 MG: 500 INJECTION, POWDER, LYOPHILIZED, FOR SOLUTION INTRAVENOUS at 12:14

## 2021-01-01 RX ADMIN — Medication 10 ML: at 20:48

## 2021-01-01 RX ADMIN — POTASSIUM CHLORIDE 20 MEQ: 20 TABLET, EXTENDED RELEASE ORAL at 09:46

## 2021-01-01 RX ADMIN — MAGNESIUM HYDROXIDE 30 ML: 400 SUSPENSION ORAL at 10:35

## 2021-01-01 RX ADMIN — Medication 10 ML: at 21:21

## 2021-01-01 RX ADMIN — CARVEDILOL 25 MG: 12.5 TABLET, FILM COATED ORAL at 22:08

## 2021-01-01 RX ADMIN — MILRINONE LACTATE IN DEXTROSE 0.38 MCG/KG/MIN: 200 INJECTION, SOLUTION INTRAVENOUS at 19:14

## 2021-01-01 RX ADMIN — CARVEDILOL 25 MG: 12.5 TABLET, FILM COATED ORAL at 20:46

## 2021-01-01 RX ADMIN — Medication 10 ML: at 18:12

## 2021-01-01 RX ADMIN — CARVEDILOL 25 MG: 12.5 TABLET, FILM COATED ORAL at 22:47

## 2021-01-01 RX ADMIN — INSULIN LISPRO 2 UNITS: 100 INJECTION, SOLUTION INTRAVENOUS; SUBCUTANEOUS at 21:20

## 2021-01-01 RX ADMIN — MILRINONE LACTATE IN DEXTROSE 0.38 MCG/KG/MIN: 200 INJECTION, SOLUTION INTRAVENOUS at 15:21

## 2021-01-01 RX ADMIN — CARVEDILOL 25 MG: 12.5 TABLET, FILM COATED ORAL at 08:24

## 2021-01-01 RX ADMIN — TAMSULOSIN HYDROCHLORIDE 0.4 MG: 0.4 CAPSULE ORAL at 09:11

## 2021-01-01 RX ADMIN — FUROSEMIDE 40 MG: 10 INJECTION INTRAMUSCULAR; INTRAVENOUS at 22:50

## 2021-01-01 RX ADMIN — Medication 400 UNITS: at 09:08

## 2021-01-01 RX ADMIN — FUROSEMIDE 20 MG: 10 INJECTION, SOLUTION INTRAMUSCULAR; INTRAVENOUS at 14:54

## 2021-01-01 RX ADMIN — GUAIFENESIN 600 MG: 600 TABLET, EXTENDED RELEASE ORAL at 09:36

## 2021-01-01 RX ADMIN — GUAIFENESIN 600 MG: 600 TABLET, EXTENDED RELEASE ORAL at 20:46

## 2021-01-01 RX ADMIN — IPRATROPIUM BROMIDE AND ALBUTEROL SULFATE 3 ML: .5; 3 SOLUTION RESPIRATORY (INHALATION) at 15:39

## 2021-01-01 RX ADMIN — CARVEDILOL 25 MG: 12.5 TABLET, FILM COATED ORAL at 23:01

## 2021-01-01 RX ADMIN — Medication 400 UNITS: at 08:31

## 2021-01-01 RX ADMIN — MILRINONE LACTATE IN DEXTROSE 0.38 MCG/KG/MIN: 200 INJECTION, SOLUTION INTRAVENOUS at 11:31

## 2021-01-01 RX ADMIN — GUAIFENESIN 600 MG: 600 TABLET, EXTENDED RELEASE ORAL at 21:43

## 2021-01-01 RX ADMIN — ENOXAPARIN SODIUM 40 MG: 40 INJECTION SUBCUTANEOUS at 08:34

## 2021-01-01 RX ADMIN — AZITHROMYCIN MONOHYDRATE 500 MG: 500 INJECTION, POWDER, LYOPHILIZED, FOR SOLUTION INTRAVENOUS at 10:53

## 2021-01-01 RX ADMIN — ACETAMINOPHEN 650 MG: 325 TABLET ORAL at 01:09

## 2021-01-01 RX ADMIN — CYANOCOBALAMIN TAB 1000 MCG 2000 MCG: 1000 TAB at 08:29

## 2021-01-01 RX ADMIN — ATORVASTATIN CALCIUM 40 MG: 20 TABLET, FILM COATED ORAL at 09:36

## 2021-01-01 RX ADMIN — INSULIN LISPRO 3 UNITS: 100 INJECTION, SOLUTION INTRAVENOUS; SUBCUTANEOUS at 06:42

## 2021-01-01 RX ADMIN — MILRINONE LACTATE IN DEXTROSE 0.38 MCG/KG/MIN: 200 INJECTION, SOLUTION INTRAVENOUS at 12:29

## 2021-01-01 RX ADMIN — MILRINONE LACTATE IN DEXTROSE 0.38 MCG/KG/MIN: 200 INJECTION, SOLUTION INTRAVENOUS at 02:59

## 2021-01-01 RX ADMIN — CYANOCOBALAMIN TAB 1000 MCG 2000 MCG: 1000 TAB at 08:27

## 2021-01-01 RX ADMIN — INSULIN LISPRO 6 UNITS: 100 INJECTION, SOLUTION INTRAVENOUS; SUBCUTANEOUS at 16:52

## 2021-01-01 RX ADMIN — FUROSEMIDE 80 MG: 10 INJECTION, SOLUTION INTRAMUSCULAR; INTRAVENOUS at 09:07

## 2021-01-01 RX ADMIN — DOCUSATE SODIUM 50 MG AND SENNOSIDES 8.6 MG 2 TABLET: 8.6; 5 TABLET, FILM COATED ORAL at 08:15

## 2021-01-01 RX ADMIN — ACETAMINOPHEN 650 MG: 325 TABLET ORAL at 21:48

## 2021-01-01 RX ADMIN — POTASSIUM CHLORIDE 40 MEQ: 20 TABLET, EXTENDED RELEASE ORAL at 15:23

## 2021-01-01 RX ADMIN — APIXABAN 2.5 MG: 2.5 TABLET, FILM COATED ORAL at 09:57

## 2021-01-01 RX ADMIN — CARVEDILOL 25 MG: 12.5 TABLET, FILM COATED ORAL at 22:49

## 2021-01-01 RX ADMIN — CYANOCOBALAMIN TAB 1000 MCG 2000 MCG: 1000 TAB at 09:36

## 2021-01-01 RX ADMIN — FUROSEMIDE 40 MG: 10 INJECTION INTRAMUSCULAR; INTRAVENOUS at 08:16

## 2021-01-01 RX ADMIN — ATORVASTATIN CALCIUM 40 MG: 20 TABLET, FILM COATED ORAL at 08:24

## 2021-01-01 RX ADMIN — IMATINIB MESYLATE 100 MG: 100 TABLET, FILM COATED ORAL at 09:38

## 2021-01-01 RX ADMIN — GUAIFENESIN 600 MG: 600 TABLET, EXTENDED RELEASE ORAL at 22:09

## 2021-01-01 RX ADMIN — Medication 10 ML: at 07:14

## 2021-01-01 RX ADMIN — FUROSEMIDE 20 MG: 10 INJECTION, SOLUTION INTRAMUSCULAR; INTRAVENOUS at 22:47

## 2021-01-01 RX ADMIN — CARVEDILOL 25 MG: 12.5 TABLET, FILM COATED ORAL at 10:08

## 2021-01-01 RX ADMIN — DILTIAZEM HYDROCHLORIDE 22 MG: 5 INJECTION INTRAVENOUS at 09:13

## 2021-01-01 RX ADMIN — ATORVASTATIN CALCIUM 40 MG: 20 TABLET, FILM COATED ORAL at 09:08

## 2021-01-01 RX ADMIN — FUROSEMIDE 40 MG: 10 INJECTION, SOLUTION INTRAMUSCULAR; INTRAVENOUS at 08:27

## 2021-01-01 RX ADMIN — FAMOTIDINE 20 MG: 20 TABLET, FILM COATED ORAL at 08:35

## 2021-01-01 RX ADMIN — Medication 400 UNITS: at 08:27

## 2021-01-01 RX ADMIN — AMLODIPINE BESYLATE 5 MG: 5 TABLET ORAL at 08:34

## 2021-01-01 RX ADMIN — GUAIFENESIN 600 MG: 600 TABLET, EXTENDED RELEASE ORAL at 09:57

## 2021-01-01 RX ADMIN — ASPIRIN 81 MG: 81 TABLET, COATED ORAL at 08:24

## 2021-01-01 RX ADMIN — Medication 400 UNITS: at 09:36

## 2021-01-01 RX ADMIN — TAMSULOSIN HYDROCHLORIDE 0.4 MG: 0.4 CAPSULE ORAL at 08:27

## 2021-01-01 RX ADMIN — GUAIFENESIN 600 MG: 600 TABLET, EXTENDED RELEASE ORAL at 09:56

## 2021-01-01 RX ADMIN — MILRINONE LACTATE IN DEXTROSE 0.38 MCG/KG/MIN: 200 INJECTION, SOLUTION INTRAVENOUS at 21:09

## 2021-01-01 RX ADMIN — TAMSULOSIN HYDROCHLORIDE 0.4 MG: 0.4 CAPSULE ORAL at 08:41

## 2021-01-01 RX ADMIN — CYANOCOBALAMIN TAB 1000 MCG 2000 MCG: 1000 TAB at 09:07

## 2021-01-01 RX ADMIN — CHLOROTHIAZIDE SODIUM 500 MG: 500 INJECTION, POWDER, LYOPHILIZED, FOR SOLUTION INTRAVENOUS at 11:32

## 2021-01-01 RX ADMIN — Medication 10 ML: at 22:37

## 2021-01-01 RX ADMIN — DOXYCYCLINE 100 MG: 100 INJECTION, POWDER, LYOPHILIZED, FOR SOLUTION INTRAVENOUS at 20:40

## 2021-01-01 RX ADMIN — ENOXAPARIN SODIUM 140 MG: 100 INJECTION SUBCUTANEOUS at 09:11

## 2021-01-01 RX ADMIN — ACETAZOLAMIDE 250 MG: 250 TABLET ORAL at 09:57

## 2021-01-01 RX ADMIN — IMATINIB MESYLATE 100 MG: 100 TABLET, FILM COATED ORAL at 08:16

## 2021-01-01 RX ADMIN — ONDANSETRON 4 MG: 2 INJECTION INTRAMUSCULAR; INTRAVENOUS at 11:57

## 2021-01-01 RX ADMIN — CARVEDILOL 25 MG: 12.5 TABLET, FILM COATED ORAL at 22:43

## 2021-01-01 RX ADMIN — ENOXAPARIN SODIUM 140 MG: 100 INJECTION SUBCUTANEOUS at 08:32

## 2021-01-01 RX ADMIN — GUAIFENESIN 600 MG: 600 TABLET, EXTENDED RELEASE ORAL at 09:07

## 2021-01-01 RX ADMIN — GUAIFENESIN 600 MG: 600 TABLET, EXTENDED RELEASE ORAL at 08:40

## 2021-01-01 RX ADMIN — GABAPENTIN 100 MG: 100 CAPSULE ORAL at 22:36

## 2021-01-01 RX ADMIN — GUAIFENESIN 600 MG: 600 TABLET, EXTENDED RELEASE ORAL at 21:21

## 2021-01-01 RX ADMIN — CHLOROTHIAZIDE SODIUM 500 MG: 500 INJECTION, POWDER, LYOPHILIZED, FOR SOLUTION INTRAVENOUS at 12:16

## 2021-01-01 RX ADMIN — DOXYCYCLINE 100 MG: 100 INJECTION, POWDER, LYOPHILIZED, FOR SOLUTION INTRAVENOUS at 21:09

## 2021-01-01 RX ADMIN — ASPIRIN 81 MG: 81 TABLET, COATED ORAL at 09:11

## 2021-01-01 RX ADMIN — ATORVASTATIN CALCIUM 40 MG: 20 TABLET, FILM COATED ORAL at 08:26

## 2021-01-01 RX ADMIN — CYANOCOBALAMIN TAB 1000 MCG 2000 MCG: 1000 TAB at 09:11

## 2021-01-01 RX ADMIN — Medication 400 UNITS: at 08:15

## 2021-01-01 RX ADMIN — DOXYCYCLINE 100 MG: 100 INJECTION, POWDER, LYOPHILIZED, FOR SOLUTION INTRAVENOUS at 09:13

## 2021-01-01 RX ADMIN — CARVEDILOL 25 MG: 12.5 TABLET, FILM COATED ORAL at 09:54

## 2021-01-01 RX ADMIN — CARVEDILOL 25 MG: 12.5 TABLET, FILM COATED ORAL at 08:41

## 2021-01-01 RX ADMIN — Medication 400 UNITS: at 08:41

## 2021-01-01 RX ADMIN — TAMSULOSIN HYDROCHLORIDE 0.4 MG: 0.4 CAPSULE ORAL at 08:25

## 2021-01-01 RX ADMIN — FUROSEMIDE 80 MG: 10 INJECTION, SOLUTION INTRAMUSCULAR; INTRAVENOUS at 20:44

## 2021-01-01 RX ADMIN — FAMOTIDINE 20 MG: 20 TABLET ORAL at 09:36

## 2021-01-01 RX ADMIN — GUAIFENESIN 600 MG: 600 TABLET, EXTENDED RELEASE ORAL at 21:09

## 2021-01-01 RX ADMIN — GABAPENTIN 100 MG: 100 CAPSULE ORAL at 22:48

## 2021-01-01 RX ADMIN — INSULIN LISPRO 3 UNITS: 100 INJECTION, SOLUTION INTRAVENOUS; SUBCUTANEOUS at 06:45

## 2021-01-01 RX ADMIN — ACETAZOLAMIDE 250 MG: 250 TABLET ORAL at 12:25

## 2021-01-01 RX ADMIN — Medication 400 UNITS: at 09:58

## 2021-01-01 RX ADMIN — CARVEDILOL 25 MG: 12.5 TABLET, FILM COATED ORAL at 21:09

## 2021-01-01 RX ADMIN — FUROSEMIDE 60 MG: 10 INJECTION, SOLUTION INTRAMUSCULAR; INTRAVENOUS at 22:12

## 2021-01-01 RX ADMIN — ASPIRIN 81 MG: 81 TABLET, COATED ORAL at 08:31

## 2021-01-01 RX ADMIN — POLYETHYLENE GLYCOL 3350 17 G: 17 POWDER, FOR SOLUTION ORAL at 08:29

## 2021-01-01 RX ADMIN — INSULIN LISPRO 3 UNITS: 100 INJECTION, SOLUTION INTRAVENOUS; SUBCUTANEOUS at 17:08

## 2021-01-01 RX ADMIN — IMATINIB MESYLATE 100 MG: 100 TABLET, FILM COATED ORAL at 09:13

## 2021-01-01 RX ADMIN — Medication 10 ML: at 23:02

## 2021-01-01 RX ADMIN — FAMOTIDINE 20 MG: 20 TABLET, FILM COATED ORAL at 22:48

## 2021-01-01 RX ADMIN — DOXYCYCLINE 100 MG: 100 INJECTION, POWDER, LYOPHILIZED, FOR SOLUTION INTRAVENOUS at 08:32

## 2021-01-01 RX ADMIN — INSULIN LISPRO 6 UNITS: 100 INJECTION, SOLUTION INTRAVENOUS; SUBCUTANEOUS at 17:11

## 2021-01-01 RX ADMIN — INSULIN LISPRO 3 UNITS: 100 INJECTION, SOLUTION INTRAVENOUS; SUBCUTANEOUS at 17:33

## 2021-01-01 RX ADMIN — ASPIRIN 81 MG: 81 TABLET, COATED ORAL at 08:41

## 2021-01-01 RX ADMIN — ATORVASTATIN CALCIUM 40 MG: 20 TABLET, FILM COATED ORAL at 09:12

## 2021-01-01 RX ADMIN — CYANOCOBALAMIN TAB 1000 MCG 2000 MCG: 1000 TAB at 09:56

## 2021-01-01 RX ADMIN — CEFTRIAXONE SODIUM 2 G: 2 INJECTION, POWDER, FOR SOLUTION INTRAMUSCULAR; INTRAVENOUS at 10:49

## 2021-01-01 RX ADMIN — CHLOROTHIAZIDE SODIUM 500 MG: 500 INJECTION, POWDER, LYOPHILIZED, FOR SOLUTION INTRAVENOUS at 12:35

## 2021-01-01 RX ADMIN — Medication 10 ML: at 01:12

## 2021-01-01 RX ADMIN — TAMSULOSIN HYDROCHLORIDE 0.4 MG: 0.4 CAPSULE ORAL at 09:00

## 2021-01-01 RX ADMIN — FUROSEMIDE 80 MG: 10 INJECTION, SOLUTION INTRAMUSCULAR; INTRAVENOUS at 21:09

## 2021-01-01 RX ADMIN — MILRINONE LACTATE IN DEXTROSE 0.38 MCG/KG/MIN: 200 INJECTION, SOLUTION INTRAVENOUS at 17:51

## 2021-01-01 RX ADMIN — DOCUSATE SODIUM 50 MG AND SENNOSIDES 8.6 MG 2 TABLET: 8.6; 5 TABLET, FILM COATED ORAL at 09:56

## 2021-01-01 RX ADMIN — PERFLUTREN 1 ML: 6.52 INJECTION, SUSPENSION INTRAVENOUS at 13:58

## 2021-01-01 RX ADMIN — Medication 10 ML: at 06:04

## 2021-01-01 RX ADMIN — IMATINIB MESYLATE 100 MG: 100 TABLET, FILM COATED ORAL at 09:09

## 2021-01-01 RX ADMIN — ASPIRIN 81 MG: 81 TABLET, COATED ORAL at 08:27

## 2021-01-01 RX ADMIN — INSULIN LISPRO 3 UNITS: 100 INJECTION, SOLUTION INTRAVENOUS; SUBCUTANEOUS at 18:12

## 2021-01-01 RX ADMIN — Medication 400 UNITS: at 08:29

## 2021-01-01 RX ADMIN — INSULIN LISPRO 6 UNITS: 100 INJECTION, SOLUTION INTRAVENOUS; SUBCUTANEOUS at 17:51

## 2021-01-01 RX ADMIN — Medication 400 UNITS: at 09:11

## 2021-01-01 RX ADMIN — POLYETHYLENE GLYCOL 3350 17 G: 17 POWDER, FOR SOLUTION ORAL at 10:04

## 2021-01-01 RX ADMIN — POLYETHYLENE GLYCOL 3350 17 G: 17 POWDER, FOR SOLUTION ORAL at 08:40

## 2021-01-01 RX ADMIN — CARVEDILOL 25 MG: 12.5 TABLET, FILM COATED ORAL at 08:27

## 2021-01-01 RX ADMIN — GABAPENTIN 100 MG: 100 CAPSULE ORAL at 21:48

## 2021-01-01 RX ADMIN — FUROSEMIDE 40 MG: 10 INJECTION INTRAMUSCULAR; INTRAVENOUS at 08:29

## 2021-01-01 RX ADMIN — ACETAZOLAMIDE 250 MG: 250 TABLET ORAL at 12:42

## 2021-01-01 RX ADMIN — FAMOTIDINE 20 MG: 20 TABLET ORAL at 08:41

## 2021-01-01 RX ADMIN — IMATINIB MESYLATE 100 MG: 100 TABLET, FILM COATED ORAL at 08:42

## 2021-01-01 RX ADMIN — FUROSEMIDE 80 MG: 40 TABLET ORAL at 10:12

## 2021-01-01 RX ADMIN — GABAPENTIN 100 MG: 100 CAPSULE ORAL at 22:49

## 2021-01-01 RX ADMIN — Medication 400 UNITS: at 08:23

## 2021-07-11 PROBLEM — A41.9 SEPSIS (HCC): Status: ACTIVE | Noted: 2021-01-01

## 2021-07-11 PROBLEM — J18.9 PNEUMONIA: Status: ACTIVE | Noted: 2021-01-01

## 2021-07-11 PROBLEM — J81.1 PULMONARY EDEMA: Status: ACTIVE | Noted: 2021-01-01

## 2021-07-11 PROBLEM — I48.91 ATRIAL FIBRILLATION WITH RVR (HCC): Status: ACTIVE | Noted: 2021-01-01

## 2021-07-11 NOTE — PROGRESS NOTES
Problem: Falls - Risk of  Goal: *Absence of Falls  Description: Document Martha Tang Fall Risk and appropriate interventions in the flowsheet.   Outcome: Progressing Towards Goal  Note: Fall Risk Interventions:            Medication Interventions: Patient to call before getting OOB

## 2021-07-11 NOTE — PROGRESS NOTES
Bedside and Verbal shift change report given to Evie Rodriguez RN  (oncoming nurse) by Rg Cottrell RN  (offgoing nurse). Report included the following information SBAR, Kardex, Procedure Summary, Intake/Output, MAR and Recent Results.

## 2021-07-11 NOTE — ED PROVIDER NOTES
EMERGENCY DEPARTMENT HISTORY AND PHYSICAL EXAM    Date: 7/11/2021  Patient Name: Rao Heredia    History of Presenting Illness     Chief Complaint   Patient presents with    Shortness of Breath       History Provided By: Patient and Patient's Wife     History Nadeem Michelle):   9:05 AM  Rao Heredia is a 79 y.o. male with PMHX of CAD, A. fib, CML, diabetes, hypertension, TIA who presents to the emergency department C/O dyspnea onset 3 days ago. Associated sxs include bilateral leg swelling. Pt denies fever, chills, nausea, vomiting or any other sxs or complaints. Patient states his leg swelling has increased over the last several days. Chief Complaint: Dyspnea  Onset: 3 days ago  Timing: Acute  Context: Symptoms started spontaneously, symptoms have worsened since onset  Location: Lungs  Quality: Pressure  Severity: Moderate  Modifying Factors: Nothing makes it better, or worse. Associated Symptoms: Leg swelling    PCP: Haily Costa DO     Current Facility-Administered Medications   Medication Dose Route Frequency Provider Last Rate Last Admin    sodium chloride (NS) flush 5-10 mL  5-10 mL IntraVENous PRN Xuan Jones MD        cefTRIAXone (ROCEPHIN) 2 g in sterile water (preservative free) 20 mL IV syringe  2 g IntraVENous Q24H Xuan Jones MD        azithromycin (ZITHROMAX) 500 mg in 0.9% sodium chloride 250 mL (VIAL-MATE)  500 mg IntraVENous Q24H Xuan Jones MD         Current Outpatient Medications   Medication Sig Dispense Refill    furosemide (LASIX) 20 mg tablet Take 20 mg by mouth two (2) times a day.  aspirin delayed-release 81 mg tablet Take 81 mg by mouth daily.  imatinib (Gleevec) 100 mg tablet Take 100 mg by mouth daily.  cyanocobalamin 1,000 mcg tablet Take 2,000 mcg by mouth daily.  amLODIPine (NORVASC) 5 mg tablet Take 5 mg by mouth daily.  vitamin E (AQUA GEMS) 400 unit capsule Take 400 Units by mouth daily.       tamsulosin (FLOMAX) 0.4 mg capsule Take 0.4 mg by mouth daily.  nitroglycerin (NITROSTAT) 0.4 mg SL tablet 1 Tab by SubLINGual route as needed for Chest Pain. Up to 3 doses. 20 Tab 0    atorvastatin (LIPITOR) 40 mg tablet Take 40 mg by mouth daily.  carvedilol (COREG) 6.25 mg tablet Take 25 mg by mouth two (2) times a day. Past History         Past Medical History:  Past Medical History:   Diagnosis Date    CAD (coronary artery disease)     Chronic atrial fibrillation ([facility] [address] 2016    CML (chronic myelocytic leukemia) ([facility] [address]     Coagulation disorder (HCC)     bruises easily    Diabetes ([facility] [address]     Hypertension     Nausea & vomiting     with most recent surgery-carotid endardarectomy    TIA (transient ischemic attack) 2016       Past Surgical History:  Past Surgical History:   Procedure Laterality Date    HX CAROTID ENDARTERECTOMY Left 2017    HX CATARACT REMOVAL Bilateral     HX CORONARY ARTERY BYPASS GRAFT      HX HERNIA REPAIR Right     hernia    HX UROLOGICAL      greenlight laser    HX UROLOGICAL      kidney stone    HX UROLOGICAL      Lithotripsy x 2    DE CARDIAC SURG PROCEDURE UNLIST  3/2014    quadruple bypass       Family History:  Family History   Problem Relation Age of Onset    Post-op Nausea/Vomiting Brother    Reviewed and non-contributory    Social History:  Social History     Tobacco Use    Smoking status: Former Smoker     Packs/day: 1.00     Years: 60.00     Pack years: 60.00     Quit date: 3/23/2014     Years since quittin.3    Smokeless tobacco: Never Used   Substance Use Topics    Alcohol use: Yes     Alcohol/week: 2.0 standard drinks     Types: 2 Shots of liquor per week     Comment: per day    Drug use: No       Allergies:   Allergies   Allergen Reactions    Adhesive Tape-Silicones Other (comments)     Raw skin    Codeine Nausea and Vomiting    Iodinated Contrast Media Rash     patient states this was years ago - he states that he can tolerate the newer agents Review of Systems      Review of Systems   Constitutional: Negative for chills and fever. HENT: Negative for rhinorrhea and sore throat. Eyes: Negative for pain and visual disturbance. Respiratory: Positive for chest tightness and shortness of breath. Negative for wheezing. Cardiovascular: Positive for palpitations. Negative for chest pain. Gastrointestinal: Negative for abdominal pain, diarrhea, nausea and vomiting. Musculoskeletal: Negative for arthralgias and myalgias. Skin: Negative for rash and wound. Neurological: Negative for speech difficulty, light-headedness and headaches. Psychiatric/Behavioral: Negative for agitation and confusion. All other systems reviewed and are negative. Physical Exam     Vitals:    07/11/21 0943 07/11/21 0944 07/11/21 0944 07/11/21 0945   BP:    114/61   Pulse: 71 74  79   Resp: 22 23  23   Temp:       SpO2: 97% 97% 97% 97%   Weight:       Height:           Physical Exam  Vitals and nursing note reviewed. Constitutional:       General: He is not in acute distress. Appearance: Normal appearance. He is normal weight. He is not ill-appearing. HENT:      Head: Normocephalic and atraumatic. Nose: Nose normal. No rhinorrhea. Mouth/Throat:      Mouth: Mucous membranes are moist.      Pharynx: No oropharyngeal exudate or posterior oropharyngeal erythema. Eyes:      Extraocular Movements: Extraocular movements intact. Conjunctiva/sclera: Conjunctivae normal.      Pupils: Pupils are equal, round, and reactive to light. Cardiovascular:      Rate and Rhythm: Tachycardia present. Rhythm irregularly irregular. Heart sounds: No murmur heard. No friction rub. No gallop. Pulmonary:      Effort: Pulmonary effort is normal. No respiratory distress. Breath sounds: Examination of the right-middle field reveals wheezing. Examination of the left-middle field reveals wheezing.  Examination of the right-lower field reveals decreased breath sounds. Examination of the left-lower field reveals wheezing. Decreased breath sounds and wheezing present. No rhonchi or rales. Abdominal:      General: Bowel sounds are normal.      Palpations: Abdomen is soft. Tenderness: There is no abdominal tenderness. There is no guarding or rebound. Musculoskeletal:         General: No swelling, tenderness or deformity. Normal range of motion. Cervical back: Normal range of motion and neck supple. No rigidity. Right lower leg: 3+ Pitting Edema present. Left lower leg: 3+ Pitting Edema present. Lymphadenopathy:      Cervical: No cervical adenopathy. Skin:     General: Skin is warm and dry. Findings: No rash. Neurological:      General: No focal deficit present. Mental Status: He is alert and oriented to person, place, and time. Psychiatric:         Mood and Affect: Mood normal.         Behavior: Behavior normal.         Diagnostic Study Results     Labs -     Recent Results (from the past 12 hour(s))   EKG, 12 LEAD, INITIAL    Collection Time: 07/11/21  8:51 AM   Result Value Ref Range    Ventricular Rate 110 BPM    Atrial Rate 111 BPM    QRS Duration 88 ms    Q-T Interval 294 ms    QTC Calculation (Bezet) 397 ms    Calculated R Axis -42 degrees    Calculated T Axis 153 degrees    Diagnosis       Atrial fibrillation with rapid ventricular response  Left axis deviation  Anterior infarct , age undetermined  T wave abnormality, consider lateral ischemia  Abnormal ECG  When compared with ECG of 12-DEC-2020 15:17,  Atrial fibrillation has replaced Sinus rhythm  Vent.  rate has increased BY  44 BPM  QRS axis shifted left     CBC WITH AUTOMATED DIFF    Collection Time: 07/11/21  8:55 AM   Result Value Ref Range    WBC 43.0 (H) 4.6 - 13.2 K/uL    RBC 5.33 4.35 - 5.65 M/uL    HGB 14.1 13.0 - 16.0 g/dL    HCT 47.7 36.0 - 48.0 %    MCV 89.5 74.0 - 97.0 FL    MCH 26.5 24.0 - 34.0 PG    MCHC 29.6 (L) 31.0 - 37.0 g/dL    RDW 16.6 (H) 11.6 - 14.5 %    PLATELET 368 663 - 322 K/uL    MPV 13.4 (H) 9.2 - 11.8 FL    NEUTROPHILS 75 (H) 40 - 73 %    BAND NEUTROPHILS 3 0 - 5 %    LYMPHOCYTES 2 (L) 21 - 52 %    MONOCYTES 12 (H) 3 - 10 %    EOSINOPHILS 0 0 - 5 %    BASOPHILS 0 0 - 2 %    METAMYELOCYTES 6 (H) 0 %    MYELOCYTES 2 (H) 0 %    ABS. NEUTROPHILS 33.5 (H) 1.8 - 8.0 K/UL    ABS. LYMPHOCYTES 0.9 0.9 - 3.6 K/UL    ABS. MONOCYTES 5.2 (H) 0.05 - 1.2 K/UL    ABS. EOSINOPHILS 0.0 0.0 - 0.4 K/UL    ABS. BASOPHILS 0.0 0.0 - 0.1 K/UL    DF MANUAL      PLATELET COMMENTS DECREASED PLATELETS      RBC COMMENTS ANISOCYTOSIS  1+        RBC COMMENTS POIKILOCYTOSIS  1+        WBC COMMENTS VACUOLATED POLYS     METABOLIC PANEL, COMPREHENSIVE    Collection Time: 07/11/21  8:55 AM   Result Value Ref Range    Sodium 138 136 - 145 mmol/L    Potassium 4.3 3.5 - 5.5 mmol/L    Chloride 101 100 - 111 mmol/L    CO2 31 21 - 32 mmol/L    Anion gap 6 3.0 - 18 mmol/L    Glucose 228 (H) 74 - 99 mg/dL    BUN 41 (H) 7.0 - 18 MG/DL    Creatinine 1.67 (H) 0.6 - 1.3 MG/DL    BUN/Creatinine ratio 25 (H) 12 - 20      GFR est AA 48 (L) >60 ml/min/1.73m2    GFR est non-AA 40 (L) >60 ml/min/1.73m2    Calcium 9.5 8.5 - 10.1 MG/DL    Bilirubin, total 0.5 0.2 - 1.0 MG/DL    ALT (SGPT) 22 16 - 61 U/L    AST (SGOT) 30 10 - 38 U/L    Alk.  phosphatase 133 (H) 45 - 117 U/L    Protein, total 7.3 6.4 - 8.2 g/dL    Albumin 3.3 (L) 3.4 - 5.0 g/dL    Globulin 4.0 2.0 - 4.0 g/dL    A-G Ratio 0.8 0.8 - 1.7     MAGNESIUM    Collection Time: 07/11/21  8:55 AM   Result Value Ref Range    Magnesium 2.3 1.6 - 2.6 mg/dL   NT-PRO BNP    Collection Time: 07/11/21  8:55 AM   Result Value Ref Range    NT pro-BNP 11,337 (H) 0 - 1,800 PG/ML   CARDIAC PANEL,(CK, CKMB & TROPONIN)    Collection Time: 07/11/21  8:55 AM   Result Value Ref Range    CK - MB 2.9 <3.6 ng/ml    CK-MB Index 3.7 0.0 - 4.0 %    CK 79 39 - 308 U/L    Troponin-I, QT <0.02 0.0 - 0.045 NG/ML   PROTHROMBIN TIME + INR    Collection Time: 07/11/21 8:55 AM   Result Value Ref Range    Prothrombin time 16.2 (H) 11.5 - 15.2 sec    INR 1.3 (H) 0.8 - 1.2     PTT    Collection Time: 07/11/21  8:55 AM   Result Value Ref Range    aPTT 27.1 23.0 - 36.4 SEC       Radiologic Studies -     9:10 AM  RADIOLOGY FINDINGS  Portable chest x-ray shows cardiomegaly, right-sided pleural effusion, pulmonary edema. Pending review by Radiologist  Recorded by Dirk Perea MD.    XR CHEST PORT   Final Result         1. Bilateral airspace disease         2. Small bilateral pleural effusions           CT Results  (Last 48 hours)    None        CXR Results  (Last 48 hours)               07/11/21 0908  XR CHEST PORT Final result    Impression:          1. Bilateral airspace disease           2. Small bilateral pleural effusions           Narrative:  EXAM: CHEST RADIOGRAPH       CLINICAL INDICATION/HISTORY: Dyspnoea   -Additional: None       COMPARISON: December 12, 2020       TECHNIQUE: Portable frontal view of the chest       _______________       FINDINGS:       SUPPORT DEVICES: None. HEART AND MEDIASTINUM: Prior median sternotomy. No appreciable cardiomegaly. Remaining mediastinal contours within normal limits. LUNGS AND PLEURAL SPACES: There are patchy bilateral opacities as well as   blunting of the costophrenic sulci.        BONY THORAX AND SOFT TISSUES: Unremarkable.       _______________                 Medications given in the ED-  Medications   sodium chloride (NS) flush 5-10 mL (has no administration in time range)   cefTRIAXone (ROCEPHIN) 2 g in sterile water (preservative free) 20 mL IV syringe (has no administration in time range)   azithromycin (ZITHROMAX) 500 mg in 0.9% sodium chloride 250 mL (VIAL-MATE) (has no administration in time range)   furosemide (LASIX) injection 40 mg (40 mg IntraVENous Given 7/11/21 0917)   dilTIAZem (CARDIZEM) injection 22 mg (22 mg IntraVENous Given 7/11/21 0913)         Medical Decision Making   I am the first provider for this patient. I reviewed the vital signs, available nursing notes, past medical history, past surgical history, family history and social history. Vital Signs-Reviewed the patient's vital signs. Records Reviewed: Nursing Notes    Pulse Oximetry Analysis - 99% on 2 L/min nasal cannula    Cardiac Monitor:  Rate: 103 bpm  Rhythm: Atrial flutter with RVR    EKG interpretation: (Preliminary)  Rhythm: A. fib with RVR. Rate: 110 bpm; no STEMI  EKG read by Rob Keller MD at 8:51 AM    Provider Notes (Medical Decision Making):   DDX: Pulmonary edema, pleural effusion, A. fib with RVR, CHF, ACS    Procedures:  Procedures    ED Course:   9:05 AM Initial assessment performed. The patients presenting problems have been discussed, and they are in agreement with the care plan formulated and outlined with them. I have encouraged them to ask questions as they arise throughout their visit. SEPSIS ASSESSMENT NOTE:   9:30 AM  Rob Keller MD, has seen and assessed the patient as follows:    Capillary Refill:normal/brisk  Cardiopulmonary Evaluation:   Lung Sounds: wheezing, dull/diminished   Cardiac Sounds: rapid rate, irregular rhythm  Peripheral Pulses: present  Skin Exam: pink, warm    Visit Vitals  /61   Pulse 79   Temp 97.8 °F (36.6 °C)   Resp 23   Ht 5' 8\" (1.727 m)   Wt 88.9 kg (196 lb)   SpO2 97%   BMI 29.80 kg/m²     10:20 AM  Antibiotics were started. 10:20 AM  Fluids WERE NOT started due to CHF and fluid overload. 10:25 AM Pt has had both covid shots 3 months ago. 10:41 AM Discussed with Dr. Miguel Angel Reynolds for telemetry admission. Diagnosis and Disposition     Discussion:  79 y.o. male with 3 days of acute dyspnea he has profound elevation of his white blood cell count which may be due to CML or due to his sepsis and pneumonia. Patient also has an elevated proBNP indicating that he may be in heart failure and is clinically fluid overloaded.   Patient was diuresed with Lasix and started on IV antibiotics with the sepsis fluid bolus was withheld due to his fluid overload status. Patient's heart rate was improved with Cardizem which helped with his breathing as well. This is a complicated patient who appears to be a mixed pneumonia, pulmonary edema and A. fib with RVR. Discussed with hospitalist for telemetry admission. Patient and family understand and agree with the need for admission. Critical Care Time:   I have spent 66 minutes of critical care time involved in lab review, consultations with specialist, family decision-making, and documentation. During this entire length of time I was immediately available to the patient. Critical Care: The reason for providing this level of medical care for this critically ill patient was due a critical illness that impaired one or more vital organ systems such that there was a high probability of imminent or life threatening deterioration in the patients condition. This care involved high complexity decision making to assess, manipulate, and support vital system functions, to treat this degreee vital     Critical Care Time: 66 minutes    Core Measures:  For Hospitalized Patients:   1. Hospitalization Decision Time:  The decision to hospitalize the patient was made by Krista Sadler MD, MD at 10:20 AM on 7/11/2021    2. Aspirin: Aspirin was not given because the patient did not present with a stroke at the time of their Emergency Department evaluation    10:41 AM Patient is being admitted to the hospital by Dr. Kerri Hinojosa. The results of their tests and reasons for their admission have been discussed with them and/or available family. They convey agreement and understanding for the need to be admitted and for their admission diagnosis. CONDITIONS ON ADMISSION:  Sepsis is present at the time of admission. Deep Vein Thrombosis is not present at the time of admission. Thrombosis is not present at the time of admission.  Urinary Tract Infection is not present at the time of admission. Pneumonia is present at the time of admission. MRSA is not present at the time of admission. Wound infection is not present at the time of admission. Pressure Ulcer is not present at the time of admission. CLINICAL IMPRESSION:    1. Atrial fibrillation with RVR ([address]    2. Peripheral edema    3. Pneumonia of both lungs due to infectious organism, unspecified part of lung    4. Sepsis, due to unspecified organism, unspecified whether acute organ dysfunction present [facility])      Dragon Disclaimer     Please note that this dictation was completed with [employer], the computer voice recognition software. Quite often unanticipated grammatical, syntax, homophones, and other interpretive errors are inadvertently transcribed by the computer software. Please disregard these errors. Please excuse any errors that have escaped final proofreading.     Jah Motta MD

## 2021-07-11 NOTE — H&P
HISTORY AND PHYSICAL EXAMINATION      Assessment:     Patient Active Problem List    Diagnosis Date Noted    Pneumonia 07/11/2021    Pulmonary edema 07/11/2021    Sepsis ([facility] 07/11/2021    Atrial fibrillation with RVR ([facility] 07/11/2021    Community acquired pneumonia 12/12/2020    Chronic congestive heart failure ([facility] 12/12/2020    SOB (shortness of breath) 12/12/2020    On supplemental oxygen by nasal cannula 12/12/2020    Hyperglycemia 09/06/2018    Elevated blood pressure reading with diagnosis of hypertension 09/06/2018    Stage 3 chronic kidney disease ([facility] 08/10/2018    Pancytopenia ([facility] 09/02/2016    CML (chronic myelocytic leukemia) ([facility] 09/02/2016    HTN (hypertension) 09/02/2016    Chronic atrial fibrillation ([facility] 09/02/2016    TIA (transient ischemic attack) 08/11/2016    Coronary artery disease involving native coronary artery 08/11/2016    Type II diabetes mellitus, uncontrolled ([facility] 08/11/2016    Dyslipidemia 08/11/2016     Impression    1. Acute respiratory distress/sepsis    2. Multiple bilateral pneumonia with marked leukocytosis    3. Acute diastolic CHF extubation    4. New onset atrial fibrillation    5. Hypertension    6. Peripheral edema    7. History of CML with with last white blood cell on December 12, 2020 was 14.5. Current white blood cell is 43    8. Acute kidney injury with chronic kidney disease although baseline creatinine is not available. Plan:     Plan    Patient will be admitted to telemetry floor. We will start patient on IV empiric antibiotic. Cultures has been obtained. Also start patient on IV Lasix. Obtain echocardiogram.    He does appear to be in atrial fibrillation in which does appear to be new onset. Continue patient on beta-blocker with rate control. We will start patient on Lovenox and consider oral anticoagulation at discharge. Continue other medications as ordered.   DVT prophylaxis subcu Lovenox    Advanced care planning/CODE STATUS reviewed with the patient and the wife at the bedside and he remains full code    The plan was discussed with the patient and the wife at the bedside and all the questions been answered. Subjective:      Mr. Lexa Ballard is a very pleasant 79-year-old male with a past medical history significant for coronary artery disease, CML, diabetes mellitus, hypertension, history of TIAs in the past.  Patient was brought to the emergency room after having 2 to 3 days history of progressive shortness of breath with the cough and increasing lower extremity swelling subjective fever and chills. Upon evaluation in the emergency room he was found to have a bilateral pneumonia along with his CHF extubation hence inpatient hospitalization has been requested. Patient states he is fully vaccinated with Covid vaccination. Patient wife is at bedside.          Past Medical History:   Diagnosis Date    CAD (coronary artery disease) 2014    Chronic atrial fibrillation ([facility] 9/2/2016    CML (chronic myelocytic leukemia) (HCC)     Coagulation disorder (HCC)     bruises easily    Diabetes ([facility] 2012    Hypertension     Nausea & vomiting     with most recent surgery-carotid endardarectomy    TIA (transient ischemic attack) 8/11/2016       Past Surgical History:   Procedure Laterality Date    HX CAROTID ENDARTERECTOMY Left 05/2017    HX CATARACT REMOVAL Bilateral     HX CORONARY ARTERY BYPASS GRAFT      HX HERNIA REPAIR Right     hernia    HX UROLOGICAL      greenlight laser    HX UROLOGICAL      kidney stone    HX UROLOGICAL      Lithotripsy x 2    [place] CARDIAC SURG PROCEDURE UNLIST  3/2014    quadruple bypass       Allergies   Allergen Reactions    Adhesive Tape-Silicones Other (comments)     Raw skin    Codeine Nausea and Vomiting    Iodinated Contrast Media Rash     patient states this was years ago - he states that he can tolerate the newer agents       Current Facility-Administered Medications Medication Dose Route Frequency Provider Last Rate Last Admin    sodium chloride (NS) flush 5-10 mL  5-10 mL IntraVENous PRN Bud Villa MD        cefTRIAXone (ROCEPHIN) 2 g in sterile water (preservative free) 20 mL IV syringe  2 g IntraVENous Q24H Bud Villa MD   2 g at 07/11/21 1049    azithromycin (ZITHROMAX) 500 mg in 0.9% sodium chloride 250 mL (VIAL-MATE)  500 mg IntraVENous Q24H Bud Villa  mL/hr at 07/11/21 1053 500 mg at 07/11/21 1053    ondansetron (ZOFRAN) injection 4 mg  4 mg IntraVENous Q6H PRN Anusha Syed MD       [facility] [START ON 7/12/2021] amLODIPine (NORVASC) tablet 5 mg  5 mg Oral DAILY Anusha Syed MD        [START ON 7/12/2021] aspirin delayed-release tablet 81 mg  81 mg Oral DAILY Anusha Syed MD       [facility] ON 7/12/2021] atorvastatin (LIPITOR) tablet 40 mg  40 mg Oral DAILY Anusha Syed MD        carvediloL (COREG) tablet 25 mg  25 mg Oral BID Anusha Syed MD       [facility] ON 7/12/2021] tamsulosin (FLOMAX) capsule 0.4 mg  0.4 mg Oral DAILY Anusha Syed MD        sodium chloride (NS) flush 5-40 mL  5-40 mL IntraVENous Q8H Anusha Syed MD        sodium chloride (NS) flush 5-40 mL  5-40 mL IntraVENous PRN Anusha Syed MD        sodium chloride (NS) flush 5-40 mL  5-40 mL IntraVENous Q8H Anusha Syed MD        sodium chloride (NS) flush 5-40 mL  5-40 mL IntraVENous PRN Anusha Syed MD        acetaminophen (TYLENOL) tablet 650 mg  650 mg Oral Q6H PRN Anusha Syed MD        Or   [facility] acetaminophen (TYLENOL) suppository 650 mg  650 mg Rectal Q6H PRN Anusha Syed MD        polyethylene glycol (MIRALAX) packet 17 g  17 g Oral DAILY PRN Anusha Syed MD        ondansetron (ZOFRAN ODT) tablet 4 mg  4 mg Oral Q8H PRN Anusha Syed MD        Or    [facility]ron [facility] injection 4 mg  4 mg IntraVENous Q6H PRN Anusha Syed MD        [START ON 7/12/2021] enoxaparin (LOVENOX) injection 40 mg  40 mg SubCUTAneous DAILY Deanne Leger MD        azithromycin [facility]) 500 mg in 0.9% sodium chloride 250 mL (VIAL-MATE)  500 mg IntraVENous Q24H Deanne Leger MD        cefTRIAXone (ROCEPHIN) 1 g in sterile water (preservative free) 10 mL IV syringe  1 g IntraVENous Q24H Deanne Leger MD        acetaminophen (TYLENOL) tablet 650 mg  650 mg Oral Q4H PRN Deanne Leger MD        famotidine (PEPCID) tablet 20 mg  20 mg Oral BID Deanne Leger MD        furosemide (LASIX) injection 20 mg  20 mg IntraVENous Q12H Deanne Leger MD           Prior to Admission Medications   Prescriptions Last Dose Informant Patient Reported? Taking? amLODIPine (NORVASC) 5 mg tablet 7/10/2021 at Unknown time  Yes Yes   Sig: Take 5 mg by mouth daily. aspirin delayed-release 81 mg tablet 7/10/2021 at Unknown time  Yes Yes   Sig: Take 81 mg by mouth daily. atorvastatin (LIPITOR) 40 mg tablet 7/10/2021 at Unknown time  Yes Yes   Sig: Take 40 mg by mouth daily. carvedilol (COREG) 6.25 mg tablet 7/10/2021 at Unknown time  Yes Yes   Sig: Take 25 mg by mouth two (2) times a day. cyanocobalamin 1,000 mcg tablet 7/10/2021 at Unknown time  Yes Yes   Sig: Take 2,000 mcg by mouth daily. furosemide (LASIX) 20 mg tablet 7/10/2021 at Unknown time  Yes Yes   Sig: Take 20 mg by mouth two (2) times a day. imatinib (Gleevec) 100 mg tablet 7/10/2021 at Unknown time  Yes Yes   Sig: Take 100 mg by mouth daily. nitroglycerin (NITROSTAT) 0.4 mg SL tablet   No No   Si Tab by SubLINGual route as needed for Chest Pain. Up to 3 doses. tamsulosin (FLOMAX) 0.4 mg capsule 7/10/2021 at Unknown time  Yes Yes   Sig: Take 0.4 mg by mouth daily. vitamin E (AQUA GEMS) 400 unit capsule 7/10/2021 at Unknown time  Yes Yes   Sig: Take 400 Units by mouth daily.       Facility-Administered Medications: None       Social History     Socioeconomic History    Marital status:      Spouse name: Not on file    Number of children: Not on file    Years of education: Not on file    Highest education level: Not on file   Occupational History    Not on file   Tobacco Use    Smoking status: Former Smoker     Packs/day: 1.00     Years: 60.00     Pack years: 60.00     Quit date: 3/23/2014     Years since quittin.3    Smokeless tobacco: Never Used   Substance and Sexual Activity    Alcohol use: Yes     Alcohol/week: 2.0 standard drinks     Types: 2 Shots of liquor per week     Comment: per day    Drug use: No    Sexual activity: Never   Other Topics Concern    Not on file   Social History Narrative    Not on file     Social Determinants of Health     Financial Resource Strain:     Difficulty of Paying Living Expenses:    Food Insecurity:     Worried About Running Out of Food in the Last Year:     [address] in the Last Year:    Transportation Needs:     Lack of Transportation (Medical):  Lack of Transportation (Non-Medical):    Physical Activity:     Days of Exercise per Week:     Minutes of Exercise per Session:    Stress:     Feeling of Stress :    Social Connections:     Frequency of Communication with Friends and Family:     Frequency of Social Gatherings with Friends and Family:     Attends [employer] Services:     Active Member of Clubs or Organizations:     Attends Club or Organization Meetings:     Marital Status:    Intimate Partner Violence:     Fear of Current or Ex-Partner:     Emotionally Abused:     Physically Abused:     Sexually Abused:        Family History   Problem Relation Age of Onset    Post-op Nausea/Vomiting Brother            Review of Systems   Constitutional: Positive for fever and malaise/fatigue. HENT: Negative. Eyes: Negative. Respiratory: Positive for cough, sputum production and shortness of breath. Cardiovascular: Positive for orthopnea and leg swelling. Negative for chest pain and palpitations. Gastrointestinal: Negative. Negative for abdominal pain, diarrhea and heartburn.    Genitourinary: Negative for dysuria and hematuria. Skin: Negative. Neurological: Positive for weakness. Psychiatric/Behavioral: Negative for depression, substance abuse and suicidal ideas. The patient is not nervous/anxious. Objective:     Patient Vitals for the past 24 hrs:   BP Temp Pulse Resp SpO2 Height Weight   07/11/21 1216 113/76 97.5 °F (36.4 °C) 86 20 99 %     07/11/21 1121 136/86 97.5 °F (36.4 °C) 83 20 98 %     07/11/21 1100 136/86  83 27 98 %     07/11/21 0945 114/61  79 23 97 %     07/11/21 0944     97 %     07/11/21 0944   74 23 97 %     07/11/21 0943   71 22 97 %     07/11/21 0930 123/60  69 (!) 31 100 %     07/11/21 0917 137/85  83       07/11/21 0913 137/85  (!) 113       07/11/21 0900  97.8 °F (36.6 °C)        07/11/21 0852 124/81  (!) 103 16 99 % 5' 8\" (1.727 m) 88.9 kg (196 lb)           Extended / Orthostatic Vitals:    Vital Signs  Level of Consciousness: Alert (0) (07/11/21 1216)  Temp: 97.5 °F (36.4 °C) (07/11/21 1216)  Temp Source: Oral (07/11/21 1216)  Pulse (Heart Rate): 86 (07/11/21 1216)  Heart Rate Source: Monitor (07/11/21 1216)  Cardiac Rhythm: Atrial Fib (07/11/21 1216)  Resp Rate: 20 (07/11/21 1216)  BP: 113/76 (07/11/21 1216)  MAP (Monitor): 99 (07/11/21 1100)  MAP (Calculated): 88 (07/11/21 1216)  BP 1 Location: Right upper arm (07/11/21 1216)  BP 1 Method: Automatic (07/11/21 1216)  BP Patient Position: At rest (07/11/21 1216)  MEWS Score: 1 (07/11/21 1216)         Oxygen Therapy  O2 Sat (%): 99 % (07/11/21 1216)  Pulse via Oximetry: 78 beats per minute (07/11/21 1100)  O2 Device: Nasal cannula (07/11/21 1216)  O2 Flow Rate (L/min): 2 l/min (07/11/21 1216)     Physical Exam  Vitals and nursing note reviewed. Constitutional:       General: He is in acute distress. Appearance: He is ill-appearing. Neck:      Vascular: No JVD. Cardiovascular:      Rate and Rhythm: Tachycardia present. Rhythm irregular.       Heart sounds: Normal heart sounds. Pulmonary:      Effort: Respiratory distress present. Breath sounds: Normal breath sounds. Abdominal:      General: There is no distension. Palpations: Abdomen is soft. Tenderness: There is abdominal tenderness. There is no rebound. Musculoskeletal:         General: Normal range of motion. Cervical back: Normal range of motion and neck supple. Right lower leg: Edema present. Left lower leg: Edema present. Skin:     General: Skin is warm and dry. Neurological:      Mental Status: He is alert and oriented to person, place, and time. Motor: Weakness present. Psychiatric:         Mood and Affect: Affect normal.         Laboratory:     Recent Results (from the past 24 hour(s))   EKG, 12 LEAD, INITIAL    Collection Time: 07/11/21  8:51 AM   Result Value Ref Range    Ventricular Rate 110 BPM    Atrial Rate 111 BPM    QRS Duration 88 ms    Q-T Interval 294 ms    QTC Calculation (Bezet) 397 ms    Calculated R Axis -42 degrees    Calculated T Axis 153 degrees    Diagnosis       Atrial fibrillation with rapid ventricular response  Left axis deviation  Anterior infarct , age undetermined  T wave abnormality, consider lateral ischemia  Abnormal ECG  When compared with ECG of 12-DEC-2020 15:17,  Atrial fibrillation has replaced Sinus rhythm  Vent.  rate has increased BY  44 BPM  QRS axis shifted left     CBC WITH AUTOMATED DIFF    Collection Time: 07/11/21  8:55 AM   Result Value Ref Range    WBC 43.0 (H) 4.6 - 13.2 K/uL    RBC 5.33 4.35 - 5.65 M/uL    HGB 14.1 13.0 - 16.0 g/dL    HCT 47.7 36.0 - 48.0 %    MCV 89.5 74.0 - 97.0 FL    MCH 26.5 24.0 - 34.0 PG    MCHC 29.6 (L) 31.0 - 37.0 g/dL    RDW 16.6 (H) 11.6 - 14.5 %    PLATELET 745 907 - 094 K/uL    MPV 13.4 (H) 9.2 - 11.8 FL    NEUTROPHILS 75 (H) 40 - 73 %    BAND NEUTROPHILS 3 0 - 5 %    LYMPHOCYTES 2 (L) 21 - 52 %    MONOCYTES 12 (H) 3 - 10 %    EOSINOPHILS 0 0 - 5 %    BASOPHILS 0 0 - 2 %    METAMYELOCYTES 6 (H) 0 % MYELOCYTES 2 (H) 0 %    ABS. NEUTROPHILS 33.5 (H) 1.8 - 8.0 K/UL    ABS. LYMPHOCYTES 0.9 0.9 - 3.6 K/UL    ABS. MONOCYTES 5.2 (H) 0.05 - 1.2 K/UL    ABS. EOSINOPHILS 0.0 0.0 - 0.4 K/UL    ABS. BASOPHILS 0.0 0.0 - 0.1 K/UL    DF MANUAL      PLATELET COMMENTS DECREASED PLATELETS      RBC COMMENTS ANISOCYTOSIS  1+        RBC COMMENTS POIKILOCYTOSIS  1+        WBC COMMENTS VACUOLATED POLYS     METABOLIC PANEL, COMPREHENSIVE    Collection Time: 07/11/21  8:55 AM   Result Value Ref Range    Sodium 138 136 - 145 mmol/L    Potassium 4.3 3.5 - 5.5 mmol/L    Chloride 101 100 - 111 mmol/L    CO2 31 21 - 32 mmol/L    Anion gap 6 3.0 - 18 mmol/L    Glucose 228 (H) 74 - 99 mg/dL    BUN 41 (H) 7.0 - 18 MG/DL    Creatinine 1.67 (H) 0.6 - 1.3 MG/DL    BUN/Creatinine ratio 25 (H) 12 - 20      GFR est AA 48 (L) >60 ml/min/1.73m2    GFR est non-AA 40 (L) >60 ml/min/1.73m2    Calcium 9.5 8.5 - 10.1 MG/DL    Bilirubin, total 0.5 0.2 - 1.0 MG/DL    ALT (SGPT) 22 16 - 61 U/L    AST (SGOT) 30 10 - 38 U/L    Alk.  phosphatase 133 (H) 45 - 117 U/L    Protein, total 7.3 6.4 - 8.2 g/dL    Albumin 3.3 (L) 3.4 - 5.0 g/dL    Globulin 4.0 2.0 - 4.0 g/dL    A-G Ratio 0.8 0.8 - 1.7     MAGNESIUM    Collection Time: 07/11/21  8:55 AM   Result Value Ref Range    Magnesium 2.3 1.6 - 2.6 mg/dL   NT-PRO BNP    Collection Time: 07/11/21  8:55 AM   Result Value Ref Range    NT pro-BNP 11,337 (H) 0 - 1,800 PG/ML   CARDIAC PANEL,(CK, CKMB & TROPONIN)    Collection Time: 07/11/21  8:55 AM   Result Value Ref Range    CK - MB 2.9 <3.6 ng/ml    CK-MB Index 3.7 0.0 - 4.0 %    CK 79 39 - 308 U/L    Troponin-I, QT <0.02 0.0 - 0.045 NG/ML   PROTHROMBIN TIME + INR    Collection Time: 07/11/21  8:55 AM   Result Value Ref Range    Prothrombin time 16.2 (H) 11.5 - 15.2 sec    INR 1.3 (H) 0.8 - 1.2     PTT    Collection Time: 07/11/21  8:55 AM   Result Value Ref Range    aPTT 27.1 23.0 - 36.4 SEC   LACTIC ACID    Collection Time: 07/11/21 10:15 AM   Result Value Ref Range Lactic acid 1.3 0.4 - 2.0 MMOL/L   URINALYSIS W/ RFLX MICROSCOPIC    Collection Time: 07/11/21 10:59 AM   Result Value Ref Range    Color YELLOW      Appearance CLEAR      Specific gravity 1.016 1.005 - 1.030      pH (UA) 5.0 5.0 - 8.0      Protein 300 (A) NEG mg/dL    Glucose Negative NEG mg/dL    Ketone Negative NEG mg/dL    Bilirubin Negative NEG      Blood TRACE (A) NEG      Urobilinogen 1.0 0.2 - 1.0 EU/dL    Nitrites Negative NEG      Leukocyte Esterase Negative NEG     COVID-19 RAPID TEST    Collection Time: 07/11/21 10:59 AM   Result Value Ref Range    Specimen source Nasopharyngeal      COVID-19 rapid test Not detected NOTD     URINE MICROSCOPIC ONLY    Collection Time: 07/11/21 10:59 AM   Result Value Ref Range    WBC 0 to 3 0 - 5 /hpf    RBC 3 to 6 0 - 5 /hpf    Epithelial cells FEW 0 - 5 /lpf    Bacteria NONE SEEN NEG /hpf    Hyaline cast 11 to 20 0 - 2 /lpf         Imaging/Procedures:     Significant Diagnostic Studies: XR CHEST PORT    Result Date: 7/11/2021  EXAM: CHEST RADIOGRAPH CLINICAL INDICATION/HISTORY: Dyspnoea -Additional: None COMPARISON: December 12, 2020 TECHNIQUE: Portable frontal view of the chest _______________ FINDINGS: SUPPORT DEVICES: None. HEART AND MEDIASTINUM: Prior median sternotomy. No appreciable cardiomegaly. Remaining mediastinal contours within normal limits. LUNGS AND PLEURAL SPACES: There are patchy bilateral opacities as well as blunting of the costophrenic sulci. BONY THORAX AND SOFT TISSUES: Unremarkable. _______________     1. Bilateral airspace disease 2.  Small bilateral pleural effusions      Cirilo Mckeon MD    7/11/2021, 12:46 PM

## 2021-07-11 NOTE — PROGRESS NOTES
TRANSFER - IN REPORT:    Verbal report received from [id number] MARGIE Vega RN(name) on Rocio Chairez  being received from Ed(unit) for routine progression of care      Report consisted of patients Situation, Background, Assessment and   Recommendations(SBAR). Information from the following report(s) SBAR, Kardex, [facility] and Recent Results was reviewed with the receiving nurse. Opportunity for questions and clarification was provided. 1330: Patient stated He has not urinated since being in the ED. Feels he might be having some retention. This nurse bladder scanned patient Bladder scan reading 180ML. Will continue to monitor at this time. 1630: patient still has not voided since receiving Lasix at 1454. Bladder scanned patient, reading 200>ML. Patient denies any presssure at this time.  Will page Dr. Gagandeep Morales to inform him of the patients condition and continue to monitor     1700: Patient voided

## 2021-07-11 NOTE — ED NOTES
TRANSFER - OUT REPORT:    Verbal report given to RUDDY Melchor(name) on Rajiv Ortiz  being transferred to Tele(unit) for routine progression of care       Report consisted of patients Situation, Background, Assessment and   Recommendations(SBAR). Information from the following report(s) SBAR, Kardex, ED Summary, MAR, Accordion, Recent Results and Cardiac Rhythm afib was reviewed with the receiving nurse. Lines:   Peripheral IV 07/11/21 Right Arm (Active)   Site Assessment Clean, dry, & intact 07/11/21 0903   Phlebitis Assessment 0 07/11/21 0903   Infiltration Assessment 0 07/11/21 0903   Dressing Status Clean, dry, & intact 07/11/21 0903   Dressing Type Transparent 07/11/21 0903   Hub Color/Line Status Pink 07/11/21 0903   Action Taken Blood drawn 07/11/21 0903   Alcohol Cap Used Yes 07/11/21 5897        Opportunity for questions and clarification was provided.       Patient transported with:   Monitor  O2 @ 2 liters  Tech

## 2021-07-11 NOTE — PROGRESS NOTES
Bedside shift change report received by Ruiz Barrientos RN(offgoing nurse). Report included the following information SBAR, Kardex, ED Summary, Intake/Output, MAR, Accordion, Recent Results, Med Rec Status and Cardiac Rhythm Afib. Assumed care of patient, he denies any pain or distress.

## 2021-07-11 NOTE — ED TRIAGE NOTES
Pt states he has gained 10lbs as of this am, hx of CHF pt states sob increase over the last 3 days. Pt wears oxygen 2L/nc. Pt c/o intermittent sharp chest pain approx 1 x day.

## 2021-07-12 PROBLEM — I50.43 ACUTE ON CHRONIC COMBINED SYSTOLIC AND DIASTOLIC CONGESTIVE HEART FAILURE (HCC): Status: ACTIVE | Noted: 2021-01-01

## 2021-07-12 NOTE — PROGRESS NOTES
Bedside shift change report given to April B RN (oncoming nurse) by Lillie Fernandez RN (offgoing nurse). Report included the following information SBAR, Kardex, Intake/Output and MAR.

## 2021-07-12 NOTE — PROGRESS NOTES
Pharmacy Dosing Services: Pepcid      Indication: Symptomatic GERD  Previous Regimen Pepcid 20 mg PO BID   Serum Creatinine Lab Results   Component Value Date/Time    Creatinine 1.52 (H) 07/12/2021 02:55 AM      Creatinine Clearance Estimated Creatinine Clearance: 40.6 mL/min (A) (based on SCr of 1.52 mg/dL (H)).    BUN Lab Results   Component Value Date/Time    BUN 43 (H) 07/12/2021 02:55 AM       Dose adjusted based on renal clearance and indication to Pepcid 20 mg PO daily  Plan:  Continue to monitor

## 2021-07-12 NOTE — CONSULTS
[facility]: [phone]  [facility]: [phone]     Patient Name: Mandeep Patel  YOB: 1940    Date of Initial Consult : 7/12/2021  Reason for Consult: Care decisions/goals of care  Requesting Provider: Dr. Yessi Eden   Primary Care Physician: Ene Ferraro DO      SUMMARY:   Mandeep Patel is a 79 y.o. male with a past history of coronary artery disease, CML, diabetes mellitus, hypertension and history of TIAs, who was admitted on 7/11/2021 from home with a diagnosis of acute respiratory distress, pneumonia, acute diastolic CHF exacerbation and new onset atrial fibrillation. Current medical issues leading to Palliative Medicine involvement include: Chronically ill-appearing 79year-old gentleman with multiple comorbid conditions admitted with respiratory distress, pneumonia, exacerbation of CHF and new onset atrial fibrillation. Palliative medicine is consulted for care decisions and goals of care. CHIEF COMPLAINT: Shortness of breath    HPI/SUBJECTIVE:    Past history as above. Mr. Binu Graves is a chronically ill-appearing 79-year-old gentleman admitted to the hospital after 2 to 3 days of progressive shortness of breath with cough and increasing lower extremity swelling. Patient reported that his lower legs are \"about twice the size is normal\" in his abdomen feels tight with fluid. Patient reports this is the first time his abdomen has been this tight so. Patient is on home oxygen at 2 L as needed. Denies pain. No shortness of breath at rest however dyspnea increases with any activity or movement. The patient is:   [x] Verbal and participatory  [] Non-participatory due to:     GOALS OF CARE: Full code, full interventions  Patient/Health Care Proxy Stated Goals: Prolong life      TREATMENT PREFERENCES:   Code Status: Full Code         PALLIATIVE DIAGNOSES:   1. Encounter for palliative care/goals of care   2. Pneumonia  3.  Atrial fibrillation with RVR  4. Debility       PLAN:   1. Encounter for palliative care/goals of care -seen at bedside along with Ms. Griselda Ramirez. Patient's wife, Jovany Flores, at bedside visiting. Patient is sitting up in bed, awake, alert and oriented x4. Nasal oxygen at 2 L in place. Patient is engaged in talking with us, speaks in full sentences. Patient reports no shortness of breath at rest; however, dyspnea does increase with any activity or movement. There is no AMD on file. Of note, the only document scanned into the medical record is the patient's last will and testament which does not address medical power of . Patient and wife made aware and were asked to bring in a copy of any [facility] paperwork for scanning into the medical record. Patient reports that paperwork has been completed appointing his wife, Jovany Flores, as primary surrogate decision maker and his daughter, Ruel Verma, as secondary surrogate decision maker. According to the [place], in the absence of any documentation legal decision making would default to patient's wife, Jovany Flores, and patient is agreeable to this. Discussed goals of care including benefits and burdens of resuscitation, intubation and life support with patient and wife and patient expresses a desire to undergo full resuscitative measures in the event of cardiac arrest, stating he wants \"to give it a shot\". Goals of care are full code, full interventions. 2. Pneumonia -primary team managing, IV antibiotics    3. Atrial fibrillation with RVR - primary team managing, cardiology consulted, echo - result pending, telemetry monitoring    4. Debility - PPS 48, lives at home with his wife, on home oxygen at 2 L, patient no longer drives. Independent with ADLs. 5. Initial consult note routed to primary continuity provider    6.  Communicated plan of care with: Palliative IDT      Advance Care Planning:  [] The [facility] Interdisciplinary Team has updated the ACP Navigator with [address] and Patient Capacity    Primary Decision Maker ([address]): Reece Arriaza (wife) - [phone]            As far as possible, the palliative care team has discussed with patient / health care proxy about goals of care / treatment preferences for patient. HISTORY:     History obtained from: Patient, wife and chart    Principal Problem:    Pneumonia (2021)    Active Problems:    Pulmonary edema (2021)      Sepsis ([address] (2021)      Atrial fibrillation with RVR ([address] (2021)      Past Medical History:   Diagnosis Date    CAD (coronary artery disease)     Chronic atrial fibrillation ([address] 2016    CML (chronic myelocytic leukemia) ([address]     Coagulation disorder ([address]     bruises easily    Diabetes ([address]     Hypertension     Nausea & vomiting     with most recent surgery-carotid endardarectomy    TIA (transient ischemic attack) 2016      Past Surgical History:   Procedure Laterality Date    HX CAROTID ENDARTERECTOMY Left 2017    HX CATARACT REMOVAL Bilateral     HX CORONARY ARTERY BYPASS GRAFT      HX HERNIA REPAIR Right     hernia    HX UROLOGICAL      greenlight laser    HX UROLOGICAL      kidney stone    HX UROLOGICAL      Lithotripsy x 2    GA CARDIAC SURG PROCEDURE UNLIST  3/2014    quadruple bypass      Family History   Problem Relation Age of Onset    Post-op Nausea/Vomiting Brother      History reviewed, no pertinent family history. Social History     Tobacco Use    Smoking status: Former Smoker     Packs/day: 1.00     Years: 60.00     Pack years: 60.00     Quit date: 3/23/2014     Years since quittin.3    Smokeless tobacco: Never Used   Substance Use Topics    Alcohol use:  Yes     Alcohol/week: 2.0 standard drinks     Types: 2 Shots of liquor per week     Comment: per day     Allergies   Allergen Reactions    Adhesive Tape-Silicones Other (comments)     Raw skin  Codeine Nausea and Vomiting    Iodinated Contrast Media Rash     patient states this was years ago - he states that he can tolerate the newer agents      Current Facility-Administered Medications   Medication Dose Route Frequency    guaiFENesin ER (MUCINEX) tablet 600 mg  600 mg Oral Q12H    amLODIPine (NORVASC) tablet 5 mg  5 mg Oral DAILY    aspirin delayed-release tablet 81 mg  81 mg Oral DAILY    atorvastatin (LIPITOR) tablet 40 mg  40 mg Oral DAILY    carvediloL (COREG) tablet 25 mg  25 mg Oral BID    tamsulosin (FLOMAX) capsule 0.4 mg  0.4 mg Oral DAILY    sodium chloride (NS) flush 5-40 mL  5-40 mL IntraVENous Q8H    sodium chloride (NS) flush 5-40 mL  5-40 mL IntraVENous PRN    sodium chloride (NS) flush 5-40 mL  5-40 mL IntraVENous PRN    acetaminophen (TYLENOL) tablet 650 mg  650 mg Oral Q6H PRN    Or    acetaminophen (TYLENOL) suppository 650 mg  650 mg Rectal Q6H PRN    polyethylene glycol (MIRALAX) packet 17 g  17 g Oral DAILY PRN    ondansetron (ZOFRAN ODT) tablet 4 mg  4 mg Oral Q8H PRN    Or    ondansetron (ZOFRAN) injection 4 mg  4 mg IntraVENous Q6H PRN    enoxaparin (LOVENOX) injection 40 mg  40 mg SubCUTAneous DAILY    azithromycin (ZITHROMAX) 500 mg in 0.9% sodium chloride 250 mL (VIAL-MATE)  500 mg IntraVENous Q24H    cefTRIAXone (ROCEPHIN) 1 g in sterile water (preservative free) 10 mL IV syringe  1 g IntraVENous Q24H    famotidine (PEPCID) tablet 20 mg  20 mg Oral BID    furosemide (LASIX) injection 20 mg  20 mg IntraVENous Q12H    insulin lispro (HUMALOG) injection   SubCUTAneous AC&HS          Clinical Pain Assessment (nonverbal scale for nonverbal patients): Clinical Pain Assessment  Severity: 0          Duration: for how long has pt been experiencing pain (e.g., 2 days, 1 month, years)  Frequency: how often pain is an issue (e.g., several times per day, once every few days, constant)     FUNCTIONAL ASSESSMENT:     Palliative Performance Scale (PPS):  PPS: 50    ECOG  ECOG Status : Limited self-care     PSYCHOSOCIAL/SPIRITUAL SCREENING:      Any spiritual / Catholic concerns:  [] Yes /  [x] No    Caregiver Burnout:  [] Yes /  [x] No /  [] No Caregiver Present      Anticipatory grief assessment:   [x] Normal  / [] Maladaptive        REVIEW OF SYSTEMS:     Systems: constitutional, ears/nose/mouth/throat, respiratory, gastrointestinal, genitourinary, musculoskeletal, integumentary, neurologic, psychiatric, endocrine. Positive findings noted below. Modified ESAS Completed by: provider           Pain: 0           Dyspnea: 0               Positive and pertinent negative findings in ROS are noted above in HPI. The following systems were [x] reviewed / [] unable to be reviewed as noted in HPI  Other findings are noted below. PHYSICAL EXAM:     Constitutional: Chronically ill-appearing, sitting up in bed, awake, alert and oriented x4  Eyes: pupils equal, anicteric  ENMT: no nasal discharge, moist mucous membranes  Cardiovascular: regular rhythm, distal pulses intact, 2-3+ edema lower extremities  Respiratory: breathing not labored, symmetric, nasal oxygen at 2 liters  Gastrointestinal: abdomen distended, tympanic  Last bowel movement: None recorded  Musculoskeletal: no deformity, no tenderness to palpation  Skin: warm, dry  Neurologic: following commands, moving all extremities  Psychiatric: full affect, no hallucinations    Other: Wt Readings from Last 3 Encounters:   07/11/21 88.9 kg (196 lb)   12/12/20 87.5 kg (193 lb)   05/24/19 114.3 kg (252 lb)     Blood pressure 101/64, pulse 98, temperature 97.3 °F (36.3 °C), resp. rate 16, height 5' 8\" (1.727 m), weight 88.9 kg (196 lb), SpO2 96 %.   Pain:  Pain Scale 1: Numeric (0 - 10)  Pain Intensity 1: 0                      LAB AND IMAGING FINDINGS:     Lab Results   Component Value Date/Time    WBC 43.2 (H) 07/12/2021 02:55 AM    HGB 11.9 (L) 07/12/2021 02:55 AM    PLATELET 758 (L) 40/10/4948 02:55 AM     Lab Results Component Value Date/Time    Sodium 140 07/12/2021 02:55 AM    Potassium 4.1 07/12/2021 02:55 AM    Chloride 104 07/12/2021 02:55 AM    CO2 31 07/12/2021 02:55 AM    BUN 43 (H) 07/12/2021 02:55 AM    Creatinine 1.52 (H) 07/12/2021 02:55 AM    Calcium 9.2 07/12/2021 02:55 AM    Magnesium 2.3 07/12/2021 02:55 AM    Phosphorus 4.4 07/12/2021 02:55 AM      Lab Results   Component Value Date/Time    Alk. phosphatase 103 07/12/2021 02:55 AM    Protein, total 6.0 (L) 07/12/2021 02:55 AM    Albumin 2.8 (L) 07/12/2021 02:55 AM    Globulin 3.2 07/12/2021 02:55 AM     Lab Results   Component Value Date/Time    INR 1.3 (H) 07/11/2021 08:55 AM    Prothrombin time 16.2 (H) 07/11/2021 08:55 AM    aPTT 27.1 07/11/2021 08:55 AM      No results found for: IRON, FE, TIBC, IBCT, PSAT, FERR   No results found for: PH, PCO2, PO2  No components found for: [place]   Lab Results   Component Value Date/Time    CK 53 07/12/2021 02:55 AM    CK - MB 1.8 07/12/2021 02:55 AM              Total time: 30 minutes    > 50% counseling / coordination:  Time spent in direct consultation with the patient, medical team, and family     Prolonged service was provided for  []30 min   []75 min in face to face time in the presence of the patient, spent as noted above. Time Start:   Time End:     Disclaimer: Sections of this note are dictated using utilizing voice recognition software, which may have resulted in some phonetic based errors in grammar and contents. Even though attempts were made to correct all the mistakes, some may have been missed, and remained in the body of the document. If questions arise, please contact our department.

## 2021-07-12 NOTE — PROGRESS NOTES
Hospitalist Progress Note-critical care note     Patient: Matty Stephenson MRN: [id number]  [facility]: [id number]    YOB: 1940  Age: 79 y.o. Sex: male    DOA: 7/11/2021 LOS:  LOS: 1 day            Chief complaint: pulm edema, sepsis , afib CHF exacerbation, pneumonia, A. fib with RVR    Assessment/Plan         Hospital Problems  Date Reviewed: 7/20/2017        Codes Class Noted POA    Encounter for palliative care ICD-10-CM: Z51.5  ICD-9-CM: V66.7  Unknown Unknown        Debility ICD-10-CM: R53.81  ICD-9-CM: 799.3  Unknown Unknown        Acute on chronic combined systolic and diastolic congestive heart failure ([facility] ICD-10-CM: I50.43  ICD-9-CM: 428.43, 428.0  7/12/2021 Unknown        * (Principal) Pneumonia ICD-10-CM: J18.9  ICD-9-CM: 480  7/11/2021 Unknown        Pulmonary edema ICD-10-CM: J81.1  ICD-9-CM: 459  7/11/2021 Unknown        Sepsis ([facility] ICD-10-CM: A41.9  ICD-9-CM: 038.9, 995.91  7/11/2021 Unknown        Atrial fibrillation with RVR (HCC) ICD-10-CM: I48.91  ICD-9-CM: 427.31  7/11/2021 Unknown             Acute respiratory distress/sepsis  Continue wean off nc o2   So far cx negative       Multiple bilateral pneumonia with marked leukocytosis  Continue iv abx   Breathing tx as needed      Acute diastolic /systolic CHF extubation-acute on chronic combined   Ef 15-20 %   On Lasix Coreg, cardiology consulted Case discussed with Dr. Stella Galan diet, fluid restriction      New onset atrial fibrillation  Coreg for rate control, check a TSH,  Renal function better changed to Lovenox every 12 hours      Hypertension  On Norvasc Lasix Coreg     Peripheral edema  Continue Lasix,, watch for renal function and electrolytes     History of CML      Acute kidney injury with chronic kidney disease   Creatinine got improving, close to baseline 1.5    Subjective: Feels better, never use oxygen at home    Wife was at the bedside she was updated.     Palliative care consult  Disposition :tbd,   Review of systems:    General: No fevers or chills. Cardiovascular: No chest pain or pressure. No palpitations. Pulmonary:  shortness of breath better  Gastrointestinal: No nausea, vomiting. Vital signs/Intake and Output:  Visit Vitals  /70   Pulse 98   Temp 97.3 °F (36.3 °C)   Resp 16   Ht 5' 8\" (1.727 m)   Wt 88.9 kg (196 lb)   SpO2 96%   BMI 29.80 kg/m²     Current Shift:  07/12 0701 - 07/12 1900  In: 480 [P.O.:480]  Out: 250 [Urine:250]  Last three shifts:  07/10 1901 - 07/12 0700  In: 132 [P.O.:132]  Out: 1150 [Urine:1150]    Physical Exam:  General: WD, WN. Alert, cooperative, no acute distress    HEENT: NC, Atraumatic. PERRLA, anicteric sclerae. Lungs: CTA Bilaterally. No Wheezing/Rhonchi/Rales. Heart:  Regular  rhythm,  + murmur, No Rubs, No Gallops  Abdomen: Soft, Non distended, Non tender. +Bowel sounds,   Extremities: No c/c/e  Psych:   Not anxious or agitated. Neurologic:  No acute neurological deficit.              Labs: Results:       Chemistry Recent Labs     07/12/21  0255 07/11/21  0855   * 228*    138   K 4.1 4.3    101   CO2 31 31   BUN 43* 41*   CREA 1.52* 1.67*   CA 9.2 9.5   AGAP 5 6   BUCR 28* 25*    133*   TP 6.0* 7.3   ALB 2.8* 3.3*   GLOB 3.2 4.0   AGRAT 0.9 0.8      CBC w/Diff Recent Labs     07/12/21  0255 07/11/21  0855   WBC 43.2* 43.0*   RBC 4.41 5.33   HGB 11.9* 14.1   HCT 39.7 47.7   * 135   GRANS 73 75*   LYMPH 13* 2*   EOS 2 0      Cardiac Enzymes Recent Labs     07/12/21  0255 07/11/21  1850   CPK 53 63   CKND1 3.4 3.0      Coagulation Recent Labs     07/11/21  0855   PTP 16.2*   INR 1.3*   APTT 27.1       Lipid Panel Lab Results   Component Value Date/Time    Cholesterol, total 107 08/12/2016 03:20 AM    HDL Cholesterol 56 08/12/2016 03:20 AM    LDL, calculated 26.4 08/12/2016 03:20 AM    VLDL, calculated 24.6 08/12/2016 03:20 AM    Triglyceride 123 08/12/2016 03:20 AM    CHOL/HDL Ratio 1.9 08/12/2016 03:20 AM      BNP No results for input(s): BNPP in the last 72 hours. Liver Enzymes Recent Labs     07/12/21  0255   TP 6.0*   ALB 2.8*         Thyroid Studies Lab Results   Component Value Date/Time    TSH 1.34 07/12/2021 02:55 AM        Procedures/imaging: see electronic medical records for all procedures/Xrays and details which were not copied into this note but were reviewed prior to creation of Plan    XR CHEST PORT    Result Date: 7/11/2021  EXAM: CHEST RADIOGRAPH CLINICAL INDICATION/HISTORY: Dyspnoea -Additional: None COMPARISON: December 12, 2020 TECHNIQUE: Portable frontal view of the chest _______________ FINDINGS: SUPPORT DEVICES: None. HEART AND MEDIASTINUM: Prior median sternotomy. No appreciable cardiomegaly. Remaining mediastinal contours within normal limits. LUNGS AND PLEURAL SPACES: There are patchy bilateral opacities as well as blunting of the costophrenic sulci. BONY THORAX AND SOFT TISSUES: Unremarkable. _______________     1. Bilateral airspace disease 2.  Small bilateral pleural effusions       Catie Delgado MD

## 2021-07-12 NOTE — ACP (ADVANCE CARE PLANNING)
Advance Care Planning     General Advance Care Planning (ACP) Conversation  CODE STATUS:  FULL    Date of Conversation: 7/12/2021  Conducted with: Patient with Decision Making Capacity and Healthcare Decision Maker: Next of Kin by law (only applies in absence of a Healthcare Power of  or Legal Guardian) His wife, Madalyn Wills, was in the room for the discussion. Healthcare Decision Maker: Today we documented Decision Maker(s) consistent with Legal Next of Kin hierarchy. Mr Salma Killian accepts that per [id number] [facility]. of decision making his wife would be his primary surrogate decision maker and his children Christopher Murphy and Nixon Belcher would be his secondary surrogate decision makers. He does have a will on file but does not have an AMD. Offered opportunity to complete and he is considering    Content/Action Overview:   Has NO ACP documents/care preferences - information provided, considering goals and options. No POST needed as he has decided to proceed with complete resuscitative options in the event of cardiac arrest.   Reviewed DNR/DNI and patient elects Full Code (Attempt Resuscitation)     7/12/2021 1030 Seen today in room 356 along with his wife, Tita Arthur, and Loren Marr NP. Awake, alert. Oriented x 4 Respirations unlabored. Oxygen on at 2 lpm per NC. He uses oxygen at 2 lpm per NC at home on a prn basis. Able to speak in full  sentences. Pain--denies. States his lower legs are \"about twice the size as normal\" and his abdomen feels tight with fluid. Bilateral 2-3+ pitting edema. Abdomen protuberant and taut. Came to the ED for concerns of \"blowing up like a balloon with fluid\". He has had similar situations before and attributes this to his atrial fibrillation. He has recently been in the hospital at the [facility] and then at [facility] rehab for similar issues.  Hx of quad vessel CABG in the past. 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 He is independent in ADLs and does not use anything for ambulation assistance. His wife does most of the driving. Introduced the role of palliative medicine for the hospitalized patient and family. Asked if he had ever completed AMD paperwork and he said that he had and it was in the chart. Explained that the scanned document is a will and not AMD. Offered to complete but he is comfortable with the [employer]. Began discussion of code status and his wishes re: resuscitation. He said that he had discussed that with his wife and would like \"to give it a shot\". Explained that once resuscitation is initiated, it is usually carried out to it's finality, ROSC or death. He agreed and would like to proceed. CODE STATUS:  FULL CODE     Disposition plan: anticipate home with family support. Palliative care will continue to follow Katrin Noyola during his hospitalization and support him as he and his family make healthcare decisions and establish goals of care.     Mervat Salguero RN, MSN  Palliative Medicine  P: [phone]

## 2021-07-12 NOTE — DIABETES MGMT
GLYCEMIC CONTROL PLAN OF CARE     Assessment:  Pt admitted with pneumonia, acute respiratory distress/sepsis, a fib. Past Medial History includes T2DM, HTN, CML, CAD, CKD stage 3, TIA. GFR - 44. Pt with fasting blood glucose this morning close to target range but noted 3 out of last 6 blood glucose readings above target range (> 180 mg/dL)    Recommendations:  1. Will advance corrective lispro to very insulin resistant dosing per protocol  2. Obtain current A1C per protocol. 3. Adjust diet to include \"no concentrated sweets\" so as to allow allow liberal food selections yet promote glycemic control. 1320  Addendum:  Attempted to visit pt however he was OOR. Will follow up later in day as time allows or 7/13. Most recent blood glucose values:  7/12:  Lab - 153,  POC - 164, 226  7/11:  Lab - 228;  POC - 230, 184        Current A1C- not available  Lab Results   Component Value Date/Time    Hemoglobin A1c 6.2 (H) 08/10/2018 06:35 AM     From Care Everywhere last A1C was 10/23/19 - 6.0%    Current hospital diabetes medications:  Corrective lispro, normal insulin sensitivity ACHS    Previous day's insulin requirements: not here for full day    Home diabetes medications:  Per PTA list: need to verify   Key Antihyperglycemic Medications     Patient is on no antihyperglycemic meds. Diet:  ADULT DIET Regular; Low Fat/Low Chol/High Fiber/TEX   Meal Intake:   Patient Vitals for the past 168 hrs:   % Diet Eaten   07/12/21 0811 76 - 100%   07/11/21 1833 1 - 25%     Supplement Intake:  No data found.     Education:  ____Refer to Diabetes Education Record             ____Education not indicated at this time    Sarah [address], [address] [place] [address], [address] [place] [address]  Diabetes and Glycemic Control   Office:  [phone]  Pager:  [phone]

## 2021-07-12 NOTE — CONSULTS
[facility] Consult Note      Patient: Matty Stephenson MRN: [id number]  SSN: [id number]    YOB: 1940  Age: 79 y.o. Sex: male        Date of Consultation: 7/12/2021  Referring Physician: Dr. Elias Engel  Reason for Consultation: SOB, CHF, Afib    HPI:  I was asked by  for CHF. Matty Stephenson is a 79-year-old gentleman came to the hospital with respiratory distress diagnosed with pneumonia, atrial fibrillation with RVR acute on chronic combined systolic and diastolic heart failure. Patient past medical history significant for CAD, CABG, cardiomyopathy and history of atrial fibrillation in the past.  Patient is complaining of shortness of breath, orthopnea PND or ankle swelling. Patient denied any chest pain.           Past Medical History:   Diagnosis Date    CAD (coronary artery disease) 2014    Chronic atrial fibrillation ([address] 9/2/2016    CML (chronic myelocytic leukemia) (HCC)     Coagulation disorder (HCC)     bruises easily    Diabetes ([facility] 2012    Hypertension     Nausea & vomiting     with most recent surgery-carotid endardarectomy    TIA (transient ischemic attack) 8/11/2016     Past Surgical History:   Procedure Laterality Date    HX CAROTID ENDARTERECTOMY Left 05/2017    HX CATARACT REMOVAL Bilateral     HX CORONARY ARTERY BYPASS GRAFT      HX HERNIA REPAIR Right     hernia    HX UROLOGICAL      greenlight laser    HX UROLOGICAL      kidney stone    HX UROLOGICAL      Lithotripsy x 2    PA CARDIAC SURG PROCEDURE UNLIST  3/2014    quadruple bypass     Current Facility-Administered Medications   Medication Dose Route Frequency    guaiFENesin ER (MUCINEX) tablet 600 mg  600 mg Oral Q12H    [START ON 7/13/2021] famotidine (PEPCID) tablet 20 mg  20 mg Oral DAILY    enoxaparin (LOVENOX) injection 90 mg  1 mg/kg SubCUTAneous Q12H    furosemide (LASIX) injection 40 mg  40 mg IntraVENous Q12H    [Held by provider] amLODIPine (NORVASC) tablet 5 mg  5 mg Oral DAILY    aspirin delayed-release tablet 81 mg  81 mg Oral DAILY    atorvastatin (LIPITOR) tablet 40 mg  40 mg Oral DAILY    carvediloL (COREG) tablet 25 mg  25 mg Oral BID    tamsulosin (FLOMAX) capsule 0.4 mg  0.4 mg Oral DAILY    sodium chloride (NS) flush 5-40 mL  5-40 mL IntraVENous Q8H    sodium chloride (NS) flush 5-40 mL  5-40 mL IntraVENous PRN    sodium chloride (NS) flush 5-40 mL  5-40 mL IntraVENous PRN    acetaminophen (TYLENOL) tablet 650 mg  650 mg Oral Q6H PRN    Or    acetaminophen (TYLENOL) suppository 650 mg  650 mg Rectal Q6H PRN    polyethylene glycol (MIRALAX) packet 17 g  17 g Oral DAILY PRN    ondansetron (ZOFRAN ODT) tablet 4 mg  4 mg Oral Q8H PRN    Or    ondansetron (ZOFRAN) injection 4 mg  4 mg IntraVENous Q6H PRN    azithromycin (ZITHROMAX) 500 mg in 0.9% sodium chloride 250 mL (VIAL-MATE)  500 mg IntraVENous Q24H    cefTRIAXone (ROCEPHIN) 1 g in sterile water (preservative free) 10 mL IV syringe  1 g IntraVENous Q24H    insulin lispro (HUMALOG) injection   SubCUTAneous AC&HS       Allergies and Intolerances: Allergies   Allergen Reactions    Adhesive Tape-Silicones Other (comments)     Raw skin    Codeine Nausea and Vomiting    Iodinated Contrast Media Rash     patient states this was years ago - he states that he can tolerate the newer agents       Family History:   Family History   Problem Relation Age of Onset    Post-op Nausea/Vomiting Brother        Social History:   He  reports that he quit smoking about 7 years ago. He has a 60.00 pack-year smoking history. He has never used smokeless tobacco.  He  reports current alcohol use of about 2.0 standard drinks of alcohol per week. Review of Systems:     Review of Systems  Gen: No fever, chills, malaise, weight loss/gain. Heent: No headache, rhinorrhea, epistaxis, ear pain, hearing loss, sinus pain, neck pain/stiffness, sore throat. Heart: No chest pain, palpitations, +VARGAS, pnd, or orthopnea.    Resp: No cough, hemoptysis, wheezing and +shortness of breath. GI: No nausea, vomiting, diarrhea, constipation, melena or hematochezia. : No urinary obstruction, dysuria or hematuria. Derm: No rash, new skin lesion or pruritis. Musc/skeletal: no bone or joint complains. Vasc: + edema, cyanosis or claudication. Endo: No heat/cold intolerance, no polyuria,polydipsia or polyphagia. Neuro: No unilateral weakness, numbness, tingling. No seizures. Heme: No easy bruising or bleeding. Physical:   Patient Vitals for the past 6 hrs:   Temp Pulse Resp BP SpO2   07/12/21 1553 97.8 °F (36.6 °C) 82 16 123/85 97 %   07/12/21 1357    119/70          Exam:   General Appearance: Comfortable, not using accessory muscles of respiration. HEENT: CT. HEAD: Atraumatic  NECK: No JVD, no thyroidomeglay. LUNGS: Decreased breath sounds at bases bilaterally   HEART: S1 regular, variable +S2     ABD: Non-tender, BS Audible    EXT: No edema, and no cysnosis. VASCULAR EXAM: Pulses are intact. PSYCHIATRIC EXAM: Mood is appropriate. MUSCULOSKELETAL: Grossly no joint deformity. NEUROLOGICAL: Motor and sensory sytem intact and Cranial nerves II-XII intact.     Review of Data:   LABS:   Lab Results   Component Value Date/Time    WBC 43.2 (H) 07/12/2021 02:55 AM    HGB 11.9 (L) 07/12/2021 02:55 AM    HCT 39.7 07/12/2021 02:55 AM    PLATELET 324 (L) 74/72/9525 02:55 AM     Lab Results   Component Value Date/Time    Sodium 140 07/12/2021 02:55 AM    Potassium 4.1 07/12/2021 02:55 AM    Chloride 104 07/12/2021 02:55 AM    CO2 31 07/12/2021 02:55 AM    Glucose 153 (H) 07/12/2021 02:55 AM    BUN 43 (H) 07/12/2021 02:55 AM    Creatinine 1.52 (H) 07/12/2021 02:55 AM     Lab Results   Component Value Date/Time    Cholesterol, total 107 08/12/2016 03:20 AM    HDL Cholesterol 56 08/12/2016 03:20 AM    LDL, calculated 26.4 08/12/2016 03:20 AM    Triglyceride 123 08/12/2016 03:20 AM     No components found for: GPT  Lab Results   Component Value Date/Time Hemoglobin A1c 7.1 (H) 07/12/2021 02:55 AM       RADIOLOGY:  CT Results  (Last 48 hours)    None        CXR Results  (Last 48 hours)               07/11/21 0908  XR CHEST PORT Final result    Impression:          1. Bilateral airspace disease           2. Small bilateral pleural effusions           Narrative:  EXAM: CHEST RADIOGRAPH       CLINICAL INDICATION/HISTORY: Dyspnoea   -Additional: None       COMPARISON: December 12, 2020       TECHNIQUE: Portable frontal view of the chest       _______________       FINDINGS:       SUPPORT DEVICES: None. HEART AND MEDIASTINUM: Prior median sternotomy. No appreciable cardiomegaly. Remaining mediastinal contours within normal limits. LUNGS AND PLEURAL SPACES: There are patchy bilateral opacities as well as   blunting of the costophrenic sulci. BONY THORAX AND SOFT TISSUES: Unremarkable.       _______________                   Cardiology Procedures:   Results for orders placed or performed during the hospital encounter of 07/11/21   EKG, 12 LEAD, INITIAL   Result Value Ref Range    Ventricular Rate 110 BPM    Atrial Rate 111 BPM    QRS Duration 88 ms    Q-T Interval 294 ms    QTC Calculation (Bezet) 397 ms    Calculated R Axis -42 degrees    Calculated T Axis 153 degrees    Diagnosis       Atrial fibrillation with rapid ventricular response  Left axis deviation  Anterior infarct , age undetermined  T wave abnormality, consider lateral ischemia  Abnormal ECG  When compared with ECG of 12-DEC-2020 15:17,  Atrial fibrillation has replaced Sinus rhythm  Vent.  rate has increased BY  44 BPM  QRS axis shifted left        Echo Results  (Last 48 hours)    None       Cardiolite (Tc-99m Sestamibi) stress test        Impression / Plan:    Patient Active Problem List   Diagnosis Code    TIA (transient ischemic attack) G45.9    Coronary artery disease involving native coronary artery I25.10    Type II diabetes mellitus, uncontrolled ([address] E11.65    Dyslipidemia E78.5    Pancytopenia (HCC) D61.818    CML (chronic myelocytic leukemia) (HCC) C92.10    HTN (hypertension) I10    Chronic atrial fibrillation (HCC) I48.20    Stage 3 chronic kidney disease (HCC) N18.30    Hyperglycemia R73.9    Elevated blood pressure reading with diagnosis of hypertension I10    Community acquired pneumonia J18.9    Chronic congestive heart failure (HCC) I50.9    SOB (shortness of breath) R06.02    On supplemental oxygen by nasal cannula Z78.9    Pneumonia J18.9    Pulmonary edema J81.1    Sepsis (HCC) A41.9    Atrial fibrillation with RVR ([facility]) I48.91    Encounter for palliative care Z51.5    Debility R53.81    Acute on chronic combined systolic and diastolic congestive heart failure ([facility]) I50.43        Assessment and plan    Acute on chronic combined systolic heart failure  Pneumonia  Atrial fibrillation with RVR   severe cardiomyopathy   CAD  CABG  Moderate pulmonary hypertension, PA pressure 55 mmHg most likely group 2  Moderate MR      Plan  Fluid restriction to 1500 mL   salt restriction to 3 g/day  Increase Lasix from 20 mg twice daily to 40 mg twice daily  Hold amlodipine  Continue with carvedilol  Continue with therapeutic Lovenox patient will need long-term anticoagulation until patient have contraindication  Further cardiac testing depending upon on clinical course. Management plan was discussed with patient.               Signed By: Jose Miguel Sanchez MD     July 12, 2021

## 2021-07-12 NOTE — PROGRESS NOTES
Reason for Admission: Pneumonia    Chart reviewed. Per H&P: \"Mr. Maxx Montoya is a very pleasant 50-year-old male with a past medical history significant for coronary artery disease, CML, diabetes mellitus, hypertension, history of TIAs in the past.  Patient was brought to the emergency room after having 2 to 3 days history of progressive shortness of breath with the cough and increasing lower extremity swelling subjective fever and chills. Upon evaluation in the emergency room he was found to have a bilateral pneumonia along with his CHF extubation hence inpatient hospitalization has been requested. Patient states he is fully vaccinated with Covid vaccination. Patient wife is at bedside. \"       Care Management/Patient/Family Conversation: 8461: Chart reviewed. CM spoke with the patient and his wife at the bedside and informed them both of the CM's role. The patient confirmed demographics and PCP. CM and the patient discussed discharge plans to include transportation upon discharge, DME, family support, and transportation to and from appointments. The patient states that he was independent prior to this admission, not using any DME and driving himself places. The patient's wife states that she will be available to drive the patient home upon discharge. The patient and his wife deny any questions or concerns at this time. CM to follow the patient's progress and be available to assist with discharge planning as needed. CMS referral placed.      Prior to admission patient was living: Home with wife    Prior to admission patient was using the following DME: None                  RUR Score: 21%         COVID Vaccine Status: Fully vaccinated - Paula Jackson per the patient             PCP: First and Last name: Kaela Husbands, DO    Name of Practice: [facility]   Are you a current patient: Yes/No: Yes   Approximate date of last visit: Roughly two weeks ago   Can you participate in a virtual visit if needed:     Do you (patient/family) have any concerns for transition/discharge? Not at this time              Plan for utilizing home health: TBD      Current Advanced Directive/Advance Care Plan: Full Code has ACP docs on file    Healthcare Decision Maker: Wife: Madalyn Sierraa: [phone]            Transition of Care Plan: In progress     Care Management Interventions  PCP Verified by CM: Yes  Mode of Transport at Discharge:  Other (see comment) (wife)  Transition of Care Consult (CM Consult): Discharge Planning  Current Support Network: Lives with Spouse  Confirm Follow Up Transport: Self  The Plan for Transition of Care is Related to the Following Treatment Goals : Pneumonia  Discharge Location  Discharge Placement: Home with family assistance

## 2021-07-12 NOTE — PROGRESS NOTES
Assumed care of pt from [place] [address]..  1780; Pt complained of dizziness and nausea an our after abx was started. Will notify doctor,  [phone]: Pt feels better after nausea medication. Pt complained that too many medication were given at one time and that caused his nausea and dizziness. 1841: pt had no other issues during the day.

## 2021-07-12 NOTE — PROGRESS NOTES
Physician Progress Note      Seema Khanna  [facility] #:                  [id number]  :                       1940  ADMIT DATE:       2021 8:41 AM  DISCH DATE:  RESPONDING  PROVIDER #:        Suad DELACRUZ MD          QUERY TEXT:    Dear Hospitalist,    Patient admitted with sepsis, noted to have Acute kidney injury with chronic kidney disease although baseline creatinine is not available If possible, please document in progress notes and discharge summary if you are evaluating and/or treating any of the following: The medical record reflects the following:    Risk Factors: PMH CKD, DM ll    Clinical Indicators:  > Creatinine =1.67, =1.52  > GFR non-AA =4.0, =44  > PMH GFR 20=53, 19=33, 18=44    Treatment: Receiving cardiac monitoring, CMP    Thank you,  Keily Brody, RN, BSN, [facility], [address] [place] [address]  Options provided:  -- CKD Stage 1 GFR>90  -- CKD Stage 2 GFR 60-90  -- CKD Stage 3a GFR 45-59  -- CKD Stage 3b GFR 30-44  -- CKD Stage 4 GFR 15-29  -- CKD Stage 5 GFR<15  -- ESRD  -- Other - I will add my own diagnosis  -- Disagree - Not applicable / Not valid  -- Disagree - Clinically unable to determine / Unknown  -- Refer to Clinical Documentation Reviewer    PROVIDER RESPONSE TEXT:    This patient has CKD Stage 2. Query created by: José Miguel Maradiaga on 2021 10:37 AM      QUERY TEXT:    Dear Hospitalist,    Pt admitted with sepsis. Pt noted to have pneumonia. If possible, please document in the progress notes and discharge summary if you are evaluating and/or treating any of the following:    Note: CAP and HCAP indicate where the pneumonia was acquired, not a specific type. The medical record reflects the following:    Risk Factors:    Clinical Indicators:  > Per H&P-  2 to 3 days history of progressive shortness of breath with the cough and increasing lower extremity swelling subjective fever and chills.   Positive for fever and malaise/fatigue  Positive for cough, sputum production and shortness of breath  Positive for weakness  > Chest x-ray 7/11- 1. Bilateral airspace disease 2. Small bilateral pleural effusions  > , 112, 103, RR 31, 22, 23, 27    Treatment: Receiving chest x-ray, Rocephin, Zithromax, Cultures have been obtained    Thank you,  Nadya Galloway, RN, BSN, [facility], [address] [facility]  Options provided:  -- Gram negative pneumonia  -- Gram positive pneumonia  -- Bacterial pneumonia  -- Viral pneumonia  -- Aspiration pneumonia  -- Other - I will add my own diagnosis  -- Disagree - Not applicable / Not valid  -- Disagree - Clinically unable to determine / Unknown  -- Refer to Clinical Documentation Reviewer    PROVIDER RESPONSE TEXT:    Provider is clinically unable to determine a response to this query.     Query created by: Freya Villareal on 7/12/2021 10:50 AM      Electronically signed by:  Asia DELACRUZ MD 7/12/2021 5:10 PM

## 2021-07-13 NOTE — CONSULTS
Phone: [phone]  Paging : [phone]      Hematology / Oncology Progress Note    Admit Date: 7/11/2021    Assessment:   78yo w/   Principal Problem:    Pneumonia (7/11/2021)    Active Problems:    Pulmonary edema (7/11/2021)      Sepsis ([facility] (7/11/2021)      Atrial fibrillation with RVR ([facility] (7/11/2021)      Encounter for palliative care ()      Debility ()      Acute on chronic combined systolic and diastolic congestive heart failure ([facility] (7/12/2021)        Plan:     Resume outpatient GLeevac 200mg . Prior conversations of switching medications with Dr. Clyde Montgomery outpatient BCR-ABL by [facility]  Check peripheral smear  Suspect acute rise secondary to off Gleevac and CAP  Doubt blast crisis  Discharge planning as per Hospitalist team  PAtient has follow up with Dr. King Caballero this week    Subjective:     Mr. Fernando Lee is a 54-year-old  gentleman who was diagnosed with CML in March 2016 by Dr. Summer Nye at [facility].. 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 The patient had trials of TKI, including Gleevec, including Tasigna with some vague abdominal pain and it was stopped after four days. He had a trial of Gleevec, again for four days, but stopped due to the fluid retention, including perioperative edema and bilateral lower extremity edema. He did not have difficulty with shortness of breath or chest pain. The patient was given a trial of chemotherapy with omacetaxine. Received two weeks' injections in early September 2016. The patient was admitted to [facility] with severe pancytopenia, and neutropenic fever. He required frequent transfusion support and G-CSF support. Initial inpatient consultation at patient's request in September 2016. Initial office visit at [facility]. 15 on 09/28/16. Re-challenge with Gleevec 400 mg p.o. daily since 10/5/2016. Monitor: 12/4/2016: BCR-ABL PCR still positive.    Monitor 3/6/2017: FISH negative; PCR negative. 2017: RT PCR positive  2017: PCR negative. 2018: BCR-ABL/RT PCR negative  2021: BCR/ABL FISH positive -76% of cells. BMBX 2021 did not show evidence of AML with 0.2% blasts. dasatinib briefly, stopped due to edema  Resumed Gleevec 100 mg daily   Increased Gleevec to 200 mg daily 2021     Patient now admitted w/ sepsis w/ increased WBC. +CAP on imaging.     Objective:     Patient Vitals for the past 24 hrs:   BP Temp Pulse Resp SpO2 Height Weight   21 0720 136/74 97.3 °F (36.3 °C) 100 18 97 %     21 0414 132/67 97.3 °F (36.3 °C) 93 18 99 %     21 0400   93       21 0000   87       21 2255 (!) 140/83 97.4 °F (36.3 °C) 87 16 96 %     21 2120 125/77  (!) 110       21 2000   87       21 1945 131/88 97.5 °F (36.4 °C) (!) 109 16 95 %     21 1553 123/85 97.8 °F (36.6 °C) 82 16 97 %     21 1357 119/70     5' 8\" (1.727 m) 88.9 kg (196 lb)   21 1118 101/64 97.3 °F (36.3 °C) 98 16 96 %           Intake/Output Summary (Last 24 hours) at 2021 5856  Last data filed at 2021 0414  Gross per 24 hour   Intake 120 ml   Output 1050 ml   Net -930 ml       Temp (24hrs), Av.4 °F (36.3 °C), Min:97.3 °F (36.3 °C), Max:97.8 °F (36.6 °C)           Exam:  General:  Chronically ill-appearing  HEENT: No JVD    Lungs: Decreased breath sounds  Cardiovascular: RRR  Abdomen: SNTND  Extremities:+ edema    Recent Results (from the past 24 hour(s))   GLUCOSE, POC    Collection Time: 21 11:17 AM   Result Value Ref Range    Glucose (POC) 226 (H) 70 - 110 mg/dL   ECHO ADULT COMPLETE    Collection Time: 21  1:57 PM   Result Value Ref Range    IVSd 1.02 (A) 0.60 - 1.00 cm    LVIDd 6.03 (A) 4.20 - 5.90 cm    LVIDs 5.66 cm    LVOT d 2.30 cm    LVPWd 1.00 0.60 - 1.00 cm    BP EF 14.7 (A) 55.0 - 100.0 percent    LV Ejection Fraction MOD 2C 21 percent    LV Ejection Fraction MOD 4C 12 percent    LV ED Vol A2C 212.32 mL    LV ED Vol A4C 107.86 mL    LV ED Vol .02 67.0 - 155.0 mL    LV ES Vol A2C 168. 17 mL    LV ES Vol A4C 95.28 mL    LV ES Vol .81 (A) 22.0 - 58.0 mL    LVOT SV 53.2 mL    LVOT Peak Gradient 1.55 mmHg    Left Ventricular Outflow Tract Mean Gradient 0.84 mmHg    LVOT Peak Velocity 62.19 cm/s    LVOT VTI 12.77 cm    RVIDd 3.69 cm    Left Atrium Major Axis 5.27 cm    LA Volume 106.39 18.0 - 58.0 mL    LA Vol 2C 57.03 18.00 - 58.00 mL    LA Vol 4C 100.58 (A) 18.00 - 58.00 mL    Right Atrial Area 4C 30.10 cm2    Aortic Valve Area by Continuity of Peak Velocity 2.98 cm2    Aortic Valve Area by Continuity of VTI 3.90 cm2    AV R PG 35.86 mmHg    Aortic Regurgitant Pressure Half-time 793.91 ms    AR Max [place] 299.43 cm/s    AoV PG 3.03 mmHg    Aortic Valve Systolic Mean Gradient 5.04 mmHg    Aortic Valve Systolic Peak Velocity 45.63 cm/s    AoV VTI 13.64 cm    TV MG 48.24 mmHg    TR Max Velocity 422.43 cm/s    Mitral Regurgitant Velocity Time Integral 133.00 cm    Triscuspid Valve Regurgitation Peak Gradient 47.33 mmHg    Ao Root D 3.36 cm    IVC proximal 2.60 cm    LV E' Septal Velocity 4.00 cm/s    LV E' Lateral Velocity 5.00 cm/s    MV E [place] 68.00 cm/s    LV Mass .2 88.0 - 224.0 g    LV Mass AL Index 124.2 49.0 - 115.0 g/m2    E/E' lateral 13.60     E/E' septal 17.00     E/E' ratio (averaged) 15.30     LVES Vol Index BP 63.5 mL/m2    LVED Vol Index BP 74.4 mL/m2    Est. RA Pressure 8.0 mmHg    Mitral Valve E Wave Deceleration Time 163.0 ms    Left Atrium Minor Axis 2.60 cm    Tapse 1.00 1.50 - 2.00 cm    LA Vol Index 52.41 16.00 - 28.00 ml/m2    LA Vol Index 28.09 16.00 - 28.00 ml/m2    LA Vol Index 49.55 16.00 - 28.00 ml/m2    LVED Vol Index A4C 53.1 mL/m2    LVED Vol Index A2C 104.6 mL/m2    LVES Vol Index A4C 46.9 mL/m2    LVES Vol Index A2C 82.8 mL/m2    LAVINIA/BSA Pk [place]ito 1.5 cm2/m2    LAVINIA/BSA VTI 1.9 cm2/m2   GLUCOSE, POC    Collection Time: 07/12/21  5:02 PM   Result Value Ref Range    Glucose (POC) 127 (H) 70 - 110 mg/dL   CBC WITH AUTOMATED DIFF    Collection Time: 07/13/21  4:40 AM   Result Value Ref Range    WBC 53.4 (HH) 4.6 - 13.2 K/uL    RBC 4.58 4.35 - 5.65 M/uL    HGB 12.2 (L) 13.0 - 16.0 g/dL    HCT 41.8 36.0 - 48.0 %    MCV 91.3 74.0 - 97.0 FL    MCH 26.6 24.0 - 34.0 PG    MCHC 29.2 (L) 31.0 - 37.0 g/dL    RDW 16.3 (H) 11.6 - 14.5 %    PLATELET 246 (L) 225 - 420 K/uL    NEUTROPHILS 60 40 - 73 %    BAND NEUTROPHILS 4 0 - 5 %    LYMPHOCYTES 7 (L) 21 - 52 %    MONOCYTES 1 (L) 3 - 10 %    EOSINOPHILS 0 0 - 5 %    BASOPHILS 0 0 - 2 %    METAMYELOCYTES 3 (H) 0 %    MYELOCYTES 25 (H) 0 %    ABS. NEUTROPHILS 34.2 (H) 1.8 - 8.0 K/UL    ABS. LYMPHOCYTES 3.7 (H) 0.9 - 3.6 K/UL    ABS. MONOCYTES 0.5 0.05 - 1.2 K/UL    ABS. EOSINOPHILS 0.0 0.0 - 0.4 K/UL    ABS.  BASOPHILS 0.0 0.0 - 0.1 K/UL    DF MANUAL      RBC COMMENTS ANISOCYTOSIS  1+        RBC COMMENTS POLYCHROMASIA  1+       METABOLIC PANEL, BASIC    Collection Time: 07/13/21  4:40 AM   Result Value Ref Range    Sodium 140 136 - 145 mmol/L    Potassium 4.2 3.5 - 5.5 mmol/L    Chloride 101 100 - 111 mmol/L    CO2 31 21 - 32 mmol/L    Anion gap 8 3.0 - 18 mmol/L    Glucose 129 (H) 74 - 99 mg/dL    BUN 45 (H) 7.0 - 18 MG/DL    Creatinine 1.56 (H) 0.6 - 1.3 MG/DL    BUN/Creatinine ratio 29 (H) 12 - 20      GFR est AA 52 (L) >60 ml/min/1.73m2    GFR est non-AA 43 (L) >60 ml/min/1.73m2    Calcium 9.1 8.5 - 10.1 MG/DL   GLUCOSE, POC    Collection Time: 07/13/21  6:17 AM   Result Value Ref Range    Glucose (POC) 125 (H) 70 - 110 mg/dL       Sierra Chavez MD

## 2021-07-13 NOTE — PROGRESS NOTES
Cardiology Progress Note        Patient: Declan High        Sex: male          DOA: 7/11/2021  YOB: 1940      Age:  79 y.o.        LOS:  LOS: 2 days   Assessment/Plan     Principal Problem:    Pneumonia (7/11/2021)    Active Problems:    Pulmonary edema (7/11/2021)      Sepsis ([address] (7/11/2021)      Atrial fibrillation with RVR ([address] (7/11/2021)      Encounter for palliative care ()      Debility ()      Acute on chronic combined systolic and diastolic congestive heart failure ([address] (7/12/2021)        Plan:  Pulmonary edema  Acute combined severe CHF  Atrial fibrillation  Severe cardiomyopathy  Severe pulmonary hypertension, group 2    Acute combined severe CHF  Patient is not able to make enough urine output  I will start milrinone 0.2 mcg/kg/min dose  Continue with Lasix      I have asked the patient if I can call his wife and update her. Patient preferred not to call her because she will become more anxious according to the patient. Subjective:    cc:  Shortness of breath        REVIEW OF SYSTEMS:     General: No fevers or chills. Cardiovascular: No chest pain or pressure. No palpitations. ++ ankle swelling  Pulmonary: ++ SOB, orthopnea, PND  Gastrointestinal: No nausea, vomiting or diarrhea      Objective:      Visit Vitals  /85   Pulse 74   Temp 98 °F (36.7 °C)   Resp 18   Ht 5' 8\" (1.727 m)   Wt 88.9 kg (196 lb)   SpO2 100%   BMI 29.80 kg/m²     Body mass index is 29.8 kg/m². Physical Exam:  General Appearance: Comfortable, not using accessory muscles of respiration. NECK: No JVD, no thyroidomeglay. LUNGS: Clear bilaterally. HEART: S1 irregular, variable +S2 audible, systolic murmur   ABD: Non-tender, BS Audible    EXT: ++ edema, and no cysnosis. VASCULAR EXAM: Pulses are intact. PSYCHIATRIC EXAM: Mood is appropriate.     Medication:  Current Facility-Administered Medications   Medication Dose Route Frequency    imatinib (GLEEVEC) chemo tablet 100 mg (Patient Supplied)  100 mg Oral DAILY    cyanocobalamin tablet 2,000 mcg  2,000 mcg Oral DAILY    vitamin E (AQUA GEMS) capsule 400 Units  400 Units Oral DAILY    albuterol-ipratropium (DUO-NEB) 2.5 MG-0.5 MG/3 ML  3 mL Nebulization Q4H PRN    milrinone (PRIMACOR) 20 MG/100 ML D5W infusion  0.2 mcg/kg/min IntraVENous CONTINUOUS    guaiFENesin ER (MUCINEX) tablet 600 mg  600 mg Oral Q12H    famotidine (PEPCID) tablet 20 mg  20 mg Oral DAILY    enoxaparin (LOVENOX) injection 90 mg  1 mg/kg SubCUTAneous Q12H    furosemide (LASIX) injection 40 mg  40 mg IntraVENous Q12H    magnesium hydroxide (MILK OF MAGNESIA) 400 mg/5 mL oral suspension 30 mL  30 mL Oral DAILY PRN    [Held by provider] amLODIPine (NORVASC) tablet 5 mg  5 mg Oral DAILY    aspirin delayed-release tablet 81 mg  81 mg Oral DAILY    atorvastatin (LIPITOR) tablet 40 mg  40 mg Oral DAILY    carvediloL (COREG) tablet 25 mg  25 mg Oral BID    tamsulosin (FLOMAX) capsule 0.4 mg  0.4 mg Oral DAILY    sodium chloride (NS) flush 5-40 mL  5-40 mL IntraVENous Q8H    sodium chloride (NS) flush 5-40 mL  5-40 mL IntraVENous PRN    sodium chloride (NS) flush 5-40 mL  5-40 mL IntraVENous PRN    acetaminophen (TYLENOL) tablet 650 mg  650 mg Oral Q6H PRN    Or    acetaminophen (TYLENOL) suppository 650 mg  650 mg Rectal Q6H PRN    polyethylene glycol (MIRALAX) packet 17 g  17 g Oral DAILY PRN    ondansetron (ZOFRAN ODT) tablet 4 mg  4 mg Oral Q8H PRN    Or    ondansetron (ZOFRAN) injection 4 mg  4 mg IntraVENous Q6H PRN    azithromycin (ZITHROMAX) 500 mg in 0.9% sodium chloride 250 mL (VIAL-MATE)  500 mg IntraVENous Q24H    cefTRIAXone (ROCEPHIN) 1 g in sterile water (preservative free) 10 mL IV syringe  1 g IntraVENous Q24H    insulin lispro (HUMALOG) injection   SubCUTAneous AC&HS               Lab/Data Reviewed:  Procedures/imaging: see electronic medical records for all procedures/Xrays   and details which were not copied into this note but were reviewed prior to creation of Plan       All lab results for the last 24 hours reviewed. Recent Labs     07/13/21  0440 07/12/21  0255 07/11/21  0855   WBC 53.4* 43.2* 43.0*   HGB 12.2* 11.9* 14.1   HCT 41.8 39.7 47.7   * 122* 135     Recent Labs     07/13/21  0440 07/12/21  0255 07/11/21  0855    140 138   K 4.2 4.1 4.3    104 101   CO2 31 31 31   * 153* 228*   BUN 45* 43* 41*   CREA 1.56* 1.52* 1.67*   CA 9.1 9.2 9.5       RADIOLOGY:  CT Results  (Last 48 hours)    None        CXR Results  (Last 48 hours)    None            Cardiology Procedures:   Results for orders placed or performed during the hospital encounter of 07/11/21   EKG, 12 LEAD, INITIAL   Result Value Ref Range    Ventricular Rate 110 BPM    Atrial Rate 111 BPM    QRS Duration 88 ms    Q-T Interval 294 ms    QTC Calculation (Bezet) 397 ms    Calculated R Axis -42 degrees    Calculated T Axis 153 degrees    Diagnosis       Atrial fibrillation with rapid ventricular response  Left axis deviation  Anterior infarct , age undetermined  T wave abnormality, consider lateral ischemia  Abnormal ECG  When compared with ECG of 12-DEC-2020 15:17,  Atrial fibrillation has replaced Sinus rhythm  Vent.  rate has increased BY  44 BPM    Confirmed by Phil Stevens MD. ([id number]) on 7/12/2021 10:38:02 PM        Echo Results  (Last 48 hours)    None       Cardiolite (Tc-99m Sestamibi) stress test    Signed By: Namrata Padilla MD     July 13, 2021

## 2021-07-13 NOTE — PROGRESS NOTES
Bedside shift change report given to Katheryn (oncoming nurse) by Italia Ballard Rn  (offgoing nurse). Report included the following information SBAR, Kardex and Recent Results.

## 2021-07-13 NOTE — PROGRESS NOTES
Palliative Medicine    AMD Status: No AMD on file. Patient agrees with [id number] [facility]. of decision making and his wife will remains his primary surrogate decision maker. 7/13/2021 0850 Seen today in room 356 along with Shimon Donahue NP. Awake, alert. Sitting on side of bed. Oriented x 4. Respirations unlabored. Oxygen on at 2 lpm per NC . Able to speak in full sentences. States feeling a little better than yesterday. He is not urinating as much as he was hoping for with the increased diuretics. Legs a little less edematous but abdomen remains large and \"tight\". Has spoken with the cardiologist and oncologist and feels comfortable with the recommended plan of care. CODE STATUS:  FULL CODE    Disposition plan: anticpate home with family support and scheduled medical follow up. Palliative care will continue to follow Vania Mehta during his hospitalization and support him and his family as they make healthcare decisions and establish goals of care.     Reanna Martinez RN, MSN  Palliative Medicine  P: [phone]

## 2021-07-13 NOTE — DIABETES MGMT
GLYCEMIC CONTROL PLAN OF CARE     Assessment:  Pt admitted with pneumonia, acute respiratory distress/sepsis, a fib. Past Medial History includes T2DM, HTN, CML, CAD, CKD stage 3, TIA. GFR - 44. Pt with good glycemic control PTA as indicated by current A1C of 7.1%. Fasting blood glucose this morning in target range. Recommendations:  · Continue corrective lispro, very insulin resistant dosing ACHS      Most recent blood glucose values:  7/13:  Lab -  129;  POC - 125  7/12:  Lab - 153,  POC - 164, 226, 127  7/11:  Lab - 228;  POC - 230, 184        Current A1C-  Ave blood gluocse has been ~ 157 mg/dL over past 3 months  Lab Results   Component Value Date/Time    Hemoglobin A1c 7.1 (H) 07/12/2021 02:55 AM     Care Everywhere last A1C was 10/23/19 - 6.0%    Current hospital diabetes medications:  Corrective lispro, normal insulin sensitivity ACHS    Previous day's insulin requirements: 6 units (corrective lispro)    Home diabetes medications:  Per PTA list: need to verify   Key Antihyperglycemic Medications     Patient is on no antihyperglycemic meds. Diet:  ADULT DIET Regular; Low Fat/Low Chol/High Fiber/TEX; No Concentrated Sweets   Meal Intake:   Patient Vitals for the past 168 hrs:   % Diet Eaten   07/12/21 1553 76 - 100%   07/12/21 0811 76 - 100%   07/11/21 1833 1 - 25%     Supplement Intake:  No data found.     Education:  ____Refer to Diabetes Education Record             ____Education not indicated at this time    Oan[address] Jim[address], [address], [address] [place] [address]  Diabetes and Glycemic Control   Office:  [phone]  Pager:  [phone]

## 2021-07-13 NOTE — PROGRESS NOTES
Pt requested  Milk of Magnesia, Dr. Roland Libman notified, awaiting orders  Pt refused to have blood sugar taken at 2100.

## 2021-07-13 NOTE — PROGRESS NOTES
Hospitalist Progress Note-critical care note     Patient: Kathleene Homans MRN: [id number]  CSN: [id number]    YOB: 1940  Age: 79 y.o. Sex: male    DOA: 7/11/2021 LOS:  LOS: 2 days            Chief complaint: pulm edema, sepsis , afib CHF exacerbation, pneumonia, A. fib with RVR    Assessment/Plan         Hospital Problems  Date Reviewed: 7/20/2017        Codes Class Noted POA    Encounter for palliative care ICD-10-CM: Z51.5  ICD-9-CM: V66.7  Unknown Unknown        Debility ICD-10-CM: R53.81  ICD-9-CM: 799.3  Unknown Unknown        Acute on chronic combined systolic and diastolic congestive heart failure ([facility] ICD-10-CM: I50.43  ICD-9-CM: 428.43, 428.0  7/12/2021 Unknown        * (Principal) Pneumonia ICD-10-CM: J18.9  ICD-9-CM: 486  7/11/2021 Unknown        Pulmonary edema ICD-10-CM: J81.1  ICD-9-CM: 002  7/11/2021 Unknown        Sepsis ([facility] [address] ICD-10-CM: A41.9  ICD-9-CM: 038.9, 995.91  7/11/2021 Unknown        Atrial fibrillation with RVR (H[facility]) ICD-10-CM: I48.91  ICD-9-CM: 427.31  7/11/2021 Unknown             Acute respiratory distress/sepsis  Continue wean off nc o2   So far cx negative       Multiple bilateral pneumonia with marked leukocytosis  Continue iv abx Breathing tx as needed      Acute diastolic /systolic CHF extubation-acute on chronic combined   Ef 15-20 %   On Lasix Coreg, cardiology on board .  Lasix is increased   Low-salt diet, fluid restriction      New onset atrial fibrillation  Coreg for rate control, tsh wnl   On Lovenox every 12 hours      Hypertension  On Norvasc Lasix Coreg     Peripheral edema  Continue Lasix,, watch for renal function and electrolytes     History of CML   Wbc up to 53K will have oncologist on board  Continue home medication gleeve -recommend to bring medication to the hospital we can stare      Acute kidney injury with chronic kidney disease   Cr stable     Subjective: Feels better, cardiologist saw me,  I was on  Gleeve at home , my base line wbc was 45 K Rn: wbc up     Disposition :tbd,   Review of systems:    General: No fevers or chills. Cardiovascular: No chest pain or pressure. No palpitations. Pulmonary:  shortness of breath better  Gastrointestinal: No nausea, vomiting. Vital signs/Intake and Output:  Visit Vitals  /74   Pulse 100   Temp 97.3 °F (36.3 °C)   Resp 18   Ht 5' 8\" (1.727 m)   Wt 88.9 kg (196 lb)   SpO2 97%   BMI 29.80 kg/m²     Current Shift:  No intake/output data recorded. Last three shifts:  07/11 1901 - 07/13 0700  In: 720 [P.O.:720]  Out: 1850 [Urine:1850]    Physical Exam:  General: WD, WN. Alert, cooperative, no acute distress    HEENT: NC, Atraumatic. PERRLA, anicteric sclerae. Lungs: CTA Bilaterally. No Wheezing/Rhonchi/Rales. Heart:  Regular  rhythm,  + murmur, No Rubs, No Gallops  Abdomen: Soft, Non distended, Non tender. +Bowel sounds,   Extremities: No c/c/e  Psych:   Not anxious or agitated. Neurologic:  No acute neurological deficit.              Labs: Results:       Chemistry Recent Labs     07/13/21 0440 07/12/21 0255 07/11/21  0855   * 153* 228*    140 138   K 4.2 4.1 4.3    104 101   CO2 31 31 31   BUN 45* 43* 41*   CREA 1.56* 1.52* 1.67*   CA 9.1 9.2 9.5   AGAP 8 5 6   BUCR 29* 28* 25*   AP  --  103 133*   TP  --  6.0* 7.3   ALB  --  2.8* 3.3*   GLOB  --  3.2 4.0   AGRAT  --  0.9 0.8      CBC w/Diff Recent Labs     07/13/21 0440 07/12/21  0255 07/11/21  0855   WBC 53.4* 43.2* 43.0*   RBC 4.58 4.41 5.33   HGB 12.2* 11.9* 14.1   HCT 41.8 39.7 47.7   * 122* 135   GRANS 60 73 75*   LYMPH 7* 13* 2*   EOS 0 2 0      Cardiac Enzymes Recent Labs     07/12/21  0255 07/11/21  1850   CPK 53 63   CKND1 3.4 3.0      Coagulation Recent Labs     07/11/21  0855   PTP 16.2*   INR 1.3*   APTT 27.1       Lipid Panel Lab Results   Component Value Date/Time    Cholesterol, total 107 08/12/2016 03:20 AM    HDL Cholesterol 56 08/12/2016 03:20 AM    LDL, calculated 26.4 08/12/2016 03:20 AM    VLDL, calculated 24.6 08/12/2016 03:20 AM    Triglyceride 123 08/12/2016 03:20 AM    CHOL/HDL Ratio 1.9 08/12/2016 03:20 AM      BNP No results for input(s): BNPP in the last 72 hours. Liver Enzymes Recent Labs     07/12/21  0255   TP 6.0*   ALB 2.8*         Thyroid Studies Lab Results   Component Value Date/Time    TSH 1.34 07/12/2021 02:55 AM        Procedures/imaging: see electronic medical records for all procedures/Xrays and details which were not copied into this note but were reviewed prior to creation of Plan    XR CHEST PORT    Result Date: 7/11/2021  EXAM: CHEST RADIOGRAPH CLINICAL INDICATION/HISTORY: Dyspnoea -Additional: None COMPARISON: December 12, 2020 TECHNIQUE: Portable frontal view of the chest _______________ FINDINGS: SUPPORT DEVICES: None. HEART AND MEDIASTINUM: Prior median sternotomy. No appreciable cardiomegaly. Remaining mediastinal contours within normal limits. LUNGS AND PLEURAL SPACES: There are patchy bilateral opacities as well as blunting of the costophrenic sulci. BONY THORAX AND SOFT TISSUES: Unremarkable. _______________     1. Bilateral airspace disease 2.  Small bilateral pleural effusions       Maisha Boo MD

## 2021-07-13 NOTE — PROGRESS NOTES
Bedside shift change report given to Jennifer Sandhu RN (oncoming nurse) by Neftali Hassan RN (offgoing nurse). Report included the following information SBAR, Kardex, Intake/Output and MAR.

## 2021-07-13 NOTE — PROGRESS NOTES
Assumed care of pt from April B RN.  9885:  made aware of WBC of 53. Will consult oncology. 2593: Oncology saw pt for elevated WBC and hx of cancer. Gleevac was ordered patient wife to bring medication. Medication was brought and label placed on medication for scanning. 1134: Pt refused to have blood sugar checked before lunch. 1323: Pt complained that abx is making him very nauseas. Will call .   1327: notified of pt condition after Zithromax tx.

## 2021-07-13 NOTE — PROGRESS NOTES
Spoke with Dr. Tunde Adkins prior to giving Lovenox,PLT count  122, instructed to give Lovenox as ordered and MD entered am labs.  Will monitor labs closely

## 2021-07-14 NOTE — PROGRESS NOTES
Obtain daughter Kevin Hinojosa phone number from wife.  Called [phone] one answer the phone

## 2021-07-14 NOTE — PROGRESS NOTES
[address] [facility] [phone]  [facility] [phone]      Palliative Care Support: This writer, along with Loren Marr NP, with the Palliative Care team; met with patient to offer support and potentially re-visit goals of care. Patient was in bed and alert and oriented. Patient was able to verbalize his understanding of what is currently going on with his medical condition(s) and potential complications. Patient stressed that he did not want his wife knowing details of what is going on with his health. He wants to have time to talk to his wife. He stated that his daughter knows. Patient worries about his wife and discussed some of her own health issues. Patient's milrinone has already started and the medical team will monitor how that is helping or not helping his condition. Patient appears to understand the implications of his current medical condition. The Palliative Care team will re-visit his goals of care, in the near future. At this time, patient will remain a FULL CODE WITH FULL INTERVENTIONS. Recommendations: The Palliative Care team will continue to offer support to patient and will also re-visit the goals of care conversation, in the near future. Gretel Burrell., [facility] [place]all[place]   RT#[phone]

## 2021-07-14 NOTE — PROGRESS NOTES
Cardiology Progress Note        Patient: Brandan Jeffries        Sex: male          DOA: 7/11/2021  YOB: 1940      Age:  79 y.o.        LOS:  LOS: 3 days   Assessment/Plan     Principal Problem:    Pneumonia (7/11/2021)    Active Problems:    CML (chronic myelocytic leukemia) ([facility] [address] (9/2/2016)      Pulmonary edema (7/11/2021)      Sepsis ([facility] [address] [facility] (7/11/2021)      Atrial fibrillation with RVR ([facility] [address] [facility] (7/11/2021)      Encounter for palliative care ()      Debility ()      Acute on chronic combined systolic and diastolic congestive heart failure ([facility] [address] [facility] (7/12/2021)        Plan:  Pulmonary edema  Acute combined severe CHF  Atrial fibrillation  Severe cardiomyopathy  Severe pulmonary hypertension, group 2    Acute combined severe CHF  Patient is not able to make enough urine output  I will start milrinone 0.2 mcg/kg/min dose  Continue with Lasix      I have asked the patient if I can call his wife and update her. Patient preferred not to call her because she will become more anxious according to the patient. Patient just have a good urine output of 450 cc. I will increase the dose of Milrinone from 0.2-0.375 micrograms/kilogram per minute  cannot stop carvedilol due to underlying AFib with mild RVR, which is permissive at this point. Discussed with patient. Continue with current medical treatment. Lasix was increased from 40 to 80 mg by nephrology. Subjective:    cc:  Shortness of breath        REVIEW OF SYSTEMS:     General: No fevers or chills. Cardiovascular: No chest pain or pressure. No palpitations. ++ ankle swelling  Pulmonary: ++ SOB, orthopnea, PND  Gastrointestinal: No nausea, vomiting or diarrhea      Objective:      Visit Vitals  /62   Pulse 98   Temp 97.9 °F (36.6 °C)   Resp 16   Ht 5' 8\" (1.727 m)   Wt 88.9 kg (196 lb)   SpO2 98%   BMI 29.80 kg/m²     Body mass index is 29.8 kg/m².     Physical Exam:  General Appearance: Comfortable, not using accessory muscles of respiration. NECK: No JVD, no thyroidomeglay. LUNGS: Clear bilaterally. HEART: S1 irregular, variable +S2 audible, systolic murmur   ABD: Non-tender, BS Audible    EXT: ++ edema, and no cysnosis. VASCULAR EXAM: Pulses are intact. PSYCHIATRIC EXAM: Mood is appropriate.     Medication:  Current Facility-Administered Medications   Medication Dose Route Frequency    doxycycline (VIBRAMYCIN) 100 mg in 0.9% sodium chloride (MBP/ADV) 100 mL MBP  100 mg IntraVENous Q12H    furosemide (LASIX) injection 80 mg  80 mg IntraVENous Q12H    [START ON 7/15/2021] enoxaparin (LOVENOX) injection 140 mg  1.5 mg/kg SubCUTAneous Q24H    imatinib (GLEEVEC) chemo tablet 100 mg (Patient Supplied)  100 mg Oral DAILY    cyanocobalamin tablet 2,000 mcg  2,000 mcg Oral DAILY    vitamin E (AQUA GEMS) capsule 400 Units  400 Units Oral DAILY    albuterol-ipratropium (DUO-NEB) 2.5 MG-0.5 MG/3 ML  3 mL Nebulization Q4H PRN    milrinone (PRIMACOR) 20 MG/100 ML D5W infusion  0.375 mcg/kg/min IntraVENous CONTINUOUS    guaiFENesin ER (MUCINEX) tablet 600 mg  600 mg Oral Q12H    famotidine (PEPCID) tablet 20 mg  20 mg Oral DAILY    magnesium hydroxide (MILK OF MAGNESIA) 400 mg/5 mL oral suspension 30 mL  30 mL Oral DAILY PRN    [Held by provider] amLODIPine (NORVASC) tablet 5 mg  5 mg Oral DAILY    aspirin delayed-release tablet 81 mg  81 mg Oral DAILY    atorvastatin (LIPITOR) tablet 40 mg  40 mg Oral DAILY    carvediloL (COREG) tablet 25 mg  25 mg Oral BID    tamsulosin (FLOMAX) capsule 0.4 mg  0.4 mg Oral DAILY    sodium chloride (NS) flush 5-40 mL  5-40 mL IntraVENous Q8H    sodium chloride (NS) flush 5-40 mL  5-40 mL IntraVENous PRN    sodium chloride (NS) flush 5-40 mL  5-40 mL IntraVENous PRN    acetaminophen (TYLENOL) tablet 650 mg  650 mg Oral Q6H PRN    Or    acetaminophen (TYLENOL) suppository 650 mg  650 mg Rectal Q6H PRN    polyethylene glycol (MIRALAX) packet 17 g  17 g Oral DAILY PRN    ondansetron (ZOFRAN ODT) tablet 4 mg  4 mg Oral Q8H PRN    Or    ondansetron (ZOFRAN) injection 4 mg  4 mg IntraVENous Q6H PRN    cefTRIAXone (ROCEPHIN) 1 g in sterile water (preservative free) 10 mL IV syringe  1 g IntraVENous Q24H    insulin lispro (HUMALOG) injection   SubCUTAneous AC&HS               Lab/Data Reviewed:  Procedures/imaging: see electronic medical records for all procedures/Xrays   and details which were not copied into this note but were reviewed prior to creation of Plan       All lab results for the last 24 hours reviewed. Recent Labs     07/14/21  0510 07/13/21  0440 07/12/21  0255   WBC 46.4* 53.4* 43.2*   HGB 11.8* 12.2* 11.9*   HCT 40.4 41.8 39.7   * 123* 122*     Recent Labs     07/14/21 0510 07/13/21 0440 07/12/21  0255    140 140   K 4.4 4.2 4.1    101 104   CO2 32 31 31   * 129* 153*   BUN 50* 45* 43*   CREA 1.90* 1.56* 1.52*   CA 9.0 9.1 9.2       RADIOLOGY:  CT Results  (Last 48 hours)    None        CXR Results  (Last 48 hours)    None            Cardiology Procedures:   Results for orders placed or performed during the hospital encounter of 07/11/21   EKG, 12 LEAD, INITIAL   Result Value Ref Range    Ventricular Rate 110 BPM    Atrial Rate 111 BPM    QRS Duration 88 ms    Q-T Interval 294 ms    QTC Calculation (Bezet) 397 ms    Calculated R Axis -42 degrees    Calculated T Axis 153 degrees    Diagnosis       Atrial fibrillation with rapid ventricular response  Left axis deviation  Anterior infarct , age undetermined  T wave abnormality, consider lateral ischemia  Abnormal ECG  When compared with ECG of 12-DEC-2020 15:17,  Atrial fibrillation has replaced Sinus rhythm  Vent.  rate has increased BY  44 BPM    Confirmed by Jennifer Tapia MD. ([id number]) on 7/12/2021 10:38:02 PM        Echo Results  (Last 48 hours)    None       Cardiolite (Tc-99m Sestamibi) stress test    Signed By: Marisela Montgomery MD     July 14, 2021

## 2021-07-14 NOTE — PROGRESS NOTES
Problem: Falls - Risk of  Goal: *Absence of Falls  Description: Document Nupur Smith Fall Risk and appropriate interventions in the flowsheet. Outcome: Progressing Towards Goal  Note: Fall Risk Interventions:            Medication Interventions: Patient to call before getting OOB                   Problem: Patient Education: Go to Patient Education Activity  Goal: Patient/Family Education  Outcome: Progressing Towards Goal     Problem: Diabetes Self-Management  Goal: *Disease process and treatment process  Description: Define diabetes and identify own type of diabetes; list 3 options for treating diabetes. Outcome: Progressing Towards Goal  Goal: *Incorporating nutritional management into lifestyle  Description: Describe effect of type, amount and timing of food on blood glucose; list 3 methods for planning meals. Outcome: Progressing Towards Goal  Goal: *Incorporating physical activity into lifestyle  Description: State effect of exercise on blood glucose levels. Outcome: Progressing Towards Goal  Goal: *Developing strategies to promote health/change behavior  Description: Define the ABC's of diabetes; identify appropriate screenings, schedule and personal plan for screenings. Outcome: Progressing Towards Goal  Goal: *Using medications safely  Description: State effect of diabetes medications on diabetes; name diabetes medication taking, action and side effects. Outcome: Progressing Towards Goal  Goal: *Monitoring blood glucose, interpreting and using results  Description: Identify recommended blood glucose targets  and personal targets. Outcome: Progressing Towards Goal  Goal: *Prevention, detection, treatment of acute complications  Description: List symptoms of hyper- and hypoglycemia; describe how to treat low blood sugar and actions for lowering  high blood glucose level.   Outcome: Progressing Towards Goal  Goal: *Prevention, detection and treatment of chronic complications  Description: Define the natural course of diabetes and describe the relationship of blood glucose levels to long term complications of diabetes.   Outcome: Progressing Towards Goal  Goal: *Developing strategies to address psychosocial issues  Description: Describe feelings about living with diabetes; identify support needed and support network  Outcome: Progressing Towards Goal  Goal: *Insulin pump training  Outcome: Progressing Towards Goal  Goal: *Sick day guidelines  Outcome: Progressing Towards Goal  Goal: *Patient Specific Goal (EDIT GOAL, INSERT TEXT)  Outcome: Progressing Towards Goal     Problem: Patient Education: Go to Patient Education Activity  Goal: Patient/Family Education  Outcome: Progressing Towards Goal

## 2021-07-14 NOTE — PROGRESS NOTES
Hospitalist Progress Note-critical care note     Patient: Mandeep Patel MRN: [id number]  [facility]: [id number]    YOB: 1940  Age: 79 y.o. Sex: male    DOA: 7/11/2021 LOS:  LOS: 3 days            Chief complaint: pulm edema, sepsis , afib CHF exacerbation, pneumonia, A. fib with RVR    Assessment/Plan         Hospital Problems  Date Reviewed: 7/20/2017        Codes Class Noted POA    Encounter for palliative care ICD-10-CM: Z51.5  ICD-9-CM: V66.7  Unknown Unknown        Debility ICD-10-CM: R53.81  ICD-9-CM: 799.3  Unknown Unknown        Acute on chronic combined systolic and diastolic congestive heart failure ([facility] ICD-10-CM: I50.43  ICD-9-CM: 428.43, 428.0  7/12/2021 Unknown        * (Principal) Pneumonia ICD-10-CM: J18.9  ICD-9-CM: 486  7/11/2021 Unknown        Pulmonary edema ICD-10-CM: J81.1  ICD-9-CM: 675  7/11/2021 Unknown        Sepsis ([facility] ICD-10-CM: A41.9  ICD-9-CM: 038.9, 995.91  7/11/2021 Unknown        Atrial fibrillation with RVR ([facility] ICD-10-CM: I48.91  ICD-9-CM: 427.31  7/11/2021 Unknown        CML (chronic myelocytic leukemia) ([facility] ICD-10-CM: C92.10  ICD-9-CM: 205.10  9/2/2016 Yes             Acute respiratory distress/sepsis   Continue nc O2 , wean per protocol       Multiple bilateral pneumonia with marked leukocytosis  Continue iv abx Breathing tx as needed -change to doxy and rocephin -reported azithromycin makes him itchiness      Acute diastolic /systolic CHF extubation-acute on chronic combined   Ef 15-20 %   On primarcor gtt with lasix   per cardiologist   On Lasix Coreg, cardiology on board .  Lasix is increased   Low-salt diet, fluid restriction  Only had 700 ml urine out-put      atrial fibrillation  Coreg for rate control, tsh wnl   Change lovenox to 1.5 mg/kg daily due to renal function         Hypertension  On Norvasc Lasix Coreg     Peripheral edema       History of CML   Wbc improving oncologist on board  Continue home medication gleeve      Acute kidney injury with chronic kidney disease   Worsening today, renal consult     Debility     Subjective: no make so much urine , breathing is better when they increase to 3 L     Rn: wbc up     Code status discussed with him and he wants to be full code-\"cardiologist will change medication to make my heart stronger\". He does not want me update his wife, agree to update his daughter- Guevara Dey -will obtain phone number from him. Prognosis is guarded    Disposition :tbd,   Review of systems:    General: No fevers or chills. Cardiovascular: No chest pain or pressure. No palpitations. Pulmonary:  shortness of breath better  Gastrointestinal: No nausea, vomiting. Vital signs/Intake and Output:  Visit Vitals  /62   Pulse 98   Temp 97.9 °F (36.6 °C)   Resp 16   Ht 5' 8\" (1.727 m)   Wt 88.9 kg (196 lb)   SpO2 98%   BMI 29.80 kg/m²     Current Shift:  07/14 0701 - 07/14 1900  In: -   Out: 700 [Urine:700]  Last three shifts:  07/12 1901 - 07/14 0700  In: 860 [P.O.:600; I.V.:260]  Out: 6402 [Urine:1475]    Physical Exam:  General: WD, WN. Alert, cooperative, no acute distress    HEENT: NC, Atraumatic. PERRLA, anicteric sclerae. Lungs: CTA Bilaterally. No Wheezing/Rhonchi/Rales. Heart:  Regular  rhythm,  + murmur, No Rubs, No Gallops  Abdomen: Soft, Non distended, Non tender. +Bowel sounds,   Extremities: No c/c/e  Psych:   Not anxious or agitated. Neurologic:  No acute neurological deficit.              Labs: Results:       Chemistry Recent Labs     07/14/21  0510 07/13/21  0440 07/12/21  0255   * 129* 153*    140 140   K 4.4 4.2 4.1    101 104   CO2 32 31 31   BUN 50* 45* 43*   CREA 1.90* 1.56* 1.52*   CA 9.0 9.1 9.2   AGAP 6 8 5   BUCR 26* 29* 28*   AP  --   --  103   TP  --   --  6.0*   ALB  --   --  2.8*   GLOB  --   --  3.2   AGRAT  --   --  0.9      CBC w/Diff Recent Labs     07/14/21  0510 07/13/21  0440 07/12/21  0255   WBC 46.4* 53.4* 43.2*   RBC 4.44 4.58 4.41   HGB 11.8* 12.2* 11.9*   HCT 40.4 41.8 39.7   PLT 114* 123* 122*   GRANS 61 60 73   LYMPH 6* 7* 13*   EOS 0 0 2      Cardiac Enzymes Recent Labs     07/12/21  0255 07/11/21  1850   CPK 53 63   CKND1 3.4 3.0      Coagulation No results for input(s): PTP, INR, APTT, INREXT, INREXT in the last 72 hours. Lipid Panel Lab Results   Component Value Date/Time    Cholesterol, total 107 08/12/2016 03:20 AM    HDL Cholesterol 56 08/12/2016 03:20 AM    LDL, calculated 26.4 08/12/2016 03:20 AM    VLDL, calculated 24.6 08/12/2016 03:20 AM    Triglyceride 123 08/12/2016 03:20 AM    CHOL/HDL Ratio 1.9 08/12/2016 03:20 AM      BNP No results for input(s): BNPP in the last 72 hours. Liver Enzymes Recent Labs     07/12/21 0255   TP 6.0*   ALB 2.8*         Thyroid Studies Lab Results   Component Value Date/Time    TSH 1.34 07/12/2021 02:55 AM        Procedures/imaging: see electronic medical records for all procedures/Xrays and details which were not copied into this note but were reviewed prior to creation of Plan    XR CHEST PORT    Result Date: 7/11/2021  EXAM: CHEST RADIOGRAPH CLINICAL INDICATION/HISTORY: Dyspnoea -Additional: None COMPARISON: December 12, 2020 TECHNIQUE: Portable frontal view of the chest _______________ FINDINGS: SUPPORT DEVICES: None. HEART AND MEDIASTINUM: Prior median sternotomy. No appreciable cardiomegaly. Remaining mediastinal contours within normal limits. LUNGS AND PLEURAL SPACES: There are patchy bilateral opacities as well as blunting of the costophrenic sulci. BONY THORAX AND SOFT TISSUES: Unremarkable. _______________     1. Bilateral airspace disease 2.  Small bilateral pleural effusions       Magdalena Veronica MD

## 2021-07-15 PROBLEM — I27.20 PULMONARY HYPERTENSION (HCC): Status: ACTIVE | Noted: 2021-01-01

## 2021-07-15 PROBLEM — I42.9 CARDIOMYOPATHY (HCC): Status: ACTIVE | Noted: 2021-01-01

## 2021-07-15 NOTE — PROGRESS NOTES
Bedside and Verbal shift change report given to MICKIE Estrella RN  (oncoming nurse) by CATHY Combs RN  (offgoing nurse). Report included the following information SBAR, Kardex, Intake/Output, MAR and Recent Results.

## 2021-07-15 NOTE — PROGRESS NOTES
RENAL CONSULT FOLLOW UP NOTE   7/15/2021    Patient:  Charisse Gutiérrez  :  1940  Gender:  male  MRN #:  [id number]    Reason for Consult: evaluation and management of ELIZABETH on CKD       Assessment:    Charisse Gutiérrez is a 79y.o. 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 He was brought in hospital due to  2 to 3 days history of progressive shortness of breath with the cough and increasing lower extremity swelling subjective fever and chills. CXR consistent with pneumonia and pulmonary congestion . ECHO demonstrated - cardiomyopathy with EF 15-20 %   During hospital stays developed ELIZABETH on CKD , non oliguric type most likely due to cardiorenal syndrome.  He has evidence of fluid overload, crackles on lung exam and b/l LE edema  When renal service was consulted     7/15/21- Urine output 2500 cc overnight, BP stable   No urinary obstruction in USG       # ELIZABETH on CKD stage 3Ga  # HTN  #Cardiomyopathy with volume overload       Plan:   - He has decent urine output with improvement in serum creatinine   - continue Lasix 80 mg IV BID , he still has significant volume overload , will add 500 mg iv diuril today   - - Bladder scan to ensure he is not retaining urine   - Appreciate cardiology recs , on milrinone drip for cardiomyopathy and coreg   - Target urine output in next 24 hours 3 liter  - Strict intake and output charting   - Fluid restriction to 1 liter, low salt diet   - Avoid NSAIDs, contrast and nephrotoxin   -Dose all meds and antibiotics for current eGFR       Subjective/ROS: -   Feels better, SOB is improving   Urinating fine  Decent urine output overnight      Intake/Output Summary (Last 24 hours) at 7/15/2021 1002  Last data filed at 7/15/2021 0809  Gross per 24 hour   Intake 1148.63 ml   Output 2250 ml   Net -1101.37 ml Objective:    Visit Vitals  /64   Pulse 100   Temp 97.5 °F (36.4 °C)   Resp 18   Ht 5' 8\" (1.727 m)   Wt 91.8 kg (202 lb 4.8 oz)   SpO2 100%   BMI 30.76 kg/m²       Physical Exam:    Pt awake,  alert and comfortable  HEENT: No JVD, anicteric sclera, no neck swelling  Lung: crackles at lung base  Heart: s1s2 heard with systolic murmur   Abdomen: soft, non tender, no guarding, normal bowel sounds. Ext: b/l LE edema   CNS- Oriented to time , place and person     Laboratory Data:    Lab Results   Component Value Date    BUN 46 (H) 07/15/2021    BUN 50 (H) 07/14/2021    BUN 45 (H) 07/13/2021     07/15/2021     07/14/2021     07/13/2021    CO2 25 07/15/2021    CO2 32 07/14/2021    CO2 31 07/13/2021     Lab Results   Component Value Date    WBC 51.1 (HH) 07/15/2021    HGB 11.3 (L) 07/15/2021    HCT 38.0 07/15/2021     No components found for: CALCIUM, PHOSPHORUS, MAGNESIUM  Lab Results   Component Value Date    HDL 56 08/12/2016     No results found for: SPECIMENTYP, TURBIDITY, UGLU    Imaging Reveiwed:  CXR 7/11/21\"- 1. Bilateral airspace disease   Small bilateral pleural effusions    · ECHO 7/12/21- LV: Estimated LVEF is 15 - 20%. Normal wall thickness. Mildly dilated left ventricle. Severely reduced systolic function. Left ventricular diastolic dysfunction E'E= 15.30. · Contrast used: DEFINITY. · LA: Moderately dilated left atrium. · AV: Aortic valve leaflet calcification present. Mild aortic valve regurgitation is present. · MV: Mitral valve thickening. Moderate mitral valve regurgitation is present. · TV: Moderate tricuspid valve regurgitation is present. · PV: Mild pulmonic valve regurgitation is present. · PA: Pulmonary arterial systolic pressure is 55 mmHg. · IVC: Moderately elevated central venous pressure (8 mmHg); IVC diameter is larger than 21 mm and collapses more than 50% with respiration.      USG Retroperitoneum : 7/14/21-      IMPRESSION     No hydronephrosis.     Thick walled, contracted urinary bladder.     Echogenic liver.  Perihepatic ascites and right pleural effusion.    - other medical management per Primary team      Thank you for consult       Winifred Bliss MD   [facility]

## 2021-07-15 NOTE — PROGRESS NOTES
Palliative Medicine    CODE STATUS:  FULL CODE    AMD Status: No AMD on file. Patient agrees with [id number] of decision making and his wife will remains his primary surrogate decision maker. 7/15/2021 0945 Seen today in room 357 along with Hansel Herrera NP. He was sleeping comfortably and the decision was made not to awaken him.    ~1215 Ms Darrell Epley had a lengthy discussion with Mr Apley's daughter, William Weston. Ms Margi Staples had many questions about future health planning for her father and how to best make plans based on his current health status. Ms Darrell Epley explained the current status of surrogate decision maker and Ms Margi Staples agreed with her father that Mrs Lazaro Gordillo might be very challenged to make difficult healthcare decisions for her . Offered the opportunity for the patient to complete AMD paperwork naming someone other than his wife as his surrogate decision maker. She said she would peak with her father and asked if the palliative team could check with him tomorrow about his decision. We will follow up tomorrow  She was unaware that her father had expressed desire for full code status and was wondering if this was the best choice for him. She did ask some questions about how hospice worked and the qualifying conditions. Gave her brief overview of hospice and encourage her to research and come back to us with more questions. Mr Lazaro Gordillo apparently is 100%  VA disabled and may have hospice benefits through the [place] as well as medicare. Encouraged Ms Margi Staples to speak with the care manager working with her dad.      CODE STATUS:  FULL CODE    Disposition plan: to be determined    Palliative care will continue to follow Diya  during his hospitalization and support him and his family as they make healthcare decisions and establish goals of care      César Fuentes RN, MSN  Palliative Medicine  P: [phone]

## 2021-07-15 NOTE — PROGRESS NOTES
Problem: Falls - Risk of  Goal: *Absence of Falls  Description: Document Anshul Pathak Fall Risk and appropriate interventions in the flowsheet.   Outcome: Progressing Towards Goal  Note: Fall Risk Interventions:  Mobility Interventions: Assess mobility with egress test, Communicate number of staff needed for ambulation/transfer, OT consult for ADLs, Patient to call before getting OOB, PT Consult for mobility concerns, PT Consult for assist device competence, Strengthening exercises (ROM-active/passive), Utilize walker, cane, or other assistive device         Medication Interventions: Evaluate medications/consider consulting pharmacy, Patient to call before getting OOB, Teach patient to arise slowly

## 2021-07-15 NOTE — CONSULTS
78yo w/ Hx CLL on Gleevac admitted with CAP. On outpatient Gleevac from home  WBC improving  Follow with Dr. Jose Miguel Mejia as outpatient  Following peripherally    Abigail Roblero M.D.  [facility]

## 2021-07-15 NOTE — PALLIATIVE CARE
Called and spoke with patient's daughter, Dani Dempsey at [phone] per request of Dr. Marichuy Amador had general questions about hospice services and shared that she has been talking with her dad about his medical condition and depending on his clinical course they may be interested in hospice services. Patient is 100% service-connected with the [facility] according to patient. Jose Esquivel had also been made aware of her father's decision for full code which she said she was not aware of until today. Encouraged Jose Esquivel to have continued conversations with her father regarding his CODE STATUS in light of his underlying medical conditions and his response to current treatment. Jose Esquivel also inquired about medical power of  and I explained to her that according to the [place] of decision-makers, in the absence of any paperwork, legal decision making would fall to the patient's wife (her mother). Explained to Jose Esquivel that if her father wished to elect someone other than his wife as his medical power of  he could do so by filling out a [place] advance medical directive which could be done in the hospital if he wished. Jose Esquivel asked if palliative medicine could follow-up with her father regarding completion of a [address] to have continued conversations with her father regarding medical power of  and who he would want to make his medical decisions if he were not able. Jose Esquivel expressed appreciation for all of the information and will continue to discuss advance care planning and goals of care with her father.

## 2021-07-15 NOTE — PROGRESS NOTES
Hospitalist Progress Note-critical care note     Patient: Graeme Query MRN: [id number]  [facility]: [id number]    YOB: 1940  Age: 79 y.o. Sex: male    DOA: 7/11/2021 LOS:  LOS: 4 days            Chief complaint: pulm edema, sepsis , afib CHF exacerbation, pneumonia, A. fib with RVR    Assessment/Plan         Hospital Problems  Date Reviewed: 7/20/2017        Codes Class Noted POA    Pulmonary hypertension ([facility] ICD-10-CM: I27.20  ICD-9-CM: 416.8  7/15/2021 Unknown        Cardiomyopathy ([facility] ICD-10-CM: I42.9  ICD-9-CM: 425.4  7/15/2021 Unknown        Encounter for palliative care ICD-10-CM: Z51.5  ICD-9-CM: V66.7  Unknown Unknown        Debility ICD-10-CM: R53.81  ICD-9-CM: 799.3  Unknown Unknown        Acute on chronic combined systolic and diastolic congestive heart failure ([facility] ICD-10-CM: I50.43  ICD-9-CM: 428.43, 428.0  7/12/2021 Unknown        * (Principal) Pneumonia ICD-10-CM: J18.9  ICD-9-CM: 486  7/11/2021 Unknown        Pulmonary edema ICD-10-CM: J81.1  ICD-9-CM: 068  7/11/2021 Unknown        Sepsis ([facility] ICD-10-CM: A41.9  ICD-9-CM: 038.9, 995.91  7/11/2021 Unknown        Atrial fibrillation with RVR ([facility]) ICD-10-CM: I48.91  ICD-9-CM: 427.31  7/11/2021 Unknown        CML (chronic myelocytic leukemia) ([facility] ICD-10-CM: C92.10  ICD-9-CM: 205.10  9/2/2016 Yes             Acute respiratory distress/sepsis   Continue nc O2 , wean per protocol   Still need 3 L nc O2       Multiple bilateral pneumonia with marked leukocytosis  Continue iv abx Breathing as needed   Continue  doxy and rocephin      Acute diastolic /systolic CHF extubation-acute on chronic combined , severe cardiomyopathy and severe pulm HTn   Ef 15-20 %   On primarcor gtt with lasix   per cardiologist   On Lasix Coreg, cardiology on board .    Low-salt diet, fluid restriction  2.5 L urine yesterday         atrial fibrillation  Coreg for rate control, tsh wnl   Change lovenox to 1.5 mg/kg         Hypertension  On Norvasc Lasix Coreg     Peripheral edema       History of CML   Wbc improving oncologist on board  Continue home medication gleeve      Acute kidney injury with chronic kidney disease renal ultrasound done no obstruction , abnormal liver imaging from renal ultrasound   Will have liver ultrasound today     Debility     Subjective: feel same as yesterday. Rn: geovanny balbuena     Talked with his daughter  Lan Wallace including poor prognosis. Also discussed with patient. They all indicated verbal understanding. Daughter would like to talk with palliative care team about hospice. Prognosis is guarded  All questions have been answered. 45 total min's spent on patient care including >50% on counseling/coordinating care. Discussed the above assessments. also discussed labs, medications and hospital course  Disposition :tbd,   Review of systems:    General: No fevers or chills. Cardiovascular: No chest pain or pressure. No palpitations. Pulmonary:  shortness of breath on exertion   Gastrointestinal: No nausea, vomiting. Vital signs/Intake and Output:  Visit Vitals  BP (!) 133/59   Pulse 75   Temp 97.4 °F (36.3 °C)   Resp 18   Ht 5' 8\" (1.727 m)   Wt 91.8 kg (202 lb 4.8 oz)   SpO2 100%   BMI 30.76 kg/m²     Current Shift:  07/15 0701 - 07/15 1900  In: 240 [P.O.:240]  Out: -   Last three shifts:  07/13 1901 - 07/15 0700  In: 1028.6 [P.O.:670; I.V.:358.6]  Out: 2975 [Urine:2975]    Physical Exam:  General: WD, WN. Alert, cooperative, no acute distress    HEENT: NC, Atraumatic. PERRLA, anicteric sclerae. Lungs: CTA Bilaterally. No Wheezing/Rhonchi/Rales. Heart:  Regular  rhythm,  + murmur, No Rubs, No Gallops  Abdomen: Soft, Non distended, Non tender. +Bowel sounds,   Extremities: No c/c, edema   Psych:   Not anxious or agitated. Neurologic:  No acute neurological deficit.              Labs: Results:       Chemistry Recent Labs     07/15/21  0550 07/14/21  0510 07/13/21  0440   * 134* 129*    140 140   K 4.1 4.4 4.2    102 101   CO2 25 32 31   BUN 46* 50* 45*   CREA 1.80* 1.90* 1.56*   CA 8.8 9.0 9.1   AGAP 11 6 8   BUCR 26* 26* 29*      CBC w/Diff Recent Labs     07/15/21  0550 07/14/21  0510 07/13/21  0440   WBC 51.1* 46.4* 53.4*   RBC 4.24* 4.44 4.58   HGB 11.3* 11.8* 12.2*   HCT 38.0 40.4 41.8   * 114* 123*   GRANS 67 61 60   LYMPH 5* 6* 7*   EOS 1 0 0      Cardiac Enzymes No results for input(s): CPK, CKND1, TARA in the last 72 hours. No lab exists for component: CKRMB, TROIP   Coagulation No results for input(s): PTP, INR, APTT, INREXT, INREXT in the last 72 hours. Lipid Panel Lab Results   Component Value Date/Time    Cholesterol, total 107 08/12/2016 03:20 AM    HDL Cholesterol 56 08/12/2016 03:20 AM    LDL, calculated 26.4 08/12/2016 03:20 AM    VLDL, calculated 24.6 08/12/2016 03:20 AM    Triglyceride 123 08/12/2016 03:20 AM    CHOL/HDL Ratio 1.9 08/12/2016 03:20 AM      BNP No results for input(s): BNPP in the last 72 hours. Liver Enzymes No results for input(s): TP, ALB, TBIL, AP in the last 72 hours. No lab exists for component: SGOT, GPT, DBIL   Thyroid Studies Lab Results   Component Value Date/Time    TSH 1.34 07/12/2021 02:55 AM        Procedures/imaging: see electronic medical records for all procedures/Xrays and details which were not copied into this note but were reviewed prior to creation of Plan    XR CHEST PORT    Result Date: 7/11/2021  EXAM: CHEST RADIOGRAPH CLINICAL INDICATION/HISTORY: Dyspnoea -Additional: None COMPARISON: December 12, 2020 TECHNIQUE: Portable frontal view of the chest _______________ FINDINGS: SUPPORT DEVICES: None. HEART AND MEDIASTINUM: Prior median sternotomy. No appreciable cardiomegaly. Remaining mediastinal contours within normal limits. LUNGS AND PLEURAL SPACES: There are patchy bilateral opacities as well as blunting of the costophrenic sulci. BONY THORAX AND SOFT TISSUES: Unremarkable. _______________     1. Bilateral airspace disease 2.  Small bilateral pleural effusions       Jeison Toussaint MD

## 2021-07-15 NOTE — PROGRESS NOTES
Cardiology Progress Note        Patient: Ana Robles        Sex: male          DOA: 7/11/2021  YOB: 1940      Age:  79 y.o.        LOS:  LOS: 4 days   Assessment/Plan     Principal Problem:    Pneumonia (7/11/2021)    Active Problems:    CML (chronic myelocytic leukemia) ([address] (9/2/2016)      Pulmonary edema (7/11/2021)      Sepsis ([address] (7/11/2021)      Atrial fibrillation with RVR ([address] (7/11/2021)      Encounter for palliative care ()      Debility ()      Acute on chronic combined systolic and diastolic congestive heart failure ([address] (7/12/2021)      Pulmonary hypertension ([address] (7/15/2021)      Cardiomyopathy ([address] (7/15/2021)        Plan:  Pulmonary edema  Acute combined severe CHF  Atrial fibrillation  Severe cardiomyopathy  Severe pulmonary hypertension, group 2    Acute combined severe CHF  Patient is not able to make enough urine output  I will start milrinone 0.2 mcg/kg/min dose  Continue with Lasix      I have asked the patient if I can call his wife and update her. Patient preferred not to call her because she will become more anxious according to the patient. Continue with milrinone 375 micrograms/kilogram per minute  cannot stop carvedilol due to underlying AFib with mild RVR, which is permissive at this point. Discussed with patient. Continue with current medical treatment. Lasix was increased from 40 to 80 mg by nephrology. Patient have minimal continuous oozing from attempted IV access on the right arm  I will hold Lovenox tonight  Continue with rest of the treatment. Discussed with patient. Subjective:    cc:  Shortness of breath        REVIEW OF SYSTEMS:     General: No fevers or chills. Cardiovascular: No chest pain or pressure.  No palpitations. ++ ankle swelling  Pulmonary: ++ SOB, orthopnea, PND  Gastrointestinal: No nausea, vomiting or diarrhea      Objective:      Visit Vitals  /76   Pulse (!) 111   Temp 97.9 °F (36.6 °C)   Resp 16   Ht 5' 8\" (1.727 m)   Wt 91.8 kg (202 lb 4.8 oz)   SpO2 99%   BMI 30.76 kg/m²     Body mass index is 30.76 kg/m². Physical Exam:  General Appearance: Comfortable, not using accessory muscles of respiration. NECK: No JVD, no thyroidomeglay. LUNGS: Clear bilaterally. HEART: S1 irregular, variable +S2 audible, systolic murmur   ABD: Non-tender, BS Audible    EXT: ++ edema, and no cysnosis. VASCULAR EXAM: Pulses are intact. PSYCHIATRIC EXAM: Mood is appropriate.   Minimal oozing from the right forearm from attempted venous access  Medication:  Current Facility-Administered Medications   Medication Dose Route Frequency    chlorothiazide (DIURIL) 500 mg in 0.9% sodium chloride 50 mL IVPB  500 mg IntraVENous DAILY    doxycycline (VIBRAMYCIN) 100 mg in 0.9% sodium chloride (MBP/ADV) 100 mL MBP  100 mg IntraVENous Q12H    furosemide (LASIX) injection 80 mg  80 mg IntraVENous Q12H    [Held by provider] enoxaparin (LOVENOX) injection 140 mg  1.5 mg/kg SubCUTAneous Q24H    imatinib (GLEEVEC) chemo tablet 100 mg (Patient Supplied)  100 mg Oral DAILY    cyanocobalamin tablet 2,000 mcg  2,000 mcg Oral DAILY    vitamin E (AQUA GEMS) capsule 400 Units  400 Units Oral DAILY    albuterol-ipratropium (DUO-NEB) 2.5 MG-0.5 MG/3 ML  3 mL Nebulization Q4H PRN    milrinone (PRIMACOR) 20 MG/100 ML D5W infusion  0.375 mcg/kg/min IntraVENous CONTINUOUS    guaiFENesin ER (MUCINEX) tablet 600 mg  600 mg Oral Q12H    famotidine (PEPCID) tablet 20 mg  20 mg Oral DAILY    magnesium hydroxide (MILK OF MAGNESIA) 400 mg/5 mL oral suspension 30 mL  30 mL Oral DAILY PRN    [Held by provider] amLODIPine (NORVASC) tablet 5 mg  5 mg Oral DAILY    aspirin delayed-release tablet 81 mg  81 mg Oral DAILY    atorvastatin (LIPITOR) tablet 40 mg  40 mg Oral DAILY    carvediloL (COREG) tablet 25 mg  25 mg Oral BID    tamsulosin (FLOMAX) capsule 0.4 mg  0.4 mg Oral DAILY    sodium chloride (NS) flush 5-40 mL  5-40 mL IntraVENous Q8H    sodium chloride (NS) flush 5-40 mL  5-40 mL IntraVENous PRN    sodium chloride (NS) flush 5-40 mL  5-40 mL IntraVENous PRN    acetaminophen (TYLENOL) tablet 650 mg  650 mg Oral Q6H PRN    Or    acetaminophen (TYLENOL) suppository 650 mg  650 mg Rectal Q6H PRN    polyethylene glycol (MIRALAX) packet 17 g  17 g Oral DAILY PRN    ondansetron (ZOFRAN ODT) tablet 4 mg  4 mg Oral Q8H PRN    Or    ondansetron (ZOFRAN) injection 4 mg  4 mg IntraVENous Q6H PRN    insulin lispro (HUMALOG) injection   SubCUTAneous AC&HS               Lab/Data Reviewed:  Procedures/imaging: see electronic medical records for all procedures/Xrays   and details which were not copied into this note but were reviewed prior to creation of Plan       All lab results for the last 24 hours reviewed. Recent Labs     07/15/21  0550 07/14/21  0510 07/13/21  0440   WBC 51.1* 46.4* 53.4*   HGB 11.3* 11.8* 12.2*   HCT 38.0 40.4 41.8   * 114* 123*     Recent Labs     07/15/21  0550 07/14/21  0510 07/13/21  0440    140 140   K 4.1 4.4 4.2    102 101   CO2 25 32 31   * 134* 129*   BUN 46* 50* 45*   CREA 1.80* 1.90* 1.56*   CA 8.8 9.0 9.1       RADIOLOGY:  CT Results  (Last 48 hours)    None        CXR Results  (Last 48 hours)    None            Cardiology Procedures:   Results for orders placed or performed during the hospital encounter of 07/11/21   EKG, 12 LEAD, INITIAL   Result Value Ref Range    Ventricular Rate 110 BPM    Atrial Rate 111 BPM    QRS Duration 88 ms    Q-T Interval 294 ms    QTC Calculation (Bezet) 397 ms    Calculated R Axis -42 degrees    Calculated T Axis 153 degrees    Diagnosis       Atrial fibrillation with rapid ventricular response  Left axis deviation  Anterior infarct , age undetermined  T wave abnormality, consider lateral ischemia  Abnormal ECG  When compared with ECG of 12-DEC-2020 15:17,  Atrial fibrillation has replaced Sinus rhythm  Vent.  rate has increased BY  44 BPM    Confirmed by Alhaji Pérez MD. ([id number]) on 7/12/2021 10:38:02 PM        Echo Results  (Last 48 hours)    None       Cardiolite (Tc-99m Sestamibi) stress test    Signed By: Delbert Solano MD     July 15, 2021

## 2021-07-15 NOTE — PROGRESS NOTES
Discharge Planning: In progress    CM notes that palliative care has met with the patient and family and that hospice is a possibility. CM to meet with the patient and family tomorrow to have discussion regarding discharge plans and goals of care. Care Management Interventions  PCP Verified by CM: Yes  Mode of Transport at Discharge:  Other (see comment) (wife)  Transition of Care Consult (CM Consult): Discharge Planning  Current Support Network: Lives with Spouse  Confirm Follow Up Transport: Self  The Plan for Transition of Care is Related to the Following Treatment Goals : Pneumonia  Discharge Location  Discharge Placement: Home with family assistance (vs home with hospice)

## 2021-07-16 NOTE — PROGRESS NOTES
Comprehensive Nutrition Assessment    Type and Reason for Visit: Initial (LOS)    Nutrition Recommendations/Plan: Other: continue w/ POC    Nutrition Assessment: Pt admited w/ pulm edema, CHF exacerbation, pneumonia, A. fib with RVR, hx of CML, pulmonary hypertension, cardiomyopathy, Acute kidney injury with chronic kidney disease. Estimated Daily Nutrient Needs:  Energy (kcal): 1898; Weight Used for Energy Requirements: Current  Protein (g): 73-91; Weight Used for Protein Requirements: Current (0.8-1g)  Fluid (ml/day): 1000; Method Used for Fluid Requirements:  (Fluid restricted)    Nutrition Related Findings:  labsL K 3.4, GFR est AA 49, BUN 41, creatinine 1.65. Med: lasix, pepcid, vit E, vit B12, MOM, flomax, humalog. +BM 7/14. Pt reported eating good. Pt has breakfast and lunch- stated eating most of lunch but two piece of turkey. noted PO intake in flowsheet      Wounds:    None       Current Nutrition Therapies:  ADULT DIET Regular; Low Fat/Low Chol/High Fiber/TEX; Low Sodium (2 gm); 1000 ml; No Concentrated Sweets    Anthropometric Measures:  Height:  5' 8\" (172.7 cm)  Current Body Wt:  90.7 kg (200 lb)   Admission Body Wt:  195 lb 15.8 oz    Usual Body Wt:  108 kg (238 lb 1.6 oz) (10/2019)     Ideal Body Wt:  154 lbs:  129.9 %   BMI Category:  Obese class 1 (BMI 30.0-34. 9)       Nutrition Diagnosis:   Overweight/obesity related to excessive energy intake as evidenced by BMI (BMI of 30.55 and PO intake %)    Nutrition Interventions:   Food and/or Nutrient Delivery: Continue current diet  Nutrition Education and Counseling: No recommendations at this time  Coordination of Nutrition Care: Continue to monitor while inpatient    Goals:  Continue PO >75% at most meals throughout the next 7 days       Nutrition Monitoring and Evaluation:   Behavioral-Environmental Outcomes: None identified  Food/Nutrient Intake Outcomes: Food and nutrient intake  Physical Signs/Symptoms Outcomes: Biochemical data, Meal time behavior, Skin, Weight, Nausea/vomiting, GI status    Discharge Planning:    Continue current diet     Electronically signed by Katarina Garcia RD on 7/16/2021 at 1:44 PM

## 2021-07-16 NOTE — CONSULTS
[address]: [phone])  [facility]: [phone]     Patient Name: Rachell Steward  YOB: 1940    Date of follow up 7/16/2021   Reason for Consult: Care decisions/goals of care  Requesting Provider: Dr. Aubrey Ventura   Primary Care Physician: Aly Kerr DO      SUMMARY:   Rachell Steward is a 79 y.o. male with a past history of coronary artery disease, CML, diabetes mellitus, hypertension and history of TIAs, who was admitted on 7/11/2021 from home with a diagnosis of acute respiratory distress, pneumonia, acute diastolic CHF exacerbation and new onset atrial fibrillation. Current medical issues leading to Palliative Medicine involvement include: Chronically ill-appearing 79year-old gentleman with multiple comorbid conditions admitted with respiratory distress, pneumonia, exacerbation of CHF and new onset atrial fibrillation. Palliative medicine is consulted for care decisions and goals of care. CHIEF COMPLAINT: Shortness of breath    HPI/SUBJECTIVE:    Past history as above. Mr. Adamaris Kemp is a chronically ill-appearing 65-year-old gentleman admitted to the hospital after 2 to 3 days of progressive shortness of breath with cough and increasing lower extremity swelling. Patient reported that his lower legs are \"about twice the size is normal\" in his abdomen feels tight with fluid. Patient reports this is the first time his abdomen has been this tight so. Patient is on home oxygen at 2 L as needed. Denies pain. No shortness of breath at rest however dyspnea increases with any activity or movement. 7/16/2021 alert and oriented x 4, able to complete AMD naming his daugther Lorraine Post. Tells us he feels a bit better.      The patient is:   [x] Verbal and participatory  [] Non-participatory due to:     GOALS OF CARE: Full code, full interventions  Patient/Health Care Proxy Stated Goals: Prolong life      TREATMENT PREFERENCES:   Code Status: Full Code         PALLIATIVE DIAGNOSES:   1. Encounter for palliative care/goals of care   2. Pneumonia  3. Atrial fibrillation with RVR  4. Debility       PLAN:   1. 7/16/2021 Mr Austin Evans seen along with Ms Ben Diehl RN. He is awake, alert oriented x 4, a bit fatigued appearing. Tells us he feels a bit better this am. Still with lower extremity edema. Introduced completion of an AMD of which he was agreeable. He named his daughter Fernie Layne as primary MPOA and his son Serge Mata as secondary MPOA. Spoke with Dutch Ellison this am, she is aware that her father completed an AMD naming her as MPOA and is accepting of appointment . Mr Austin Evans wishes to remain a full code, we have discussed benefits and burdens of resuscitation with chronic illness and patient would like to further discuss with his daughter. All questions answered to the best of ability. Goals of care full code with full interventions. ( please see below for previous notes per palliative team)     2. Encounter for palliative care/goals of care -seen at bedside along with Ms. Joao Umanzor. Patient's wife, Bimal Major, at bedside visiting. Patient is sitting up in bed, awake, alert and oriented x4. Nasal oxygen at 2 L in place. Patient is engaged in talking with us, speaks in full sentences. Patient reports no shortness of breath at rest; however, dyspnea does increase with any activity or movement. There is no AMD on file. Of note, the only document scanned into the medical record is the patient's last will and testament which does not address medical power of . Patient and wife made aware and were asked to bring in a copy of any [facility] paperwork for scanning into the medical record. Patient reports that paperwork has been completed appointing his wife, Bimal Major, as primary surrogate decision maker and his daughter, Fernie Layne, as secondary surrogate decision maker.   According to the [place], in the absence of any documentation legal decision making would default to patient's wife, Bradley Ip, and patient is agreeable to this. Discussed goals of care including benefits and burdens of resuscitation, intubation and life support with patient and wife and patient expresses a desire to undergo full resuscitative measures in the event of cardiac arrest, stating he wants \"to give it a shot\". Goals of care are full code, full interventions. 3. Pneumonia -primary team managing, IV antibiotics    4. Atrial fibrillation with RVR - primary team managing, cardiology consulted, echo - result pending, telemetry monitoring    5. Debility - PPS 48, lives at home with his wife, on home oxygen at 2 L, patient no longer drives. Independent with ADLs. 6. Initial consult note routed to primary continuity provider    7. Communicated plan of care with: Palliative IDT      Advance Care Planning:  [] [facility] Interdisciplinary Team has updated the ACP Navigator with [address] and Patient Capacity    Primary Decision [facility] ([address]): AMD completed   Naming daughter Kayla Rose as MPOA and son Karmen Juan as secondary MPOA   Medical Interventions: Full interventions         As far as possible, the palliative care team has discussed with patient / health care proxy about goals of care / treatment preferences for patient.          HISTORY:     History obtained from: Patient    Principal Problem:    Pneumonia (7/11/2021)    Active Problems:    CML (chronic myelocytic leukemia) ([address] (9/2/2016)      Pulmonary edema (7/11/2021)      Sepsis ([address] (7/11/2021)      Atrial fibrillation with RVR ([address] (7/11/2021)      Encounter for palliative care ()      Debility ()      Acute on chronic combined systolic and diastolic congestive heart failure ([address] (7/12/2021)      Pulmonary hypertension ([address] (7/15/2021)      Cardiomyopathy ([address] (7/15/2021)      Past Medical History:   Diagnosis Date    CAD (coronary artery disease)     Chronic [facility] ([facility] 2016    CML (chronic myelocytic leukemia) ([facility]     Coagulation disorder (HCC)     bruises easily    Diabetes ([facility]     Hypertension     Nausea & vomiting     with most recent surgery-carotid endardarectomy    TIA (transient ischemic attack) 2016      Past Surgical History:   Procedure Laterality Date    HX CAROTID ENDARTERECTOMY Left 2017    HX CATARACT REMOVAL Bilateral     HX CORONARY ARTERY BYPASS GRAFT      HX HERNIA REPAIR Right     hernia    HX UROLOGICAL      greenlight laser    HX UROLOGICAL      kidney stone    HX UROLOGICAL      Lithotripsy x 2    [place] CARDIAC SURG PROCEDURE UNLIST  3/2014    quadruple bypass      Family History   Problem Relation Age of Onset    Post-op Nausea/Vomiting Brother      History reviewed, no pertinent family history. Social History     Tobacco Use    Smoking status: Former Smoker     Packs/day: 1.00     Years: 60.00     Pack years: 60.00     Quit date: 3/23/2014     Years since quittin.3    Smokeless tobacco: Never Used   Substance Use Topics    Alcohol use:  Yes     Alcohol/week: 2.0 standard drinks     Types: 2 Shots of liquor per week     Comment: per day     Allergies   Allergen Reactions    Adhesive Tape-Silicones Other (comments)     Raw skin    Codeine Nausea and Vomiting    Iodinated Contrast Media Rash     patient states this was years ago - he states that he can tolerate the newer agents      Current Facility-Administered Medications   Medication Dose Route Frequency    chlorothiazide (DIURIL) 500 mg in 0.9% sodium chloride 50 mL IVPB  500 mg IntraVENous DAILY    doxycycline (VIBRAMYCIN) 100 mg in 0.9% sodium chloride (MBP/ADV) 100 mL MBP  100 mg IntraVENous Q12H    furosemide (LASIX) injection 80 mg  80 mg IntraVENous Q12H    [Held by provider] enoxaparin (LOVENOX) injection 140 mg  1.5 mg/kg SubCUTAneous Q24H    imatinib (GLEEVEC) chemo tablet 100 mg (Patient Supplied) 100 mg Oral DAILY    cyanocobalamin tablet 2,000 mcg  2,000 mcg Oral DAILY    vitamin E (AQUA GEMS) capsule 400 Units  400 Units Oral DAILY    albuterol-ipratropium (DUO-NEB) 2.5 MG-0.5 MG/3 ML  3 mL Nebulization Q4H PRN    milrinone (PRIMACOR) 20 MG/100 ML D5W infusion  0.375 mcg/kg/min IntraVENous CONTINUOUS    guaiFENesin ER (MUCINEX) tablet 600 mg  600 mg Oral Q12H    famotidine (PEPCID) tablet 20 mg  20 mg Oral DAILY    magnesium hydroxide (MILK OF MAGNESIA) 400 mg/5 mL oral suspension 30 mL  30 mL Oral DAILY PRN    [Held by provider] amLODIPine (NORVASC) tablet 5 mg  5 mg Oral DAILY    aspirin delayed-release tablet 81 mg  81 mg Oral DAILY    atorvastatin (LIPITOR) tablet 40 mg  40 mg Oral DAILY    carvediloL (COREG) tablet 25 mg  25 mg Oral BID    tamsulosin (FLOMAX) capsule 0.4 mg  0.4 mg Oral DAILY    sodium chloride (NS) flush 5-40 mL  5-40 mL IntraVENous Q8H    sodium chloride (NS) flush 5-40 mL  5-40 mL IntraVENous PRN    acetaminophen (TYLENOL) tablet 650 mg  650 mg Oral Q6H PRN    Or    acetaminophen (TYLENOL) suppository 650 mg  650 mg Rectal Q6H PRN    polyethylene glycol (MIRALAX) packet 17 g  17 g Oral DAILY PRN    ondansetron (ZOFRAN ODT) tablet 4 mg  4 mg Oral Q8H PRN    Or    ondansetron (ZOFRAN) injection 4 mg  4 mg IntraVENous Q6H PRN    insulin lispro (HUMALOG) injection   SubCUTAneous AC&HS          Clinical Pain Assessment (nonverbal scale for nonverbal patients): Clinical Pain Assessment  Severity: 0          Duration: for how long has pt been experiencing pain (e.g., 2 days, 1 month, years)  Frequency: how often pain is an issue (e.g., several times per day, once every few days, constant)     FUNCTIONAL ASSESSMENT:     Palliative Performance Scale (PPS):  PPS: 50    ECOG  ECOG Status : Limited self-care     PSYCHOSOCIAL/SPIRITUAL SCREENING:      Any spiritual / Congregation concerns:  [] Yes /  [x] No    Caregiver Burnout:  [] Yes /  [x] No /  [] No Caregiver Present Anticipatory grief assessment:   [x] Normal  / [] Maladaptive        REVIEW OF SYSTEMS:     Systems: constitutional, ears/nose/mouth/throat, respiratory, gastrointestinal, genitourinary, musculoskeletal, integumentary, neurologic, psychiatric, endocrine. Positive findings noted below. Modified ESAS Completed by: provider   Fatigue: 4     Depression: 1 Pain: 0   Anxiety: 0 Nausea: 0   Anorexia: 2 Dyspnea: 0               Positive and pertinent negative findings in ROS are noted above in HPI. The following systems were [x] reviewed / [] unable to be reviewed as noted in HPI  Other findings are noted below. PHYSICAL EXAM:     Constitutional: reclining in bed alert answers questions, comfortable appearing in NAD   Eyes: pupils equal   ENMT: moist MM  Cardiovascular: RRR  Respiratory: respirations not labored   Last bowel movement: None recorded  Skin: warm, dry  Neurologic: alert and oriented x 4   Psychiatric: full affect, no hallucinations    Other: Wt Readings from Last 3 Encounters:   07/16/21 91.1 kg (200 lb 14.4 oz)   12/12/20 87.5 kg (193 lb)   05/24/19 114.3 kg (252 lb)     Blood pressure 127/65, pulse (!) 106, temperature 98.6 °F (37 °C), resp. rate 17, height 5' 8\" (1.727 m), weight 91.1 kg (200 lb 14.4 oz), SpO2 99 %.   Pain:  Pain Scale 1: Numeric (0 - 10)  Pain Intensity 1: 0                      LAB AND IMAGING FINDINGS:     Lab Results   Component Value Date/Time    WBC 44.3 (H) 07/16/2021 04:25 AM    HGB 10.2 (L) 07/16/2021 04:25 AM    PLATELET 604 (L) 77/02/7698 04:25 AM     Lab Results   Component Value Date/Time    Sodium 140 07/16/2021 04:25 AM    Potassium 3.4 (L) 07/16/2021 04:25 AM    Chloride 99 (L) 07/16/2021 04:25 AM    CO2 36 (H) 07/16/2021 04:25 AM    BUN 41 (H) 07/16/2021 04:25 AM    Creatinine 1.65 (H) 07/16/2021 04:25 AM    Calcium 8.6 07/16/2021 04:25 AM    Magnesium 2.3 07/12/2021 02:55 AM    Phosphorus 4.4 07/12/2021 02:55 AM      Lab Results   Component Value Date/Time Alk. phosphatase 103 07/12/2021 02:55 AM    Protein, total 6.0 (L) 07/12/2021 02:55 AM    Albumin 2.8 (L) 07/12/2021 02:55 AM    Globulin 3.2 07/12/2021 02:55 AM     Lab Results   Component Value Date/Time    INR 1.3 (H) 07/11/2021 08:55 AM    Prothrombin time 16.2 (H) 07/11/2021 08:55 AM    aPTT 27.1 07/11/2021 08:55 AM      No results found for: IRON, FE, TIBC, IBCT, PSAT, FERR   No results found for: PH, PCO2, PO2  No components found for: [place]   Lab Results   Component Value Date/Time    CK 53 07/12/2021 02:55 AM    CK - MB 1.8 07/12/2021 02:55 AM              Total time: 25 minutes    > 50% counseling / coordination:  Time spent in direct consultation with the patient, medical team, and family     Prolonged service was provided for  []30 min   []75 min in face to face time in the presence of the patient, spent as noted above. Time Start:   Time End:     Disclaimer: Sections of this note are dictated using utilizing voice recognition software, which may have resulted in some phonetic based errors in grammar and contents. Even though attempts were made to correct all the mistakes, some may have been missed, and remained in the body of the document. If questions arise, please contact our department.

## 2021-07-16 NOTE — CONSULTS
80yo w/ Hx CLL on Gleevac admitted with CAP w Leukocytosis w/ responding WBC(down to 44k) following break of Gleevac.     On outpatient Gleevac from home  WBC improving  Follow with Dr. Tuyet Curry as outpatient  Following peripherally     Alex Sykes M.D.  [facility]

## 2021-07-16 NOTE — PROGRESS NOTES
Discharge Planning: Home with home health vs home with hospice    CM spoke with the patient and his wife at the bedside regarding plans for discharge. The patient states that a family member will be available to pick him up upon discharge. The patient states that he is interested in home health and private care aide services at home upon discharge. CM to reach out to the [place] and make arrangements as the patient is 100% service connected with the [place]. [facility] transfer form completed with the patient and will be sent to the [place] along with the patient's clinicals. Additionally, the patient confirms that he has home oxygen already and that it is provided by the [place]. The patient denies any additional questions or concerns at this time. CM to follow the patient's progress and be available to assist with discharge planning as needed. CMS referral placed. 1515: CM received a call from the patient's wife to discuss plans for discharge further. Mrs. Apley states that she and the patient are considering going home with hospice vs home with home health. CM and Mrs. Apley discussed the different discharge options. At this time Mrs. Apley states that they are not ready to speak with hospice and that they would want to hear more from the doctors before deciding to go in that direction. CM informed Mrs. Apley that a care manager will be available this weekend should they have any questions regarding discharge plans whether it be home with home health or hospice. Mrs. Apley verbalized understanding and denies any questions or concerns. Care Management Interventions  PCP Verified by CM: Yes  Mode of Transport at Discharge:  Other (see comment) (wife)  Transition of Care Consult (CM Consult): Home Health  Current Support Network: Lives with Spouse  Confirm Follow Up Transport: Self  The Plan for Transition of Care is Related to the Following Treatment Goals : Pneumonia  [facility] [employer] Information Provided?: Yes  Discharge Location  Discharge Placement: Home with home health (vs home with hospice)

## 2021-07-16 NOTE — PROGRESS NOTES
Problem: Falls - Risk of  Goal: *Absence of Falls  Description: Document Lori Rowe Fall Risk and appropriate interventions in the flowsheet. Outcome: Progressing Towards Goal  Note: Fall Risk Interventions:  Mobility Interventions: Bed/chair exit alarm         Medication Interventions: Assess postural VS orthostatic hypotension, Bed/chair exit alarm                   Problem: Patient Education: Go to Patient Education Activity  Goal: Patient/Family Education  Outcome: Progressing Towards Goal     Problem: Diabetes Self-Management  Goal: *Disease process and treatment process  Description: Define diabetes and identify own type of diabetes; list 3 options for treating diabetes. Outcome: Progressing Towards Goal  Goal: *Incorporating nutritional management into lifestyle  Description: Describe effect of type, amount and timing of food on blood glucose; list 3 methods for planning meals. Outcome: Progressing Towards Goal  Goal: *Incorporating physical activity into lifestyle  Description: State effect of exercise on blood glucose levels. Outcome: Progressing Towards Goal  Goal: *Developing strategies to promote health/change behavior  Description: Define the ABC's of diabetes; identify appropriate screenings, schedule and personal plan for screenings. Outcome: Progressing Towards Goal  Goal: *Using medications safely  Description: State effect of diabetes medications on diabetes; name diabetes medication taking, action and side effects. Outcome: Progressing Towards Goal  Goal: *Monitoring blood glucose, interpreting and using results  Description: Identify recommended blood glucose targets  and personal targets. Outcome: Progressing Towards Goal  Goal: *Prevention, detection, treatment of acute complications  Description: List symptoms of hyper- and hypoglycemia; describe how to treat low blood sugar and actions for lowering  high blood glucose level.   Outcome: Progressing Towards Goal  Goal: *Prevention, detection and treatment of chronic complications  Description: Define the natural course of diabetes and describe the relationship of blood glucose levels to long term complications of diabetes.   Outcome: Progressing Towards Goal  Goal: *Developing strategies to address psychosocial issues  Description: Describe feelings about living with diabetes; identify support needed and support network  Outcome: Progressing Towards Goal  Goal: *Insulin pump training  Outcome: Progressing Towards Goal  Goal: *Sick day guidelines  Outcome: Progressing Towards Goal  Goal: *Patient Specific Goal (EDIT GOAL, INSERT TEXT)  Outcome: Progressing Towards Goal

## 2021-07-16 NOTE — ACP (ADVANCE CARE PLANNING)
Advance Care Planning     CODE STATUS:  FULL CODE    General Advance Care Planning (ACP) Conversation  Date of Conversation: 7/16/2021  Conducted with: Patient with Decision Making Capacity    Healthcare Decision Maker:     Primary Decision Maker: Isaiah - Daughter - [phone]    Secondary Decision Maker: Keith Childs - Son - [phone]    Supplemental (Other) Decision Maker: Ramiro Larsen - [phone]  Today the patient confirmed AMD naming his children as his surrogate decision makers. 7/16/2021 0955 Seen today in room 357 along with Louisa Aguilar NP. Awake, alert. Oriented  X 4. Respirations unlabored. Oxygen on at 3 lpm per [place] . Able to speak in full  Sentences. States feeling better. His abdomen is less taut but his legs remain edematous. Has been urinating more. Revisited discussion of AMD and the current status that his wife is the default surrogate decision maker. He said that he would feel comfortable changing the status to make his children the surrogate decision makers. 1115--telephone call with Declan Gomez updating her re: AMD status. Brief medical update given. Did not revisit code status today with patient. He was able to describe how his life has been impacted by the chronicity of his illness and seemed to be contemplating his prospects. Encouraged continued family discussion. CODE STATUS:  FULL CODE    Disposition plan: anticipate home. Will most likely need home health support. Palliative care will continue to follow Frankie Forrest during his hospitalization and support him and his family as they make healthcare decisions and establish goals of care.     Raul Howell RN, MSN  Palliative Medicine  P: [phone]

## 2021-07-16 NOTE — PROGRESS NOTES
1200- Pt refused blood sugar checks at this time. Stated he already clarified with MD that he will only allow 2 blood sugar checks/day.  Insulin held and Doctor will be notified

## 2021-07-16 NOTE — PROGRESS NOTES
Hospitalist Progress Note-critical care note     Patient: Matty Stephenson MRN: [id number]  [facility]: [id number]    YOB: 1940  Age: 79 y.o. Sex: male    DOA: 7/11/2021 LOS:  LOS: 5 days            Chief complaint: pulm edema, sepsis , afib CHF exacerbation, pneumonia, A. fib with RVR    Assessment/Plan         Hospital Problems  Date Reviewed: 7/20/2017        Codes Class Noted POA    Pulmonary hypertension ([facility] ICD-10-CM: I27.20  ICD-9-CM: 416.8  7/15/2021 Unknown        Cardiomyopathy ([facility] ICD-10-CM: I42.9  ICD-9-CM: 425.4  7/15/2021 Unknown        Encounter for palliative care ICD-10-CM: Z51.5  ICD-9-CM: V66.7  Unknown Unknown        Debility ICD-10-CM: R53.81  ICD-9-CM: 799.3  Unknown Unknown        Acute on chronic combined systolic and diastolic congestive heart failure ([facility] ICD-10-CM: I50.43  ICD-9-CM: 428.43, 428.0  7/12/2021 Unknown        * (Principal) Pneumonia ICD-10-CM: J18.9  ICD-9-CM: 486  7/11/2021 Unknown        Pulmonary edema ICD-10-CM: J81.1  ICD-9-CM: 258  7/11/2021 Unknown        Sepsis ([facility] ICD-10-CM: A41.9  ICD-9-CM: 038.9, 995.91  7/11/2021 Unknown        Atrial fibrillation with RVR (HCC) ICD-10-CM: I48.91  ICD-9-CM: 427.31  7/11/2021 Unknown        CML (chronic myelocytic leukemia) ([facility] ICD-10-CM: C92.10  ICD-9-CM: 205.10  9/2/2016 Yes             Acute respiratory distress/sepsis   Continue nc O2 , wean per protocol       Multiple bilateral pneumonia with marked leukocytosis  Continue iv abx Breathing as needed   Continue  doxy and rocephin      Acute diastolic /systolic CHF extubation-acute on chronic combined , severe cardiomyopathy and severe pulm HTn   Ef 15-20 %   On primarcor gtt with lasix  -will replace K today  On Lasix Coreg, cardiology on board .    Low-salt diet, fluid restriction          atrial fibrillation  Coreg for rate control, tsh wnl   Change lovenox to 1.5 mg/kg   lovenox was hold yesterday, no bleeding now, will restart tomorrow         Hypertension Lasix Coreg     Peripheral edema-improving today        History of CML   Wbc improving oncologist on board  Continue home  gleeve      Acute kidney injury with chronic kidney disease renal ultrasound done no obstruction ,   Abdominal ultrasound showed mild ascites    Debility     Subjective: feel better today  Rn: No acute ECU    Disposition :tbd,   Review of systems:    General: No fevers or chills. Cardiovascular: No chest pain or pressure. No palpitations. Pulmonary:  shortness of breath improving  Gastrointestinal: No nausea, vomiting. Vital signs/Intake and Output:  Visit Vitals  /75   Pulse (!) 107   Temp 98.5 °F (36.9 °C)   Resp 19   Ht 5' 8\" (1.727 m)   Wt 91.1 kg (200 lb 14.4 oz)   SpO2 99%   BMI 30.55 kg/m²     Current Shift:  07/16 0701 - 07/16 1900  In: 0   Out: 1300 [Urine:1300]  Last three shifts:  07/14 1901 - 07/16 0700  In: 1048.6 [P.O.:690; I.V.:358.6]  Out: 9344 [Urine:4475]    Physical Exam:  General: WD, WN. Alert, cooperative, no acute distress    HEENT: NC, Atraumatic. PERRLA, anicteric sclerae. Lungs: CTA Bilaterally. No Wheezing/Rhonchi/Rales. Heart:  Regular  rhythm,  + murmur, No Rubs, No Gallops  Abdomen: Soft, Non distended, Non tender. +Bowel sounds,   Extremities: No c/c, mild edema  Psych:   Not anxious or agitated. Neurologic:  No acute neurological deficit. Labs: Results:       Chemistry Recent Labs     07/16/21  0425 07/15/21  0550 07/14/21  0510   * 139* 134*    140 140   K 3.4* 4.1 4.4   CL 99* 104 102   CO2 36* 25 32   BUN 41* 46* 50*   CREA 1.65* 1.80* 1.90*   CA 8.6 8.8 9.0   AGAP 5 11 6   BUCR 25* 26* 26*      CBC w/Diff Recent Labs     07/16/21  0425 07/15/21  0550 07/14/21  0510   WBC 44.3* 51.1* 46.4*   RBC 3.88* 4.24* 4.44   HGB 10.2* 11.3* 11.8*   HCT 34.3* 38.0 40.4   * 105* 114*   GRANS 65 67 61   LYMPH 4* 5* 6*   EOS 3 1 0      Cardiac Enzymes No results for input(s): CPK, CKND1, TARA in the last 72 hours.     No lab exists for component: CKRMB, TROIP   Coagulation No results for input(s): PTP, INR, APTT, INREXT, INREXT in the last 72 hours. Lipid Panel Lab Results   Component Value Date/Time    Cholesterol, total 107 08/12/2016 03:20 AM    HDL Cholesterol 56 08/12/2016 03:20 AM    LDL, calculated 26.4 08/12/2016 03:20 AM    VLDL, calculated 24.6 08/12/2016 03:20 AM    Triglyceride 123 08/12/2016 03:20 AM    CHOL/HDL Ratio 1.9 08/12/2016 03:20 AM      BNP No results for input(s): BNPP in the last 72 hours. Liver Enzymes No results for input(s): TP, ALB, TBIL, AP in the last 72 hours. No lab exists for component: SGOT, GPT, DBIL   Thyroid Studies Lab Results   Component Value Date/Time    TSH 1.34 07/12/2021 02:55 AM        Procedures/imaging: see electronic medical records for all procedures/Xrays and details which were not copied into this note but were reviewed prior to creation of Plan    XR CHEST PORT    Result Date: 7/11/2021  EXAM: CHEST RADIOGRAPH CLINICAL INDICATION/HISTORY: Dyspnoea -Additional: None COMPARISON: December 12, 2020 TECHNIQUE: Portable frontal view of the chest _______________ FINDINGS: SUPPORT DEVICES: None. HEART AND MEDIASTINUM: Prior median sternotomy. No appreciable cardiomegaly. Remaining mediastinal contours within normal limits. LUNGS AND PLEURAL SPACES: There are patchy bilateral opacities as well as blunting of the costophrenic sulci. BONY THORAX AND SOFT TISSUES: Unremarkable. _______________     1. Bilateral airspace disease 2.  Small bilateral pleural effusions       Amira Valdivia MD

## 2021-07-16 NOTE — PROGRESS NOTES
Cardiology Progress Note        Patient: Rocio Chairez        Sex: male          DOA: 7/11/2021  YOB: 1940      Age:  79 y.o.        LOS:  LOS: 5 days   Assessment/Plan     Principal Problem:    Pneumonia (7/11/2021)    Active Problems:    CML (chronic myelocytic leukemia) ([address] (9/2/2016)      Pulmonary edema (7/11/2021)      Sepsis ([address] (7/11/2021)      Atrial fibrillation with RVR ([address] (7/11/2021)      Encounter for palliative care ()      Debility ()      Acute on chronic combined systolic and diastolic congestive heart failure ([address] (7/12/2021)      Pulmonary hypertension ([address] (7/15/2021)      Cardiomyopathy ([address] (7/15/2021)        Plan:  Atrial fibrillation  CHF urine output has improved  Continue milrinone 0.375 mcg/kg/min dose and diuretic treatment  Resume Lovenox no more bleeding from the attempted IV access site  Dr. Elizabeth Bhardwaj will be covering during the weekend. Discussed with patient and wife. Pulmonary edema  Acute combined severe CHF  Atrial fibrillation  Severe cardiomyopathy  Severe pulmonary hypertension, group 2    Acute combined severe CHF  Patient is not able to make enough urine output  I will start milrinone 0.2 mcg/kg/min dose  Continue with Lasix      I have asked the patient if I can call his wife and update her. Patient preferred not to call her because she will become more anxious according to the patient. Continue with milrinone 375 micrograms/kilogram per minute  cannot stop carvedilol due to underlying AFib with mild RVR, which is permissive at this point. Discussed with patient. Continue with current medical treatment. Lasix was increased from 40 to 80 mg by nephrology. Patient have minimal continuous oozing from attempted IV access on the right arm  I will hold Lovenox tonight  Continue with rest of the treatment. Discussed with patient.            Subjective:    cc:  Shortness of breath        REVIEW OF SYSTEMS: General: No fevers or chills. Cardiovascular: No chest pain or pressure. No palpitations. ++ ankle swelling  Pulmonary: ++ SOB, orthopnea, PND  Gastrointestinal: No nausea, vomiting or diarrhea      Objective:      Visit Vitals  /65   Pulse (!) 106   Temp 98.6 °F (37 °C)   Resp 17   Ht 5' 8\" (1.727 m)   Wt 91.1 kg (200 lb 14.4 oz)   SpO2 99%   BMI 30.55 kg/m²     Body mass index is 30.55 kg/m². Physical Exam:  General Appearance: Comfortable, not using accessory muscles of respiration. NECK: No JVD, no thyroidomeglay. LUNGS: Clear bilaterally. HEART: S1 irregular, variable +S2 audible, systolic murmur   ABD: Non-tender, BS Audible    EXT: ++ edema, and no cysnosis. VASCULAR EXAM: Pulses are intact. PSYCHIATRIC EXAM: Mood is appropriate.   Minimal oozing from the right forearm from attempted venous access  Medication:  Current Facility-Administered Medications   Medication Dose Route Frequency    chlorothiazide (DIURIL) 500 mg in 0.9% sodium chloride 50 mL IVPB  500 mg IntraVENous DAILY    doxycycline (VIBRAMYCIN) 100 mg in 0.9% sodium chloride (MBP/ADV) 100 mL MBP  100 mg IntraVENous Q12H    furosemide (LASIX) injection 80 mg  80 mg IntraVENous Q12H    enoxaparin (LOVENOX) injection 140 mg  1.5 mg/kg SubCUTAneous Q24H    imatinib (GLEEVEC) chemo tablet 100 mg (Patient Supplied)  100 mg Oral DAILY    cyanocobalamin tablet 2,000 mcg  2,000 mcg Oral DAILY    vitamin E (AQUA GEMS) capsule 400 Units  400 Units Oral DAILY    albuterol-ipratropium (DUO-NEB) 2.5 MG-0.5 MG/3 ML  3 mL Nebulization Q4H PRN    milrinone (PRIMACOR) 20 MG/100 ML D5W infusion  0.375 mcg/kg/min IntraVENous CONTINUOUS    guaiFENesin ER (MUCINEX) tablet 600 mg  600 mg Oral Q12H    famotidine (PEPCID) tablet 20 mg  20 mg Oral DAILY    magnesium hydroxide (MILK OF MAGNESIA) 400 mg/5 mL oral suspension 30 mL  30 mL Oral DAILY PRN    [Held by provider] amLODIPine (NORVASC) tablet 5 mg  5 mg Oral DAILY    aspirin delayed-release tablet 81 mg  81 mg Oral DAILY    atorvastatin (LIPITOR) tablet 40 mg  40 mg Oral DAILY    carvediloL (COREG) tablet 25 mg  25 mg Oral BID    tamsulosin (FLOMAX) capsule 0.4 mg  0.4 mg Oral DAILY    sodium chloride (NS) flush 5-40 mL  5-40 mL IntraVENous Q8H    sodium chloride (NS) flush 5-40 mL  5-40 mL IntraVENous PRN    acetaminophen (TYLENOL) tablet 650 mg  650 mg Oral Q6H PRN    Or    acetaminophen (TYLENOL) suppository 650 mg  650 mg Rectal Q6H PRN    polyethylene glycol (MIRALAX) packet 17 g  17 g Oral DAILY PRN    ondansetron (ZOFRAN ODT) tablet 4 mg  4 mg Oral Q8H PRN    Or    ondansetron (ZOFRAN) injection 4 mg  4 mg IntraVENous Q6H PRN    insulin lispro (HUMALOG) injection   SubCUTAneous AC&HS               Lab/Data Reviewed:  Procedures/imaging: see electronic medical records for all procedures/Xrays   and details which were not copied into this note but were reviewed prior to creation of Plan       All lab results for the last 24 hours reviewed.      Recent Labs     07/16/21  0425 07/15/21  0550 07/14/21  0510   WBC 44.3* 51.1* 46.4*   HGB 10.2* 11.3* 11.8*   HCT 34.3* 38.0 40.4   * 105* 114*     Recent Labs     07/16/21  0425 07/15/21  0550 07/14/21  0510    140 140   K 3.4* 4.1 4.4   CL 99* 104 102   CO2 36* 25 32   * 139* 134*   BUN 41* 46* 50*   CREA 1.65* 1.80* 1.90*   CA 8.6 8.8 9.0       RADIOLOGY:  CT Results  (Last 48 hours)    None        CXR Results  (Last 48 hours)    None            Cardiology Procedures:   Results for orders placed or performed during the hospital encounter of 07/11/21   EKG, 12 LEAD, INITIAL   Result Value Ref Range    Ventricular Rate 110 BPM    Atrial Rate 111 BPM    QRS Duration 88 ms    Q-T Interval 294 ms    QTC Calculation (Bezet) 397 ms    Calculated R Axis -42 degrees    Calculated T Axis 153 degrees    Diagnosis       Atrial fibrillation with rapid ventricular response  Left axis deviation  Anterior infarct , age undetermined  T wave abnormality, consider lateral ischemia  Abnormal ECG  When compared with ECG of 12-DEC-2020 15:17,  Atrial fibrillation has replaced Sinus rhythm  Vent.  rate has increased BY  44 BPM    Confirmed by Alhaji Pérez MD. ([id number]) on 7/12/2021 10:38:02 PM        Echo Results  (Last 48 hours)    None       Cardiolite (Tc-99m Sestamibi) stress test    Signed By: Delbert Solano MD     July 16, 2021

## 2021-07-16 NOTE — PROGRESS NOTES
RENAL CONSULT FOLLOW UP NOTE   2021    Patient:  Rocio Chairez  :  1940  Gender:  male  MRN #:  [id number]    Reason for Consult: evaluation and management of ELIZABETH on CKD       Assessment:    Rocio Chairez is a 79y.o. 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 He was brought in hospital due to  2 to 3 days history of progressive shortness of breath with the cough and increasing lower extremity swelling subjective fever and chills. CXR consistent with pneumonia and pulmonary congestion . ECHO demonstrated - cardiomyopathy with EF 15-20 %   During hospital stays developed ELIZABETH on CKD , non oliguric type most likely due to cardiorenal syndrome. He has evidence of fluid overload, crackles on lung exam and b/l LE edema  When renal service was consulted     7/15/21- Urine output 2500 cc overnight, BP stable   No urinary obstruction in USG     : UPO is 3475 cc      # ELIZABETH on CKD stage 3Ga  # HTN  #Cardiomyopathy with volume overload       Plan:   - He has robust  urine output with continued  improvement in serum creatinine .  He still looks volume up .   - continue Lasix 80 mg IV BID and  500 mg iv diuril , will readjust tomorrow depending on volume status/hemodyanmics   - - Bladder scan to ensure he is not retaining urine   - Appreciate cardiology recs , on milrinone drip for cardiomyopathy and coreg   - Target urine output in next 24 hours 3 liter  - Strict intake and output charting   - Fluid restriction to 1 liter, low salt diet   - Avoid NSAIDs, contrast and nephrotoxin   -Dose all meds and antibiotics for current eGFR       Subjective/ROS: -   Feels better, SOB is improving   Has Foleys cath, urinating fine   Robust  urine output overnight      Intake/Output Summary (Last 24 hours) at 2021 1125  Last data filed at 2021 0421  Gross per 24 hour   Intake    Output 3175 ml   Net -3175 ml         Objective:    Visit Vitals  /72   Pulse 96   Temp 98.2 °F (36.8 °C)   Resp 16   Ht 5' 8\" (1.727 m)   Wt 91.1 kg (200 lb 14.4 oz)   SpO2 99%   BMI 30.55 kg/m²       Physical Exam:    Pt awake,  alert and comfortable  HEENT: No JVD, anicteric sclera, no neck swelling  Lung: crackles at lung base  Heart: s1s2 heard with systolic murmur   Abdomen: soft, non tender, no guarding, normal bowel sounds. Ext: b/l LE edema   CNS- Oriented to time , place and person     Laboratory Data:    Lab Results   Component Value Date    BUN 41 (H) 07/16/2021    BUN 46 (H) 07/15/2021    BUN 50 (H) 07/14/2021     07/16/2021     07/15/2021     07/14/2021    CO2 36 (H) 07/16/2021    CO2 25 07/15/2021    CO2 32 07/14/2021     Lab Results   Component Value Date    WBC 44.3 (H) 07/16/2021    HGB 10.2 (L) 07/16/2021    HCT 34.3 (L) 07/16/2021     No components found for: CALCIUM, PHOSPHORUS, MAGNESIUM  Lab Results   Component Value Date    HDL 56 08/12/2016     No results found for: SPECIMENTYP, TURBIDITY, UGLU    Imaging Reveiwed:  CXR 7/11/21\"- 1. Bilateral airspace disease   Small bilateral pleural effusions    · ECHO 7/12/21- LV: Estimated LVEF is 15 - 20%. Normal wall thickness. Mildly dilated left ventricle. Severely reduced systolic function. Left ventricular diastolic dysfunction E'E= 15.30. · Contrast used: DEFINITY. · LA: Moderately dilated left atrium. · AV: Aortic valve leaflet calcification present. Mild aortic valve regurgitation is present. · MV: Mitral valve thickening. Moderate mitral valve regurgitation is present. · TV: Moderate tricuspid valve regurgitation is present. · PV: Mild pulmonic valve regurgitation is present. · PA: Pulmonary arterial systolic pressure is 55 mmHg. · IVC: Moderately elevated central venous pressure (8 mmHg); IVC diameter is larger than 21 mm and collapses more than 50% with respiration.      USG Retroperitoneum : 7/14/21-      IMPRESSION     No hydronephrosis.     Thick walled, contracted urinary bladder.     Echogenic liver.  Perihepatic ascites and right pleural effusion.    - other medical management per Primary team      Thank you for consult       Simin Gusman MD   [facility] [place]Nephrology

## 2021-07-17 NOTE — PROGRESS NOTES
Cardiology Progress Note        Patient: Ana Smith        Sex: male          DOA: 7/11/2021  YOB: 1940      Age:  79 y.o.        LOS:  LOS: 6 days      Patient seen and examined, chart reviewed. Assessment/Plan     Patient Active Problem List   Diagnosis Code    TIA (transient ischemic attack) G45.9    Coronary artery disease involving native coronary artery I25.10    Type II diabetes mellitus, uncontrolled ([facility] E11.65    Dyslipidemia E78.5    [facility] D61.818    CML (chronic myelocytic leukemia) ([facility] C92.10    HTN (hypertension) I10         Stage 3 chronic kidney disease (HCC) N18.30    Hyperglycemia R73.9         Community acquired pneumonia J18.9    Chronic congestive heart failure (HCC) I50.9    SOB (shortness of breath) R06.02    On supplemental oxygen by nasal cannula Z78.9    Pneumonia J18.9    Pulmonary edema J81.1    Sepsis ([facility] [address] [facility] A41.9    Atrial fibrillation with RVR ([facility] I48.91    Encounter for palliative care Z51.5    Debility R53.81    Acute on chronic combined systolic and diastolic congestive heart failure (HCC) I50.43    Pulmonary hypertension (HCC) I27.20    Cardiomyopathy (HCC) I42.9       Permanent atrial fibrillation    Echocardiogram reported    · LV: Estimated LVEF is 15 - 20%. Normal wall thickness. Mildly dilated left ventricle. Severely reduced systolic function. Left ventricular diastolic dysfunction E'E= 15.30. · Contrast used: DEFINITY. · LA: Moderately dilated left atrium. · AV: Aortic valve leaflet calcification present. Mild aortic valve regurgitation is present. · MV: Mitral valve thickening. Moderate mitral valve regurgitation is present. · TV: Moderate tricuspid valve regurgitation is present. · PV: Mild pulmonic valve regurgitation is present. · PA: Pulmonary arterial systolic pressure is 55 mmHg.   · IVC: Moderately elevated central venous pressure (8 mmHg); IVC diameter is larger than 21 mm and collapses more than 50% with respiration. Last 24 hour negative balance of 2779 ml  Plan:    Continue aspirin, carvedilol, atorvastatin. Change IV Lasix to 60 mg twice a day. Continue full dose of Lovenox. Continue IV Milrinone. Strict input output. Monitor renal function and electrolytes. Salt restriction up to 2 g per day and fluid restriction up to 1.2 L per day            Subjective:    cc:   breathing is better      REVIEW OF SYSTEMS:     General: No fevers or chills. Cardiovascular: No chest pain,No palpitations, No orthopnea, No PND, No leg swelling, No claudication  Pulmonary: No  dyspnea. Gastrointestinal: No nausea, vomiting, bleeding  Neurology: No Dizziness    Objective:      Visit Vitals  /70   Pulse (!) 109   Temp 97.8 °F (36.6 °C)   Resp 17   Ht 5' 8\" (1.727 m)   Wt 87.8 kg (193 lb 9 oz)   SpO2 98%   BMI 29.43 kg/m²     Body mass index is 29.43 kg/m². Physical Exam:  General Appearance: Comfortable, not using accessory muscles of respiration. HEENT: CT. HEAD: Atraumatic  NECK: No JVD, no thyroidomeglay. CAROTIDS: no bruit  LUNGS: Clear bilaterally. HEART: S1+S2 audible, irregular irregular, no murmur, no pericardial rub. ABD: Non-tender, BS Audible    EXT: No edema, and no cyanosis. VASCULAR EXAM: Pulses are intact. PSYCHIATRIC EXAM: Mood is appropriate. MUSCULOSKELETAL: Grossly no joint deformity.   NEUROLOGICAL: AAO times 3, No motor and sensory deficit    Medication:  Current Facility-Administered Medications   Medication Dose Route Frequency    phenol throat spray (CHLORASEPTIC) 1 Spray  1 Spray Oral PRN    polyethylene glycol (MIRALAX) packet 17 g  17 g Oral DAILY    senna-docusate (PERICOLACE) 8.6-50 mg per tablet 2 Tablet  2 Tablet Oral DAILY PRN    furosemide (LASIX) injection 60 mg  60 mg IntraVENous Q12H    enoxaparin (LOVENOX) injection 140 mg  1.5 mg/kg SubCUTAneous Q24H    imatinib (GLEEVEC) chemo tablet 100 mg (Patient Supplied) 100 mg Oral DAILY    cyanocobalamin tablet 2,000 mcg  2,000 mcg Oral DAILY    vitamin E (AQUA GEMS) capsule 400 Units  400 Units Oral DAILY    albuterol-ipratropium (DUO-NEB) 2.5 MG-0.5 MG/3 ML  3 mL Nebulization Q4H PRN    milrinone (PRIMACOR) 20 MG/100 ML D5W infusion  0.375 mcg/kg/min IntraVENous CONTINUOUS    guaiFENesin ER (MUCINEX) tablet 600 mg  600 mg Oral Q12H    famotidine (PEPCID) tablet 20 mg  20 mg Oral DAILY    magnesium hydroxide (MILK OF MAGNESIA) 400 mg/5 mL oral suspension 30 mL  30 mL Oral DAILY PRN    [Held by provider] amLODIPine (NORVASC) tablet 5 mg  5 mg Oral DAILY    aspirin delayed-release tablet 81 mg  81 mg Oral DAILY    atorvastatin (LIPITOR) tablet 40 mg  40 mg Oral DAILY    carvediloL (COREG) tablet 25 mg  25 mg Oral BID    tamsulosin (FLOMAX) capsule 0.4 mg  0.4 mg Oral DAILY    sodium chloride (NS) flush 5-40 mL  5-40 mL IntraVENous Q8H    sodium chloride (NS) flush 5-40 mL  5-40 mL IntraVENous PRN    acetaminophen (TYLENOL) tablet 650 mg  650 mg Oral Q6H PRN    Or    acetaminophen (TYLENOL) suppository 650 mg  650 mg Rectal Q6H PRN    polyethylene glycol (MIRALAX) packet 17 g  17 g Oral DAILY PRN    ondansetron (ZOFRAN ODT) tablet 4 mg  4 mg Oral Q8H PRN    Or    ondansetron (ZOFRAN) injection 4 mg  4 mg IntraVENous Q6H PRN    insulin lispro (HUMALOG) injection   SubCUTAneous AC&HS               Lab/Data Reviewed:       Recent Labs     07/17/21  0150 07/16/21  0425 07/15/21  0550   WBC 50.0* 44.3* 51.1*   HGB 10.8* 10.2* 11.3*   HCT 35.1* 34.3* 38.0   * 109* 105*     Recent Labs     07/17/21  0150 07/16/21  0425 07/15/21  0550    140 140   K 4.0 3.4* 4.1    99* 104   CO2 36* 36* 25   * 160* 139*   BUN 37* 41* 46*   CREA 1.58* 1.65* 1.80*   CA 9.0 8.6 8.8     Total time spent 32 minutes    Signed By: Sunshine Fatima MD     July 17, 2021

## 2021-07-17 NOTE — PROGRESS NOTES
Problem: Falls - Risk of  Goal: *Absence of Falls  Description: Document Trixie Jones Fall Risk and appropriate interventions in the flowsheet. Outcome: Progressing Towards Goal  Note: Fall Risk Interventions:  Mobility Interventions: Patient to call before getting OOB         Medication Interventions: Teach patient to arise slowly    Elimination Interventions: Call light in reach              Problem: Patient Education: Go to Patient Education Activity  Goal: Patient/Family Education  Outcome: Progressing Towards Goal     Problem: Diabetes Self-Management  Goal: *Disease process and treatment process  Description: Define diabetes and identify own type of diabetes; list 3 options for treating diabetes. Outcome: Progressing Towards Goal  Goal: *Incorporating nutritional management into lifestyle  Description: Describe effect of type, amount and timing of food on blood glucose; list 3 methods for planning meals. Outcome: Progressing Towards Goal  Goal: *Incorporating physical activity into lifestyle  Description: State effect of exercise on blood glucose levels. Outcome: Progressing Towards Goal  Goal: *Developing strategies to promote health/change behavior  Description: Define the ABC's of diabetes; identify appropriate screenings, schedule and personal plan for screenings. Outcome: Progressing Towards Goal  Goal: *Using medications safely  Description: State effect of diabetes medications on diabetes; name diabetes medication taking, action and side effects. Outcome: Progressing Towards Goal  Goal: *Monitoring blood glucose, interpreting and using results  Description: Identify recommended blood glucose targets  and personal targets. Outcome: Progressing Towards Goal  Goal: *Prevention, detection, treatment of acute complications  Description: List symptoms of hyper- and hypoglycemia; describe how to treat low blood sugar and actions for lowering  high blood glucose level.   Outcome: Progressing Towards Goal  Goal: *Prevention, detection and treatment of chronic complications  Description: Define the natural course of diabetes and describe the relationship of blood glucose levels to long term complications of diabetes.   Outcome: Progressing Towards Goal  Goal: *Developing strategies to address psychosocial issues  Description: Describe feelings about living with diabetes; identify support needed and support network  Outcome: Progressing Towards Goal  Goal: *Insulin pump training  Outcome: Progressing Towards Goal  Goal: *Sick day guidelines  Outcome: Progressing Towards Goal  Goal: *Patient Specific Goal (EDIT GOAL, INSERT TEXT)  Outcome: Progressing Towards Goal     Problem: Patient Education: Go to Patient Education Activity  Goal: Patient/Family Education  Outcome: Progressing Towards Goal     Problem: Afib Pathway: Day 1  Goal: Treatments/Interventions/Procedures  Outcome: Progressing Towards Goal     Problem: Heart Failure: Day 5  Goal: Treatments/Interventions/Procedures  Outcome: Progressing Towards Goal

## 2021-07-17 NOTE — PROGRESS NOTES
Problem: Falls - Risk of  Goal: *Absence of Falls  Description: Document Martha Tang Fall Risk and appropriate interventions in the flowsheet. Outcome: Progressing Towards Goal  Note: Fall Risk Interventions:  Mobility Interventions: Bed/chair exit alarm, Communicate number of staff needed for ambulation/transfer, Patient to call before getting OOB, Utilize walker, cane, or other assistive device         Medication Interventions: Bed/chair exit alarm, Patient to call before getting OOB, Teach patient to arise slowly    Elimination Interventions: Call light in reach, Bed/chair exit alarm, Patient to call for help with toileting needs, Toilet paper/wipes in reach, Toileting schedule/hourly rounds              Problem: Patient Education: Go to Patient Education Activity  Goal: Patient/Family Education  Outcome: Progressing Towards Goal     Problem: Diabetes Self-Management  Goal: *Disease process and treatment process  Description: Define diabetes and identify own type of diabetes; list 3 options for treating diabetes. Outcome: Progressing Towards Goal  Goal: *Incorporating nutritional management into lifestyle  Description: Describe effect of type, amount and timing of food on blood glucose; list 3 methods for planning meals. Outcome: Progressing Towards Goal  Goal: *Incorporating physical activity into lifestyle  Description: State effect of exercise on blood glucose levels. Outcome: Progressing Towards Goal  Goal: *Developing strategies to promote health/change behavior  Description: Define the ABC's of diabetes; identify appropriate screenings, schedule and personal plan for screenings. Outcome: Progressing Towards Goal  Goal: *Using medications safely  Description: State effect of diabetes medications on diabetes; name diabetes medication taking, action and side effects.   Outcome: Progressing Towards Goal  Goal: *Monitoring blood glucose, interpreting and using results  Description: Identify recommended blood glucose targets  and personal targets. Outcome: Progressing Towards Goal  Goal: *Prevention, detection, treatment of acute complications  Description: List symptoms of hyper- and hypoglycemia; describe how to treat low blood sugar and actions for lowering  high blood glucose level. Outcome: Progressing Towards Goal  Goal: *Prevention, detection and treatment of chronic complications  Description: Define the natural course of diabetes and describe the relationship of blood glucose levels to long term complications of diabetes.   Outcome: Progressing Towards Goal  Goal: *Developing strategies to address psychosocial issues  Description: Describe feelings about living with diabetes; identify support needed and support network  Outcome: Progressing Towards Goal  Goal: *Insulin pump training  Outcome: Progressing Towards Goal  Goal: *Sick day guidelines  Outcome: Progressing Towards Goal  Goal: *Patient Specific Goal (EDIT GOAL, INSERT TEXT)  Outcome: Progressing Towards Goal     Problem: Patient Education: Go to Patient Education Activity  Goal: Patient/Family Education  Outcome: Progressing Towards Goal     Problem: Afib Pathway: Day 1  Goal: Treatments/Interventions/Procedures  Outcome: Progressing Towards Goal     Problem: Heart Failure: Day 5  Goal: Treatments/Interventions/Procedures  Outcome: Progressing Towards Goal

## 2021-07-17 NOTE — PROGRESS NOTES
RENAL CONSULT FOLLOW UP NOTE   2021    Patient:  Jame Che  :  1940  Gender:  male  MRN #:  [id number]    Reason for Consult: evaluation and management of ELIZABETH on CKD       Assessment:    Jame Che is a 79y.o. 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 He was brought in hospital due to  2 to 3 days history of progressive shortness of breath with the cough and increasing lower extremity swelling subjective fever and chills. CXR consistent with pneumonia and pulmonary congestion . ECHO demonstrated - cardiomyopathy with EF 15-20 %   During hospital stays developed ELIZABETH on CKD , non oliguric type most likely due to cardiorenal syndrome. He has evidence of fluid overload, crackles on lung exam and b/l LE edema  When renal service was consulted     7/15/21- Urine output 2500 cc overnight, BP stable   No urinary obstruction in USG     : UPO is 3475 cc  - UOP 3450 cc       # ELIZABETH on CKD stage 3Ga  # HTN  #Cardiomyopathy with volume overload   #Metabolci alkalosis       Plan:   - He has robust  urine output with continued  improvement in serum creatinine . He still looks volume up .   - continue Lasix 80 mg IV BID,will hold Iv diuril today as he is developing metabolic alkalosis, will give one dose of diamox 250 mg once.  Follow up BMP again tomorrow    - - Bladder scan to ensure he is not retaining urine   - Appreciate cardiology recs , on milrinone drip for cardiomyopathy and coreg   - Strict intake and output charting   - Fluid restriction to 1 liter, low salt diet   - Avoid NSAIDs, contrast and nephrotoxin   -Dose all meds and antibiotics for current eGFR       Subjective/ROS: -   Feels better, SOB is improving   Has Foleys cath, urinating fine   Robust  urine output overnight    Denied any other symptoms     Intake/Output Summary (Last 24 hours) at 7/17/2021 1231  Last data filed at 7/17/2021 1225  Gross per 24 hour   Intake 550.83 ml   Output 4550 ml   Net -3999.17 ml         Objective:    Visit Vitals  /70   Pulse (!) 109   Temp 97.8 °F (36.6 °C)   Resp 17   Ht 5' 8\" (1.727 m)   Wt 87.8 kg (193 lb 9 oz)   SpO2 98%   BMI 29.43 kg/m²       Physical Exam:    Pt awake,  alert and comfortable  HEENT: No JVD, anicteric sclera, no neck swelling  Lung: crackles at lung base  Heart: s1s2 heard with systolic murmur   Abdomen: soft, non tender, no guarding, normal bowel sounds. Ext: b/l LE edema   CNS- Oriented to time , place and person     Laboratory Data:    Lab Results   Component Value Date    BUN 37 (H) 07/17/2021    BUN 41 (H) 07/16/2021    BUN 46 (H) 07/15/2021     07/17/2021     07/16/2021     07/15/2021    CO2 36 (H) 07/17/2021    CO2 36 (H) 07/16/2021    CO2 25 07/15/2021     Lab Results   Component Value Date    WBC 50.0 (H) 07/17/2021    HGB 10.8 (L) 07/17/2021    HCT 35.1 (L) 07/17/2021     No components found for: CALCIUM, PHOSPHORUS, MAGNESIUM  Lab Results   Component Value Date    HDL 56 08/12/2016     No results found for: SPECIMENTYP, TURBIDITY, UGLU    Imaging Reveiwed:  CXR 7/11/21\"- 1. Bilateral airspace disease   Small bilateral pleural effusions    · ECHO 7/12/21- LV: Estimated LVEF is 15 - 20%. Normal wall thickness. Mildly dilated left ventricle. Severely reduced systolic function. Left ventricular diastolic dysfunction E'E= 15.30. · Contrast used: DEFINITY. · LA: Moderately dilated left atrium. · AV: Aortic valve leaflet calcification present. Mild aortic valve regurgitation is present. · MV: Mitral valve thickening. Moderate mitral valve regurgitation is present. · TV: Moderate tricuspid valve regurgitation is present. · PV: Mild pulmonic valve regurgitation is present. · PA: Pulmonary arterial systolic pressure is 55 mmHg.   · IVC: Moderately elevated central venous pressure (8 mmHg); IVC diameter is larger than 21 mm and collapses more than 50% with respiration. USG Retroperitoneum : 7/14/21-      IMPRESSION     No hydronephrosis.     Thick walled, contracted urinary bladder.     Echogenic liver.  Perihepatic ascites and right pleural effusion.    - other medical management per Primary team      Thank you for consult       Marguerite Tavarez MD   [facility]

## 2021-07-17 NOTE — PROGRESS NOTES
Problem: Mobility Impaired (Adult and Pediatric)  Goal: *Acute Goals and Plan of Care (Insert Text)  Description: Physical Therapy Goals   Initiated 7/17/2021 and to be accomplished within 5-7 day(s)  1. Patient will move from supine <> sit with Mod I in prep for out of bed activity and change of position. 2.  Patient will perform sit<> stand with Mod I with LRAD in prep for transfers/ambulation. 3.  Patient will transfer from bed <> chair with Mod I with LRAD for time up in chair for completion of ADL activity. 4.  Patient will ambulate 150 feet with Mod I/LRAD for improved functional mobility at discharge. 5.  Patient will ascend/descend 3-5 stairs with handrail(s) with S for home re-entry as needed. Outcome: Progressing Towards Goal   physical Therapy EVALUATION    Patient: Krista Porras (32 y.o. male)  Date: 7/17/2021  Primary Diagnosis: Pulmonary edema [J81.1]  Atrial fibrillation with RVR ([facility] [address] [I48.91]  Pneumonia [J18.9]  Sepsis ([facility] [address] [A41.9]        Precautions: Fall    ASSESSMENT :  Based on the objective data described below, the patient presents with decrease I with bed mobility, transfers, gait, and step negotiation. Pt seen w/ OT for an additional set of skilled hands. Pt seen in supine prior to session with supplemental O2, IV, telemonitor, and condom catheter. Pt reports BL heel pain 2/10. Pt reports he was I prior to admittance. Pt able to ambulate with the use of holding on to IV pole for support, no LOB noted. Pt denies SOB while ambulating but reports BL LE weakness and fatigue d/t the significant edema in the LEs. Pt requested to transfer back to room where pt was left sitting at the EOB after session, call bell and tray in reach, spouse present in the room, nurse notified after session. Patient will benefit from skilled intervention to address the above impairments.   Patients rehabilitation potential is considered to be Fair  Factors which may influence rehabilitation potential include:   []         None noted  []         Mental ability/status  [x]         Medical condition  [x]         Home/family situation and support systems  [x]         Safety awareness  [x]         Pain tolerance/management  []         Other:      PLAN :  Recommendations and Planned Interventions:  [x]           Bed Mobility Training             [x]    Neuromuscular Re-Education  [x]           Transfer Training                   []    Orthotic/Prosthetic Training  [x]           Gait Training                          []    Modalities  [x]           Therapeutic Exercises          []    Edema Management/Control  [x]           Therapeutic Activities            [x]    Patient and Family Training/Education  []           Other (comment):    Frequency/Duration: Patient will be followed by physical therapy 1-2 times per day to address goals. Discharge Recommendations: Home Health  Further Equipment Recommendations for Discharge: N/A     SUBJECTIVE:   Patient stated \" I'm fine.     OBJECTIVE DATA SUMMARY:     Past Medical History:   Diagnosis Date    CAD (coronary artery disease) 2014    Chronic atrial fibrillation ([facility] [address] 9/2/2016    CML (chronic myelocytic leukemia) ([facility] [address]     Coagulation disorder ([facility] [address]     bruises easily    Diabetes ([facility] [address] 2012    Hypertension     Nausea & vomiting     with most recent surgery-carotid endardarectomy    TIA (transient ischemic attack) 8/11/2016     Past Surgical History:   Procedure Laterality Date    HX CAROTID ENDARTERECTOMY Left 05/2017    HX CATARACT REMOVAL Bilateral     HX CORONARY ARTERY BYPASS GRAFT      HX HERNIA REPAIR Right     hernia    HX UROLOGICAL      greenlight laser    HX UROLOGICAL      kidney stone    HX UROLOGICAL      Lithotripsy x 2    [facility] CARDIAC SURG PROCEDURE UNLIST  3/2014    quadruple bypass     Barriers to Learning/Limitations: yes;  physical  Compensate with: Verbal Cues and Tactile Cues  Prior Level of Function/Home Situation:   Home Situation  Home Environment: Private residence  # Steps to Enter: 3  Rails to Enter: Yes  Hand Rails : Bilateral  One/Two Story Residence: One story  Living Alone: No  Support Systems: Spouse/Significant Other/Partner  Patient Expects to be Discharged to[de-identified] [employer]  Current DME Used/Available at Home: Grab bars, Shower chair, Walker, rolling, Oxygen, portable  Tub or Shower Type: Tub/Shower combination  Critical Behavior:  Neurologic State: Alert  Orientation Level: Oriented X4  Cognition: Appropriate for age attention/concentration; Follows commands  Safety/Judgement: Fall prevention  Psychosocial  Patient Behaviors: Calm; Cooperative  Purposeful Interaction: Yes  Pt Identified Daily Priority: Clinical issues (comment)  Caritas Process: Nurture loving kindness;Enable shawanda/hope;Establish trust;Create healing environment; Attend basic human needs  Caring Interventions: Reassure; Therapeutic modalities  Reassure: Therapeutic listening;Caring rounds;Quiet presence  Therapeutic Modalities: Intentional therapeutic touch;Humor  Skin Condition/Temp: Warm  Skin Integrity: Cracked (butt crack littt sore)  Skin Integumentary  Skin Color: Appropriate for ethnicity  Skin Condition/Temp: Warm  Skin Integrity: Cracked (butt crack littt sore)  Strength:    Strength: Generally decreased, functional  Tone & Sensation:   Sensation: Intact  Range Of Motion:  AROM: Generally decreased, functional  Functional Mobility:  Bed Mobility:  Rolling: Supervision  Supine to Sit: Supervision  Sit to Supine: Supervision  Scooting: Supervision  Transfers:  Sit to Stand: Contact guard assistance;Stand-by assistance  Stand to Sit: Stand-by assistance  Balance:   Sitting: Intact  Standing: Intact; With support  Ambulation/Gait Training:  Distance (ft): 100 Feet (ft)  Assistive Device: Gait belt; Other (comment) (IV pole)  Ambulation - Level of Assistance: Supervision;Stand-by assistance  Gait Description (WDL): Exceptions to WDL  Gait Abnormalities: Decreased step clearance  Speed/Katerine: Slow  Pain:  Pain Scale 1: Numeric (0 - 10)  Pain Intensity 1: 0  Activity Tolerance:   Fair+  Please refer to the flowsheet for vital signs taken during this treatment. After treatment:   []         Patient left in no apparent distress sitting up in chair  [x]         Patient left in no apparent distress in bed  [x]         Call bell left within reach  [x]         Nursing notified  []         Caregiver present  []         Bed alarm activated    COMMUNICATION/EDUCATION:   [x]         Fall prevention education was provided and the patient/caregiver indicated understanding. [x]         Patient/family have participated as able in goal setting and plan of care. [x]         Patient/family agree to work toward stated goals and plan of care. []         Patient understands intent and goals of therapy, but is neutral about his/her participation. []         Patient is unable to participate in goal setting and plan of care.     Thank you for this referral.  Harvey Soni, PT   Time Calculation: 19 mins   Eval Complexity: History: HIGH Complexity :3+ comorbidities / personal factors will impact the outcome/ POC Exam:LOW Complexity : 1-2 Standardized tests and measures addressing body structure, function, activity limitation and / or participation in recreation  Presentation: LOW Complexity : Stable, uncomplicated  Clinical Decision Making:Low Complexity ambulate >30ft  Overall Complexity:LOW

## 2021-07-17 NOTE — PROGRESS NOTES
Hospitalist Progress Note    Patient: Lula Wyman MRN: [id number]  CSN: [id number]    YOB: 1940  Age: 79 y.o. Sex: male    DOA: 7/11/2021 LOS:  LOS: 6 days            Assessment/Plan   1. Acute decompensated LV systolic CHF w EF 39-52%. Improving w milrinone  2. Afib  3. CML  4. Pulmonary hypertension  5. HTN  6. ELIZABETH- improving  7. BPH    Plan:  - milrinone/ diuresis per cardiology  - continue tx dose lovenox  - DC doxycycline  - flomax  - gleevec  - mobilize OOB  - add mirlax  - palliative care following. He is FULL CODE    Patient Active Problem List   Diagnosis Code    TIA (transient ischemic attack) G45.9    Coronary artery disease involving native coronary artery I25.10    Type II diabetes mellitus, uncontrolled ([facility] [address] E11.65    Dyslipidemia E78.5    Pancytopenia ([facility]) D61.818    CML (chronic myelocytic leukemia) ([facility]) C92.10    HTN (hypertension) I10    Chronic atrial fibrillation (HCC) I48.20    Stage 3 chronic kidney disease (HCC) N18.30    Hyperglycemia R73.9    Elevated blood pressure reading with diagnosis of hypertension I10    Community acquired pneumonia J18.9    Chronic congestive heart failure (HCC) I50.9    SOB (shortness of breath) R06.02    On supplemental oxygen by nasal cannula Z78.9    Pneumonia J18.9    Pulmonary edema J81.1    Sepsis ([facility]) A41.9    Atrial fibrillation with RVR ([facility]) I48.91    Encounter for palliative care Z51.5    Debility R53.81    Acute on chronic combined systolic and diastolic congestive heart failure (HCC) I50.43    Pulmonary hypertension (HCC) I27.20    Cardiomyopathy (HCC) I42.9               Subjective:    cc: \" Im feeling much better today\"  Pt dyspnea improving  Denies chest pain, he is having palpitaitons intermittently  Co constipation        REVIEW OF SYSTEMS:  General: No fevers or chills. Cardiovascular: No chest pain or pressure. No palpitations. Pulmonary: No shortness of breath. Gastrointestinal: No nausea, vomiting. Objective:        Vital signs/Intake and Output:  Visit Vitals  /61   Pulse (!) 109   Temp 97.6 °F (36.4 °C)   Resp 14   Ht 5' 8\" (1.727 m)   Wt 91.1 kg (200 lb 14.4 oz)   SpO2 99%   BMI 30.55 kg/m²     Current Shift:  No intake/output data recorded. Last three shifts:  07/15 1901 - 07/17 0700  In: 670.8 [P.O.:240; I.V.:430.8]  Out: 5550 [Urine:5550]    Body mass index is 30.55 kg/m².     Physical Exam:  GEN: Alert and oriented times three NAD  EYES: conjunctiva normal, lids with out lesions  HEENT: MMM, No thyromegaly, no lymphadenopathy  HEART:  +S1 +S2, no JVD, pulses 2+ distally  LUNGS: CTA B/L no wheezes, rales or rhonchi  ABDOMEN: + BS, soft NT/ND no organomegaly,  No rebound  EXTREMITIES: No edema cyanosis, cap refill normal   SKIN: no rashes or skin breakdown, no nodules, normal turgor  Current Facility-Administered Medications   Medication Dose Route Frequency    phenol throat spray (CHLORASEPTIC) 1 Spray  1 Spray Oral PRN    chlorothiazide (DIURIL) 500 mg in 0.9% sodium chloride 50 mL IVPB  500 mg IntraVENous DAILY    doxycycline (VIBRAMYCIN) 100 mg in 0.9% sodium chloride (MBP/ADV) 100 mL MBP  100 mg IntraVENous Q12H    furosemide (LASIX) injection 80 mg  80 mg IntraVENous Q12H    enoxaparin (LOVENOX) injection 140 mg  1.5 mg/kg SubCUTAneous Q24H    imatinib (GLEEVEC) chemo tablet 100 mg (Patient Supplied)  100 mg Oral DAILY    cyanocobalamin tablet 2,000 mcg  2,000 mcg Oral DAILY    vitamin E (AQUA GEMS) capsule 400 Units  400 Units Oral DAILY    albuterol-ipratropium (DUO-NEB) 2.5 MG-0.5 MG/3 ML  3 mL Nebulization Q4H PRN    milrinone (PRIMACOR) 20 MG/100 ML D5W infusion  0.375 mcg/kg/min IntraVENous CONTINUOUS    guaiFENesin ER (MUCINEX) tablet 600 mg  600 mg Oral Q12H    famotidine (PEPCID) tablet 20 mg  20 mg Oral DAILY    magnesium hydroxide (MILK OF MAGNESIA) 400 mg/5 mL oral suspension 30 mL  30 mL Oral DAILY PRN    [Held by provider] amLODIPine (NORVASC) tablet 5 mg  5 mg Oral DAILY    aspirin delayed-release tablet 81 mg  81 mg Oral DAILY    atorvastatin (LIPITOR) tablet 40 mg  40 mg Oral DAILY    carvediloL (COREG) tablet 25 mg  25 mg Oral BID    tamsulosin (FLOMAX) capsule 0.4 mg  0.4 mg Oral DAILY    sodium chloride (NS) flush 5-40 mL  5-40 mL IntraVENous Q8H    sodium chloride (NS) flush 5-40 mL  5-40 mL IntraVENous PRN    acetaminophen (TYLENOL) tablet 650 mg  650 mg Oral Q6H PRN    Or    acetaminophen (TYLENOL) suppository 650 mg  650 mg Rectal Q6H PRN    polyethylene glycol (MIRALAX) packet 17 g  17 g Oral DAILY PRN    ondansetron (ZOFRAN ODT) tablet 4 mg  4 mg Oral Q8H PRN    Or    ondansetron (ZOFRAN) injection 4 mg  4 mg IntraVENous Q6H PRN    insulin lispro (HUMALOG) injection   SubCUTAneous AC&HS         All the patient's labs over the past 24 hours were reviewed both during my initial daily workflow process and at the time notated as \"note time\" in [facility]. (It is not time stamped separately in this workflow.)  Select labs are listed below.         Labs: Results:       Chemistry Recent Labs     07/17/21  0150 07/16/21  0425 07/15/21  0550   * 160* 139*    140 140   K 4.0 3.4* 4.1    99* 104   CO2 36* 36* 25   BUN 37* 41* 46*   CREA 1.58* 1.65* 1.80*   CA 9.0 8.6 8.8   AGAP 2* 5 11   BUCR 23* 25* 26*      CBC w/Diff Recent Labs     07/17/21  0150 07/16/21  0425 07/15/21  0550   WBC 50.0* 44.3* 51.1*   RBC 3.98* 3.88* 4.24*   HGB 10.8* 10.2* 11.3*   HCT 35.1* 34.3* 38.0   * 109* 105*   GRANS 67 65 67   LYMPH 5* 4* 5*   EOS 0 3 1              Lipid Panel Lab Results   Component Value Date/Time    Cholesterol, total 107 08/12/2016 03:20 AM    HDL Cholesterol 56 08/12/2016 03:20 AM    LDL, calculated 26.4 08/12/2016 03:20 AM    VLDL, calculated 24.6 08/12/2016 03:20 AM    Triglyceride 123 08/12/2016 03:20 AM    CHOL/HDL Ratio 1.9 08/12/2016 03:20 AM              Thyroid Studies Lab Results   Component Value Date/Time    TSH 1.34 07/12/2021 02:55 AM        Procedures/imaging: see electronic medical records for all procedures/Xrays and details which were not copied into this note but were reviewed prior to creation of Jayme Shepherd DO  Internal Medicine/Geriatrics

## 2021-07-17 NOTE — PROGRESS NOTES
Problem: Self Care Deficits Care Plan (Adult)  Goal: *Acute Goals and Plan of Care (Insert Text)  Description: Occupational Therapy Goals  Initiated 7/17/2021 within 7 day(s). 1.  Patient will perform grooming with independence standing at sink for 5 minutes or more. 2.  Patient will perform lower body dressing with minimal assistance/contact guard assist and use of AEs as needed. 3.  Patient will perform toilet transfers with independence. 4.  Patient will perform all aspects of toileting with independence. 5.  Patient will participate in upper extremity therapeutic exercise/activities with independence for 10 minutes. 6.  Patient will utilize energy conservation techniques during functional activities with verbal, visual, and tactile cues. OCCUPATIONAL THERAPY EVALUATION    Patient: Brandan Jeffries (72 y.o. male)  Date: 7/17/2021  Primary Diagnosis: Pulmonary edema [J81.1]  Atrial fibrillation with RVR ([facility]) [I48.91]  Pneumonia [J18.9]  Sepsis ([address] [A41.9]        Precautions:   Fall  PLOF: independent with ADLs and transfers; mod A for LB ADLs     ASSESSMENT :  Based on the objective data described below, the patient presents with decreased strength, endurance and balance for carryover of ADLs and transfers following above mentioned medical diagnosis. Pt presented supine in bed and agrees to participate with therapy. Pt co-treat with PT for the need of another set of skilled hands and safety with transfers/ADLs. Pt participate with bed mobility, supine to sit with S, CG-SBA for sit<>stand transfers, mod A LB dressing, and ambulate in hallway with one hand assist on IV pole with CG-SBA. Pt was left seated at EOB at the end of session in NAD, no SOB reported during this session. Patient will benefit from skilled intervention to address the above impairments.   Patient's rehabilitation potential is considered to be Good  Factors which may influence rehabilitation potential include:   [] None noted  []             Mental ability/status  [x]             Medical condition  []             Home/family situation and support systems  []             Safety awareness  []             Pain tolerance/management  []             Other:      PLAN :  Recommendations and Planned Interventions:   [x]               Self Care Training                  [x]      Therapeutic Activities  []               Functional Mobility Training   []      Cognitive Retraining  [x]               Therapeutic Exercises           [x]      Endurance Activities  []               Balance Training                    []      Neuromuscular Re-Education  []               Visual/Perceptual Training     [x]      Home Safety Training  [x]               Patient Education                   [x]      Family Training/Education  []               Other (comment):    Frequency/Duration: Patient will be followed by occupational therapy 1-2 times per day/4-7 days per week to address goals. Discharge Recommendations: Home Health  Further Equipment Recommendations for Discharge: N/A     SUBJECTIVE:   Patient stated  I will try to get up.     OBJECTIVE DATA SUMMARY:     Past Medical History:   Diagnosis Date    CAD (coronary artery disease) 2014    Chronic atrial fibrillation ([facility] [address] 9/2/2016    CML (chronic myelocytic leukemia) ([facility] [address]     Coagulation disorder ([facility] [address]     bruises easily    Diabetes ([facility] [address] 2012    Hypertension     Nausea & vomiting     with most recent surgery-carotid endardarectomy    TIA (transient ischemic attack) 8/11/2016     Past Surgical History:   Procedure Laterality Date    HX CAROTID ENDARTERECTOMY Left 05/2017    HX CATARACT REMOVAL Bilateral     HX CORONARY ARTERY BYPASS GRAFT      HX HERNIA REPAIR Right     hernia    HX UROLOGICAL      greenlight laser    HX UROLOGICAL      kidney stone    HX UROLOGICAL      Lithotripsy x 2    [place] CARDIAC SURG PROCEDURE UNLIST  3/2014    quadruple bypass     Barriers to Learning/Limitations: None  Compensate with: visual, verbal, tactile, kinesthetic cues/model    Home Situation:   Home Situation  Home Environment: Private residence  # Steps to Enter: 3  Rails to Enter: Yes  Hand Rails : Bilateral  One/Two Story Residence: One story  Living Alone: No  Support Systems: Spouse/Significant Other/Partner  Patient Expects to be Discharged to[de-identified] House  Current DME Used/Available at Home: Grab bars, Shower chair, Walker, rolling, Oxygen, portable  Tub or Shower Type: Tub/Shower combination  []  Right hand dominant   []  Left hand dominant    Cognitive/Behavioral Status:  Neurologic State: Alert  Orientation Level: Oriented X4  Cognition: Appropriate for age attention/concentration; Follows commands  Safety/Judgement: Fall prevention    Skin: intact  Edema: none at UEs; moderate at BLEs     Vision/Perceptual:    Tracking: Able to track stimulus in all quadrants w/o difficulty    Corrective Lenses: Reading glasses  Coordination: BUE  Coordination: Within functional limits  Fine Motor Skills-Upper: Left Intact; Right Intact    Gross Motor Skills-Upper: Left Intact; Right Intact  Balance:  Sitting: Intact  Standing: Intact; With support  Strength: BUE  Strength: Generally decreased, functional  Tone & Sensation: BUE  Sensation: Intact  Range of Motion: BUE  AROM: Generally decreased, functional  Functional Mobility and Transfers for ADLs:  Bed Mobility:  Rolling: Supervision  Supine to Sit: Supervision  Sit to Supine: Supervision  Scooting: Supervision  Transfers:  Sit to Stand: Contact guard assistance;Stand-by assistance  Stand to Sit: Stand-by assistance  ADL Assessment:   Feeding: Independent    Oral Facial Hygiene/Grooming: Stand-by assistance    Upper Body Dressing: Stand-by assistance    Lower Body Dressing: Moderate assistance    Toileting: Minimum assistance  ADL Intervention:  Lower Body Dressing Assistance  Dressing Assistance: Moderate assistance  Protective Undergarmet: Minimum assistance  Socks:  Moderate assistance  Leg Crossed Method Used: No  Position Performed: Seated edge of bed  Cues: Don    Cognitive Retraining  Safety/Judgement: Fall prevention    Pain:  Pain level pre-treatment: 0/10   Pain level post-treatment: 0/10   Pain Intervention(s): Medication (see MAR); Rest, Ice, Repositioning   Response to intervention: Nurse notified, See doc flow    Activity Tolerance:   Good     Please refer to the flowsheet for vital signs taken during this treatment. After treatment:   [x] Patient left in no apparent distress sitting up EOB  [] Patient left in no apparent distress in bed  [x] Call bell left within reach  [x] Nursing notified  [x] Caregiver present  [] Bed alarm activated    COMMUNICATION/EDUCATION:   [x] Role of Occupational Therapy in the acute care setting  [x] Home safety education was provided and the patient/caregiver indicated understanding. [x] Patient/family have participated as able in goal setting and plan of care. [x] Patient/family agree to work toward stated goals and plan of care. [] Patient understands intent and goals of therapy, but is neutral about his/her participation. [] Patient is unable to participate in goal setting and plan of care. Thank you for this referral.  Flaquito Manley OTR/L  Time Calculation: 17 mins    Eval Complexity: History: LOW Complexity : Brief history review ; Examination: LOW Complexity : 1-3 performance deficits relating to physical, cognitive , or psychosocial skils that result in activity limitations and / or participation restrictions ; Decision Making:MEDIUM Complexity : Patient may present with comorbidities that affect occupational performnce.  Miniml to moderate modification of tasks or assistance (eg, physical or verbal ) with assesment(s) is necessary to enable patient to complete evaluation

## 2021-07-17 NOTE — PROGRESS NOTES
Assumed care of pt from pattie CHRISTIAN RN. 1800: pt ha a few incontinent episodes due faulty condom cath and primo fit. Pt did have an uneventful day.

## 2021-07-18 NOTE — PROGRESS NOTES
Problem: Falls - Risk of  Goal: *Absence of Falls  Description: Document Zainab Lacy Fall Risk and appropriate interventions in the flowsheet. Outcome: Progressing Towards Goal  Note: Fall Risk Interventions:  Mobility Interventions: Bed/chair exit alarm, Patient to call before getting OOB         Medication Interventions: Evaluate medications/consider consulting pharmacy, Patient to call before getting OOB, Bed/chair exit alarm    Elimination Interventions: Call light in reach              Problem: Diabetes Self-Management  Goal: *Disease process and treatment process  Description: Define diabetes and identify own type of diabetes; list 3 options for treating diabetes. Outcome: Progressing Towards Goal  Goal: *Incorporating nutritional management into lifestyle  Description: Describe effect of type, amount and timing of food on blood glucose; list 3 methods for planning meals. Outcome: Progressing Towards Goal  Goal: *Incorporating physical activity into lifestyle  Description: State effect of exercise on blood glucose levels. Outcome: Progressing Towards Goal  Goal: *Developing strategies to promote health/change behavior  Description: Define the ABC's of diabetes; identify appropriate screenings, schedule and personal plan for screenings. Outcome: Progressing Towards Goal  Goal: *Using medications safely  Description: State effect of diabetes medications on diabetes; name diabetes medication taking, action and side effects. Outcome: Progressing Towards Goal  Goal: *Monitoring blood glucose, interpreting and using results  Description: Identify recommended blood glucose targets  and personal targets. Outcome: Progressing Towards Goal  Goal: *Prevention, detection, treatment of acute complications  Description: List symptoms of hyper- and hypoglycemia; describe how to treat low blood sugar and actions for lowering  high blood glucose level.   Outcome: Progressing Towards Goal  Goal: *Prevention, detection and treatment of chronic complications  Description: Define the natural course of diabetes and describe the relationship of blood glucose levels to long term complications of diabetes.   Outcome: Progressing Towards Goal  Goal: *Developing strategies to address psychosocial issues  Description: Describe feelings about living with diabetes; identify support needed and support network  Outcome: Progressing Towards Goal  Goal: *Insulin pump training  Outcome: Progressing Towards Goal  Goal: *Sick day guidelines  Outcome: Progressing Towards Goal  Goal: *Patient Specific Goal (EDIT GOAL, INSERT TEXT)  Outcome: Progressing Towards Goal

## 2021-07-18 NOTE — PROGRESS NOTES
0720- Assumed Pt care. Pt's right antecubital is bleeding. Old bandage saturated with blood. Linens and gown also stained with blood. Offgoing RN applied new bandage with 2x2 and pressure tape. MD aware about the bleeding. . Lovenox already dcd      0820- Pt's arm still bleeding. New bandage applied. Pt instructed to rest arm. Arm elevated. 1030- Noted the Pt's arm still bleeding. Supervisor notified to get quik clot dressing from ICU. Bleeding site dressed with quik clot.  Will continue to monitor    1800- Dressing on right antecubital dry and intact

## 2021-07-18 NOTE — PROGRESS NOTES
[facility] care from Killian Carbajal RN.    0599 Pt requesting pain relief for feet. Provider made aware. Orders were placed by provider. Shift uneventful. 0715 Bedside and Verbal shift change report given to ZACH Arriola (oncoming nurse) by Silvia Marrero RN   (offgoing nurse). Report included the following information SBAR, Kardex, ED Summary, Procedure Summary, Intake/Output, MAR, Recent Results and Med Rec Status.

## 2021-07-18 NOTE — PROGRESS NOTES
1915 Assumed care from ZACH Santoyo, [address] [place] [address] [id number]  Pt had a scab in right upper arm covered with a bandage saturated in blood. Bandage removed and placed 4 x 4 with pressure tape. Scattered scabes noted that pt is scratching that are reddened. 0715 Bedside and Verbal shift change report given to Frank Urena RN (oncoming nurse) by Mervat Black RN   (offgoing nurse). Report included the following information SBAR, Kardex, ED Summary, Procedure Summary, Intake/Output, MAR, Recent Results and Med Rec Status. 4[facility] Pt had another bleeding episode from the same extremity. Gown, linen, and bandage changed.

## 2021-07-18 NOTE — PROGRESS NOTES
RENAL CONSULT FOLLOW UP NOTE   2021    Patient:  Rao Heredia  :  1940  Gender:  male  MRN #:  [id number]    Reason for Consult: evaluation and management of ELIZABETH on CKD       Assessment:    Rao Heredia is a 79y.o. 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 He was brought in hospital due to  2 to 3 days history of progressive shortness of breath with the cough and increasing lower extremity swelling subjective fever and chills. CXR consistent with pneumonia and pulmonary congestion . ECHO demonstrated - cardiomyopathy with EF 15-20 %   During hospital stays developed ELIZABETH on CKD , non oliguric type most likely due to cardiorenal syndrome. He has evidence of fluid overload, crackles on lung exam and b/l LE edema  When renal service was consulted     7/15/21- Urine output 2500 cc overnight, BP stable   No urinary obstruction in USG     : UPO is 3475 cc  - UOP 3450 cc   - UOP 4400 cc       # ELIZABETH on CKD stage 3Ga  # HTN  #Cardiomyopathy with volume overload   #Metabolci alkalosis       Plan:   - He has robust  urine output, renal function stable with fluctuating creatinine    - Appreciate cardiology recs and assistance. Agree to decrease lasix to 40 mg IV BID , will give one dose diamox 151 mg for metabolic alkalosis.  Follow up BMP again tomorrow    - - Bladder scan to ensure he is not retaining urine   - Appreciate cardiology recs , on milrinone drip for cardiomyopathy and coreg   - Strict intake and output charting   - Fluid restriction to 1 liter, low salt diet   - Avoid NSAIDs, contrast and nephrotoxin   -Dose all meds and antibiotics for current eGFR       Subjective/ROS: -   Feels better, SOB is improving   Has Foleys cath  Robust  urine output overnight   Had bleed while putting peripheral  line    Denied any other symptoms     Intake/Output Summary (Last 24 hours) at 7/18/2021 1145  Last data filed at 7/18/2021 0348  Gross per 24 hour   Intake 50 ml   Output 3800 ml   Net -3750 ml         Objective:    Visit Vitals  BP (!) 103/54   Pulse (!) 106   Temp 97.8 °F (36.6 °C)   Resp 16   Ht 5' 8\" (1.727 m)   Wt 87.8 kg (193 lb 9 oz)   SpO2 100%   BMI 29.43 kg/m²       Physical Exam:    Pt awake,  alert and comfortable  HEENT: No JVD, anicteric sclera, no neck swelling  Lung: crackles at lung base  Heart: s1s2 heard with systolic murmur   Abdomen: soft, non tender, no guarding, normal bowel sounds. Ext: b/l LE edema   CNS- Oriented to time , place and person     Laboratory Data:    Lab Results   Component Value Date    BUN 37 (H) 07/18/2021    BUN 37 (H) 07/17/2021    BUN 41 (H) 07/16/2021     07/18/2021     07/17/2021     07/16/2021    CO2 37 (H) 07/18/2021    CO2 36 (H) 07/17/2021    CO2 36 (H) 07/16/2021     Lab Results   Component Value Date    WBC 49.0 (H) 07/18/2021    HGB 11.5 (L) 07/18/2021    HCT 38.5 07/18/2021     No components found for: CALCIUM, PHOSPHORUS, MAGNESIUM  Lab Results   Component Value Date    HDL 56 08/12/2016     No results found for: SPECIMENTYP, TURBIDITY, UGLU    Imaging Reveiwed:  CXR 7/11/21\"- 1. Bilateral airspace disease   Small bilateral pleural effusions    · ECHO 7/12/21- LV: Estimated LVEF is 15 - 20%. Normal wall thickness. Mildly dilated left ventricle. Severely reduced systolic function. Left ventricular diastolic dysfunction E'E= 15.30. · Contrast used: DEFINITY. · LA: Moderately dilated left atrium. · AV: Aortic valve leaflet calcification present. Mild aortic valve regurgitation is present. · MV: Mitral valve thickening. Moderate mitral valve regurgitation is present. · TV: Moderate tricuspid valve regurgitation is present. · PV: Mild pulmonic valve regurgitation is present. · PA: Pulmonary arterial systolic pressure is 55 mmHg.   · IVC: Moderately elevated central venous pressure (8 mmHg); IVC diameter is larger than 21 mm and collapses more than 50% with respiration. USG Retroperitoneum : 7/14/21-      IMPRESSION     No hydronephrosis.     Thick walled, contracted urinary bladder.     Echogenic liver.  Perihepatic ascites and right pleural effusion.    - other medical management per Primary team      Thank you for consult       Shani Lau MD   [facility]

## 2021-07-18 NOTE — PROGRESS NOTES
Problem: Falls - Risk of  Goal: *Absence of Falls  Description: Document Felicia King Fall Risk and appropriate interventions in the flowsheet. Outcome: Progressing Towards Goal  Note: Fall Risk Interventions:  Mobility Interventions: Patient to call before getting OOB         Medication Interventions: Patient to call before getting OOB, Teach patient to arise slowly    Elimination Interventions: Call light in reach              Problem: Diabetes Self-Management  Goal: *Disease process and treatment process  Description: Define diabetes and identify own type of diabetes; list 3 options for treating diabetes.   Outcome: Progressing Towards Goal     Problem: Afib Pathway: Day 1  Goal: Treatments/Interventions/Procedures  Outcome: Progressing Towards Goal     Problem: Patient Education: Go to Patient Education Activity  Goal: Patient/Family Education  Outcome: Progressing Towards Goal     Problem: Patient Education: Go to Patient Education Activity  Goal: Patient/Family Education  Outcome: Progressing Towards Goal

## 2021-07-18 NOTE — PROGRESS NOTES
Problem: Mobility Impaired (Adult and Pediatric)  Goal: *Acute Goals and Plan of Care (Insert Text)  Description: Physical Therapy Goals   Initiated 7/17/2021 and to be accomplished within 5-7 day(s)  1. Patient will move from supine <> sit with Mod I in prep for out of bed activity and change of position. 2.  Patient will perform sit<> stand with Mod I with LRAD in prep for transfers/ambulation. 3.  Patient will transfer from bed <> chair with Mod I with LRAD for time up in chair for completion of ADL activity. 4.  Patient will ambulate 150 feet with Mod I/LRAD for improved functional mobility at discharge. 5.  Patient will ascend/descend 3-5 stairs with handrail(s) with S for home re-entry as needed. Outcome: Progressing Towards Goal   physical Therapy TREATMENT    Patient: Vania Mehta (4 y.o. male)  Date: 7/18/2021  Diagnosis: Pulmonary edema [J81.1]  Atrial fibrillation with RVR ([facility] [address] [I48.91]  Pneumonia [J18.9]  Sepsis ([facility] [address] [A41.9] Pneumonia       Precautions: Fall   Chart, physical therapy assessment, plan of care and goals were reviewed. ASSESSMENT:  Pt is very mobile and meet gait goals easily. Pt reports increase neuropathy pain and foot discomfort. SPO2 range of 93-98% on 2L NC (2L use PTA). Stair training refused, due to foot pain. Will attempt on upcoming treatment. Progression toward goals:  [x]      Improving appropriately and progressing toward goals  []      Improving slowly and progressing toward goals  []      Not making progress toward goals and plan of care will be adjusted     PLAN:  Patient continues to benefit from skilled intervention to address the above impairments. Continue treatment per established plan of care. Discharge Recommendations:  Home Health vs None  Further Equipment Recommendations for Discharge:  portable oxygen     SUBJECTIVE:   Patient stated I have been up all nigh and all morning. Bleeding and going to the restroom.     OBJECTIVE DATA SUMMARY: Critical Behavior:  Neurologic State: Alert  Orientation Level: Oriented X4  Cognition: Appropriate for age attention/concentration, Follows commands  Safety/Judgement: Fall prevention  Functional Mobility Training:  Bed Mobility:  Rolling: Modified independent  Supine to Sit: Modified independent  Sit to Supine: Modified independent  Scooting: Modified independent  Transfers:  Sit to Stand: Supervision  Stand to Sit: Supervision  Balance:  Sitting: Intact  Standing: Intact  Ambulation/Gait Training:  Distance (ft): 220 Feet (ft)  Assistive Device: Gait belt; Other (comment)  Ambulation - Level of Assistance: Supervision;Stand-by assistance  Gait Abnormalities: Decreased step clearance  Speed/Katerine: Slow  Pain:  Pain Scale 1: Numeric (0 - 10)  Pain Intensity 1: 3 (L foot)  Pain out: 6  Pain Location 1: Foot  Pain Orientation 1: Left  Pain Description 1: Aching  Pain Intervention(s) 1: Medication (see MAR)  Activity Tolerance:   Fair+  Please refer to the flowsheet for vital signs taken during this treatment.   After treatment:   [x] Patient left in no apparent distress sitting EOB  [] Patient left in no apparent distress in bed  [x] Call bell left within reach  [x] Nursing notified  [] Caregiver present  [] Bed alarm activated      [facility], [facility]   Time Calculation: 23 mins

## 2021-07-18 NOTE — PROGRESS NOTES
Hospitalist Progress Note    Patient: Shahbaz Valiente MRN: [id number]  CSN: [id number]    YOB: 1940  Age: 79 y.o. Sex: male    DOA: 7/11/2021 LOS:  LOS: 7 days            Assessment/Plan   1. Acute decompensated LV systolic CHF w EF 87-99%. Improving w milrinone  2. Afib  3. CML  4. Pulmonary hypertension  5. HTN  6. ELIZABETH- improving  7. BPH     Plan:  - milrinone/ diuresis per cardiology  - hold tx dose lovenox this AM  - DC doxycycline  - flomax  - gleevec  - mobilize OOB  - scheduled miralax, start senokot S scheduled  - palliative care following.  He is FULL CODE    Patient Active Problem List   Diagnosis Code    TIA (transient ischemic attack) G45.9    Coronary artery disease involving native coronary artery I25.10    Type II diabetes mellitus, uncontrolled ([facility] [address] E11.65    Dyslipidemia E78.5    Pancytopenia ([facility]) D61.818    CML (chronic myelocytic leukemia) ([facility]) C92.10    HTN (hypertension) I10    Chronic atrial fibrillation (HCC) I48.20    Stage 3 chronic kidney disease (HCC) N18.30    Hyperglycemia R73.9    Elevated blood pressure reading with diagnosis of hypertension I10    Community acquired pneumonia J18.9    Chronic congestive heart failure (HCC) I50.9    SOB (shortness of breath) R06.02    On supplemental oxygen by nasal cannula Z78.9    Pneumonia J18.9    Pulmonary edema J81.1    Sepsis ([facility]) A41.9    Atrial fibrillation with RVR ([facility]) I48.91    Encounter for palliative care Z51.5    Debility R53.81    Acute on chronic combined systolic and diastolic congestive heart failure (HCC) I50.43    Pulmonary hypertension (HCC) I27.20    Cardiomyopathy (HCC) I42.9               Subjective:    cc: \" Im bleeding and Im constipated\"  No acute events overnight  He notes increased bleeding/ oozing from his IV sites  Reports dyspnea improving, ambulated down hallway  Continues w constipation/ hard stools      REVIEW OF SYSTEMS:  General: No fevers or chills. Cardiovascular: No chest pain or pressure. No palpitations. Pulmonary: No shortness of breath. Gastrointestinal: No nausea, vomiting. Objective:        Vital signs/Intake and Output:  Visit Vitals  /76   Pulse (!) 102   Temp 97.4 °F (36.3 °C)   Resp 16   Ht 5' 8\" (1.727 m)   Wt 87.8 kg (193 lb 9 oz)   SpO2 100%   BMI 29.43 kg/m²     Current Shift:  07/17 1901 - 07/18 0700  In: -   Out: 1000 [Urine:1000]  Last three shifts:  07/16 0701 - 07/17 1900  In: 770.8 [P.O.:290; I.V.:480.8]  Out: 6850 [Urine:6850]    Body mass index is 29.43 kg/m².     Physical Exam:  GEN: Alert and oriented times three NAD  EYES: conjunctiva normal, lids with out lesions  HEENT: MMM, No thyromegaly, no lymphadenopathy  HEART:  +S1 +S2, no JVD, pulses 2+ distally  LUNGS: CTA B/L no wheezes, rales or rhonchi  ABDOMEN: + BS, soft NT/ND no organomegaly,  No rebound  EXTREMITIES: No edema cyanosis, cap refill normal   SKIN: no rashes or skin breakdown, no nodules, normal turgor, scattered ecchymosis  Current Facility-Administered Medications   Medication Dose Route Frequency    senna-docusate (PERICOLACE) 8.6-50 mg per tablet 2 Tablet  2 Tablet Oral DAILY    phenol throat spray (CHLORASEPTIC) 1 Spray  1 Spray Oral PRN    polyethylene glycol (MIRALAX) packet 17 g  17 g Oral DAILY    furosemide (LASIX) injection 60 mg  60 mg IntraVENous Q12H    [Held by provider] enoxaparin (LOVENOX) injection 140 mg  1.5 mg/kg SubCUTAneous Q24H    imatinib (GLEEVEC) chemo tablet 100 mg (Patient Supplied)  100 mg Oral DAILY    cyanocobalamin tablet 2,000 mcg  2,000 mcg Oral DAILY    vitamin E (AQUA GEMS) capsule 400 Units  400 Units Oral DAILY    albuterol-ipratropium (DUO-NEB) 2.5 MG-0.5 MG/3 ML  3 mL Nebulization Q4H PRN    milrinone (PRIMACOR) 20 MG/100 ML D5W infusion  0.375 mcg/kg/min IntraVENous CONTINUOUS    guaiFENesin ER (MUCINEX) tablet 600 mg  600 mg Oral Q12H    famotidine (PEPCID) tablet 20 mg  20 mg Oral DAILY    magnesium hydroxide (MILK OF MAGNESIA) 400 mg/5 mL oral suspension 30 mL  30 mL Oral DAILY PRN    [Held by provider] amLODIPine (NORVASC) tablet 5 mg  5 mg Oral DAILY    aspirin delayed-release tablet 81 mg  81 mg Oral DAILY    atorvastatin (LIPITOR) tablet 40 mg  40 mg Oral DAILY    carvediloL (COREG) tablet 25 mg  25 mg Oral BID    tamsulosin (FLOMAX) capsule 0.4 mg  0.4 mg Oral DAILY    sodium chloride (NS) flush 5-40 mL  5-40 mL IntraVENous Q8H    sodium chloride (NS) flush 5-40 mL  5-40 mL IntraVENous PRN    acetaminophen (TYLENOL) tablet 650 mg  650 mg Oral Q6H PRN    Or    acetaminophen (TYLENOL) suppository 650 mg  650 mg Rectal Q6H PRN    polyethylene glycol (MIRALAX) packet 17 g  17 g Oral DAILY PRN    ondansetron (ZOFRAN ODT) tablet 4 mg  4 mg Oral Q8H PRN    Or    ondansetron (ZOFRAN) injection 4 mg  4 mg IntraVENous Q6H PRN    insulin lispro (HUMALOG) injection   SubCUTAneous AC&HS         All the patient's labs over the past 24 hours were reviewed both during my initial daily workflow process and at the time notated as \"note time\" in [facility]. (It is not time stamped separately in this workflow.)  Select labs are listed below.         Labs: Results:       Chemistry Recent Labs     07/17/21 0150 07/16/21 0425   * 160*    140   K 4.0 3.4*    99*   CO2 36* 36*   BUN 37* 41*   CREA 1.58* 1.65*   CA 9.0 8.6   AGAP 2* 5   BUCR 23* 25*      CBC w/Diff Recent Labs     07/17/21 0150 07/16/21  0425   WBC 50.0* 44.3*   RBC 3.98* 3.88*   HGB 10.8* 10.2*   HCT 35.1* 34.3*   * 109*   GRANS 67 65   LYMPH 5* 4*   EOS 0 3              Lipid Panel Lab Results   Component Value Date/Time    Cholesterol, total 107 08/12/2016 03:20 AM    HDL Cholesterol 56 08/12/2016 03:20 AM    LDL, calculated 26.4 08/12/2016 03:20 AM    VLDL, calculated 24.6 08/12/2016 03:20 AM    Triglyceride 123 08/12/2016 03:20 AM    CHOL/HDL Ratio 1.9 08/12/2016 03:20 AM              Thyroid Studies Lab Results   Component Value Date/Time    TSH 1.34 07/12/2021 02:55 AM        Procedures/imaging: see electronic medical records for all procedures/Xrays and details which were not copied into this note but were reviewed prior to creation of Jayme Shepherd DO  Internal Medicine/Geriatrics

## 2021-07-19 NOTE — PROGRESS NOTES
Hospitalist Progress Note-critical care note     Patient: Curtis Lopez MRN: [id number]  CSN: [id number]    YOB: 1940  Age: 79 y.o. Sex: male    DOA: 7/11/2021 LOS:  LOS: 8 days            Chief complaint: pulm edema, sepsis , afib CHF exacerbation, pneumonia, A. fib with RVR    Assessment/Plan         Hospital Problems  Date Reviewed: 7/20/2017        Codes Class Noted POA    Pulmonary hypertension ([facility] ICD-10-CM: I27.20  ICD-9-CM: 416.8  7/15/2021 Unknown        Cardiomyopathy ([facility] ICD-10-CM: I42.9  ICD-9-CM: 425.4  7/15/2021 Unknown        Encounter for palliative care ICD-10-CM: Z51.5  ICD-9-CM: V66.7  Unknown Unknown        Debility ICD-10-CM: R53.81  ICD-9-CM: 799.3  Unknown Unknown        Acute on chronic combined systolic and diastolic congestive heart failure ([facility] ICD-10-CM: I50.43  ICD-9-CM: 428.43, 428.0  7/12/2021 Unknown        * (Principal) Pneumonia ICD-10-CM: J18.9  ICD-9-CM: 486  7/11/2021 Unknown        Pulmonary edema ICD-10-CM: J81.1  ICD-9-CM: 146  7/11/2021 Unknown        Sepsis ([facility] ICD-10-CM: A41.9  ICD-9-CM: 038.9, 995.91  7/11/2021 Unknown        Atrial fibrillation with RVR (HCC) ICD-10-CM: I48.91  ICD-9-CM: 427.31  7/11/2021 Unknown        CML (chronic myelocytic leukemia) ([facility] ICD-10-CM: C92.10  ICD-9-CM: 205.10  9/2/2016 Yes             chroinic respiratory distress/sepsis   Continue nc O2 -use 2 L nc O2 at home       Multiple bilateral pneumonia with marked leukocytosis  Continue iv abx Breathing as needed   Completed abx treatment      Acute diastolic /systolic CHF extubation-acute on chronic combined , severe cardiomyopathy and severe pulm HTn   Ef 15-20 %   On primarcor gtt with lasix   per cardiologist   On Lasix Coreg, cardiology on board .   Low-salt diet, fluid restriction  2.5 L urine yesterday         atrial fibrillation  Coreg for rate control, tsh wnl   Change lovenox to 1.5 mg/kg         Hypertension  On Norvasc Lasix Coreg     Peripheral edema       History of CML   Wbc improving oncologist on board  Continue home medication gleeve      Acute kidney injury with chronic kidney disease renal ultrasound done no obstruction , abnormal liver imaging from renal ultrasound   Improving     Debility will benefit for home aids -cm on board     Subjective: feel fine,     Wife was at the bedside . All questions have been answered. 35 total min's spent on patient care including >50% on counseling/coordinating care. Discussed the above assessments. also discussed labs, medications and hospital course    Case discussed with Srikanth love Cm on board for home needs       Disposition :2-3 days   Review of systems:    General: No fevers or chills. Cardiovascular: No chest pain or pressure. No palpitations. Pulmonary:  shortness of breath better   Gastrointestinal: No nausea, vomiting. Vital signs/Intake and Output:  Visit Vitals  /64   Pulse 79   Temp 97.5 °F (36.4 °C)   Resp 16   Ht 5' 8\" (1.727 m)   Wt 85.2 kg (187 lb 14.4 oz)   SpO2 100%   BMI 28.57 kg/m²     Current Shift:  07/19 0701 - 07/19 1900  In: 240 [P.O.:240]  Out: 600 [Urine:600]  Last three shifts:  07/17 1901 - 07/19 0700  In: 1080 [P.O.:1080]  Out: 2125 [Urine:2125]    Physical Exam:  General: WD, WN. Alert, cooperative, no acute distress    HEENT: NC, Atraumatic. PERRLA, anicteric sclerae. Lungs: CTA Bilaterally. No Wheezing/Rhonchi/Rales. Heart:  Regular  rhythm,  + murmur, No Rubs, No Gallops  Abdomen: Soft, Non distended, Non tender. +Bowel sounds,   Extremities: No c/c, edema   Psych:   Not anxious or agitated. Neurologic:  No acute neurological deficit.              Labs: Results:       Chemistry Recent Labs     07/19/21  1210 07/18/21  0535 07/17/21  0150   * 136* 150*    140 141   K 4.0 3.9 4.0   CL 98* 98* 103   CO2 35* 37* 36*   BUN 35* 37* 37*   CREA 1.67* 1.69* 1.58*   CA 9.0 9.3 9.0   AGAP 4 5 2*   BUCR 21* 22* 23*   AP  --  106  --    TP  --  6.3*  -- ALB  --  2.9*  --    GLOB  --  3.4  --    AGRAT  --  0.9  --       CBC w/Diff Recent Labs     07/18/21  0535 07/17/21  0150   WBC 49.0* 50.0*   RBC 4.32* 3.98*   HGB 11.5* 10.8*   HCT 38.5 35.1*   * 108*   GRANS 63 67   LYMPH 2* 5*   EOS 1 0      Cardiac Enzymes No results for input(s): CPK, CKND1, TARA in the last 72 hours. No lab exists for component: CKRMB, TROIP   Coagulation No results for input(s): PTP, INR, APTT, INREXT, INREXT in the last 72 hours. Lipid Panel Lab Results   Component Value Date/Time    Cholesterol, total 107 08/12/2016 03:20 AM    HDL Cholesterol 56 08/12/2016 03:20 AM    LDL, calculated 26.4 08/12/2016 03:20 AM    VLDL, calculated 24.6 08/12/2016 03:20 AM    Triglyceride 123 08/12/2016 03:20 AM    CHOL/HDL Ratio 1.9 08/12/2016 03:20 AM      BNP No results for input(s): BNPP in the last 72 hours. Liver Enzymes Recent Labs     07/18/21  0535   TP 6.3*   ALB 2.9*         Thyroid Studies Lab Results   Component Value Date/Time    TSH 1.34 07/12/2021 02:55 AM        Procedures/imaging: see electronic medical records for all procedures/Xrays and details which were not copied into this note but were reviewed prior to creation of Plan    XR CHEST PORT    Result Date: 7/11/2021  EXAM: CHEST RADIOGRAPH CLINICAL INDICATION/HISTORY: Dyspnoea -Additional: None COMPARISON: December 12, 2020 TECHNIQUE: Portable frontal view of the chest _______________ FINDINGS: SUPPORT DEVICES: None. HEART AND MEDIASTINUM: Prior median sternotomy. No appreciable cardiomegaly. Remaining mediastinal contours within normal limits. LUNGS AND PLEURAL SPACES: There are patchy bilateral opacities as well as blunting of the costophrenic sulci. BONY THORAX AND SOFT TISSUES: Unremarkable. _______________     1. Bilateral airspace disease 2.  Small bilateral pleural effusions       Billy Philip MD

## 2021-07-19 NOTE — PROGRESS NOTES
Cardiology Progress Note        Patient: Casi Rivas        Sex: male          DOA: 7/11/2021  YOB: 1940      Age:  79 y.o.        LOS:  LOS: 8 days   Assessment/Plan     Principal Problem:    Pneumonia (7/11/2021)    Active Problems:    CML (chronic myelocytic leukemia) ([address] (9/2/2016)      Pulmonary edema (7/11/2021)      Sepsis ([address] (7/11/2021)      Atrial fibrillation with RVR ([address] (7/11/2021)      Encounter for palliative care ()      Debility ()      Acute on chronic combined systolic and diastolic congestive heart failure ([address] (7/12/2021)      Pulmonary hypertension ([address] (7/15/2021)      Cardiomyopathy ([address] (7/15/2021)        Plan:    Acute congestive heart failure  Atrial fibrillation New onset  CAD   Cardiomyopathy  Pulmonary hypertension   CABG      Continue diuretic treatment as per Nephrology discussed with Dr. Selena Jernigan  I will continue with Milrinone 0.375 micrograms/kg per minute dose   Lovenox was again stopped /hold because of bleeding issue   I think patient will not be able to tolerate full therapeutic anticoagulation in the setting of thrombocytopenia, CLL   I will change Lovenox to DVT prophylaxis dose. If kidney functions stabilized then I will try low-dose Eliquis 2.5 mg twice daily depending upon tolerableblity of the patient   Discussed with Dr. Bonilla Coley.                      Subjective:    cc:  Shortness of breath   Ankle swelling        REVIEW OF SYSTEMS:     General: No fevers or chills. Cardiovascular: No chest pain or pressure. No palpitations. ++ ankle swelling  Pulmonary: No SOB, orthopnea, PND  Gastrointestinal: No nausea, vomiting or diarrhea      Objective:      Visit Vitals  /60   Pulse 100   Temp 97.6 °F (36.4 °C)   Resp 16   Ht 5' 8\" (1.727 m)   Wt 85.2 kg (187 lb 14.4 oz)   SpO2 99%   BMI 28.57 kg/m²     Body mass index is 28.57 kg/m². Physical Exam:  General Appearance: Comfortable,   NECK: No JVD, no thyroidomeglay.    LUNGS: Clear bilaterally. HEART: [id number] audible,    ABD: Non-tender, BS Audible    EXT: + edema, and no cysnosis. VASCULAR EXAM: Pulses are intact. PSYCHIATRIC EXAM: Mood is appropriate.     Medication:  Current Facility-Administered Medications   Medication Dose Route Frequency    gabapentin (NEURONTIN) capsule 100 mg  100 mg Oral QHS    oxyCODONE IR (ROXICODONE) tablet 5 mg  5 mg Oral Q6H PRN    enoxaparin (LOVENOX) injection 40 mg  40 mg SubCUTAneous Q24H    furosemide (LASIX) injection 40 mg  40 mg IntraVENous Q12H    phenol throat spray (CHLORASEPTIC) 1 Spray  1 Spray Oral PRN    polyethylene glycol (MIRALAX) packet 17 g  17 g Oral DAILY    imatinib (GLEEVEC) chemo tablet 100 mg (Patient Supplied)  100 mg Oral DAILY    cyanocobalamin tablet 2,000 mcg  2,000 mcg Oral DAILY    vitamin E (AQUA GEMS) capsule 400 Units  400 Units Oral DAILY    albuterol-ipratropium (DUO-NEB) 2.5 MG-0.5 MG/3 ML  3 mL Nebulization Q4H PRN    milrinone (PRIMACOR) 20 MG/100 ML D5W infusion  0.375 mcg/kg/min IntraVENous CONTINUOUS    guaiFENesin ER (MUCINEX) tablet 600 mg  600 mg Oral Q12H    famotidine (PEPCID) tablet 20 mg  20 mg Oral DAILY    magnesium hydroxide (MILK OF MAGNESIA) 400 mg/5 mL oral suspension 30 mL  30 mL Oral DAILY PRN    [Held by provider] amLODIPine (NORVASC) tablet 5 mg  5 mg Oral DAILY    aspirin delayed-release tablet 81 mg  81 mg Oral DAILY    atorvastatin (LIPITOR) tablet 40 mg  40 mg Oral DAILY    carvediloL (COREG) tablet 25 mg  25 mg Oral BID    tamsulosin (FLOMAX) capsule 0.4 mg  0.4 mg Oral DAILY    sodium chloride (NS) flush 5-40 mL  5-40 mL IntraVENous Q8H    sodium chloride (NS) flush 5-40 mL  5-40 mL IntraVENous PRN    acetaminophen (TYLENOL) tablet 650 mg  650 mg Oral Q6H PRN    Or    acetaminophen (TYLENOL) suppository 650 mg  650 mg Rectal Q6H PRN    polyethylene glycol (MIRALAX) packet 17 g  17 g Oral DAILY PRN    ondansetron (ZOFRAN ODT) tablet 4 mg  4 mg Oral Q8H PRN    Or    ondansetron (ZOFRAN) injection 4 mg  4 mg IntraVENous Q6H PRN    insulin lispro (HUMALOG) injection   SubCUTAneous AC&HS               Lab/Data Reviewed:  Procedures/imaging: see electronic medical records for all procedures/Xrays   and details which were not copied into this note but were reviewed prior to creation of Plan       All lab results for the last 24 hours reviewed. Recent Labs     07/18/21  0535 07/17/21  0150   WBC 49.0* 50.0*   HGB 11.5* 10.8*   HCT 38.5 35.1*   * 108*     Recent Labs     07/18/21  0535 07/17/21  0150    141   K 3.9 4.0   CL 98* 103   CO2 37* 36*   * 150*   BUN 37* 37*   CREA 1.69* 1.58*   CA 9.3 9.0       RADIOLOGY:  CT Results  (Last 48 hours)    None        CXR Results  (Last 48 hours)    None            Cardiology Procedures:   Results for orders placed or performed during the hospital encounter of 07/11/21   EKG, 12 LEAD, INITIAL   Result Value Ref Range    Ventricular Rate 110 BPM    Atrial Rate 111 BPM    QRS Duration 88 ms    Q-T Interval 294 ms    QTC Calculation (Bezet) 397 ms    Calculated R Axis -42 degrees    Calculated T Axis 153 degrees    Diagnosis       Atrial fibrillation with rapid ventricular response  Left axis deviation  Anterior infarct , age undetermined  T wave abnormality, consider lateral ischemia  Abnormal ECG  When compared with ECG of 12-DEC-2020 15:17,  Atrial fibrillation has replaced Sinus rhythm  Vent.  rate has increased BY  44 BPM    Confirmed by Trent Lam MD. ([id number]) on 7/12/2021 10:38:02 PM        Echo Results  (Last 48 hours)    None       Cardiolite (Tc-99m Sestamibi) stress test    Signed By: Hailee Rodríguez MD     July 19, 2021

## 2021-07-19 NOTE — PROGRESS NOTES
Problem: Mobility Impaired (Adult and Pediatric)  Goal: *Acute Goals and Plan of Care (Insert Text)  Description: Physical Therapy Goals   Initiated 7/17/2021 and to be accomplished within 5-7 day(s)  1. Patient will move from supine <> sit with Mod I in prep for out of bed activity and change of position. 2.  Patient will perform sit<> stand with Mod I with LRAD in prep for transfers/ambulation. 3.  Patient will transfer from bed <> chair with Mod I with LRAD for time up in chair for completion of ADL activity. 4.  Patient will ambulate 150 feet with Mod I/LRAD for improved functional mobility at discharge. 5.  Patient will ascend/descend 3-5 stairs with handrail(s) with S for home re-entry as needed. Outcome: Progressing Towards Goal  physical Therapy TREATMENT    Patient: Vania Mehta (22 y.o. male)  Date: 7/19/2021  Diagnosis: Pulmonary edema [J81.1]  Atrial fibrillation with RVR ([facility] [I48.91]  Pneumonia [J18.9]  Sepsis ([facility] [A41.9] Pneumonia  Precautions: Fall   Chart, physical therapy assessment, plan of care and goals were reviewed. ASSESSMENT:  Pt supine in bed with LE's elevated on PT arrival.  O2 at 2 Lnc on and IV in place R UE. Pt transitions in/out of bed with mod I.  LE's mild/mod edema. Completes transfers with supervision. Participated with GT/GB applied 220ft in enamorado while pt pushing IV pole, with decr'd foot clearance and slow colten. Stair nego performed x2 with 8inch step and bilat HR's. Slight difficulty due to step ht, but completed with CGA. Returned to room and back to bed with mod I.  Multiple pillows placed beneath LE'S to address edema. Spouse present and supportive. Nurse Jude Goodson notified of above.    Progression toward goals:  [x]      Improving appropriately and progressing toward goals  []      Improving slowly and progressing toward goals  []      Not making progress toward goals and plan of care will be adjusted     PLAN:  Patient continues to benefit from skilled intervention to address the above impairments. Continue treatment per established plan of care. Discharge Recommendations:  Home Physical Therapy  Further Equipment Recommendations for Discharge:  N/A     SUBJECTIVE:   Patient stated Okay.     OBJECTIVE DATA SUMMARY:   Critical Behavior:  Neurologic State: Alert, Appropriate for age  Orientation Level: Oriented X4  Cognition: Appropriate decision making, Appropriate for age attention/concentration, Appropriate safety awareness, Follows commands  Safety/Judgement: Fall prevention  Functional Mobility Training:  Bed Mobility:  Supine to Sit: Modified independent  Sit to Supine: Modified independent  Scooting: Independent  Transfers:  Sit to Stand: Supervision  Stand to Sit: Supervision  Balance:  Sitting: Intact  Standing: Intact; Without support  Ambulation/Gait Training:  Distance (ft): 220 Feet (ft)  Assistive Device: Gait belt; Other (comment) (IV pole )  Ambulation - Level of Assistance: Supervision;Stand-by assistance (SBA for equipment)  Gait Abnormalities: Decreased step clearance  Speed/Katerine: Slow  Step Length: Right shortened;Left shortened  Interventions: Safety awareness training  Stairs:  Number of Stairs Trained: 2 (8 inch)  Stairs - Level of Assistance: Contact guard assistance  Rail Use: Both  Pain:  Pain Scale 1: Numeric (0 - 10)  Pain Intensity 1: 0  Activity Tolerance:   Fair   Please refer to the flowsheet for vital signs taken during this treatment.   After treatment:   [] Patient left in no apparent distress sitting up in chair  [x] Patient left in no apparent distress in bed  [x] Call bell left within reach  [x] Nursing notified-Kristin  [x] Caregiver present-Spouse  [] Bed alarm activated      Debbi Jimenez PT   Time Calculation: 30 mins

## 2021-07-19 NOTE — DIABETES MGMT
GLYCEMIC CONTROL PLAN OF CARE     Assessment:  Pt admitted with pneumonia, acute respiratory distress/sepsis, a fib. Past Medial History includes T2DM, HTN, CML, CAD, CKD stage 3, TIA. Pt with good glycemic control PTA as indicated by current A1C of 7.1%. Most blood glucose readings in or close to target range. Pt has required < 10 units corrective lispro/day since admission. Taking > 75% most meals per I/O's. Attempted to see x2 - pt with PT each time. GFR - 40    Recommendations:  · Continue corrective lispro, very insulin resistant dosing ACHS    Most recent blood glucose values:  7/19:  Lab - 189;  POC - 148  7/18:  Lab - 136; ; POC - 140, 230          Current A1C-  Ave blood gluocse has been ~ 157 mg/dL over past 3 months  Lab Results   Component Value Date/Time    Hemoglobin A1c 7.1 (H) 07/12/2021 02:55 AM     Care Everywhere last A1C was 10/23/19 - 6.0%    Current hospital diabetes medications:  Corrective lispro, very insulin resistant dosing ACHS    Previous day's insulin requirements: 6 units (corrective lispro)    Home diabetes medications:  Per PTA list: need to verify   Key Antihyperglycemic Medications     Patient is on no antihyperglycemic meds. Diet:  ADULT DIET Regular; Low Fat/Low Chol/High Fiber/TEX; Low Sodium (2 gm); 1000 ml;  No Concentrated Sweets   Meal Intake:   Patient Vitals for the past 168 hrs:   % Diet Eaten   07/19/21 0742 76 - 100%   07/18/21 1841 76 - 100%   07/18/21 1200 76 - 100%   07/18/21 0955 51 - 75%   07/17/21 1358 26 - 50%   07/16/21 1708 76 - 100%   07/16/21 1204 76 - 100%   07/16/21 0830 76 - 100%   07/15/21 0809 76 - 100%   07/14/21 1144 26 - 50%   07/14/21 0803 26 - 50%     Supplement Intake:  Patient Vitals for the past 168 hrs:   Supplement intake %   07/16/21 1936 76 - 100%       Education:  ____Refer to Diabetes Education Record             __x__Education not indicated at this time    Genevieve Clark MS, RD, [facility]  Diabetes and Glycemic Control   Office:  [phone]  Pager:  [phone]

## 2021-07-19 NOTE — PROGRESS NOTES
DC Plan: home with [facility] and additional services when medically stable    Care manager met with patient in anticipation of discharge; per patient, he is accepting of recommendation for [facility] and would like as much assistance as possible as he stated his wife has limited capacity to assist him, including cooking meals. FOC offered and patient stated whatever company could cover him would be acceptable. CM will find [address] and will reach out to the [place] to request personal care assistance as well as patient's [facility] Medicare for meal assistance when discharged. Nagi Salmon has accepted patient, orders placed. Care manager has reached out to [place]  Jyotsna Stephenson [phone], ext [phone] to request home personal aide services to assist at home; hospitalist requested to document need for home personal aide services and CMS will send updated clinical info and fax to [place] [phone] attention Dr. Zion Thacker. .  CM has contacted [facility], awaiting call back from care manager to inquire about home meal assistance. 1600:  Care manager spoke with [facility] care manager representative Elinor; verified his [employer] number is [phone]; reference number for the call was [phone]; per Elinor, benefit from [facility] for meals is 4 times per year, 2 meals a day x 7 days; patient needs to contact member services number at discharge to set this benefit up. Care Management Interventions  PCP Verified by CM: Yes  Mode of Transport at Discharge:  Other (see comment) (wife)  Transition of Care Consult (CM Consult): Discharge Planning, [address]: Yes  Physical Therapy Consult: Yes  Occupational Therapy Consult: Yes  Current Support Network: Own Home, Lives with Spouse  Confirm Follow Up Transport: Self  The Plan for Transition of Care is Related to the Following Treatment Goals : pneumonia, sepsis, pulmonary edema  The Patient and/or Patient Representative was Provided with a Choice of Provider and Agrees with the Discharge Plan?: Yes  Name of the Patient Representative Who was Provided with a Choice of Provider and Agrees with the Discharge Plan: Hector Goldberg, patient  Freedom of Choice List was Provided with Basic Dialogue that Supports the Patient's Individualized Plan of Care/Goals, Treatment Preferences and Shares the Quality Data Associated with the Providers?: Yes   Resource Information Provided?: Yes  Discharge Location  Discharge Placement: Home with home health

## 2021-07-19 NOTE — PROGRESS NOTES
RENAL CONSULT FOLLOW UP NOTE   2021    Patient:  Jame Che  :  1940  Gender:  male  MRN #:  [id number]    Reason for Consult: evaluation and management of ELIZABETH on CKD       Assessment:    Jame Che is a 79y.o. 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 He was brought in hospital due to  2 to 3 days history of progressive shortness of breath with the cough and increasing lower extremity swelling subjective fever and chills. CXR consistent with pneumonia and pulmonary congestion . ECHO demonstrated - cardiomyopathy with EF 15-20 %   During hospital stays developed ELIZABETH on CKD , non oliguric type most likely due to cardiorenal syndrome. He has evidence of fluid overload, crackles on lung exam and b/l LE edema  When renal service was consulted     7/15/21- Urine output 2500 cc overnight, BP stable   No urinary obstruction in USG     : UPO is 3475 cc  - UOP 3450 cc   - UOP 4400 cc   : UOP 78554 on lasxi 40 mg iv bid       # ELIZABETH on CKD stage 3Ga  # HTN  #Cardiomyopathy with volume overload   #Metabolci alkalosis       Plan:   - UOP has decrease for last 24 hours, overall he is 13.4 liter negative since admission   - Continue lasix to 40 mg IV BID , f/u renal function and metabolic alkalosis today.  If serum rises again will consider diamox and decrease lasix again   - - Bladder scan to ensure he is not retaining urine   - Appreciate cardiology recs and assistance  , on milrinone drip for cardiomyopathy and coreg   - Strict intake and output charting   - Fluid restriction to 1 liter, low salt diet   - Avoid NSAIDs, contrast and nephrotoxin   -Dose all meds and antibiotics for current eGFR       Subjective/ROS: -   Feels better, SOB is improving, still has leg edema   Denied any other symptoms     Intake/Output Summary (Last 24 hours) at 7/19/2021 1110  Last data filed at 7/19/2021 1105  Gross per 24 hour   Intake 1080 ml   Output 1725 ml   Net -645 ml         Objective:    Visit Vitals  /66   Pulse 81   Temp 98.6 °F (37 °C)   Resp 16   Ht 5' 8\" (1.727 m)   Wt 85.2 kg (187 lb 14.4 oz)   SpO2 100%   BMI 28.57 kg/m²       Physical Exam:    Pt awake,  alert and comfortable  HEENT:no neck swelling  Lung: crackles at lung base  Heart: s1s2 heard with systolic murmu  Ext: b/l LE edema       Laboratory Data:    Lab Results   Component Value Date    BUN 37 (H) 07/18/2021    BUN 37 (H) 07/17/2021    BUN 41 (H) 07/16/2021     07/18/2021     07/17/2021     07/16/2021    CO2 37 (H) 07/18/2021    CO2 36 (H) 07/17/2021    CO2 36 (H) 07/16/2021     Lab Results   Component Value Date    WBC 49.0 (H) 07/18/2021    HGB 11.5 (L) 07/18/2021    HCT 38.5 07/18/2021     No components found for: CALCIUM, PHOSPHORUS, MAGNESIUM  Lab Results   Component Value Date    HDL 56 08/12/2016     No results found for: SPECIMENTYP, TURBIDITY, UGLU    Imaging Reveiwed:  CXR 7/11/21\"- 1. Bilateral airspace disease   Small bilateral pleural effusions    · ECHO 7/12/21- LV: Estimated LVEF is 15 - 20%. Normal wall thickness. Mildly dilated left ventricle. Severely reduced systolic function. Left ventricular diastolic dysfunction E'E= 15.30. · Contrast used: DEFINITY. · LA: Moderately dilated left atrium. · AV: Aortic valve leaflet calcification present. Mild aortic valve regurgitation is present. · MV: Mitral valve thickening. Moderate mitral valve regurgitation is present. · TV: Moderate tricuspid valve regurgitation is present. · PV: Mild pulmonic valve regurgitation is present. · PA: Pulmonary arterial systolic pressure is 55 mmHg. · IVC: Moderately elevated central venous pressure (8 mmHg); IVC diameter is larger than 21 mm and collapses more than 50% with respiration.      USG Retroperitoneum : 7/14/21-      IMPRESSION     No hydronephrosis.     Thick walled, contracted urinary bladder.     Echogenic liver.  Perihepatic ascites and right pleural effusion.    - other medical management per Primary team      Thank you for consult       Libby Mcburney, MD   [facility] [place]-[facility]rology

## 2021-07-19 NOTE — PROGRESS NOTES
Problem: Diabetes Self-Management  Goal: *Disease process and treatment process  Description: Define diabetes and identify own type of diabetes; list 3 options for treating diabetes.   Outcome: Progressing Towards Goal     Problem: Heart Failure: Day 5  Goal: Treatments/Interventions/Procedures  Outcome: Progressing Towards Goal

## 2021-07-19 NOTE — PROGRESS NOTES
Palliative Medicine    CODE STATUS: FULL CODE    AMD Status: AMD completed naming his daughter and son as his surrogate decision makers     7/19/2021 0855 Seen today in room 357 along with SAMANTHA Borrego. Awake, alert. Oriented  X 4. Respirations unlabored. Oxygen on at 2 lpm per NC . Able to speak in full  sentences. Pain--denies. States feeling better than on Friday. Is breathing easier. Has urinated much more on the milrinone. Abdomen feels less taut to him. Legs remain edematous. States he has [place] some [place] has not worked on the stairs and has three stairs at the entrance to his home. Reviewed the completion of his AMD and asked if he wanted to continue with orders for full resucitative efforts in the event of cardiac arrest and he clearly stated yes. After meeting with patient and speaking with his daughter, the goals of care have been defined. Code status remains: FULL CODE   Will sign off but remain available for reconsult as needed/if appropriate.      Thank you for the Palliative Medicine consult and allowing us to participate in the care of Debo Robles RN, MSN  Palliative Medicine  P: [phone]

## 2021-07-20 NOTE — PROGRESS NOTES
Problem: Falls - Risk of  Goal: *Absence of Falls  Description: Document Alexei Fenton Fall Risk and appropriate interventions in the flowsheet.   Outcome: Progressing Towards Goal  Note: Fall Risk Interventions:  Mobility Interventions: Bed/chair exit alarm         Medication Interventions: Evaluate medications/consider consulting pharmacy, Patient to call before getting OOB    Elimination Interventions: Call light in reach, Bed/chair exit alarm

## 2021-07-20 NOTE — PROGRESS NOTES
RENAL CONSULT FOLLOW UP NOTE   2021    Patient:  Rocio Chairez  :  1940  Gender:  male  MRN #:  [id number]    Reason for Consult: evaluation and management of ELIZABETH on CKD       Assessment:    Rocio Chairez is a 79y.o. 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 He was brought in hospital due to  2 to 3 days history of progressive shortness of breath with the cough and increasing lower extremity swelling subjective fever and chills. CXR consistent with pneumonia and pulmonary congestion . ECHO demonstrated - cardiomyopathy with EF 15-20 %   During hospital stays developed ELIZABETH on CKD , non oliguric type most likely due to cardiorenal syndrome. He has evidence of fluid overload, crackles on lung exam and b/l LE edema  When renal service was consulted     7/15/21- Urine output 2500 cc overnight, BP stable   No urinary obstruction in USG     : UPO is 3475 cc  - UOP 3450 cc   - UOP 4400 cc   : UOP 74799 on lasxi 40 mg iv bid         # ELIZABETH on CKD stage 3Ga  # HTN  #Cardiomyopathy with volume overload   #Metabolci alkalosis       Plan:   - UOP 1500 cc overnight, overall he is 13.4 liter negative since admission   - Continue lasix to 40 mg IV BID , f/u renal function and metabolic alkalosis today. Will give him diamox 250 mg daily until alkalosis improves   - Does not have clinical and metabolic indications of dialysis , will not anticipate dramatic improvement in renal function due to advanced cardiomyopathy and A.fib now.    - - Bladder scan to ensure he is not retaining urine   - Appreciate cardiology recs and assistance  , on milrinone drip for cardiomyopathy and coreg   - Strict intake and output charting   - Fluid restriction to 1 liter, low salt diet   - Avoid NSAIDs, contrast and nephrotoxin   -Dose all meds and antibiotics for current eGFR       Subjective/ROS: -   Feels better, SOB is improving but still requiring oxygen , still has leg edema   Denied any other symptoms  Urinating fine     Intake/Output Summary (Last 24 hours) at 7/20/2021 0837  Last data filed at 7/20/2021 0817  Gross per 24 hour   Intake 1127.5 ml   Output 1750 ml   Net -622.5 ml         Objective:    Visit Vitals  BP (!) 101/51   Pulse 86   Temp 97.7 °F (36.5 °C)   Resp 14   Ht 5' 8\" (1.727 m)   Wt 84.7 kg (186 lb 11.7 oz)   SpO2 99%   BMI 28.39 kg/m²       Physical Exam:    Pt awake,  alert and comfortable  HEENT:no neck swelling  Lung: crackles at lung base  Heart: s1s2 heard with systolic murmu  Ext: b/l LE edema       Laboratory Data:    Lab Results   Component Value Date    BUN 35 (H) 07/19/2021    BUN 37 (H) 07/18/2021    BUN 37 (H) 07/17/2021     07/19/2021     07/18/2021     07/17/2021    CO2 35 (H) 07/19/2021    CO2 37 (H) 07/18/2021    CO2 36 (H) 07/17/2021     Lab Results   Component Value Date    WBC 49.0 (H) 07/18/2021    HGB 11.5 (L) 07/18/2021    HCT 38.5 07/18/2021     No components found for: CALCIUM, PHOSPHORUS, MAGNESIUM  Lab Results   Component Value Date    HDL 56 08/12/2016     No results found for: SPECIMENTYP, TURBIDITY, UGLU    Imaging Reveiwed:  CXR 7/11/21\"- 1. Bilateral airspace disease   Small bilateral pleural effusions    · ECHO 7/12/21- LV: Estimated LVEF is 15 - 20%. Normal wall thickness. Mildly dilated left ventricle. Severely reduced systolic function. Left ventricular diastolic dysfunction E'E= 15.30. · Contrast used: DEFINITY. · LA: Moderately dilated left atrium. · AV: Aortic valve leaflet calcification present. Mild aortic valve regurgitation is present. · MV: Mitral valve thickening. Moderate mitral valve regurgitation is present. · TV: Moderate tricuspid valve regurgitation is present. · PV: Mild pulmonic valve regurgitation is present. · PA: Pulmonary arterial systolic pressure is 55 mmHg.   · IVC: Moderately elevated central venous pressure (8 mmHg); IVC diameter is larger than 21 mm and collapses more than 50% with respiration. USG Retroperitoneum : 7/14/21-      IMPRESSION     No hydronephrosis.     Thick walled, contracted urinary bladder.     Echogenic liver.  Perihepatic ascites and right pleural effusion.    - other medical management per Primary team      Thank you for consult       David Mcgee MD   [facility] [place]-Nephrology

## 2021-07-20 NOTE — PROGRESS NOTES
Hospitalist Progress Note-critical care note     Patient: Diya  MRN: [id number]  CSN: [id number]    YOB: 1940  Age: 79 y.o. Sex: male    DOA: 7/11/2021 LOS:  LOS: 9 days            Chief complaint: pulm edema, sepsis , afib CHF exacerbation, pneumonia, A. fib with RVR    Assessment/Plan         Hospital Problems  Date Reviewed: 7/20/2017        Codes Class Noted POA    Pulmonary hypertension ([facility] ICD-10-CM: I27.20  ICD-9-CM: 416.8  7/15/2021 Unknown        Cardiomyopathy ([facility] ICD-10-CM: I42.9  ICD-9-CM: 425.4  7/15/2021 Unknown        Encounter for palliative care ICD-10-CM: Z51.5  ICD-9-CM: V66.7  Unknown Unknown        Debility ICD-10-CM: R53.81  ICD-9-CM: 799.3  Unknown Unknown        Acute on chronic combined systolic and diastolic congestive heart failure ([facility] ICD-10-CM: I50.43  ICD-9-CM: 428.43, 428.0  7/12/2021 Unknown        * (Principal) Pneumonia ICD-10-CM: J18.9  ICD-9-CM: 486  7/11/2021 Unknown        Pulmonary edema ICD-10-CM: J81.1  ICD-9-CM: 874  7/11/2021 Unknown        Sepsis ([facility] ICD-10-CM: A41.9  ICD-9-CM: 038.9, 995.91  7/11/2021 Unknown        Atrial fibrillation with RVR (HCC) ICD-10-CM: I48.91  ICD-9-CM: 427.31  7/11/2021 Unknown        CML (chronic myelocytic leukemia) ([facility] ICD-10-CM: C92.10  ICD-9-CM: 205.10  9/2/2016 Yes             chroinic respiratory distress/sepsis   Continue nc O2 -use 2 L nc O2 at home       Multiple bilateral pneumonia with marked leukocytosis  Continue iv abx Breathing as needed   Completed abx treatment      Acute diastolic /systolic CHF extubation-acute on chronic combined , severe cardiomyopathy and severe pulm HTn   Ef 15-20 %   On primarcor gtt with lasix   per cardiologist and nephrologist   On Lasix Coreg, cardiology on board .   diamox 250 mg per nephrologist   Low-salt diet, fluid restriction          atrial fibrillation  Coreg for rate control, tsh wnl   Ac hold due to bleeding         Hypertension  On Norvasc Lasix Coreg     Peripheral edema       History of CML    oncologist on board  Continue home medication gleeve      Debility will benefit for home aids -cm on board     Subjective: feel fine,     Wife was at the bedside . What hospital bed       Disposition :till cardiologist clearance   Review of systems:    General: No fevers or chills. Cardiovascular: No chest pain or pressure. No palpitations. Pulmonary:  shortness of breath better   Gastrointestinal: No nausea, vomiting. Vital signs/Intake and Output:  Visit Vitals  /67   Pulse 67   Temp 98 °F (36.7 °C)   Resp 14   Ht 5' 8\" (1.727 m)   Wt 84.7 kg (186 lb 11.7 oz)   SpO2 100%   BMI 28.39 kg/m²     Current Shift:  07/20 0701 - 07/20 1900  In: -   Out: 816 [Urine:770]  Last three shifts:  07/18 1901 - 07/20 0700  In: 1847.5 [P.O.:1080; I.V.:767.5]  Out: 2200 [Urine:2200]    Physical Exam:  General: WD, WN. Alert, cooperative, no acute distress    HEENT: NC, Atraumatic. PERRLA, anicteric sclerae. Lungs: CTA Bilaterally. No Wheezing/Rhonchi/Rales. Heart:  Regular  rhythm,  + murmur, No Rubs, No Gallops  Abdomen: Soft, Non distended, Non tender. +Bowel sounds,   Extremities: No c/c, edema   Psych:   Not anxious or agitated. Neurologic:  No acute neurological deficit. Labs: Results:       Chemistry Recent Labs     07/20/21  0900 07/19/21  1210 07/18/21  0535   * 189* 136*    137 140   K 4.0 4.0 3.9    98* 98*   CO2 37* 35* 37*   BUN 36* 35* 37*   CREA 1.75* 1.67* 1.69*   CA 8.6 9.0 9.3   AGAP 2* 4 5   BUCR 21* 21* 22*   AP  --   --  106   TP  --   --  6.3*   ALB  --   --  2.9*   GLOB  --   --  3.4   AGRAT  --   --  0.9      CBC w/Diff Recent Labs     07/18/21  0535   WBC 49.0*   RBC 4.32*   HGB 11.5*   HCT 38.5   *   GRANS 63   LYMPH 2*   EOS 1      Cardiac Enzymes No results for input(s): CPK, CKND1, TARA in the last 72 hours.     No lab exists for component: CKRMB, TROIP   Coagulation No results for input(s): PTP, INR, APTT, INREXT, INREXT in the last 72 hours. Lipid Panel Lab Results   Component Value Date/Time    Cholesterol, total 107 08/12/2016 03:20 AM    HDL Cholesterol 56 08/12/2016 03:20 AM    LDL, calculated 26.4 08/12/2016 03:20 AM    VLDL, calculated 24.6 08/12/2016 03:20 AM    Triglyceride 123 08/12/2016 03:20 AM    CHOL/HDL Ratio 1.9 08/12/2016 03:20 AM      BNP No results for input(s): BNPP in the last 72 hours. Liver Enzymes Recent Labs     07/18/21  0535   TP 6.3*   ALB 2.9*         Thyroid Studies Lab Results   Component Value Date/Time    TSH 1.34 07/12/2021 02:55 AM        Procedures/imaging: see electronic medical records for all procedures/Xrays and details which were not copied into this note but were reviewed prior to creation of Plan    XR CHEST PORT    Result Date: 7/11/2021  EXAM: CHEST RADIOGRAPH CLINICAL INDICATION/HISTORY: Dyspnoea -Additional: None COMPARISON: December 12, 2020 TECHNIQUE: Portable frontal view of the chest _______________ FINDINGS: SUPPORT DEVICES: None. HEART AND MEDIASTINUM: Prior median sternotomy. No appreciable cardiomegaly. Remaining mediastinal contours within normal limits. LUNGS AND PLEURAL SPACES: There are patchy bilateral opacities as well as blunting of the costophrenic sulci. BONY THORAX AND SOFT TISSUES: Unremarkable. _______________     1. Bilateral airspace disease 2.  Small bilateral pleural effusions       Avani Dowd MD

## 2021-07-20 NOTE — PROGRESS NOTES
Problem: Falls - Risk of  Goal: *Absence of Falls  Description: Document Rosalina Saravia Fall Risk and appropriate interventions in the flowsheet. Outcome: Progressing Towards Goal  Note: Fall Risk Interventions:  Mobility Interventions: Bed/chair exit alarm, Communicate number of staff needed for ambulation/transfer, Patient to call before getting OOB, Utilize walker, cane, or other assistive device         Medication Interventions: Bed/chair exit alarm, Teach patient to arise slowly, Patient to call before getting OOB    Elimination Interventions: Bed/chair exit alarm, Call light in reach, Patient to call for help with toileting needs, Toileting schedule/hourly rounds, Toilet paper/wipes in reach              Problem: Patient Education: Go to Patient Education Activity  Goal: Patient/Family Education  Outcome: Progressing Towards Goal     Problem: Diabetes Self-Management  Goal: *Disease process and treatment process  Description: Define diabetes and identify own type of diabetes; list 3 options for treating diabetes. Outcome: Progressing Towards Goal  Goal: *Incorporating nutritional management into lifestyle  Description: Describe effect of type, amount and timing of food on blood glucose; list 3 methods for planning meals. Outcome: Progressing Towards Goal  Goal: *Incorporating physical activity into lifestyle  Description: State effect of exercise on blood glucose levels. Outcome: Progressing Towards Goal  Goal: *Developing strategies to promote health/change behavior  Description: Define the ABC's of diabetes; identify appropriate screenings, schedule and personal plan for screenings. Outcome: Progressing Towards Goal  Goal: *Using medications safely  Description: State effect of diabetes medications on diabetes; name diabetes medication taking, action and side effects.   Outcome: Progressing Towards Goal  Goal: *Monitoring blood glucose, interpreting and using results  Description: Identify recommended blood glucose targets  and personal targets. Outcome: Progressing Towards Goal  Goal: *Prevention, detection, treatment of acute complications  Description: List symptoms of hyper- and hypoglycemia; describe how to treat low blood sugar and actions for lowering  high blood glucose level. Outcome: Progressing Towards Goal  Goal: *Prevention, detection and treatment of chronic complications  Description: Define the natural course of diabetes and describe the relationship of blood glucose levels to long term complications of diabetes.   Outcome: Progressing Towards Goal  Goal: *Developing strategies to address psychosocial issues  Description: Describe feelings about living with diabetes; identify support needed and support network  Outcome: Progressing Towards Goal  Goal: *Insulin pump training  Outcome: Progressing Towards Goal  Goal: *Sick day guidelines  Outcome: Progressing Towards Goal  Goal: *Patient Specific Goal (EDIT GOAL, INSERT TEXT)  Outcome: Progressing Towards Goal     Problem: Patient Education: Go to Patient Education Activity  Goal: Patient/Family Education  Outcome: Progressing Towards Goal     Problem: Afib Pathway: Day 1  Goal: Treatments/Interventions/Procedures  Outcome: Progressing Towards Goal     Problem: Heart Failure: Day 5  Goal: Treatments/Interventions/Procedures  Outcome: Progressing Towards Goal     Problem: Patient Education: Go to Patient Education Activity  Goal: Patient/Family Education  Outcome: Progressing Towards Goal     Problem: Patient Education: Go to Patient Education Activity  Goal: Patient/Family Education  Outcome: Progressing Towards Goal

## 2021-07-20 NOTE — PROGRESS NOTES
Bedside shift change report given to Lois (oncoming nurse) by Donny Antoine RN (offgoing nurse). Report included the following information SBAR, Kardex, Intake/Output and MAR.

## 2021-07-20 NOTE — PROGRESS NOTES
1920 Assumed care from ZACH Mascorro RN. Shift uneventful. Pt remained stable overnight. 0720 Bedside and Verbal shift change report given to RANDY Hinds RN (oncoming nurse) by Corey Pagan RN   (offgoing nurse). Report included the following information SBAR, Kardex, ED Summary, Procedure Summary, Intake/Output, MAR, Recent Results and Med Rec Status.

## 2021-07-20 NOTE — PROGRESS NOTES
7/20/2021  PT treatment not completed due to:  [] Off Unit for testing/procedure  [] Pain  [x] Eating  [] Patient too lethargic  [] Nausea/vomiting  [] Dialysis treatment in progress   [] Other:    Will f/u at later time as pt schedule allows. Thank you.    Marissa Cervantes, PT

## 2021-07-21 NOTE — PROGRESS NOTES
Problem: Falls - Risk of  Goal: *Absence of Falls  Description: Document Trixie Jones Fall Risk and appropriate interventions in the flowsheet. Outcome: Progressing Towards Goal  Note: Fall Risk Interventions:  Mobility Interventions: Assess mobility with egress test, Bed/chair exit alarm, Communicate number of staff needed for ambulation/transfer, Patient to call before getting OOB, PT Consult for mobility concerns, Utilize walker, cane, or other assistive device         Medication Interventions: Evaluate medications/consider consulting pharmacy, Patient to call before getting OOB, Teach patient to arise slowly    Elimination Interventions: Call light in reach, Patient to call for help with toileting needs, Stay With Me (per policy), Toilet paper/wipes in reach, Toileting schedule/hourly rounds, Urinal in reach              Problem: Diabetes Self-Management  Goal: *Disease process and treatment process  Description: Define diabetes and identify own type of diabetes; list 3 options for treating diabetes. Outcome: Progressing Towards Goal  Goal: *Incorporating nutritional management into lifestyle  Description: Describe effect of type, amount and timing of food on blood glucose; list 3 methods for planning meals. Outcome: Progressing Towards Goal  Goal: *Incorporating physical activity into lifestyle  Description: State effect of exercise on blood glucose levels. Outcome: Progressing Towards Goal  Goal: *Developing strategies to promote health/change behavior  Description: Define the ABC's of diabetes; identify appropriate screenings, schedule and personal plan for screenings. Outcome: Progressing Towards Goal  Goal: *Using medications safely  Description: State effect of diabetes medications on diabetes; name diabetes medication taking, action and side effects.   Outcome: Progressing Towards Goal  Goal: *Monitoring blood glucose, interpreting and using results  Description: Identify recommended blood glucose targets and personal targets. Outcome: Progressing Towards Goal  Goal: *Prevention, detection, treatment of acute complications  Description: List symptoms of hyper- and hypoglycemia; describe how to treat low blood sugar and actions for lowering  high blood glucose level. Outcome: Progressing Towards Goal  Goal: *Prevention, detection and treatment of chronic complications  Description: Define the natural course of diabetes and describe the relationship of blood glucose levels to long term complications of diabetes.   Outcome: Progressing Towards Goal  Goal: *Developing strategies to address psychosocial issues  Description: Describe feelings about living with diabetes; identify support needed and support network  Outcome: Progressing Towards Goal  Goal: *Insulin pump training  Outcome: Progressing Towards Goal  Goal: *Sick day guidelines  Outcome: Progressing Towards Goal  Goal: *Patient Specific Goal (EDIT GOAL, INSERT TEXT)  Outcome: Progressing Towards Goal

## 2021-07-21 NOTE — PROGRESS NOTES
Problem: Self Care Deficits Care Plan (Adult)  Goal: *Acute Goals and Plan of Care (Insert Text)  Description: Occupational Therapy Goals  Initiated 7/17/2021 within 7 day(s). 1.  Patient will perform grooming with independence standing at sink for 5 minutes or more. 2.  Patient will perform lower body dressing with minimal assistance/contact guard assist and use of AEs as needed. 3.  Patient will perform toilet transfers with independence. 4.  Patient will perform all aspects of toileting with independence. 5.  Patient will participate in upper extremity therapeutic exercise/activities with independence for 10 minutes. 6.  Patient will utilize energy conservation techniques during functional activities with verbal, visual, and tactile cues. Outcome: Resolved/Met   OCCUPATIONAL THERAPY TREATMENT/DISCHARGE    Patient: Jennifer Baron (1 y.o. male)  Date: 7/21/2021  Diagnosis: Pulmonary edema [J81.1]  Atrial fibrillation with RVR ([facility] [I48.91]  Pneumonia [J18.9]  Sepsis ([facility] [A41.9] Pneumonia       Precautions: Fall    Chart, occupational therapy assessment, plan of care, and goals were reviewed. ASSESSMENT:  Pt found supine in bed, agreeable to therapy. Pt has been able to ambulate to bathroom with mod I and complete morning ADL routine without difficulty. Pt mod I to sit up to EOB. Pt reports difficulty with sock/shoe donning. Provided/educated on use of hip kit for increased independence with lower body ADLs. Educated pt on [address] [place] [address] home exercise program for improved performance of ADLs, pt demonstrated understanding. Provided opportunity for patient to voice questions/concerns on ADL performance when home, patient voiced no further concerns. Pt left supine in bed, call bell/phone within reach. PLAN:  Patient will be discharged from occupational therapy at this time.   Rationale for discharge:  [x] Goals Achieved  [] [address]  [] Patient not participating in therapy  [] Other:  Discharge Recommendations:  Home Health  Further Equipment Recommendations for Discharge:  N/A     SUBJECTIVE:   Patient stated i've been able to manage with all of that.     OBJECTIVE DATA SUMMARY:   Cognitive/Behavioral Status:  Neurologic State: Alert  Orientation Level: Oriented X4  Cognition: Appropriate decision making, Appropriate for age attention/concentration, Appropriate safety awareness, Follows commands  Safety/Judgement: Awareness of environment    Functional Mobility and Transfers for ADLs:   Bed Mobility:  Supine to Sit: Modified independent  Sit to Supine: Modified independent    Balance:  Sitting: Intact    ADL Intervention:  Lower Body Dressing Assistance  Dressing Assistance: Set-up; Modified independent  Socks: Set-up; Modified independent  Position Performed: Seated edge of bed    Cognitive Retraining  Safety/Judgement: Awareness of environment    Pain:  Pain level pre-treatment: 0/10   Pain level post-treatment: 0/10   Pain Intervention(s): Medication administered by RN (see MAR); Rest, Ice, Repositioning   Response to intervention: Nurse notified, See doc flow sheet    Activity Tolerance:    Fair. Pt able to complete ADLs with intermittent rest breaks    Please refer to the flowsheet for vital signs taken during this treatment. After treatment:   []  Patient left in no apparent distress sitting up in chair  [x]  Patient left in no apparent distress in bed  [x]  Call bell left within reach  [x]  Nursing notified  []  Caregiver present  []  Bed alarm activated    COMMUNICATION/EDUCATION:   [x]      Role of Occupational Therapy in the acute care setting  [x]      Home safety education was provided and the patient/caregiver indicated understanding. [x]      Patient/family have participated as able and agree with findings and recommendations. []      Patient is unable to participate in plan of care at this time. Thank you for allowing me to assist in the care of this patient.   Maryjane Higginbotham, OTR/L  Time Calculation: 21 mins

## 2021-07-21 NOTE — PROGRESS NOTES
Hospitalist Progress Note-critical care note     Patient: Naima Aguila MRN: [id number]  CSN: [id number]    YOB: 1940  Age: 79 y.o. Sex: male    DOA: 7/11/2021 LOS:  LOS: 10 days            Chief complaint: pulm edema, sepsis , afib CHF exacerbation, pneumonia, A. fib with RVR    Assessment/Plan         Hospital Problems  Date Reviewed: 7/20/2017        Codes Class Noted POA    Pulmonary hypertension ([facility] ICD-10-CM: I27.20  ICD-9-CM: 416.8  7/15/2021 Unknown        Cardiomyopathy ([facility] ICD-10-CM: I42.9  ICD-9-CM: 425.4  7/15/2021 Unknown        Encounter for palliative care ICD-10-CM: Z51.5  ICD-9-CM: V66.7  Unknown Unknown        Debility ICD-10-CM: R53.81  ICD-9-CM: 799.3  Unknown Unknown        Acute on chronic combined systolic and diastolic congestive heart failure ([facility] ICD-10-CM: I50.43  ICD-9-CM: 428.43, 428.0  7/12/2021 Unknown        * (Principal) Pneumonia ICD-10-CM: J18.9  ICD-9-CM: 486  7/11/2021 Unknown        Pulmonary edema ICD-10-CM: J81.1  ICD-9-CM: 153  7/11/2021 Unknown        Sepsis ([facility] ICD-10-CM: A41.9  ICD-9-CM: 038.9, 995.91  7/11/2021 Unknown        Atrial fibrillation with RVR (HCC) ICD-10-CM: I48.91  ICD-9-CM: 427.31  7/11/2021 Unknown        CML (chronic myelocytic leukemia) ([facility] ICD-10-CM: C92.10  ICD-9-CM: 205.10  9/2/2016 Yes             chroinic respiratory distress/sepsis   Continue nc O2 -use 2 L nc O2 at home       Multiple bilateral pneumonia  Completed abx treatment      Acute diastolic /systolic CHF extubation-acute on chronic combined , severe cardiomyopathy and severe pulm HTn   Ef 15-20 %   On off gtt today, continue  lasix  IV   per cardiologist and nephrologist   On Lasix Coreg, cardiology on board .   Low-salt diet, fluid restriction          atrial fibrillation  Coreg for rate control, tsh wnl   On low dose eliquis per cardiologist       Hypertension  On Norvasc Lasix Coreg     Peripheral edema        History of CML    oncologist on board  Continue home medication johnscoutrosalva      Debility   CM on board-will contact [place] for hospital bed per wife request     Subjective: feel fine, no sob now   Palliative care team consulted for code status-still want to keep full code     Disposition :till cardiologist clearance   Review of systems:    General: No fevers or chills. Cardiovascular: No chest pain or pressure. No palpitations. Pulmonary: no  shortness of breath   Gastrointestinal: No nausea, vomiting. Vital signs/Intake and Output:  Visit Vitals  /73   Pulse (!) 105   Temp 97.9 °F (36.6 °C)   Resp 16   Ht 5' 8\" (1.727 m)   Wt 84.4 kg (186 lb)   SpO2 100%   BMI 28.28 kg/m²     Current Shift:  07/21 0701 - 07/21 1900  In: 240 [P.O.:240]  Out: 275 [Urine:275]  Last three shifts:  07/19 1901 - 07/21 0700  In: 1607.5 [P.O.:840; I.V.:767.5]  Out: 2970 [Urine:2970]    Physical Exam:  General: WD, WN. Alert, cooperative, no acute distress    HEENT: NC, Atraumatic. PERRLA, anicteric sclerae. Lungs: CTA Bilaterally. No Wheezing/Rhonchi/Rales. Heart:  Regular  rhythm,  + murmur, No Rubs, No Gallops  Abdomen: Soft, Non distended, Non tender. +Bowel sounds,   Extremities: No c/c, edema   Psych:   Not anxious or agitated. Neurologic:  No acute neurological deficit. Labs: Results:       Chemistry Recent Labs     07/21/21  0517 07/20/21  0900 07/19/21  1210   * 182* 189*    139 137   K 3.7 4.0 4.0    100 98*   CO2 33* 37* 35*   BUN 36* 36* 35*   CREA 1.63* 1.75* 1.67*   CA 8.4* 8.6 9.0   AGAP 4 2* 4   BUCR 22* 21* 21*      CBC w/Diff No results for input(s): WBC, RBC, HGB, HCT, PLT, GRANS, LYMPH, EOS, HGBEXT, HCTEXT, PLTEXT, HGBEXT, HCTEXT, PLTEXT in the last 72 hours. Cardiac Enzymes No results for input(s): CPK, CKND1, TARA in the last 72 hours. No lab exists for component: CKRMB, TROIP   Coagulation No results for input(s): PTP, INR, APTT, INREXT, INREXT in the last 72 hours.     Lipid Panel Lab Results   Component Value Date/Time Cholesterol, total 107 08/12/2016 03:20 AM    HDL Cholesterol 56 08/12/2016 03:20 AM    LDL, calculated 26.4 08/12/2016 03:20 AM    VLDL, calculated 24.6 08/12/2016 03:20 AM    Triglyceride 123 08/12/2016 03:20 AM    CHOL/HDL Ratio 1.9 08/12/2016 03:20 AM      BNP No results for input(s): BNPP in the last 72 hours. Liver Enzymes No results for input(s): TP, ALB, TBIL, AP in the last 72 hours. No lab exists for component: SGOT, GPT, DBIL   Thyroid Studies Lab Results   Component Value Date/Time    TSH 1.34 07/12/2021 02:55 AM        Procedures/imaging: see electronic medical records for all procedures/Xrays and details which were not copied into this note but were reviewed prior to creation of Plan    XR CHEST PORT    Result Date: 7/11/2021  EXAM: CHEST RADIOGRAPH CLINICAL INDICATION/HISTORY: Dyspnoea -Additional: None COMPARISON: December 12, 2020 TECHNIQUE: Portable frontal view of the chest _______________ FINDINGS: SUPPORT DEVICES: None. HEART AND MEDIASTINUM: Prior median sternotomy. No appreciable cardiomegaly. Remaining mediastinal contours within normal limits. LUNGS AND PLEURAL SPACES: There are patchy bilateral opacities as well as blunting of the costophrenic sulci. BONY THORAX AND SOFT TISSUES: Unremarkable. _______________     1. Bilateral airspace disease 2.  Small bilateral pleural effusions       Milena Del Angel MD

## 2021-07-21 NOTE — PROGRESS NOTES
RENAL CONSULT FOLLOW UP NOTE   2021    Patient:  Unique Jimenes  :  1940  Gender:  male  MRN #:  [id number]    Reason for Consult: evaluation and management of ELIZABETH on CKD       Assessment:    Unique Jimenes is a 79y.o. 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 He was brought in hospital due to  2 to 3 days history of progressive shortness of breath with the cough and increasing lower extremity swelling subjective fever and chills. CXR consistent with pneumonia and pulmonary congestion . ECHO demonstrated - cardiomyopathy with EF 15-20 %   During hospital stays developed ELIZABETH on CKD , non oliguric type most likely due to cardiorenal syndrome. He has evidence of fluid overload, crackles on lung exam and b/l LE edema  When renal service was consulted     7/15/21- Urine output 2500 cc overnight, BP stable   No urinary obstruction in USG     : UPO is 3475 cc  - UOP 3450 cc   - UOP 4400 cc   : UOP 92120 on lasxi 40 mg iv bid         # ELIZABETH on CKD stage 3Ga  # HTN  #Cardiomyopathy with volume overload   #Metabolic  alkalosis       Plan:   - UOP 2070 cc overnight, overall he is 14.3 liter negative since admission   - Continue lasix 40 mg iv daily and diamox 250 mg daily for metabolic acidosis   - Does not have clinical and metabolic indications of dialysis , will not anticipate dramatic improvement in renal function due to advanced cardiomyopathy and A.fib now.    - - Bladder scan to ensure he is not retaining urine   - Appreciate cardiology recs and assistance  , on milrinone drip for cardiomyopathy and coreg   - Strict intake and output charting   - Fluid restriction to 1 liter, low salt diet   - Avoid NSAIDs, contrast and nephrotoxin   HTN stable on coreg , hold amlodipine   -Dose all meds and antibiotics for current eGFR Subjective/ROS: -   Still on oxygen, SOB with exertion but none at rest   Urinating fine  No chest pain and palpitation     Intake/Output Summary (Last 24 hours) at 7/21/2021 0915  Last data filed at 7/21/2021 0852  Gross per 24 hour   Intake 960 ml   Output 1820 ml   Net -860 ml         Objective:    Visit Vitals  BP (!) 102/56   Pulse 79   Temp 97.9 °F (36.6 °C)   Resp 14   Ht 5' 8\" (1.727 m)   Wt 84.4 kg (186 lb)   SpO2 100%   BMI 28.28 kg/m²       Physical Exam:    Pt awake,  alert and comfortable  HEENT:no neck swelling  Lung: crackles at lung base  Heart: s1s2 heard with systolic murmu  Ext: b/l LE edema       Laboratory Data:    Lab Results   Component Value Date    BUN 36 (H) 07/21/2021    BUN 36 (H) 07/20/2021    BUN 35 (H) 07/19/2021     07/21/2021     07/20/2021     07/19/2021    CO2 33 (H) 07/21/2021    CO2 37 (H) 07/20/2021    CO2 35 (H) 07/19/2021     Lab Results   Component Value Date    WBC 49.0 (H) 07/18/2021    HGB 11.5 (L) 07/18/2021    HCT 38.5 07/18/2021     No components found for: CALCIUM, PHOSPHORUS, MAGNESIUM  Lab Results   Component Value Date    HDL 56 08/12/2016     No results found for: SPECIMENTYP, TURBIDITY, UGLU    Imaging Reveiwed:  CXR 7/11/21\"- 1. Bilateral airspace disease   Small bilateral pleural effusions    · ECHO 7/12/21- LV: Estimated LVEF is 15 - 20%. Normal wall thickness. Mildly dilated left ventricle. Severely reduced systolic function. Left ventricular diastolic dysfunction E'E= 15.30. · Contrast used: DEFINITY. · LA: Moderately dilated left atrium. · AV: Aortic valve leaflet calcification present. Mild aortic valve regurgitation is present. · MV: Mitral valve thickening. Moderate mitral valve regurgitation is present. · TV: Moderate tricuspid valve regurgitation is present. · PV: Mild pulmonic valve regurgitation is present. · PA: Pulmonary arterial systolic pressure is 55 mmHg.   · IVC: Moderately elevated central venous pressure (8 mmHg); IVC diameter is larger than 21 mm and collapses more than 50% with respiration. USG Retroperitoneum : 7/14/21-      IMPRESSION     No hydronephrosis.     Thick walled, contracted urinary bladder.     Echogenic liver.  Perihepatic ascites and right pleural effusion.    - other medical management per Primary team      Thank you for consult       Jenn Malcolm MD   [facility]

## 2021-07-21 NOTE — PROGRESS NOTES
0730 Assumed care of the patient from [address] (offgoing Nurse). Patient is alert and oriented. Pt denies any pain or discomfort at this moment. bed in low position, call bell within reach. 1900 Patient had an uneventful shift and remained stable. Purposeful hourly rounding completed throughout the shift, NAD noted at this time, and patient is resting quietly in bed. No concerns or requests voiced    1912 Bedside and Verbal shift change report given to Sue Pittman RN (oncoming nurse) by Guerita Jackson RN (offgoing nurse). Report included the following information SBAR, Kardex, MAR, Recent Results and Cardiac Rhythm .

## 2021-07-21 NOTE — PROGRESS NOTES
Cardiology Progress Note        Patient: Maxx Montoya        Sex: male          DOA: 7/11/2021  YOB: 1940      Age:  79 y.o.        LOS:  LOS: 9 days      Patient seen and examined, chart reviewed. Assessment/Plan     Patient Active Problem List   Diagnosis Code    TIA (transient ischemic attack) G45.9    Coronary artery disease involving native coronary artery I25.10    Type II diabetes mellitus, uncontrolled ([facility] E11.65    Dyslipidemia E78.5    Pancytopenia ([facility] D61.818    CML (chronic myelocytic leukemia) ([facility] C92.10    HTN (hypertension) I10         Stage 3 chronic kidney disease (HCC) N18.30    Hyperglycemia R73.9         Community acquired pneumonia J18.9    Chronic congestive heart failure (HCC) I50.9    SOB (shortness of breath) R06.02    On supplemental oxygen by nasal cannula Z78.9    Pneumonia J18.9    Pulmonary edema J81.1    Sepsis ([facility] A41.9    Atrial fibrillation with RVR ([facility] I48.91    Encounter for palliative care Z51.5    Debility R53.81    Acute on chronic combined systolic and diastolic congestive heart failure (HCC) I50.43    Pulmonary hypertension (HCC) I27.20    Cardiomyopathy (HCC) I42.9       Permanent atrial fibrillation    Echocardiogram reported    · LV: Estimated LVEF is 15 - 20%. Normal wall thickness. Mildly dilated left ventricle. Severely reduced systolic function. Left ventricular diastolic dysfunction E'E= 15.30. · Contrast used: DEFINITY. · LA: Moderately dilated left atrium. · AV: Aortic valve leaflet calcification present. Mild aortic valve regurgitation is present. · MV: Mitral valve thickening. Moderate mitral valve regurgitation is present. · TV: Moderate tricuspid valve regurgitation is present. · PV: Mild pulmonic valve regurgitation is present. · PA: Pulmonary arterial systolic pressure is 55 mmHg.   · IVC: Moderately elevated central venous pressure (8 mmHg); IVC diameter is larger than 21 mm and collapses more than 50% with respiration. Last 24 hour negative balance of 2779 ml  Plan:    Continue aspirin, carvedilol, atorvastatin. Change IV Lasix to 40 mg once a day. Full dose of Lovenox is on hold due to bleeding from IV site   Now on DVT prophylaxis dose of Lovenox  Continue IV Milrinone. Strict input output. Monitor renal function and electrolytes. Salt restriction up to 2 g per day and fluid restriction up to 1.2 L per day   Plan discussed with patient and family member.  returns tomorrow              Subjective:    cc:   breathing is better      REVIEW OF SYSTEMS:     General: No fevers or chills. Cardiovascular: No chest pain,No palpitations, No orthopnea, No PND, positive leg swelling, No claudication  Pulmonary: No  dyspnea. Gastrointestinal: No nausea, vomiting, bleeding  Neurology: No Dizziness    Objective:      Visit Vitals  /69   Pulse 92   Temp 97.7 °F (36.5 °C)   Resp 16   Ht 5' 8\" (1.727 m)   Wt 84.7 kg (186 lb 11.7 oz)   SpO2 100%   BMI 28.39 kg/m²     Body mass index is 28.39 kg/m². Physical Exam:  General Appearance: Comfortable, not using accessory muscles of respiration. HEENT: CT. HEAD: Atraumatic  NECK: No JVD, no thyroidomeglay. CAROTIDS: no bruit  LUNGS: Clear bilaterally. HEART: S1+S2 audible, irregular irregular, no murmur, no pericardial rub. ABD: Non-tender, BS Audible    EXT: Bilateral + lower extremity edema, and no cyanosis. VASCULAR EXAM: Pulses are intact. PSYCHIATRIC EXAM: Mood is appropriate. MUSCULOSKELETAL: Grossly no joint deformity.   NEUROLOGICAL: AAO times 3, No motor and sensory deficit    Medication:  Current Facility-Administered Medications   Medication Dose Route Frequency    acetaZOLAMIDE (DIAMOX) tablet 250 mg  250 mg Oral DAILY    gabapentin (NEURONTIN) capsule 100 mg  100 mg Oral QHS    oxyCODONE IR (ROXICODONE) tablet 5 mg  5 mg Oral Q6H PRN    enoxaparin (LOVENOX) injection 40 mg  40 mg SubCUTAneous Q24H    furosemide (LASIX) injection 40 mg  40 mg IntraVENous Q12H    phenol throat spray (CHLORASEPTIC) 1 Spray  1 Spray Oral PRN    polyethylene glycol (MIRALAX) packet 17 g  17 g Oral DAILY    imatinib (GLEEVEC) chemo tablet 100 mg (Patient Supplied)  100 mg Oral DAILY    cyanocobalamin tablet 2,000 mcg  2,000 mcg Oral DAILY    vitamin E (AQUA GEMS) capsule 400 Units  400 Units Oral DAILY    albuterol-ipratropium (DUO-NEB) 2.5 MG-0.5 MG/3 ML  3 mL Nebulization Q4H PRN    milrinone (PRIMACOR) 20 MG/100 ML D5W infusion  0.375 mcg/kg/min IntraVENous CONTINUOUS    guaiFENesin ER (MUCINEX) tablet 600 mg  600 mg Oral Q12H    famotidine (PEPCID) tablet 20 mg  20 mg Oral DAILY    magnesium hydroxide (MILK OF MAGNESIA) 400 mg/5 mL oral suspension 30 mL  30 mL Oral DAILY PRN    [Held by provider] amLODIPine (NORVASC) tablet 5 mg  5 mg Oral DAILY    aspirin delayed-release tablet 81 mg  81 mg Oral DAILY    atorvastatin (LIPITOR) tablet 40 mg  40 mg Oral DAILY    carvediloL (COREG) tablet 25 mg  25 mg Oral BID    tamsulosin (FLOMAX) capsule 0.4 mg  0.4 mg Oral DAILY    sodium chloride (NS) flush 5-40 mL  5-40 mL IntraVENous Q8H    sodium chloride (NS) flush 5-40 mL  5-40 mL IntraVENous PRN    acetaminophen (TYLENOL) tablet 650 mg  650 mg Oral Q6H PRN    Or    acetaminophen (TYLENOL) suppository 650 mg  650 mg Rectal Q6H PRN    ondansetron (ZOFRAN ODT) tablet 4 mg  4 mg Oral Q8H PRN    Or    ondansetron (ZOFRAN) injection 4 mg  4 mg IntraVENous Q6H PRN    insulin lispro (HUMALOG) injection   SubCUTAneous AC&HS               Lab/Data Reviewed:       Recent Labs     07/18/21  0535   WBC 49.0*   HGB 11.5*   HCT 38.5   *     Recent Labs     07/20/21  0900 07/19/21  1210 07/18/21  0535    137 140   K 4.0 4.0 3.9    98* 98*   CO2 37* 35* 37*   * 189* 136*   BUN 36* 35* 37*   CREA 1.75* 1.67* 1.69*   CA 8.6 9.0 9.3     Total time spent 32 minutes    Signed By: Yuko Kevin Sarah Shabazz MD     July 20, 2021

## 2021-07-21 NOTE — PROGRESS NOTES
Cardiology Progress Note        Patient: Lidia Barker        Sex: male          DOA: 7/11/2021  YOB: 1940      Age:  79 y.o.        LOS:  LOS: 10 days   Assessment/Plan     Principal Problem:    Pneumonia (7/11/2021)    Active Problems:    CML (chronic myelocytic leukemia) ([address] (9/2/2016)      Pulmonary edema (7/11/2021)      Sepsis ([address] (7/11/2021)      Atrial fibrillation with RVR ([address] (7/11/2021)      Encounter for palliative care ()      Debility ()      Acute on chronic combined systolic and diastolic congestive heart failure ([address] (7/12/2021)      Pulmonary hypertension ([address] (7/15/2021)      Cardiomyopathy ([address] (7/15/2021)        Plan:    Acute congestive heart failure  Atrial fibrillation New onset  CAD   Cardiomyopathy  Pulmonary hypertension   CABG   patient has shortness of breath is improved and edema is also improving. Plan. Stop IV Milrinone   Stop DVT prophylaxis Lovenox 40 mg subcutaneous daily  Start Eliquis 2.5 mg twice daily  Patient will need most likely high dose of diuretic.   continue with carvedilol. DC amlodipine  Management plan was discussed with the patient. Subjective:    cc:  Shortness of breath   Ankle swelling        REVIEW OF SYSTEMS:     General: No fevers or chills. Cardiovascular: No chest pain or pressure. No palpitations. + ankle swelling  Pulmonary: No SOB, orthopnea, PND  Gastrointestinal: No nausea, vomiting or diarrhea      Objective:      Visit Vitals  BP (!) 102/56   Pulse 79   Temp 97.9 °F (36.6 °C)   Resp 14   Ht 5' 8\" (1.727 m)   Wt 84.4 kg (186 lb)   SpO2 100%   BMI 28.28 kg/m²     Body mass index is 28.28 kg/m². Physical Exam:  General Appearance: Comfortable,   NECK: No JVD, no thyroidomeglay. LUNGS: Clear bilaterally. HEART: [id number]+S2 audible,    ABD: Non-tender, BS Audible    EXT: + edema, and no cysnosis. VASCULAR EXAM: Pulses are intact.     PSYCHIATRIC EXAM: Mood is appropriate.     Medication:  Current Facility-Administered Medications   Medication Dose Route Frequency    apixaban (ELIQUIS) tablet 2.5 mg  2.5 mg Oral Q12H    acetaZOLAMIDE (DIAMOX) tablet 250 mg  250 mg Oral DAILY    [START ON 7/22/2021] furosemide (LASIX) injection 40 mg  40 mg IntraVENous DAILY    gabapentin (NEURONTIN) capsule 100 mg  100 mg Oral QHS    oxyCODONE IR (ROXICODONE) tablet 5 mg  5 mg Oral Q6H PRN    phenol throat spray (CHLORASEPTIC) 1 Spray  1 Spray Oral PRN    polyethylene glycol (MIRALAX) packet 17 g  17 g Oral DAILY    imatinib (GLEEVEC) chemo tablet 100 mg (Patient Supplied)  100 mg Oral DAILY    cyanocobalamin tablet 2,000 mcg  2,000 mcg Oral DAILY    vitamin E (AQUA GEMS) capsule 400 Units  400 Units Oral DAILY    albuterol-ipratropium (DUO-NEB) 2.5 MG-0.5 MG/3 ML  3 mL Nebulization Q4H PRN    guaiFENesin ER (MUCINEX) tablet 600 mg  600 mg Oral Q12H    famotidine (PEPCID) tablet 20 mg  20 mg Oral DAILY    magnesium hydroxide (MILK OF MAGNESIA) 400 mg/5 mL oral suspension 30 mL  30 mL Oral DAILY PRN    aspirin delayed-release tablet 81 mg  81 mg Oral DAILY    atorvastatin (LIPITOR) tablet 40 mg  40 mg Oral DAILY    carvediloL (COREG) tablet 25 mg  25 mg Oral BID    tamsulosin (FLOMAX) capsule 0.4 mg  0.4 mg Oral DAILY    sodium chloride (NS) flush 5-40 mL  5-40 mL IntraVENous Q8H    sodium chloride (NS) flush 5-40 mL  5-40 mL IntraVENous PRN    acetaminophen (TYLENOL) tablet 650 mg  650 mg Oral Q6H PRN    Or    acetaminophen (TYLENOL) suppository 650 mg  650 mg Rectal Q6H PRN    ondansetron (ZOFRAN ODT) tablet 4 mg  4 mg Oral Q8H PRN    Or    ondansetron (ZOFRAN) injection 4 mg  4 mg IntraVENous Q6H PRN    insulin lispro (HUMALOG) injection   SubCUTAneous AC&HS               Lab/Data Reviewed:  Procedures/imaging: see electronic medical records for all procedures/Xrays   and details which were not copied into this note but were reviewed prior to creation of Plan All lab results for the last 24 hours reviewed. No results for input(s): WBC, HGB, HCT, PLT, HGBEXT, HCTEXT, PLTEXT, HGBEXT, HCTEXT, PLTEXT in the last 72 hours. Recent Labs     07/21/21  0517 07/20/21  0900 07/19/21  1210    139 137   K 3.7 4.0 4.0    100 98*   CO2 33* 37* 35*   * 182* 189*   BUN 36* 36* 35*   CREA 1.63* 1.75* 1.67*   CA 8.4* 8.6 9.0       RADIOLOGY:  CT Results  (Last 48 hours)    None        CXR Results  (Last 48 hours)    None            Cardiology Procedures:   Results for orders placed or performed during the hospital encounter of 07/11/21   EKG, 12 LEAD, INITIAL   Result Value Ref Range    Ventricular Rate 110 BPM    Atrial Rate 111 BPM    QRS Duration 88 ms    Q-T Interval 294 ms    QTC Calculation (Bezet) 397 ms    Calculated R Axis -42 degrees    Calculated T Axis 153 degrees    Diagnosis       Atrial fibrillation with rapid ventricular response  Left axis deviation  Anterior infarct , age undetermined  T wave abnormality, consider lateral ischemia  Abnormal ECG  When compared with ECG of 12-DEC-2020 15:17,  Atrial fibrillation has replaced Sinus rhythm  Vent.  rate has increased BY  44 BPM    Confirmed by Darnell Jain MD. ([id number]) on 7/12/2021 10:38:02 PM        Echo Results  (Last 48 hours)    None       Cardiolite (Tc-99m Sestamibi) stress test    Signed By: Minnie Mcgee MD     July 21, 2021

## 2021-07-21 NOTE — PROGRESS NOTES
Problem: Mobility Impaired (Adult and Pediatric)  Goal: *Acute Goals and Plan of Care (Insert Text)  Description: Physical Therapy Goals   Initiated 7/17/2021 and to be accomplished within 5-7 day(s)  1. Patient will move from supine <> sit with Mod I in prep for out of bed activity and change of position. 2.  Patient will perform sit<> stand with Mod I with LRAD in prep for transfers/ambulation. 3.  Patient will transfer from bed <> chair with Mod I with LRAD for time up in chair for completion of ADL activity. 4.  Patient will ambulate 150 feet with Mod I/LRAD for improved functional mobility at discharge. 5.  Patient will ascend/descend 3-5 stairs with handrail(s) with S for home re-entry as needed. Outcome: Progressing Towards Goal  physical Therapy TREATMENT    Patient: Matty Stephenson (18 y.o. male)  Date: 7/21/2021  Diagnosis: Pulmonary edema [J81.1]  Atrial fibrillation with RVR ([address] [I48.91]  Pneumonia [J18.9]  Sepsis ([address] [A41.9] Pneumonia  Precautions: Fall   Chart, physical therapy assessment, plan of care and goals were reviewed. ASSESSMENT:  Pt has met goals except given supervision with gait due to equipment. Requires 1 standing rest break. LE's less swollen today. Pt would benefit from practice of stair nego, but declines today. Will f/up for same. Progression toward goals:  [x]      Improving appropriately and progressing toward goals  []      Improving slowly and progressing toward goals  []      Not making progress toward goals and plan of care will be adjusted     PLAN:  Patient continues to benefit from skilled intervention to address the above impairments. Continue treatment per established plan of care.   Discharge Recommendations:  Home Physical Therapy  Further Equipment Recommendations for Discharge:  N/A     SUBJECTIVE:   Patient stated I don't get any rest.    OBJECTIVE DATA SUMMARY:   Critical Behavior:  Neurologic State: Alert  Orientation Level: Oriented X4  Cognition: Appropriate decision making, Appropriate for age attention/concentration, Appropriate safety awareness, Follows commands  Safety/Judgement: Awareness of environment  Functional Mobility Training:  Bed Mobility:  Supine to Sit: Modified independent  Sit to Supine: Modified independent  Scooting: Modified independent  Transfers:  Sit to Stand: Independent  Stand to Sit: Independent  Balance:  Sitting: Intact  Standing: Intact; Without support  Ambulation/Gait Training:  Distance (ft): 220 Feet (ft)  Assistive Device: Other (comment) (portable O2 and Pt pushing IV pole)  Ambulation - Level of Assistance: Supervision  Speed/Katerine: Pace decreased (<100 feet/min)  Step Length: Right shortened;Left shortened  Pain:  Pain Scale 1: Numeric (0 - 10)  Pain Intensity 1: 0  Activity Tolerance:   Fair   Please refer to the flowsheet for vital signs taken during this treatment.   After treatment:   [x] Patient left in no apparent distress sitting up EOB  [] Patient left in no apparent distress in bed  [x] Call bell left within reach  [x] Nursing notified  [] Caregiver present  [] Bed alarm activated      Alecia Gibson, PT   Time Calculation: 30 mins

## 2021-07-22 PROBLEM — N17.9 AKI (ACUTE KIDNEY INJURY) (HCC): Status: ACTIVE | Noted: 2021-01-01

## 2021-07-22 NOTE — PROGRESS NOTES
Problem: Mobility Impaired (Adult and Pediatric)  Goal: *Acute Goals and Plan of Care (Insert Text)  Description: Physical Therapy Goals   Initiated 7/17/2021 and to be accomplished within 5-7 day(s)  1. Patient will move from supine <> sit with Mod I in prep for out of bed activity and change of position. 2.  Patient will perform sit<> stand with Mod I with LRAD in prep for transfers/ambulation. 3.  Patient will transfer from bed <> chair with Mod I with LRAD for time up in chair for completion of ADL activity. 4.  Patient will ambulate 150 feet with Mod I/LRAD for improved functional mobility at discharge. 5.  Patient will ascend/descend 3-5 stairs with handrail(s) with S for home re-entry as needed. Outcome: Progressing Towards Goal  physical Therapy TREATMENT    Patient: Krista Porras (14 y.o. male)  Date: 7/22/2021  Diagnosis: Pulmonary edema [J81.1]  Atrial fibrillation with RVR ([address] [I48.91]  Pneumonia [J18.9]  Sepsis ([address] [A41.9] Pneumonia  Precautions: Fall   Chart, physical therapy assessment, plan of care and goals were reviewed. ASSESSMENT:  Pt mod I in performance of bed mobility and transfers mod I/S. Demonstrates 4 inch box step nego with use of RW for Bilat UE support to simulate home entry. Completed nego of box step x 5 reps with CGA/SBA. Progressing toward or has met goals. Rec HHPT at discharge to reach max level of independence/safety. Left seated EOB in NAD and daughter present. Progression toward goals:  [x]      Improving appropriately and progressing toward goals  []      Improving slowly and progressing toward goals  []      Not making progress toward goals and plan of care will be adjusted     PLAN:  Patient continues to benefit from skilled intervention to address the above impairments. Continue treatment per established plan of care.   Discharge Recommendations:  Skilled Nursing Facility  Further Equipment Recommendations for Discharge:  N/A     SUBJECTIVE: Patient stated Do we have to walk, I've been up and down.     OBJECTIVE DATA SUMMARY:   Critical Behavior:  Neurologic State: Alert  Orientation Level: Oriented X4  Cognition: Appropriate decision making, Appropriate for age attention/concentration, Appropriate safety awareness, Follows commands  Safety/Judgement: Awareness of environment  Functional Mobility Training:  Bed Mobility:  Rolling: Modified independent  Supine to Sit: Modified independent  Scooting: Modified independent  Level of Assistance: Modified independent   Transfers:  Sit to Stand: Modified independent;Supervision  Stand to Sit: Modified independent;Supervision  Balance:  Sitting: Intact  Standing: Intact; Without support  Ambulation/Gait Training:  Distance (ft): 28 Feet (ft)  Assistive Device: Walker, rolling  Ambulation - Level of Assistance: Modified independent;Supervision  Gait Abnormalities: Decreased step clearance  Speed/Katerine: Pace decreased (<100 feet/min)  Stairs:  Number of Stairs Trained: 5  Stairs - Level of Assistance: Contact guard assistance;Stand-by assistance  Rail Use: Both  Pain:  Pain Scale 1: Numeric (0 - 10)  Pain Intensity 1: 0 (except for constant neuropathy bilat feet)  Activity Tolerance:   Fair   Please refer to the flowsheet for vital signs taken during this treatment.   After treatment:   [x] Patient left in no apparent distress sitting up EOB  [] Patient left in no apparent distress in bed  [x] Call bell left within reach  [] Nursing notified  [x] Caregiver present-dgtr  [] Bed alarm activated      John Gold PT   Time Calculation: 15 mins

## 2021-07-22 NOTE — DISCHARGE SUMMARY
Discharge Summary    Patient: Rao Heredia MRN: [id number]  CSN: [id number]    YOB: 1940  Age: 79 y.o.   Sex: male    DOA: 7/11/2021 LOS:  LOS: 11 days   Discharge Date:      Primary Care Provider:  Brown Cantu DO    Admission Diagnoses: Pulmonary edema [J81.1]  Atrial fibrillation with RVR ([facility] [I48.91]  Pneumonia [J18.9]  Sepsis ([facility] [A41.9]    Discharge Diagnoses:    Problem List as of 7/22/2021 Date Reviewed: 7/20/2017        Codes Class Noted - Resolved    ELIZABETH (acute kidney injury) ([facility] ICD-10-CM: N17.9  ICD-9-CM: 584.9  7/22/2021 - Present        Pulmonary hypertension ([facility] ICD-10-CM: I27.20  ICD-9-CM: 416.8  7/15/2021 - Present        Cardiomyopathy ([facility] ICD-10-CM: I42.9  ICD-9-CM: 425.4  7/15/2021 - Present        Encounter for palliative care ICD-10-CM: Z51.5  ICD-9-CM: V66.7  Unknown - Present        Debility ICD-10-CM: R53.81  ICD-9-CM: 799.3  Unknown - Present        Acute on chronic combined systolic and diastolic congestive heart failure ([facility] ICD-10-CM: I50.43  ICD-9-CM: 428.43, 428.0  7/12/2021 - Present        * (Principal) Pneumonia ICD-10-CM: J18.9  ICD-9-CM: 750  7/11/2021 - Present        Pulmonary edema ICD-10-CM: J81.1  ICD-9-CM: 878  7/11/2021 - Present        Sepsis ([facility] ICD-10-CM: A41.9  ICD-9-CM: 038.9, 995.91  7/11/2021 - Present        Atrial fibrillation with RVR ([facility] ICD-10-CM: I48.91  ICD-9-CM: 427.31  7/11/2021 - Present        Community acquired pneumonia ICD-10-CM: J18.9  ICD-9-CM: 332  12/12/2020 - Present        Chronic congestive heart failure ([facility] ICD-10-CM: I50.9  ICD-9-CM: 428.0  12/12/2020 - Present        SOB (shortness of breath) ICD-10-CM: R06.02  ICD-9-CM: 786.05  12/12/2020 - Present        On supplemental oxygen by nasal cannula ICD-10-CM: Z78.9  ICD-9-CM: V49.89  12/12/2020 - Present        Hyperglycemia ICD-10-CM: R73.9  ICD-9-CM: 790.29  9/6/2018 - Present        Elevated blood pressure reading with diagnosis of hypertension ICD-10-CM: I10  ICD-9-CM: 401.9  9/6/2018 - Present        Stage 3 chronic kidney disease ([facility] ICD-10-CM: N18.30  ICD-9-CM: 585.3  8/10/2018 - Present        Pancytopenia ([facility] ICD-10-CM: T81.876  ICD-9-CM: 284.19  9/2/2016 - Present        CML (chronic myelocytic leukemia) ([facility] ICD-10-CM: C92.10  ICD-9-CM: 205.10  9/2/2016 - Present        HTN (hypertension) ICD-10-CM: I10  ICD-9-CM: 401.9  9/2/2016 - Present        Chronic atrial fibrillation (HCC) ICD-10-CM: I48.20  ICD-9-CM: 427.31  9/2/2016 - Present        TIA (transient ischemic attack) ICD-10-CM: G45.9  ICD-9-CM: 435.9  8/11/2016 - Present        Coronary artery disease involving native coronary artery ICD-10-CM: I25.10  ICD-9-CM: 414.01  8/11/2016 - Present        Type II diabetes mellitus, uncontrolled ([facility] ICD-10-CM: E11.65  ICD-9-CM: 250.02  8/11/2016 - Present        Dyslipidemia ICD-10-CM: E78.5  ICD-9-CM: 272.4  8/11/2016 - Present              Discharge Medications:     Current Discharge Medication List      START taking these medications    Details   acetaZOLAMIDE (DIAMOX) 250 mg tablet Take 1 Tablet by mouth daily for 30 days. Qty: 30 Tablet, Refills: 0  Start date: 7/23/2021, End date: 8/22/2021      apixaban (ELIQUIS) 2.5 mg tablet Take 1 Tablet by mouth every twelve (12) hours. Qty: 60 Tablet, Refills: 0  Start date: 7/22/2021      gabapentin (NEURONTIN) 100 mg capsule Take 1 Capsule by mouth nightly. Max Daily Amount: 100 mg. Qty: 30 Capsule, Refills: 0  Start date: 7/22/2021    Associated Diagnoses: Uncontrolled type 2 diabetes mellitus with hyperglycemia ([facility]         CONTINUE these medications which have CHANGED    Details   carvediloL (COREG) 25 mg tablet Take 1 Tablet by mouth two (2) times a day. Qty: 60 Tablet, Refills: 0  Start date: 7/22/2021      furosemide (LASIX) 80 mg tablet Take 1 Tablet by mouth daily.   Qty: 30 Tablet, Refills: 0  Start date: 7/22/2021         CONTINUE these medications which have NOT CHANGED    Details   aspirin delayed-release 81 mg tablet Take 81 mg by mouth daily. imatinib (Gleevec) 100 mg tablet Take 100 mg by mouth daily. cyanocobalamin 1,000 mcg tablet Take 2,000 mcg by mouth daily. vitamin E (AQUA GEMS) 400 unit capsule Take 400 Units by mouth daily. tamsulosin (FLOMAX) 0.4 mg capsule Take 0.4 mg by mouth daily. atorvastatin (LIPITOR) 40 mg tablet Take 40 mg by mouth daily. nitroglycerin (NITROSTAT) 0.4 mg SL tablet 1 Tab by SubLINGual route as needed for Chest Pain. Up to 3 doses. Qty: 20 Tab, Refills: 0         STOP taking these medications       amLODIPine (NORVASC) 5 mg tablet Comments:   Reason for Stopping:               Discharge Condition: improved    Procedures : none    Consults: cardiology and nephrology, Jennyfer Orta and [facility]  Oncology, Dr. Bulmaro Maki  BP (!) 129/54   Pulse 84   Temp 97.3 °F (36.3 °C)   Resp 16   Ht 5' 8\" (1.727 m)   Wt 84.1 kg (185 lb 8 oz)   SpO2 100%   BMI 28.21 kg/m²     General: Awake, cooperative, no acute distress    HEENT: NC, Atraumatic. PERRLA, EOMI. Anicteric sclerae. Lungs:  CTA Bilaterally. No Wheezing/Rhonchi/Rales. Heart:  Regular  rhythm,  No murmur, No Rubs, No Gallops  Abdomen: Soft, Non distended, Non tender. +Bowel sounds,   Extremities: No c/c/e  Psych:   Not anxious or agitated. Neurologic:  No acute neurological deficits. Admission HPI : Mr. Rocio Chairez is a very pleasant 79-year-old male with a past medical history significant for coronary artery disease, CML, diabetes mellitus, hypertension, history of TIAs in the past.  Patient was brought to the emergency room after having 2 to 3 days history of progressive shortness of breath with the cough and increasing lower extremity swelling subjective fever and chills.   Upon evaluation in the emergency room he was found to have a bilateral pneumonia along with his CHF extubation hence inpatient hospitalization has been requested. Patient states he is fully vaccinated with Covid vaccination. Patient wife is at bedside. Hospital Course : Chronic respiratory distress/sepsis   Continue nc O2 -use 2 L nc O2 at home      Multiple bilateral pneumonia  Completed abx treatment      Acute diastolic /systolic CHF extubation-acute on chronic combined , severe cardiomyopathy and severe pulm HTN  Ef 15-20 %   Milrinone drip stopped. DVT prophylaxis Lovenox 40 mg subcu stopped and Eliquis 2.5 mg twice daily started  Amlodipine stopped. Continue carvedilol.   DC home on oral dose of Lasix 80 mg daily per nephrology  Per cardiologist and nephrologist on Lasix and Coreg     Atrial fibrillation  Coreg for rate control, tsh wnl   On low dose eliquis per cardiologist      ELIZABETH -resolved  Renal function close to baseline  Appreciate nephrology expertise  Will check bladder scan to make sure he is not retaining urine  Continue strict I's and O's  Fluid restriction to 1 L and sodium restriction up to 2 g  Avoid nephrotoxins including NSAIDs and contrast     Metabolic alkalosis -improving  Continue Diamox 250 mg daily     Hypertension  On Norvasc Lasix Coreg     Peripheral edema      History of CML   80yo w/ Hx CLL on Gleevac admitted with CAP w Leukocytosis w/ responding WBC(down to 44k) following break of Gleevac.     On outpatient Gleevac from home  WBC improving  Follow with Dr. Sofiya Henderson as outpatient       [address] bed delivered today  Lives at home with wife, [facility] and aides    Activity: as tolerated    Diet: Fluid restriction to 1 L and sodium restriction up to 2 g    Follow-up: Cardiology and nephrology, Dr. Hansel Carlson in 2 to 3 weeks    Disposition:     Minutes spent on discharge: 45 minutes      Labs: Results:       Chemistry Recent Labs     07/22/21  0030 07/21/21  0517 07/20/21  0900   * 125* 182*    138 139   K 4.0 3.7 4.0    101 100   CO2 31 33* 37*   BUN 38* 36* 36*   CREA 1.48* 1.63* 1.75*   CA 8.6 8.4* 8. 6   AGAP 4 4 2*   BUCR 26* 22* 21*      CBC w/Diff No results for input(s): WBC, RBC, HGB, HCT, PLT, GRANS, LYMPH, EOS, HGBEXT, HCTEXT, PLTEXT in the last 72 hours. Cardiac Enzymes No results for input(s): CPK, CKND1, TARA in the last 72 hours. No lab exists for component: CKRMB, TROIP   Coagulation No results for input(s): PTP, INR, APTT, INREXT in the last 72 hours. Lipid Panel Lab Results   Component Value Date/Time    Cholesterol, total 107 08/12/2016 03:20 AM    HDL Cholesterol 56 08/12/2016 03:20 AM    LDL, calculated 26.4 08/12/2016 03:20 AM    VLDL, calculated 24.6 08/12/2016 03:20 AM    Triglyceride 123 08/12/2016 03:20 AM    CHOL/HDL Ratio 1.9 08/12/2016 03:20 AM      BNP No results for input(s): BNPP in the last 72 hours. Liver Enzymes No results for input(s): TP, ALB, TBIL, AP in the last 72 hours. No lab exists for component: SGOT, GPT, DBIL   Thyroid Studies Lab Results   Component Value Date/Time    TSH 1.34 07/12/2021 02:55 AM            Significant Diagnostic Studies: US ABD LTD    Result Date: 7/16/2021  EXAM: Limited right upper quadrant abdominal ultrasound INDICATION: Abnormal finding on recent renal ultrasound. COMPARISON: Renal ultrasound on day prior TECHNIQUE: Real-time abdomen/right upper quadrant sonography in multiple planes was performed with image documentation. Grayscale, color flow Doppler imaging, and velocity spectral waveform analysis of the portal vein was performed (duplex imaging). _______________ FINDINGS: LIVER: Increased echogenicity. No focal mass Color flow Doppler and velocity spectral waveform analysis of the portal vein shows normal (hepatopetal) direction of flow. Aron Golas BILIARY SYSTEM: No intrahepatic biliary dilatation. Common bile duct is normal in caliber measuring 0.2 cm. GALLBLADDER: No shadowing gallstones or gallbladder wall thickening. No pericholecystic fluid. Layering sludge. Small polyp 6 mm polyp. RIGHT KIDNEY: 10.6 cm in length.  No hydronephrosis or renal mass. No visible calculi. PANCREAS: Head and body are unremarkable in appearance though the tail is obscured by overlying bowel gas. IVC: Visualized portions are unremarkable in appearance. OTHER: Mild abdominal ascites and moderate right pleural effusion. _______________     Echogenic liver. Mild abdominal ascites. Moderate right pleural effusion. US RETROPERITONEUM COMP    Result Date: 7/14/2021  US RETROPERITONEUM COMP INDICATION:Acute renal failure on CKD, TECHNIQUE: Renal ultrasound. COMPARISON: None FINDINGS: Kidneys are normal in size normal cortical thickness and echogenicity. . No hydronephrosis, shadowing calculus or perinephric abnormality. No solid renal mass identified. Incompletely distended, contracted gallbladder limiting evaluation. Nonspecific echogenic focus in the bladder wall. Prostate volume is 11 cc. Incidental note of echogenic liver. Ascites and right pleural effusion. No hydronephrosis. Thick walled, contracted urinary bladder. Echogenic liver. Perihepatic ascites and right pleural effusion. XR CHEST PORT    Result Date: 7/11/2021  EXAM: CHEST RADIOGRAPH CLINICAL INDICATION/HISTORY: Dyspnoea -Additional: None COMPARISON: December 12, 2020 TECHNIQUE: Portable frontal view of the chest _______________ FINDINGS: SUPPORT DEVICES: None. HEART AND MEDIASTINUM: Prior median sternotomy. No appreciable cardiomegaly. Remaining mediastinal contours within normal limits. LUNGS AND PLEURAL SPACES: There are patchy bilateral opacities as well as blunting of the costophrenic sulci. BONY THORAX AND SOFT TISSUES: Unremarkable. _______________     1. Bilateral airspace disease 2. Small bilateral pleural effusions     ECHO ADULT COMPLETE    Result Date: 7/12/2021  · LV: Estimated LVEF is 15 - 20%. Normal wall thickness. Mildly dilated left ventricle. Severely reduced systolic function. Left ventricular diastolic dysfunction E'E= 15.30. · Contrast used: DEFINITY.  · LA: Moderately dilated left atrium. · AV: Aortic valve leaflet calcification present. Mild aortic valve regurgitation is present. · MV: Mitral valve thickening. Moderate mitral valve regurgitation is present. · TV: Moderate tricuspid valve regurgitation is present. · PV: Mild pulmonic valve regurgitation is present. · PA: Pulmonary arterial systolic pressure is 55 mmHg. · IVC: Moderately elevated central venous pressure (8 mmHg); IVC diameter is larger than 21 mm and collapses more than 50% with respiration. No results found for this or any previous visit.         CC: Haily Costa, DO

## 2021-07-22 NOTE — PROGRESS NOTES
RENAL CONSULT FOLLOW UP NOTE   2021    Patient:  Naima Aguila  :  1940  Gender:  male  MRN #:  [id number]    Reason for Consult: evaluation and management of ELIZABETH on CKD       Assessment:    Naima Aguila is a 79y.o. 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 He was brought in hospital due to  2 to 3 days history of progressive shortness of breath with the cough and increasing lower extremity swelling subjective fever and chills. CXR consistent with pneumonia and pulmonary congestion . ECHO demonstrated - cardiomyopathy with EF 15-20 %   During hospital stays developed ELIZABETH on CKD , non oliguric type most likely due to cardiorenal syndrome. 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 Renal function is slowly improving. ELIZABETH resolved , renal function close to baseline   - Milrinone drip has been stopped by cardiology. He has decent urine output on lasix 40 mg iv , will change to equivalent oral dose 80 mg daily , if urine output drops will add metolazone 2.5 mg daily   -Metabolic alkalosis improving on diamox 250 mg daily   - Does not have clinical and metabolic indications of dialysis. - - Bladder scan to ensure he is not retaining urine   - Appreciate cardiology recs and assistance.    - Strict intake and output charting   - Fluid restriction to 1 liter, low salt diet   - Avoid NSAIDs, contrast and nephrotoxin   HTN stable on coreg , hold amlodipine   -Dose all meds and antibiotics for current eGFR       Subjective/ROS: -   Still on oxygen, SOB with exertion but feels fine at rest   Urinating fine  No chest pain and palpitation     Intake/Output Summary (Last 24 hours) at 7/22/2021 1009  Last data filed at 7/22/2021 0855  Gross per 24 hour   Intake 720 ml   Output 1700 ml   Net -980 ml         Objective:    Visit Vitals  /62   Pulse 92   Temp 97.4 °F (36.3 °C)   Resp 16   Ht 5' 8\" (1.727 m)   Wt 84.1 kg (185 lb 8 oz)   SpO2 100%   BMI 28.21 kg/m²       Physical Exam:    Pt awake,  alert and comfortable  HEENT:no neck swelling  Lung: crackles at lung base  Heart: s1s2 heard   Ext: b/l LE  Trace edema only      Laboratory Data:    Lab Results   Component Value Date    BUN 38 (H) 07/22/2021    BUN 36 (H) 07/21/2021    BUN 36 (H) 07/20/2021     07/22/2021     07/21/2021     07/20/2021    CO2 31 07/22/2021    CO2 33 (H) 07/21/2021    CO2 37 (H) 07/20/2021     Lab Results   Component Value Date    WBC 49.0 (H) 07/18/2021    HGB 11.5 (L) 07/18/2021    HCT 38.5 07/18/2021     No components found for: CALCIUM, PHOSPHORUS, MAGNESIUM  Lab Results   Component Value Date    HDL 56 08/12/2016     No results found for: SPECIMENTYP, TURBIDITY, UGLU    Imaging Reveiwed:  CXR 7/11/21\"- 1. Bilateral airspace disease   Small bilateral pleural effusions    · ECHO 7/12/21- LV: Estimated LVEF is 15 - 20%. Normal wall thickness. Mildly dilated left ventricle. Severely reduced systolic function. Left ventricular diastolic dysfunction E'E= 15.30. · Contrast used: DEFINITY. · LA: Moderately dilated left atrium. · AV: Aortic valve leaflet calcification present. Mild aortic valve regurgitation is present. · MV: Mitral valve thickening. Moderate mitral valve regurgitation is present. · TV: Moderate tricuspid valve regurgitation is present.   · PV: Mild pulmonic valve regurgitation is present. · PA: Pulmonary arterial systolic pressure is 55 mmHg. · IVC: Moderately elevated central venous pressure (8 mmHg); IVC diameter is larger than 21 mm and collapses more than 50% with respiration. USG Retroperitoneum : 7/14/21-      IMPRESSION     No hydronephrosis.     Thick walled, contracted urinary bladder.     Echogenic liver.  Perihepatic ascites and right pleural effusion.    - other medical management per Primary team      Thank you for consult       Jyoti Augustin MD   [facility]

## 2021-07-22 NOTE — PROGRESS NOTES
Problem: Falls - Risk of  Goal: *Absence of Falls  Description: Document Anshul Pathak Fall Risk and appropriate interventions in the flowsheet. Outcome: Progressing Towards Goal  Note: Fall Risk Interventions:  Mobility Interventions: Assess mobility with egress test, Communicate number of staff needed for ambulation/transfer, Patient to call before getting OOB         Medication Interventions: Teach patient to arise slowly, Patient to call before getting OOB    Elimination Interventions: Call light in reach, Patient to call for help with toileting needs              Problem: Patient Education: Go to Patient Education Activity  Goal: Patient/Family Education  Outcome: Progressing Towards Goal     Problem: Diabetes Self-Management  Goal: *Disease process and treatment process  Description: Define diabetes and identify own type of diabetes; list 3 options for treating diabetes. Outcome: Progressing Towards Goal  Goal: *Incorporating nutritional management into lifestyle  Description: Describe effect of type, amount and timing of food on blood glucose; list 3 methods for planning meals. Outcome: Progressing Towards Goal  Goal: *Incorporating physical activity into lifestyle  Description: State effect of exercise on blood glucose levels. Outcome: Progressing Towards Goal  Goal: *Developing strategies to promote health/change behavior  Description: Define the ABC's of diabetes; identify appropriate screenings, schedule and personal plan for screenings. Outcome: Progressing Towards Goal  Goal: *Using medications safely  Description: State effect of diabetes medications on diabetes; name diabetes medication taking, action and side effects. Outcome: Progressing Towards Goal  Goal: *Monitoring blood glucose, interpreting and using results  Description: Identify recommended blood glucose targets  and personal targets.   Outcome: Progressing Towards Goal  Goal: *Prevention, detection, treatment of acute complications  Description: List symptoms of hyper- and hypoglycemia; describe how to treat low blood sugar and actions for lowering  high blood glucose level. Outcome: Progressing Towards Goal  Goal: *Prevention, detection and treatment of chronic complications  Description: Define the natural course of diabetes and describe the relationship of blood glucose levels to long term complications of diabetes.   Outcome: Progressing Towards Goal  Goal: *Developing strategies to address psychosocial issues  Description: Describe feelings about living with diabetes; identify support needed and support network  Outcome: Progressing Towards Goal  Goal: *Insulin pump training  Outcome: Progressing Towards Goal  Goal: *Sick day guidelines  Outcome: Progressing Towards Goal  Goal: *Patient Specific Goal (EDIT GOAL, INSERT TEXT)  Outcome: Progressing Towards Goal     Problem: Patient Education: Go to Patient Education Activity  Goal: Patient/Family Education  Outcome: Progressing Towards Goal     Problem: Afib Pathway: Day 1  Goal: Treatments/Interventions/Procedures  Outcome: Progressing Towards Goal     Problem: Afib Pathway: Day 1  Goal: Treatments/Interventions/Procedures  Outcome: Progressing Towards Goal     Problem: Heart Failure: Day 5  Goal: Treatments/Interventions/Procedures  Outcome: Progressing Towards Goal     Problem: Patient Education: Go to Patient Education Activity  Goal: Patient/Family Education  Outcome: Progressing Towards Goal     Problem: Patient Education: Go to Patient Education Activity  Goal: Patient/Family Education  Outcome: Progressing Towards Goal

## 2021-07-22 NOTE — PROGRESS NOTES
Hospitalist Progress Note    Patient: Anthony Smoker MRN: [id number]  CSN: [id number]    YOB: 1940  Age: 79 y.o. Sex: male    DOA: 7/11/2021 LOS:  LOS: 11 days          Chief Complaint:      Assessment/Plan     Chronic respiratory distress/sepsis   Continue nc O2 -use 2 L nc O2 at home      Multiple bilateral pneumonia  Completed abx treatment      Acute diastolic /systolic CHF extubation-acute on chronic combined , severe cardiomyopathy and severe pulm HTN  Ef 15-20 %   Milrinone drip stopped yesterday  DVT prophylaxis Lovenox 40 mg subcu stopped yesterday and Eliquis 2.5 mg twice daily started  Amlodipine stopped yesterday  Continue carvedilol   DC home on oral dose of Lasix 80 mg daily per nephrology  On off gtt today, continue  lasix  IV per cardiologist and nephrologist   On Lasix Coreg, cardiology on board .   Low-salt diet, fluid restriction     Atrial fibrillation  Coreg for rate control, tsh wnl   On low dose eliquis per cardiologist     ELIZABETH -resolved  Renal function close to baseline  Appreciate nephrology expertise  Will check bladder scan to make sure he is not retaining urine  Continue strict I's and O's  Fluid restriction to 1 L and sodium restriction up to 2 g  Avoid nephrotoxins including NSAIDs and contrast    Metabolic alkalosis -improving  Continue Diamox 250 mg daily    Hypertension  On Norvasc Lasix Coreg     Peripheral edema      History of CML   oncologist on board  Continue home medication [address] bed delivered today  Lives at home with wife, [place] and aides    Disposition :  Patient Active Problem List   Diagnosis Code    TIA (transient ischemic attack) G45.9    Coronary artery disease involving native coronary artery I25.10    Type II diabetes mellitus, uncontrolled ([address] E11.65    Dyslipidemia E78.5    Pancytopenia ([address] D61.818    CML (chronic myelocytic leukemia) ([address] C92.10    HTN (hypertension) I10    Chronic atrial fibrillation ([address] I48.20    Stage 3 chronic kidney disease (HCC) N18.30    Hyperglycemia R73.9    Elevated blood pressure reading with diagnosis of hypertension I10    Community acquired pneumonia J18.9    Chronic congestive heart failure (HCC) I50.9    SOB (shortness of breath) R06.02    On supplemental oxygen by nasal cannula Z78.9    Pneumonia J18.9    Pulmonary edema J81.1    Sepsis (HCC) A41.9    Atrial fibrillation with RVR (HCC) I48.91    Encounter for palliative care Z51.5    Debility R53.81    Acute on chronic combined systolic and diastolic congestive heart failure (HCC) I50.43    Pulmonary hypertension (HCC) I27.20    Cardiomyopathy (HCC) I42.9       Subjective:    [facility] with [facility]. Hospital bed delivered today.    Feeling much better, really wants to go home today     Review of systems:    Constitutional: denies fevers, chills, myalgias  Respiratory: denies SOB, cough  Cardiovascular: denies chest pain, palpitations  Gastrointestinal: denies nausea, vomiting, diarrhea      Vital signs/Intake and Output:  Visit Vitals  BP (!) 129/54   Pulse 84   Temp 97.3 °F (36.3 °C)   Resp 16   Ht 5' 8\" (1.727 m)   Wt 84.1 kg (185 lb 8 oz)   SpO2 100%   BMI 28.21 kg/m²     Current Shift:  07/22 0701 - 07/22 1900  In: 240 [P.O.:240]  Out: 200 [Urine:200]  Last three shifts:  07/20 1901 - 07/22 0700  In: 1440 [P.O.:1440]  Out: 2775 [Urine:2775]    Exam:    General: Elderly WF, Well developed, alert, NAD, OX3  Head/Neck: NCAT, supple, No masses, No lymphadenopathy  CVS:Regular rate and rhythm, no M/R/G, S1/S2 heard, no thrill  Lungs:Clear to auscultation bilaterally, no wheezes, rhonchi, or rales  Abdomen: Soft, Nontender, No distention, Normal Bowel sounds, No hepatomegaly  Extremities: No C/C/E, pulses palpable 2+  Skin:normal texture and turgor, no rashes, no lesions  Neuro:grossly normal , follows commands  Psych:appropriate                Labs: Results:       Chemistry Recent Labs 07/22/21  0030 07/21/21  0517 07/20/21  0900   * 125* 182*    138 139   K 4.0 3.7 4.0    101 100   CO2 31 33* 37*   BUN 38* 36* 36*   CREA 1.48* 1.63* 1.75*   CA 8.6 8.4* 8.6   AGAP 4 4 2*   BUCR 26* 22* 21*      CBC w/Diff No results for input(s): WBC, RBC, HGB, HCT, PLT, GRANS, LYMPH, EOS, HGBEXT, HCTEXT, PLTEXT in the last 72 hours. Cardiac Enzymes No results for input(s): CPK, CKND1, TARA in the last 72 hours. No lab exists for component: CKRMB, TROIP   Coagulation No results for input(s): PTP, INR, APTT, INREXT in the last 72 hours. Lipid Panel Lab Results   Component Value Date/Time    Cholesterol, total 107 08/12/2016 03:20 AM    HDL Cholesterol 56 08/12/2016 03:20 AM    LDL, calculated 26.4 08/12/2016 03:20 AM    VLDL, calculated 24.6 08/12/2016 03:20 AM    Triglyceride 123 08/12/2016 03:20 AM    CHOL/HDL Ratio 1.9 08/12/2016 03:20 AM      BNP No results for input(s): BNPP in the last 72 hours. Liver Enzymes No results for input(s): TP, ALB, TBIL, AP in the last 72 hours.     No lab exists for component: SGOT, GPT, DBIL   Thyroid Studies Lab Results   Component Value Date/Time    TSH 1.34 07/12/2021 02:55 AM        Procedures/imaging: see electronic medical records for all procedures/Xrays and details which were not copied into this note but were reviewed prior to creation of Jordana John MD

## 2021-07-22 NOTE — PROGRESS NOTES
Cardiology Progress Note        Patient: Rao Heredia        Sex: male          DOA: 7/11/2021  YOB: 1940      Age:  79 y.o.        LOS:  LOS: 11 days   Assessment/Plan     Principal Problem:    Pneumonia (7/11/2021)    Active Problems:    CML (chronic myelocytic leukemia) ([address] (9/2/2016)      Pulmonary edema (7/11/2021)      Sepsis ([address] (7/11/2021)      Atrial fibrillation with RVR ([address] (7/11/2021)      Encounter for palliative care ()      Debility ()      Acute on chronic combined systolic and diastolic congestive heart failure ([address] (7/12/2021)      Pulmonary hypertension ([address] (7/15/2021)      Cardiomyopathy ([address] (7/15/2021)      ELIZABETH (acute kidney injury) ([address] (7/22/2021)        Plan:            Acute congestive heart failure  Atrial fibrillation New onset  CAD   Cardiomyopathy  Pulmonary hypertension   CABG   patient has shortness of breath is improved and edema is also improving. Plan.  7/21/2021  Stop IV Milrinone   Stop DVT prophylaxis Lovenox 40 mg subcutaneous daily  Start Eliquis 2.5 mg twice daily  Patient will need most likely high dose of diuretic.   continue with carvedilol. DC amlodipine  Management plan was discussed with the patient. Plan.  7/22/2021    Feeling stable. Continue to make good urine output continue with Lasix  Atrial fibrillation is rate controlled  Continue with carvedilol  No signs/ symptoms of bleeding. Continue with Eliquis 2.5 mg twice daily    Above management plan was discussed with patient. Subjective:    cc:  Shortness of breath   Ankle swelling        REVIEW OF SYSTEMS:     General: No fevers or chills. Cardiovascular: No chest pain or pressure. No palpitations.  + ankle swelling  Pulmonary: No SOB, orthopnea, PND  Gastrointestinal: No nausea, vomiting or diarrhea      Objective:      Visit Vitals  /75   Pulse 76   Temp 97.4 °F (36.3 °C)   Resp 16   Ht 5' 8\" (1.727 m)   Wt 84.1 kg (185 lb 8 oz) SpO2 100%   BMI 28.21 kg/m²     Body mass index is 28.21 kg/m². Physical Exam:  General Appearance: Comfortable,   NECK: No JVD, no thyroidomeglay. LUNGS: Clear bilaterally. HEART: [id number]+S2 audible,    ABD: Non-tender, BS Audible    EXT: + edema, and no cysnosis. VASCULAR EXAM: Pulses are intact. PSYCHIATRIC EXAM: Mood is appropriate.     Medication:  Current Facility-Administered Medications   Medication Dose Route Frequency    furosemide (LASIX) tablet 80 mg  80 mg Oral DAILY    apixaban (ELIQUIS) tablet 2.5 mg  2.5 mg Oral Q12H    acetaZOLAMIDE (DIAMOX) tablet 250 mg  250 mg Oral DAILY    gabapentin (NEURONTIN) capsule 100 mg  100 mg Oral QHS    oxyCODONE IR (ROXICODONE) tablet 5 mg  5 mg Oral Q6H PRN    phenol throat spray (CHLORASEPTIC) 1 Spray  1 Spray Oral PRN    polyethylene glycol (MIRALAX) packet 17 g  17 g Oral DAILY    imatinib (GLEEVEC) chemo tablet 100 mg (Patient Supplied)  100 mg Oral DAILY    cyanocobalamin tablet 2,000 mcg  2,000 mcg Oral DAILY    vitamin E (AQUA GEMS) capsule 400 Units  400 Units Oral DAILY    albuterol-ipratropium (DUO-NEB) 2.5 MG-0.5 MG/3 ML  3 mL Nebulization Q4H PRN    guaiFENesin ER (MUCINEX) tablet 600 mg  600 mg Oral Q12H    famotidine (PEPCID) tablet 20 mg  20 mg Oral DAILY    magnesium hydroxide (MILK OF MAGNESIA) 400 mg/5 mL oral suspension 30 mL  30 mL Oral DAILY PRN    aspirin delayed-release tablet 81 mg  81 mg Oral DAILY    atorvastatin (LIPITOR) tablet 40 mg  40 mg Oral DAILY    carvediloL (COREG) tablet 25 mg  25 mg Oral BID    tamsulosin (FLOMAX) capsule 0.4 mg  0.4 mg Oral DAILY    sodium chloride (NS) flush 5-40 mL  5-40 mL IntraVENous Q8H    sodium chloride (NS) flush 5-40 mL  5-40 mL IntraVENous PRN    acetaminophen (TYLENOL) tablet 650 mg  650 mg Oral Q6H PRN    Or    acetaminophen (TYLENOL) suppository 650 mg  650 mg Rectal Q6H PRN    ondansetron (ZOFRAN ODT) tablet 4 mg  4 mg Oral Q8H PRN    Or    ondansetron [facility]) injection 4 mg  4 mg IntraVENous Q6H PRN    insulin lispro (HUMALOG) injection   SubCUTAneous AC&HS               Lab/Data Reviewed:  Procedures/imaging: see electronic medical records for all procedures/Xrays   and details which were not copied into this note but were reviewed prior to creation of Plan       All lab results for the last 24 hours reviewed. No results for input(s): WBC, HGB, HCT, PLT, HGBEXT, HCTEXT, PLTEXT, HGBEXT, HCTEXT, PLTEXT in the last 72 hours. Recent Labs     07/22/21  0030 07/21/21  0517 07/20/21  0900    138 139   K 4.0 3.7 4.0    101 100   CO2 31 33* 37*   * 125* 182*   BUN 38* 36* 36*   CREA 1.48* 1.63* 1.75*   CA 8.6 8.4* 8.6       RADIOLOGY:  CT Results  (Last 48 hours)    None        CXR Results  (Last 48 hours)    None            Cardiology Procedures:   Results for orders placed or performed during the hospital encounter of 07/11/21   EKG, 12 LEAD, INITIAL   Result Value Ref Range    Ventricular Rate 110 BPM    Atrial Rate 111 BPM    QRS Duration 88 ms    Q-T Interval 294 ms    QTC Calculation (Bezet) 397 ms    Calculated R Axis -42 degrees    Calculated T Axis 153 degrees    Diagnosis       Atrial fibrillation with rapid ventricular response  Left axis deviation  Anterior infarct , age undetermined  T wave abnormality, consider lateral ischemia  Abnormal ECG  When compared with ECG of 12-DEC-2020 15:17,  Atrial fibrillation has replaced Sinus rhythm  Vent.  rate has increased BY  44 BPM    Confirmed by Sagrario Perez MD. ([id number]) on 7/12/2021 10:38:02 PM        Echo Results  (Last 48 hours)    None       Cardiolite (Tc-99m Sestamibi) stress test    Signed By: Collette Nails, MD     July 22, 2021

## 2021-07-22 NOTE — PROGRESS NOTES
Bedside and Verbal shift change report given to Amy Burnette RN (oncoming nurse) by Deonna Hemphill RN (offgoing nurse). Report included the following information SBAR, Kardex, ED Summary, Intake/Output, MAR, Recent Results, Med Rec Status and Cardiac Rhythm Afib.     1942  Shift assessment complete  Pt resting quietly with eyes closed and chest rising and falling evenly   No c/o pain or signs of distress  Bed locked and in lowest position   Call light within reach     Shift uneventful     [facility] [place] Cardiac/Medical Night Shift Chart Audit    Chart Audit completed? YES      Bedside and Verbal shift change report given to González Mann RN (oncoming nurse) by Amy Burnette RN (offgoing nurse). Report included the following information SBAR, Kardex, ED Summary, Intake/Output, MAR, Recent Results, Med Rec Status and Cardiac Rhythm Afib.

## 2021-07-22 NOTE — PROGRESS NOTES
0714:Received verbal bedside report from off going nurse RANDY Tejeda R.N. Patient care received. Patient alert and oriented x 4. Patient resting in bed denies pain. Patient stable. Call light with in reach bed in lowest position. 1535:Bladder scan done 52 ml highest amount.

## 2021-07-22 NOTE — PROGRESS NOTES
DC Plan: home with [facility] and aide services when medically stable    Received call from patient's wife that hospital bed will be delivered to home around 1600 today and verified with [employer]. [phone]: CM placed call and left message for wife to contact CM when bed is delivered to coordinate home transportation. 1620: called wife who stated that bed was in the process of being put together; informed her of plan for patient to discharge tomorrow a.m. and working to have medical transport arranged for 1000; per wife she will be at facility in a.m. to receive discharge instructions; will let [address] know of expected patient discharge tomorrow a.m.    1640 - care manager called again by wife who appeared concerned regarding getting proper discharge instructions and being prepared to have patient come home; reassured wife that this was the reason care manager had contacted her this afternoon and prepared MD for morning discharge plan; assured her that all discharge instructions will be reviewed thoroughly. Care Management Interventions  PCP Verified by CM: Yes  Mode of Transport at Discharge:  Other (see comment) (wife)  Transition of Care Consult (CM Consult): Discharge Planning, [address]: Yes  Physical Therapy Consult: Yes  Occupational Therapy Consult: Yes  Current Support Network: Own Home, Lives with Spouse  Confirm Follow Up Transport: Self  The Plan for Transition of Care is Related to the Following Treatment Goals : pneumonia, sepsis, pulmonary edema  The Patient and/or Patient Representative was Provided with a Choice of Provider and Agrees with the Discharge Plan?: Yes  Name of the Patient Representative Who was Provided with a Choice of Provider and Agrees with the Discharge Plan: Naima Aguila, patient  Freedom of Choice List was Provided with Basic Dialogue that Supports the Patient's Individualized Plan of Care/Goals, Treatment Preferences and Shares the Quality Data Associated with the Providers?: Yes  [employer] Information Provided?: Yes  Discharge Location  Discharge Placement: Home with home health

## 2021-07-22 NOTE — PROGRESS NOTES
Problem: Falls - Risk of  Goal: *Absence of Falls  Description: Document Lina Vargas Fall Risk and appropriate interventions in the flowsheet. Outcome: Progressing Towards Goal  Note: Fall Risk Interventions:  Mobility Interventions: Communicate number of staff needed for ambulation/transfer, OT consult for ADLs, Patient to call before getting OOB, PT Consult for mobility concerns, PT Consult for assist device competence         Medication Interventions: Patient to call before getting OOB, Teach patient to arise slowly    Elimination Interventions: Call light in reach, Patient to call for help with toileting needs              Problem: Patient Education: Go to Patient Education Activity  Goal: Patient/Family Education  Outcome: Progressing Towards Goal     Problem: Diabetes Self-Management  Goal: *Disease process and treatment process  Description: Define diabetes and identify own type of diabetes; list 3 options for treating diabetes. Outcome: Progressing Towards Goal  Goal: *Incorporating nutritional management into lifestyle  Description: Describe effect of type, amount and timing of food on blood glucose; list 3 methods for planning meals. Outcome: Progressing Towards Goal  Goal: *Incorporating physical activity into lifestyle  Description: State effect of exercise on blood glucose levels. Outcome: Progressing Towards Goal  Goal: *Developing strategies to promote health/change behavior  Description: Define the ABC's of diabetes; identify appropriate screenings, schedule and personal plan for screenings. Outcome: Progressing Towards Goal  Goal: *Using medications safely  Description: State effect of diabetes medications on diabetes; name diabetes medication taking, action and side effects. Outcome: Progressing Towards Goal  Goal: *Monitoring blood glucose, interpreting and using results  Description: Identify recommended blood glucose targets  and personal targets.   Outcome: Progressing Towards Goal  Goal: *Prevention, detection, treatment of acute complications  Description: List symptoms of hyper- and hypoglycemia; describe how to treat low blood sugar and actions for lowering  high blood glucose level. Outcome: Progressing Towards Goal  Goal: *Prevention, detection and treatment of chronic complications  Description: Define the natural course of diabetes and describe the relationship of blood glucose levels to long term complications of diabetes.   Outcome: Progressing Towards Goal  Goal: *Developing strategies to address psychosocial issues  Description: Describe feelings about living with diabetes; identify support needed and support network  Outcome: Progressing Towards Goal  Goal: *Insulin pump training  Outcome: Progressing Towards Goal  Goal: *Sick day guidelines  Outcome: Progressing Towards Goal  Goal: *Patient Specific Goal (EDIT GOAL, INSERT TEXT)  Outcome: Progressing Towards Goal     Problem: Patient Education: Go to Patient Education Activity  Goal: Patient/Family Education  Outcome: Progressing Towards Goal     Problem: Afib Pathway: Day 1  Goal: Treatments/Interventions/Procedures  Outcome: Progressing Towards Goal     Problem: Heart Failure: Day 5  Goal: Treatments/Interventions/Procedures  Outcome: Progressing Towards Goal

## 2021-07-23 NOTE — PROGRESS NOTES
S: DC held yesterday because pt's wife was not ready. Today she is ready. Hospital bed was delivered yesterday. [facility] and MD follow-up.     O: AF VSS  CTAb  RRR no mrg    A/P:   Chronic respiratory distress/sepsis   Continue nc O2 -use 2 L nc O2 at home      Multiple bilateral pneumonia  Completed abx treatment      Acute diastolic /systolic CHF extubation-acute on chronic combined , severe cardiomyopathy and severe pulm HTN  Ef 15-20 %   Eliquis 2.5 mg twice daily started  Amlodipine DC  Continue carvedilol   DC home on oral dose of Lasix 80 mg daily per nephrology  Low-salt diet, fluid restriction     Atrial fibrillation  Coreg for rate control, tsh wnl   On low dose eliquis per cardiologist      ELIZABETH -resolved  Renal function close to baseline  Appreciate nephrology expertise  Continue strict I's and O's  Fluid restriction to 1 L and sodium restriction up to 2 g  Avoid nephrotoxins including NSAIDs and contrast     Metabolic alkalosis -improving  DC on Diamox 250 mg daily     Hypertension  On Norvasc Lasix Coreg     Peripheral edema      History of CML   oncologist on board  Continue home medication gleeve      [facility] bed delivered yesterday  DC home TODAY  Transport at 10am

## 2021-07-23 NOTE — PROGRESS NOTES
Discharge Planning: Home with home health, family assist, and MD follow-up    CM spoke with the patient regarding plans for discharge today. CM reminded the patient that transport is in place for the patient to leave the hospital at 1000. The patient verbalized understanding and denies any questions or concerns at this time. CM to follow the patient's progress and be available to assist with discharge planning as needed. CMS referral placed. Care Management Interventions  PCP Verified by CM: Yes  Mode of Transport at Discharge:  Other (see comment) (wife)  Transition of Care Consult (CM Consult): Discharge Planning, [address]: Yes  Physical Therapy Consult: Yes  Occupational Therapy Consult: Yes  Current Support Network: Own Home, Lives with Spouse  Confirm Follow Up Transport: Self  The Plan for Transition of Care is Related to the Following Treatment Goals : pneumonia, sepsis, pulmonary edema  The Patient and/or Patient Representative was Provided with a Choice of Provider and Agrees with the Discharge Plan?: Yes  Name of the Patient Representative Who was Provided with a Choice of Provider and Agrees with the Discharge Plan: Livier Gil, patient  Freedom of Choice List was Provided with Basic Dialogue that Supports the Patient's Individualized Plan of Care/Goals, Treatment Preferences and Shares the Quality Data Associated with the Providers?: Yes  [employer] Information Provided?: Yes  Discharge Location  Discharge Placement: Home with home health

## 2021-07-23 NOTE — PROGRESS NOTES
PT note: pt currently being discharged; no treatment received at this time. Pt for continued PT via HHPT. Thank you.   Lacie Carballo, PT

## 2021-07-23 NOTE — PROGRESS NOTES
Problem: Falls - Risk of  Goal: *Absence of Falls  Description: Document Sheela Schumacher Fall Risk and appropriate interventions in the flowsheet. Outcome: Progressing Towards Goal  Note: Fall Risk Interventions:  Mobility Interventions: Bed/chair exit alarm, Communicate number of staff needed for ambulation/transfer, Patient to call before getting OOB, Utilize walker, cane, or other assistive device         Medication Interventions: Bed/chair exit alarm, Patient to call before getting OOB, Teach patient to arise slowly    Elimination Interventions: Bed/chair exit alarm, Call light in reach, Patient to call for help with toileting needs, Toileting schedule/hourly rounds, Toilet paper/wipes in reach              Problem: Patient Education: Go to Patient Education Activity  Goal: Patient/Family Education  Outcome: Progressing Towards Goal     Problem: Diabetes Self-Management  Goal: *Disease process and treatment process  Description: Define diabetes and identify own type of diabetes; list 3 options for treating diabetes. Outcome: Progressing Towards Goal  Goal: *Incorporating nutritional management into lifestyle  Description: Describe effect of type, amount and timing of food on blood glucose; list 3 methods for planning meals. Outcome: Progressing Towards Goal  Goal: *Incorporating physical activity into lifestyle  Description: State effect of exercise on blood glucose levels. Outcome: Progressing Towards Goal  Goal: *Developing strategies to promote health/change behavior  Description: Define the ABC's of diabetes; identify appropriate screenings, schedule and personal plan for screenings. Outcome: Progressing Towards Goal  Goal: *Using medications safely  Description: State effect of diabetes medications on diabetes; name diabetes medication taking, action and side effects.   Outcome: Progressing Towards Goal  Goal: *Monitoring blood glucose, interpreting and using results  Description: Identify recommended blood glucose targets  and personal targets. Outcome: Progressing Towards Goal  Goal: *Prevention, detection, treatment of acute complications  Description: List symptoms of hyper- and hypoglycemia; describe how to treat low blood sugar and actions for lowering  high blood glucose level. Outcome: Progressing Towards Goal  Goal: *Prevention, detection and treatment of chronic complications  Description: Define the natural course of diabetes and describe the relationship of blood glucose levels to long term complications of diabetes.   Outcome: Progressing Towards Goal  Goal: *Developing strategies to address psychosocial issues  Description: Describe feelings about living with diabetes; identify support needed and support network  Outcome: Progressing Towards Goal  Goal: *Insulin pump training  Outcome: Progressing Towards Goal  Goal: *Sick day guidelines  Outcome: Progressing Towards Goal  Goal: *Patient Specific Goal (EDIT GOAL, INSERT TEXT)  Outcome: Progressing Towards Goal     Problem: Patient Education: Go to Patient Education Activity  Goal: Patient/Family Education  Outcome: Progressing Towards Goal     Problem: Afib Pathway: Day 1  Goal: Treatments/Interventions/Procedures  Outcome: Progressing Towards Goal     Problem: Heart Failure: Day 5  Goal: Treatments/Interventions/Procedures  Outcome: Progressing Towards Goal     Problem: Patient Education: Go to Patient Education Activity  Goal: Patient/Family Education  Outcome: Progressing Towards Goal     Problem: Patient Education: Go to Patient Education Activity  Goal: Patient/Family Education  Outcome: Progressing Towards Goal     Problem: Pain  Goal: *Control of Pain  Outcome: Progressing Towards Goal  Goal: *PALLIATIVE CARE:  Alleviation of Pain  Outcome: Progressing Towards Goal     Problem: Patient Education: Go to Patient Education Activity  Goal: Patient/Family Education  Outcome: Progressing Towards Goal     Problem: Fluid Volume - Risk of, Imbalanced  Goal: *Balanced intake and output  Outcome: Progressing Towards Goal     Problem: Patient Education: Go to Patient Education Activity  Goal: Patient/Family Education  Outcome: Progressing Towards Goal

## 2021-07-23 NOTE — DISCHARGE INSTRUCTIONS
Patient Education        Learning About Heart Failure Zones  What are heart failure zones? Heart failure zones give you an easy way to see changes in your heart failure symptoms. They also tell you when you need to get help. Check every day to see which zone you are in. Green zone. You are doing well. This is where you want to be. · Your weight is stable. It's not going up or down. · You breathe easily. · You are sleeping well. You are able to lie flat without shortness of breath. · You can do your usual activities. Yellow zone. Be careful. Your symptoms are changing. Call your doctor. · You have new or increased shortness of breath. · You are dizzy or lightheaded, or you feel like you may faint. · You have sudden weight gain, such as more than 2 to 3 pounds in a day or 5 pounds in a week. (Your doctor may suggest a different range of weight gain.)  · You have increased swelling in your legs, ankles, or feet. · You are so tired or weak that you can't do your usual activities. · You are not sleeping well. Shortness of breath wakes you up at night. You need extra pillows. Red zone. This is an emergency. Call 911. You have symptoms of sudden heart failure. For example:  · You have severe trouble breathing. · You cough up pink, foamy mucus. · You have a new irregular or fast heartbeat. You have symptoms of a heart attack. These may include:  · Chest pain or pressure, or a strange feeling in the chest.  · Sweating. · Shortness of breath. · Nausea or vomiting. · Pain, pressure, or a strange feeling in the back, neck, jaw, or upper belly or in one or both shoulders or arms. · Lightheadedness or sudden weakness. · A fast or irregular heartbeat. If you have symptoms of a heart attack: After you call 911, the  may tell you to chew 1 adult-strength or 2 to 4 low-dose aspirin. Wait for an ambulance. Do not try to drive yourself. Follow-up care is a key part of your treatment and safety.  Be sure to make and go to all appointments, and call your doctor if you are having problems. It's also a good idea to know your test results and keep a list of the medicines you take. Where can you learn more? Go to http://www.gray.com/  Enter T174 in the search box to learn more about \"Learning About Heart Failure Zones. \"  Current as of: August 31, 2020               Content Version: 12.8  © 2006-2021 [employer]. Care instructions adapted under license by [employer] (which disclaims liability or warranty for this information). If you have questions about a medical condition or this instruction, always ask your healthcare professional. [place] [address] any warranty or liability for your use of this information. Patient Education        Pneumonia: Care Instructions  Overview     Pneumonia is an infection of the lungs. Most cases are caused by infections from bacteria or viruses. Pneumonia may be mild or very severe. If it is caused by bacteria, you will be treated with antibiotics. It may take a few weeks to a few months to recover fully from pneumonia, depending on how sick you were and whether your overall health is good. Follow-up care is a key part of your treatment and safety. Be sure to make and go to all appointments, and call your doctor if you are having problems. It's also a good idea to know your test results and keep a list of the medicines you take. How can you care for yourself at home? · Take your antibiotics exactly as directed. Do not stop taking the medicine just because you are feeling better. You need to take the full course of antibiotics. · Take your medicines exactly as prescribed. Call your doctor if you think you are having a problem with your medicine. · Get plenty of rest and sleep. You may feel weak and tired for a while, but your energy level will improve with time.   · To prevent dehydration, drink plenty of fluids, enough so that your urine is light yellow or clear like water. Choose water and other caffeine-free clear liquids until you feel better. If you have kidney, heart, or liver disease and have to limit fluids, talk with your doctor before you increase the amount of fluids you drink. · Take care of your cough so you can rest. A cough that brings up mucus from your lungs is common with pneumonia. It is one way your body gets rid of the infection. But if coughing keeps you from resting or causes severe fatigue and chest-wall pain, talk to your doctor. Your doctor may suggest that you take a medicine to reduce the cough. · Use a vaporizer or humidifier to add moisture to your bedroom. Follow the directions for cleaning the machine. · Do not smoke or allow others to smoke around you. Smoke will make your cough last longer. If you need help quitting, talk to your doctor about stop-smoking programs and medicines. These can increase your chances of quitting for good. · Take an over-the-counter pain medicine, such as acetaminophen (Tylenol), ibuprofen (Advil, Motrin), or naproxen (Aleve). Read and follow all instructions on the label. · Do not take two or more pain medicines at the same time unless the doctor told you to. Many pain medicines have acetaminophen, which is Tylenol. Too much acetaminophen (Tylenol) can be harmful. · If you were given a spirometer to measure how well your lungs are working, use it as instructed. This can help your doctor tell how your recovery is going. · To prevent pneumonia in the future, talk to your doctor about getting a flu vaccine (once a year) and a pneumococcal vaccine (one time only for most people). When should you call for help? Call 911 anytime you think you may need emergency care. For example, call if:    · You have severe trouble breathing.    Call your doctor now or seek immediate medical care if:    · You cough up dark brown or bloody mucus (sputum).     · You have new or worse trouble breathing.     · You are dizzy or lightheaded, or you feel like you may faint. Watch closely for changes in your health, and be sure to contact your doctor if:    · You have a new or higher fever.     · You are coughing more deeply or more often.     · You are not getting better after 2 days (48 hours).     · You do not get better as expected. Where can you learn more? Go to http://www.[employer].com/  Enter D336 in the search box to learn more about \"Pneumonia: Care Instructions. \"  Current as of: October 26, 2020               Content Version: 12.8  © 2006-2021 [employer]. Care instructions adapted under license by [employer] (which disclaims liability or warranty for this information). If you have questions about a medical condition or this instruction, always ask your healthcare professional. [place] [address] any warranty or liability for your use of this information. Patient Education        Sepsis: Care Instructions  Overview     Sepsis is an intense reaction to an infection. It can cause damage to the body and lead to dangerously low blood pressure. You may have inflammation across large areas of your body. It can damage tissue and even go deep into your organs. Infections that can lead to sepsis include:  · A skin infection such as from a cut. · A lung infection like pneumonia. · A kidney infection. · A gut infection such as E. coli. Sepsis is treated with antibiotics. Your doctor will try to find the infection that led to sepsis. Pablo Loyd also get fluids through a vein (IV). Machines will track your vital signs, including temperature, blood pressure, breathing rate, and pulse rate. The physical and mental effects of sepsis may not be seen for several weeks after treatment. And they may last long after the infection is gone. Physical problems may include:  · Feeling weak and tired.   · Feeling out of breath. · Aches and pains. · Problems with getting around. · Trouble falling asleep or staying asleep. · Dry and itchy skin, brittle nails, and hair loss. Some of these effects can lead to problems with your organs or your feet, legs, hands, or arms. Sepsis can also affect your mind and emotions. Problems may include:  · Self-doubt. · Anxiety. · Nightmares. · Depression and mood problems. · Wanting to avoid other people. · Confusion. · Flashbacks and bad memories of your illness. It's important to care for yourself and try to avoid infections. This may lower your risk of getting sepsis again. Follow-up care is a key part of your treatment and safety. Be sure to make and go to all appointments, and call your doctor if you are having problems. It's also a good idea to know your test results and keep a list of the medicines you take. How can you care for yourself at home? · Be safe with medicines. Take your medicines exactly as prescribed. Call your doctor if you think you are having a problem with your medicine. · If your doctor prescribed antibiotics, take them as directed. Do not stop taking them just because you feel better. You need to take the full course of antibiotics. · Help prevent infections that could again lead to sepsis. ? Try to avoid colds and flu. If you must be around people who have a cold or the flu, wash your hands often. And get a flu vaccine every year. ? Ask your doctor if you need a pneumococcal vaccine (to prevent pneumonia, meningitis, and other infections). If you have had one before, ask your doctor if you need another dose. ? Clean any wounds or scrapes. · Do not smoke or use other tobacco products. When you quit smoking, you are less likely to get a cold, the flu, bronchitis, and pneumonia. If you need help quitting, talk to your doctor about stop-smoking programs and medicines. These can increase your chances of quitting for good.   · Drink plenty of fluids to prevent dehydration. Choose water and other caffeine-free clear liquids until you feel better. If you have kidney, heart, or liver disease and have to limit fluids, talk with your doctor before you increase the amount of fluids you drink. · Eat a healthy diet. Include fruits, vegetables, and whole grains in your diet every day. · If your doctor recommends it, try doing some physical activity. Walking is a good choice. Bit by bit, increase the amount you walk every day. · Talk with your family and friends about your challenges. Ask for help if you need it. · Keep a journal. Writing down your thoughts and feelings can help reduce your stress. · Ask family members to fill in gaps in your memory. · Set small goals for yourself that you can reach. Reward yourself for success. When should you call for help? Call  911 anytime you think you may need emergency care. For example, call if:    · You passed out (lost consciousness). Call your doctor now or seek immediate medical care if:    · You have symptoms such as:  ? Shortness of breath. ? Feeling very sick. ? Severe pain. ? A fast heart rate. ? Cool, pale, or clammy skin. ? Feeling confused. ? Feeling very sleepy, or you are hard to wake up.     · You are dizzy or lightheaded, or you feel like you may faint.     · You have a fever or chills. Watch closely for changes in your health, and be sure to contact your doctor if:    · You do not get better as expected. Where can you learn more? Go to http://www.gray.com/  Enter T383 in the search box to learn more about \"Sepsis: Care Instructions. \"  Current as of: September 23, 2020               Content Version: 12.8  © 0988-2079 [employer]. Care instructions adapted under license by [employer] (which disclaims liability or warranty for this information).  If you have questions about a medical condition or this instruction, always ask your healthcare professional. [address] any warranty or liability for your use of this information.

## 2021-07-24 NOTE — PROGRESS NOTES
0710:Received verbal bedside report from off going nurse . Patient care received. Patient alert and oriented x 4. Patient resting in bed denies pain. Patient stable. Call light with in reach bed in lowest position.

## 2021-07-24 NOTE — PROGRESS NOTES
Problem: Falls - Risk of  Goal: *Absence of Falls  Description: Document Eric Stark Fall Risk and appropriate interventions in the flowsheet. Outcome: Progressing Towards Goal  Note: Fall Risk Interventions:  Mobility Interventions: Bed/chair exit alarm         Medication Interventions: Bed/chair exit alarm    Elimination Interventions: Bed/chair exit alarm              Problem: Patient Education: Go to Patient Education Activity  Goal: Patient/Family Education  Outcome: Progressing Towards Goal     Problem: Diabetes Self-Management  Goal: *Disease process and treatment process  Description: Define diabetes and identify own type of diabetes; list 3 options for treating diabetes. Outcome: Progressing Towards Goal  Goal: *Incorporating nutritional management into lifestyle  Description: Describe effect of type, amount and timing of food on blood glucose; list 3 methods for planning meals. Outcome: Progressing Towards Goal  Goal: *Incorporating physical activity into lifestyle  Description: State effect of exercise on blood glucose levels. Outcome: Progressing Towards Goal  Goal: *Developing strategies to promote health/change behavior  Description: Define the ABC's of diabetes; identify appropriate screenings, schedule and personal plan for screenings. Outcome: Progressing Towards Goal  Goal: *Using medications safely  Description: State effect of diabetes medications on diabetes; name diabetes medication taking, action and side effects. Outcome: Progressing Towards Goal  Goal: *Monitoring blood glucose, interpreting and using results  Description: Identify recommended blood glucose targets  and personal targets. Outcome: Progressing Towards Goal  Goal: *Prevention, detection, treatment of acute complications  Description: List symptoms of hyper- and hypoglycemia; describe how to treat low blood sugar and actions for lowering  high blood glucose level.   Outcome: Progressing Towards Goal  Goal: *Prevention, detection and treatment of chronic complications  Description: Define the natural course of diabetes and describe the relationship of blood glucose levels to long term complications of diabetes.   Outcome: Progressing Towards Goal  Goal: *Developing strategies to address psychosocial issues  Description: Describe feelings about living with diabetes; identify support needed and support network  Outcome: Progressing Towards Goal  Goal: *Insulin pump training  Outcome: Progressing Towards Goal  Goal: *Sick day guidelines  Outcome: Progressing Towards Goal  Goal: *Patient Specific Goal (EDIT GOAL, INSERT TEXT)  Outcome: Progressing Towards Goal     Problem: Patient Education: Go to Patient Education Activity  Goal: Patient/Family Education  Outcome: Progressing Towards Goal

## 2021-07-24 NOTE — ROUTINE PROCESS
0725 Patient received in no distress breathing on room air. Chest expansion equal and adequate. Tele monitors on and audible. IVF progressing and maintained. Safety measure in place. 1100 Patient refused to have blood glucose checked and stated he only get's blood sugar checked in the morning and at night. Bedside and Verbal shift change report given to Yamini Puentes (oncoming nurse) by Den Finney (offgoing nurse). Report included the following information SBAR and Kardex.
0730 Patient received in no distress breathing on room air. chest expansion equal and adequate. Tele monitor on and audible. IVf progressing and maintained. Safety measure in place. Full assessment in [facility]. 1730 Patient noted to be bleeding from RUE from attempted IV access. Dr. Claribel Solitario aware of same. Pressure dressing applied to same with ice. Will continue to monitor. Bedside and Verbal shift change report given to Mark Braswell (oncoming nurse) by Mina Sage (offgoing nurse). Report included the following information SBAR and Kardex.
1925 Verbal shift change  Received from Angel Cox RN (outgoing nurse), to CATHY Saleem (oncoming)  Pt. Is AOX 4. IV patent and infusing, Pt. denies  pain at this time. Report included the following information SBAR, Kardex, Procedure Summary, Intake/Output, MAR, Recent Lab Results, and  Cardiac Rhythm @ afib. Will resume care and monitor Pt. Condition. Pt. Educated on call bell when in need of help and assistance. Pt. verbalized understanding. 2045 Pt. Head to toe Assessment Done and documented. Pt. Changed gown, new condom cath done. 2215  Pt. Resting in bed, not in distress. 2330  Pt. Made no complaints. 0100  Pt. Resting with eyes closed, easily awaken. 0300  Pt. Denies pain. 0500  Pt. Able to rest and sleep well throughout well.    0645  Pt. Made no complaints. Verbal and bedside Shift changed report given to Judy Aguilar RN (oncoming RN) on Pt. Condition. Report consisted of patients Situation, History, Activities, intake/output,  Background, Assessment and Recommendations(SBAR). Information from the following report(s) Kardex, order Summary, Lab results and MAR was reviewed with the receiving nurse. Opportunity for questions and clarification was provided.
Assume care of patient from [address]. Patient received in bed awake. Patient A&Ox4, denies pain and discomfort. No distress noted. Bed locked in low position. Call bell within reach and patient verbalized understanding of use for assistance and needs. 9414- Bedside and Verbal shift change report given to RUDDY Hayes (oncoming nurse) by Dk Martinez RN (offgoing nurse). Report included the following information SBAR, Kardex, Intake/Output, MAR and Recent Results. 3 [facility] Night Shift Chart Audit    Chart Audit completed?  YES
Assume care of patient from ENA JOSEPH RN. Patient received in bed awake. Patient A&Ox4, denies pain and discomfort. No distress noted. Bed locked in low position. Call bell within reach and patient verbalized understanding of use for assistance and needs. 9972- Bedside and Verbal shift change report given to RUDDY Hayes (oncoming nurse) by Idania Serrano RN (offgoing nurse). Report included the following information SBAR, Kardex, Intake/Output, MAR and Recent Results. 3 [facility] Night Shift Chart Audit    Chart Audit completed?  YES
Assume care of patient from [facility]. Patient received in bed awake. Patient A&Ox4, denies pain and discomfort. No distress noted. Bed locked in low position. Call bell within reach and patient verbalized understanding of use for assistance and needs. 7971- Bedside and Verbal shift change report given to RUDDY Alvarez (oncoming nurse) by Dirk Snellen, RN (offgoing nurse). Report included the following information SBAR, Kardex, Intake/Output, MAR and Recent Results. 3 [facility] Night Shift Chart Audit    Chart Audit completed?  YES
Bedside and Verbal shift change report given to MARCE Myers R.N. (oncoming nurse) by LUZ MRAIA Sherman R.N. (offgoing nurse). Report included the following information SBAR, Kardex, Intake/Output, MAR, Accordion, Recent Results, and Med Rec Status.
Bedside and Verbal shift change report given to Marley Coleman RN (oncoming nurse) by Myla Trevino RN (offgoing nurse). Report included the following information SBAR and Kardex.
Bedside and Verbal shift change report given to Tonya Lane RN (oncoming nurse) by Clover Oppenheim RN (offgoing nurse). Report included the following information SBAR and Kardex.
Bedside shift change report given to Deyanira Rodrigues RN (oncoming nurse) by Saul Stratton RN (offgoing nurse). Report included the following information SBAR, Kardex, ED Summary, Intake/Output and MAR.
Bedside shift change report given to Mario Loera RN (oncoming nurse) by Terry Duncan RN (offgoing nurse). Report included the following information SBAR, Kardex, ED Summary, Intake/Output and MAR.
Dual AVS reviewed with AGAPITO Santoyo All medications reviewed individually with patient. Opportunities for questions and concerns provided. Patient discharged via (mode of transport ie. Car, ambulance or air transport) [facility]  Patient's arm band appropriately discarded.
In patient's chart to assist primary rn give night meds
12-May-2021 12:10

## 2021-07-27 NOTE — HOME HEALTH
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 This patient is homebound for the following reasons Requires considerable and taxing effort to leave the home  and Requires the assistance of 1 or more persons to leave the home . Caregiver: spouse. Caregiver assists with iadls, adls, meal prep, med management, taking to md appointments. Medications reconciled and all medications are not available in the home this visit. pt does not have nitrostat. patient not taking norvasc. patient taking coreg 25mg, not 6.25mg. patient not taking lasix 20mg, taking 80mg. patient not taking gleevec 100mg, taking 400mg tablet 1/2 tablet daily. The following education was provided regarding medications, medication interactions, and look alike medications (specify): reviewed side effects, purposes, dosage, frequencies. Medications  are effective at this time. High risk medication teaching regarding anticoagulants, hyperglycemic agents or opiod narcotics performed (specify) eliquis, lantus-reviewed side effects, purposes, dosage, frequencies. MD notified of any discrepancies/medication interactions: MD office called-eliquis and aspirin; lantus and coreg; lasix and aspirin. Home health supplies by type and quantity ordered/delivered this visit include: NA    Patient education provided this visit to include: patient/cg instructed to monitor for edema/increase in edema, to elevate extremity when edema occurs and to notify md if edema exceeds normal limits for patient, edema noted to right foot- pt/cg stating it has improved since being at the hospital. encouraged patient to get three nutritional meals daily and to stay hydrated.   discussed afib and how to take radial pulse for 1 minute and to notify MD of any heart rate changes. also notified patient of heart healthy diet- instructed patient to reduce intake of saturated and fatty acids (butter, creams, red meats, fried foods, margarines, high fat baked goods, shortening, cheeses, etc) and to increase daily fresh fruits and vegetables and whole grains, encouraged to avoid canned and processed foods as much as possible. reviewed low sodium diet- patient aware to limit sodium, no added sodium to diet. reviewed foods to avoid, how to order foods when eating out, how to read nutrition labels and measure sodium intake. pt instructed to follow with diabetic diet- monitoring sugar intake, limiting foods with high sugar content. pt instructed to continue monitoring BS and to ensure BS levels are within good range to ensure proper healing. discussed importance of monitoring blood pressure daily and recording for review, discussed hypertension, causes/long term effects of uncontrolled hypertension. patient to follow eliquis regimen as directed by MD and to monitor for s/s of excessive bleeding, aware who to report to/when. Patients made aware to report bleeding to their practitioner including blood in the urine or stools and bleeding from minor cuts and scratches that won't stop. Patient also made aware to recognize the signs and symptoms of significant bleeding that can include unsteady gait, dizziness, new headache, and nausea and vomiting. discussed complications from TIA's, s/s of complications to monitor for, who to report to/when. pt/cg educated on causes, signs/symptoms of exacerbation, methods of prevention of pneumonia. patient made aware of the s/s of infection that can lead to sepsis including fever, abrupt changes in mental status, changes in urine output, redness around wound or change in drainage odor/character, chills, nausea/vomiting, dizziness, weakness.  patient made aware to monitor for s/s of infection [increased swelling, increased redness around site, increased pain, foul smelling drainage, fever] aware who to report to/when. no s/s of infection noted. pt aware to use oxygen therapy as prescribed by MD. patient instructed to be very careful when ambulating around oxygen tubing to prevent any falls from occurring. discussed fall precautions in detail- having lighted hallways, removing throw rugs, monitoring medication that may alter mental status. patient made aware to turn every 2 hours and to keep pressure off of bony prominences, to monitor for any pressure ulcer development/worsening. patient would not let SN assess buttocks at time of visit, reporting no pressure ulcers at this time. pt denies any questions or concerns at this time. Home exercise program/Homework provided: patient instructed to perform sob hep 4-5 x daily and prn for sob, to promote lung expansion. pt also encouraged to use ICS q 2 hours and to perform sob hep during therapy. Pt/Caregiver instructed on plan of care and are agreeable to plan of care at this time. Physician Dr. Baliee Lorenz notified of patient admission to home health and plan of care including anticipated frequency of 1w1, 2w2, 1w6, 2 prn and treatments/interventions/modalities of disease process teaching and medication management. Discharge planning discussed with patient and caregiver. Discharge planning as follows: Patient will be discharged once education has completed, patient is medically stable and pt/cg are able to independently manage medications and disease process. Pt/Caregiver did verbalize understanding of discharge planning. Next MD appointment 8/3 (date) with Dr. Bailee Lorenz. Patient/caregiver encouraged/instructed to keep appointment as lack of follow through with physician appointment could result in discontinuation of home care services for non-compliance.

## 2021-07-27 NOTE — HOME HEALTH
Skilled reason for visit: Patient was recently hospitalized for Cardiomyopathy, requiring observation by a SN for s/s of decomposition or adverse effects resulting from newly prescribed medications. Skilled observation needed to determine if new medication regimen prescribed requires modifications or other therapeutic interventions considered until pt's clinical condition or treatment has stabilized. Caregiver involvement:  Patient's caregiver is his spouse. Caregiver assists patient with bathing, dressing, walking, bathroom, meal prep and setup, medication management, grocery shopping, household chores, transportation to MD appointment and home exercise program.  Medications reconciled and all medications are available in the home this visit. The following education was provided regarding medications, medication interactions, and look alike medications:  Eliquis  /  Apixaban           Dose/Freq: 2.5 mg, 2 x daily                Purpose:  clot prevention                         Precautions/Side Effects/Adverse reactions: bloody urine, confusion, constipation, bruising  - Report medication questions or  problems to [address] or MD.  -  Report medication questions or  problems to [address] or MD.  Medications are effective at this time. Patient states understanding. Patient education provided this visit:  . [facility]: A fib, CAD, HTN, heart problems, pain management, constipation prevention, fall precautions, s/s of infection, deep breathing exercises. Goals/teaching progressing. Patient's goal is to reduced pain. Patient has remained free from falls, free from infection; no safety concerns at this time and is ambulating independently.   SN to complete education of patient and patient to follow up with Dr Luis Fernando Parker exercise program: PT, OT  Continued need for the following skills: Nursing, PT, OT  Patient and/or caregiver notified and agree to changes in the Plan of Care: No changes  The following discharge planning was discussed with the pt/caregiver:  SN to continue education of patient and discharge patient when teaching and goals are met. Patient not wearing oxygen today during SN visit and his O2 sats were low at 92%. Teaching on importance of wearing O2 continuously.

## 2021-08-02 NOTE — HOME HEALTH
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 His right is worse than his left. When questioned, pt report that his sugar was 164 today. When questioned about Telehealth, pt's spouse presents a small spiral notebook with a diary of the pt's vitals for each day. Pt's wife reports that therapist will need to keep an eye on the pt because he will want to do too much. Pain:  L foot 3/10,     R foot 8/10  Medication: Reviewed   Future MD appointments: Tuesday 8/3/21 at 11:15   Dr. Mitra Sheriff (PCP)                                         Wednesday 6/4/21 at 9:30 Oncology  O:  HEP x 10  Seated: 1. Breathing through pursed lips , 2. Toe taps, 2. LAQ , 4. BKWD low arm circles, 6. Jason Retro Shoulder Rolls, 8. Bicep curls, 10. Jason shoulder int/ext rotation with UE adduction  Standing: 3. Heel raises , 7. Mini Squats, 9. Hip ABD , 11. Hip EXT Exercises stopped by LPTA because pt is looking pale and slightly sweaty. Pt/Caregiver education: Pt instructed in CHF/COPD exercise protocol and POC and D/C planning. Pt instructed in HEP above and instructed to perform 2x's/day  A: Patient performed exrcises well with instruction, but has a tendency to not listen to cues given by his body. Progressing  fair toward goals for exrcises symptom-free. Pt continues to have deficits in strength, balance and overall endurance. Patient will benefit from continued skilled PT to improve strength and overall endurance to improve independence with ADLs. MEDICATIONS: There are no changes in medications. Medications appear to be effective at this time. Pt/CAREGIVER EDUCATION:     Pt verbalizes a good understanding of pt education; including HEP, POC and D/C planning. Equipment needs:  Other services:   P: Pt frequencies: 2 w 3, 1 w 1. Pt has 2 visits next week  There is a continued need for skilled PT to improve strength and overall endurance. Assess pt's response to PT and adjust PT session accordingly.

## 2021-08-02 NOTE — HOME HEALTH
Summary of clinical condition:. Pt had 3 day history of increasing SOB and was brought to the emergency room. He had  increasing lower extremity swelling subjective fever and chills. Upon evaluation in the emergency room he was found to have a bilateral pneumonia along with his CHF exacerbation hence inpatient hospitalization was  been requested. He was started on IV antibiotics and diuretics. He gradually improved and was discharged home [facility] 7/23/2021 [facility]. Medical History: Medical history significant for coronary artery disease, CML, diabetes mellitus, hypertension, history of TIAs in the pa ast  Medications review completed. No adverse reactions noted. Caregiver involvement: Pt lives with his wife who assists with IADLs. Patient education provided this visit:  Pt was educated about energy conservation concepts. Home exercise program:   Pt was trained in HEP of UE exercises including bilateral shoulder flexion, straight arm clap in front, and 2 hands touch neck then lumbar x 10. Pt was instructed to perform daily. Pt was trained in and performed HEP of UE exercises including bilateral shoulder flexion, straight arm clap in fron, and 2 hands touch neck then lumbar with controled breathing x 10. He was instructed to do daily. ASSESSMENT:  Pt demonstrated safe and independent transfers to all surfaces. He is able to perform a sponge bath but is unable to shower. Pt needs assistance for UE and LE dressing. He became SOB with all LE activities. Pt has decreased UE ROM and 4/5 strength throughout both UE. Pt is unable to fix meals at this time. He  has poor understanding of energy conservation. Patient Verbalized Goals:  Pt wants to get his endurance back. Continued need for the following skills: Occupational Therapy will see pt for there ex, ADL training, energy conservation education, and AE education.   Pt/Caregiver instructed on plan of care and are agreeable to plan of care at this time. Discharge planning discussed with patient and caregiver. Discharge planning as follows: Pt will be seen 1 x week x 1, 2 x week x 2 then discharge to community. Juan Parker Pt/Caregiver did verbalize understanding.

## 2021-08-03 NOTE — HOME HEALTH
"Pt is a 79 y/o male admitted with ALOC. Pt recently admitted on 10/1/19 following GLF. Pt presents to acute PT very confused and tangential/verbose throughout PT session, speaks in a loop but is redirectable. Pt demonstrated impairments in balance, gait, strength and activity tolerance which per pt report appear to be not far from pt's baseline PLOF. Pt will benefit from acute PT interventions to address present impairments.     Physical Therapy Evaluation completed.   Bed Mobility:  Supine to Sit: Minimal Assist  Transfers: Sit to Stand: Moderate Assist(progressed to min A)  Gait: Level Of Assist: Contact Guard Assist(for safety due to confusion) with Front-Wheel Walker       Plan of Care: Will benefit from Physical Therapy 3 times per week  Discharge Recommendations: Equipment: Will Continue to Assess for Equipment Needs.   Post-acute therapy: Need to confirm pt's home environment, social support and PLOF with family/friends.   At this time would recommend placement; however, should pt functionally progress as confusion clears DC recommendations may change.       See \"Rehab Therapy-Acute\" Patient Summary Report for complete documentation.     " Skilled reason for visit: Patient was recently hospitalized for Cardiomyopathy, requiring observation by a SN for s/s of decomposition or adverse effects resulting from newly prescribed medications. Skilled observation needed to determine if new medication regimen prescribed requires modifications or other therapeutic interventions considered until pt's clinical condition or treatment has stabilized. Caregiver involvement:  Patient's caregiver is his spouse. Caregiver assists patient with bathing, dressing, walking, bathroom, meal prep and setup, medication management, grocery shopping, household chores, transportation to MD appointment and home exercise program.  Medications reconciled and all medications are available in the home this visit. The following education was provided regarding medications, medication interactions, and look alike medications:  carvediloL (COREG) 25 mg tablet  -  Report medication questions or  problems to [address] or MD.  Medications are effective at this time. Patient states understanding. Patient education provided this visit:  . X. Marylou Kid [facility] Disease Management: A fib, DM, HTN, CHF,  pain management, constipation prevention, fall precautions, s/s of infection    Goals/teaching progressing. Patient's goal is to regain his strength. Patient has remained free from falls, free from infection; no safety concerns at this time and is ambulating independently with walker. SN to complete education of patient and patient to follow up with Dr Latrice Wong exercise program: PT/OT  Continued need for the following skills: Nursing, PT, OT  Patient and/or caregiver notified and agree to changes in the Plan of Care: YES - Patient notified of  SN discharge on next SN visit.  5 day notification of DC was discussed with patient/cg. Patient/CG signed [facility] was signed on 8/3/2021.   The following discharge planning was discussed with the pt/caregiver:  SN to continue education of patient and discharge patient when teaching and goals are met.

## 2021-08-03 NOTE — HOME HEALTH
S:  Pt asks LPTA to look at his R foot. Pt dropped a can of soup on the top of his foot, and it still hurts. Pt states that it hurts when he walks. Pt is on blood thinner and he bruises easily. Pain:  Pt is unable to quantify his foot pain. Medication: Reviewed   Future MD appointments: 8/3/21 at 11:15 Dr. Tho Kat, PCP                                        8/4/21 at 9:30 Dr. Dianna Willis, Oncology  O:  Assess R foot. Pt has bruise on dorsum of R foot. Pt is point tender on the bruise, Percussion on met heads negative for pain, bowing metatrsals does not produce pain. Pt instructed to show MD at next visit. HEP x 20  Seated: 1. Breathing through pursed lips , 2. Toe taps, 2. LAQ , 4. BKWD low arm circles, 6. Jason Retro Shoulder Rolls, 8. Bicep curls  Standing: 3. Heel raises , 7. Mini Squats, Pt is fatigued, so PT session ended. Pt/Caregiver education: Review HEP, POC and D/C planning. Pt instructed in HEP above and instructed to perform 2x's/day  A: Patient progress toward goals fair due to lack of endurance that leads to fatigue. Pt continues to have deficits in strength, balance and overall endurance. Patient will benefit from continued intervention with progress of CHF protocol. MEDICATIONS: There are no changes in medications. Medications appear to be effective at this time. Pt/CAREGIVER EDUCATION:     Pt verbalizes a good understanding of pt education; including HEP, POC and D/C planning. Equipment needs:  Other services:   P: Pt frequencies:2 w 3, 1 w 1. Pt has one remaining visit this week. There is a continued need for skilled PT to progress HEP. Address gait and stair training next visit and perform therapy based assessment.

## 2021-08-05 NOTE — HOME HEALTH
Patient/Caregiver Subjective:  Pt said he saw his doctor today and he changed his Gabapentin. Caregiver involvement: Pt's wife assists with IADLs. She was instructed to assist him to perform HEP. Medications reconciled and all medications are available in the home this visit. Patient education provided this visit:  Pt was educated about energy conservation. Home exercise program: See intervention. Progress toward goals: Pt was very tired from going to 2 doctor's appointments today. He only allowed there ex and energy conservation education. He verbalized improved understanding of energy conservation techniques. He was able to perform all of his exercises but needed VC to perform correctly. Continued need for the following skills: Pt continues to have decreased endurance for performing ADLs and IADLs. He requires further education for energy conservation and AE education. He will be seen for there ex, ADL,IADL, and AE education  The following discharge planning was discussed with the pt/caregiver: Pt will be seen 1 x 1 week, 2 x week x 1 then discharged when goals are met.

## 2021-08-07 NOTE — HOME HEALTH
S:  They have increased my Gabapentin. Pt has told the OT. Discuss the possiblity of Cardiac Rehab, but pt does not appear to be interested. He has too much going on to have other appointments. Pain:  0/10 with Gabapentin  Medication: Reviewed   Future MD appointments: 8/19/21 at 15:15  O:       TINETTI SCORE:       BALANCE       +       GAIT       =       TOTAL           7/27/21                       7              +           4         =          11           8/5/21                        13             +           9         =          22  Transfer training: Pt performs stand to sit and sit to supine transfers MOD I and safely. Bed mobility: Pt performs bed mobility independent  Gait Training: Pt amb 80ft with O2 line trailing behind without much diffiulty. Stair Training: Pt ascends/descends 4 steps with one rail MOD I. Pt uses reciprocal gait in both directions. Pt/Caregiver education: Pt instructed in diabetic footcare and the benefits of Outpt Cardiac Rehab. Pt instructed in HEP above and instructed to perform 2x's/day  A: Patient is able to   Progressing well toward goals for balance and gait. This is evident through an improved Tinetti Score. Pt continues to have deficits in strength and overall endurance. Patient will benefit from continued intervention with progression of gait training withing optimal levels. Pt does not always listen to cues of fatigue from his body. MEDICATIONS: There are no changes in medications. Medications appear to be effective at this time. Pt/CAREGIVER EDUCATION:     Pt verbalizes a good understanding of pt education; including HEP, diabetic footcare, benefit of cardiac Rehab and D/C planning. Equipment needs:  Other services:   P: Pt frequencies:   2 w 3, 1 w 1. Pt has two visits next week. There is a continued need for skilled PT to address gait training. Amb outside weather allowing for distance and managing O2. Les Masterson

## 2021-08-07 NOTE — HOME HEALTH
Patient/Caregiver Subjective:  Pt said he is sleeping better and thinks it is because of the exercises. Caregiver involvement: Pt's wife assists with IADLs. She was trained how to prompt pt in breathing techniques. Medications reconciled and all medications are available in the home this visit. Patient education provided this visit:   Pt was educated about AE and how they can assist with conserving energy. Home exercise program: See intervention  Progress toward goals: Pt was improved in LE dressing. He was able to don and doff socks and shoes independently and was not as SOB as initial visit. He would not use AE. He demonstrated safer retrieval of items from the refrigerator and cabinets. Continued need for the following skills: Pt continues to have decreased UE function, poor endurance and does not understand energy conservation, and IADLs. He will be seen for there ex, ADL, IADL, and energy conservation education. The following discharge planning was discussed with the pt/caregiver: Pt will be seen 2 x week x 1 then discharge when goals are met.

## 2021-08-10 NOTE — HOME HEALTH
Skilled reason for visit: Patient was recently hospitalized for Cardiomyopathy, requiring observation by a SN for s/s of decomposition or adverse effects resulting from newly prescribed medications. Skilled observation needed to determine if new medication regimen prescribed requires modifications or other therapeutic interventions considered until pt's clinical condition or treatment has stabilized. Caregiver involvement: Patient lives with his spouse5 day notification of DC was discussed with patient/cg. Caregiver assists patient with meal prep and setup, medication management, grocery shopping, household chores, transportation to MD appointment and home exercise program.    Medications reconciled and all medications are available in the home this visit. The following education was provided regarding medications, medication interactions, and look alike medications:  Report medication questions or  problems to [address] or MD.  Medications are effective at this time. Patient states understanding. Patient education provided this visit:  Reviewed all meds with patient on discharge, all questions addressed and answered. Progress toward goals: Goals/teaching completed. Patient is safe at home. SN completed education of patient and patient to follow up with PCP  Home exercise program: PT   Continued need for the following skills:   Patient and/or caregiver notified and agrees to changes in the Plan of Care YES  - 5 day notification of DC was discussed with patient/cg on last SN visit. [address] signed 8/3/2021  The following discharge planning was discussed with the pt/caregiver: SN discharge with teaching and goals met.   Patient states understanding of patient's meds, precautions and interactions        SN discipline discharge 8/10/2021 with goals met - Case communication with PT and OT

## 2021-08-11 NOTE — HOME HEALTH
SUBJECTIVE:  I am doing okay I guess  CAREGIVER ASSISTANCE NEEDED FOR: Agnieszka Park, wife, assist with cooking  MEDICATIONS REVIEWED AND UPDATED: meds reviewed  WOUNDS:no wound  ROM: AROM L knee ext -5  BED MOBILITY: mod indep  TRANSFERS: SBA for safety  GAIT TRAINING performed in order to reduce gait impairments and reduce the risk for falls: Pt amb using no AD X 150 ft with SBA. Gait deficits noted: decreased step length, decreased colten, decreased foot clearance  STAIRS:up.down 3 steps  THERAPEUTIC EXERCISE performed in order to improve ROM  BALANCE/NMRE MMT bilat LE  PATIENT/CAREGIVER EDUCATION PROVIDED THIS VISIT: pt educated on transfers and amb   HEP consisting of:  COPD Protocol-4 pursed lips breaths, toe taps, LAQ, low arm circles, standing heelraises, elbow bent shoulder circles, mini squats, elbow flexion, hip abd, tricep extensions, hip ext,  X 15 reps. ASSESSMENT AND PROGRESS TOWARD GOALS:  Patient improving mobility and transfer safety. CONTINUED NEED FOR THE FOLLOWING SKILLS: HH PT is medically necessary to address increased pain, decreased ROM, decreased strength, increased swelling, impaired bed mobility, decreased independence with functional transfers, impaired gait, impaired stair negotiation, and impaired balance in order to improve functional independence, quality of life, return to PLOF, reduce the risk for falls, and reduce pain.   [address] [facility]  LAST COMPLETED ON:  8/10/21  PLAN: CONT PT 1wk1, 1wk1    DISCHARGE PLANNING DISCUSSED: Discharge to self and family under MD supervision once all goals have been met or patient has reached maximum potential.

## 2021-08-12 NOTE — HOME HEALTH
Patient/Caregiver Subjective:  He said his electricity was out in the morning and he had to switch to his O2 tank. Caregiver involvement: Pt's wife assists with laundry. Medications reconciled and all medications are available in the home this visit    Patient education provided this visit:  Pt was educated about use of AE to conserve energy but he did not ant to use them. Home exercise program: See intervention  Progress toward goals: Pt has improved his bed transfers even with his mattress overlay. Pt has improved his dressing. He is independent with UE and LE dressing. Pt is able to give himself a sponge bath. He can transfer safely into the shower but his shower is broken and he prefers to take a sponge bath. Continued need for the following skills: Pt continues to have decrease UE endurance for ADL. He has decreased ability to perform meal preporation. He will be seen further ex and IADL training.   The following discharge planning was discussed with the pt/caregiver: Pt will be seen 1 more visit then discharged to community under supervision of his MD.

## 2021-08-13 NOTE — Clinical Note
Progress toward goals: Pt has improved his shoulder ROM from 120 to 140 degrees of flexion. He has improved his UE endurance to good for ADLs and IADLs. Pt verbalized understanding of energy conservation but would not use AE for dressing. He is now safe and independent for all transfers. Pt has met all his goals and is discharged from OT.

## 2021-08-13 NOTE — HOME HEALTH
SUBJECTIVE:  I feel fine this morning    CAREGIVER ASSISTANCE NEEDED FOR: Randal Vuong, wife, assist with household chores    MEDICATIONS REVIEWED AND UPDATED: meds reviewed    WOUNDS:skin intact    ROM: AROM R knee ext -5    BED MOBILITY: mod indep    TRANSFERS: mod indep    GAIT TRAINING performed in order to reduce gait impairments and reduce the risk for falls: Pt amb using no AD X 300 ft with SBA. Gait deficits noted: decreased step length. VC to correct stride length. STAIRS:up.down 3 steps    THERAPEUTIC EXERCISE performed in order to improve gait deviations    BALANCE/NMRE MMT bilat LE 4-/5    PATIENT/CAREGIVER EDUCATION PROVIDED THIS VISIT: pt educated on DC instructions    HEP consisting of:    COPD Protocol-4 pursed lips breaths, toe taps, LAQ, low arm circles, standing heelraises, elbow bent shoulder circles, mini squats, elbow flexion, hip abd, tricep extensions, hip ext, X 30 reps. ASSESSMENT AND PROGRESS TOWARD GOALS:  Patient ROM improved to -5, MMT increased to 4-/5, gait training improved to 300 ft outside, pain reduced to 0/10, able to perform CHF potocol 30 reps. CONTINUED NEED FOR THE FOLLOWING SKILLS: HH PT is medically necessary to address increased pain, decreased ROM, decreased strength, increased swelling, impaired bed mobility, decreased independence with functional transfers, impaired gait, impaired stair negotiation, and impaired balance in order to improve functional independence, quality of life, return to PLOF, reduce the risk for falls, and reduce pain.     [address] LAST COMPLETED ON:  8/10/21    PLAN: DC PT next visit    DISCHARGE PLANNING DISCUSSED: Discharge to self and family under MD supervision once all goals have been met or patient has reached maximum potential.

## 2021-08-15 NOTE — HOME HEALTH
Patient/Caregiver Subjective:  Pt said he is making homemade pizza tonight. Pt said he is almost back to pre hospital level but still gets SOB faster. Caregiver involvement: Pt's wife no longer has to help him. Medications reconciled and all medications are available in the home this visit. Patient education provided this visit:  I reminded pt to rest periodically during IADLs to avoid SOB. Home exercise program: See intervention  Progress toward goals: Pt has improved his shoulder ROM from 120 to 140 degrees of flexion. He has improved his UE endurance to good for ADLs and IADLs. Pt verbalized understanding of energy conservation but would not use AE for dressing. He is now safe and independent for all transfers. Pt has met all his goals and is discharged from OT.

## 2021-08-16 NOTE — Clinical Note
Allymeera Abbottbarbararebeca has completed 7 home health PT sessions with treatments focused on functional activity tolerance, gait training, and transfer training. Pt is discharged from home health PT at this time as all goals have been met.

## 2021-08-16 NOTE — HOME HEALTH
SUBJECTIVE: Pt reports he is doing well. He reports he has to take rest breaks sometimes, but is seeing improvement in his endurance. He is in agreement with discharge. MEDICATIONS REVIEWED AND UPDATED: Medications reviewed. No medication changes noted. No questions/concerns. .  WOUNDS: Pt denies any wounds. ROM:  LE ROM WFL. STRENGTH: FTSTS 14.9 seconds indicating improved functional strength. (Goal met)  BED MOBILITY: MI (Goal met)  TRANSFERS: All transfers MI. (Goal met)  GAIT: Gait training x 300 feet without an AD with patient independently managing oxygen cord. Pt demonstrating good step length and foot clearance. (Goal met)  STAIRS: Stair negotiation x 3 steps with B UE assist. Pt MI with reciprocal pattern. (Goal met)  BALANCE/NEUROMUSCULAR REEDUCATION:  Tinetti 25/28 indicating reduced risk for falls. (Goal met)   . PATIENT/CAREGIVER EDUCATION PROVIDED THIS VISIT: safety, HEP, walking, deep breathing, energy conservation, continued vital sign monitoring, and progress towards goals. HEP consisting of:  1. Continue existing HEP. Brandan Garcia PROGRESS: Olyamaueene Homans has completed 7 home health PT sessions with treatments focused on functional activity tolerance, gait training, and transfer training. Pt is discharged from home health PT at this time as all goals have been met. Brandan Garcia   PLAN: Discharge patient from [facility] PT as patient has met all goals

## 2022-01-01 ENCOUNTER — APPOINTMENT (OUTPATIENT)
Dept: GENERAL RADIOLOGY | Age: 82
DRG: 291 | End: 2022-01-01
Attending: EMERGENCY MEDICINE
Payer: MEDICARE

## 2022-01-01 ENCOUNTER — APPOINTMENT (OUTPATIENT)
Dept: GENERAL RADIOLOGY | Age: 82
End: 2022-01-01
Attending: EMERGENCY MEDICINE
Payer: MEDICARE

## 2022-01-01 ENCOUNTER — HOSPITAL ENCOUNTER (EMERGENCY)
Age: 82
Discharge: HOME | End: 2022-02-04
Attending: EMERGENCY MEDICINE
Payer: MEDICARE

## 2022-01-01 ENCOUNTER — HOSPITAL ENCOUNTER (OUTPATIENT)
Dept: WOUND CARE | Age: 82
Discharge: HOME | End: 2022-02-21
Attending: FAMILY MEDICINE
Payer: MEDICARE

## 2022-01-01 ENCOUNTER — HOSPITAL ENCOUNTER (OUTPATIENT)
Dept: WOUND CARE | Age: 82
Discharge: HOME | End: 2022-02-28
Attending: FAMILY MEDICINE
Payer: MEDICARE

## 2022-01-01 ENCOUNTER — ANESTHESIA EVENT (OUTPATIENT)
Dept: INTERVENTIONAL RADIOLOGY/VASCULAR | Age: 82
DRG: 291 | End: 2022-01-01
Payer: MEDICARE

## 2022-01-01 ENCOUNTER — APPOINTMENT (OUTPATIENT)
Dept: GENERAL RADIOLOGY | Age: 82
DRG: 291 | End: 2022-01-01
Attending: INTERNAL MEDICINE
Payer: MEDICARE

## 2022-01-01 ENCOUNTER — HOSPITAL ENCOUNTER (EMERGENCY)
Age: 82
Discharge: HOME | End: 2022-02-17
Attending: EMERGENCY MEDICINE
Payer: MEDICARE

## 2022-01-01 ENCOUNTER — APPOINTMENT (OUTPATIENT)
Dept: INTERVENTIONAL RADIOLOGY/VASCULAR | Age: 82
DRG: 291 | End: 2022-01-01
Attending: STUDENT IN AN ORGANIZED HEALTH CARE EDUCATION/TRAINING PROGRAM
Payer: MEDICARE

## 2022-01-01 ENCOUNTER — HOSPITAL ENCOUNTER (INPATIENT)
Age: 82
LOS: 6 days | DRG: 291 | End: 2022-03-10
Attending: EMERGENCY MEDICINE | Admitting: HOSPITALIST
Payer: MEDICARE

## 2022-01-01 ENCOUNTER — ANESTHESIA (OUTPATIENT)
Dept: INTERVENTIONAL RADIOLOGY/VASCULAR | Age: 82
DRG: 291 | End: 2022-01-01
Payer: MEDICARE

## 2022-01-01 ENCOUNTER — APPOINTMENT (OUTPATIENT)
Dept: VASCULAR SURGERY | Age: 82
End: 2022-01-01
Attending: EMERGENCY MEDICINE
Payer: MEDICARE

## 2022-01-01 ENCOUNTER — APPOINTMENT (OUTPATIENT)
Dept: GENERAL RADIOLOGY | Age: 82
DRG: 291 | End: 2022-01-01
Attending: HOSPITALIST
Payer: MEDICARE

## 2022-01-01 VITALS
DIASTOLIC BLOOD PRESSURE: 72 MMHG | OXYGEN SATURATION: 100 % | HEART RATE: 121 BPM | TEMPERATURE: 97.8 F | RESPIRATION RATE: 18 BRPM | SYSTOLIC BLOOD PRESSURE: 125 MMHG

## 2022-01-01 VITALS
DIASTOLIC BLOOD PRESSURE: 64 MMHG | SYSTOLIC BLOOD PRESSURE: 117 MMHG | BODY MASS INDEX: 29.63 KG/M2 | HEIGHT: 70 IN | OXYGEN SATURATION: 95 % | TEMPERATURE: 97.5 F | WEIGHT: 207 LBS | RESPIRATION RATE: 11 BRPM | HEART RATE: 78 BPM

## 2022-01-01 VITALS
OXYGEN SATURATION: 100 % | TEMPERATURE: 97.5 F | DIASTOLIC BLOOD PRESSURE: 67 MMHG | RESPIRATION RATE: 16 BRPM | SYSTOLIC BLOOD PRESSURE: 124 MMHG | HEART RATE: 77 BPM

## 2022-01-01 VITALS
DIASTOLIC BLOOD PRESSURE: 53 MMHG | SYSTOLIC BLOOD PRESSURE: 94 MMHG | BODY MASS INDEX: 28.85 KG/M2 | OXYGEN SATURATION: 100 % | RESPIRATION RATE: 25 BRPM | HEART RATE: 110 BPM | TEMPERATURE: 97.8 F | WEIGHT: 201.06 LBS

## 2022-01-01 VITALS
RESPIRATION RATE: 26 BRPM | SYSTOLIC BLOOD PRESSURE: 108 MMHG | HEART RATE: 117 BPM | DIASTOLIC BLOOD PRESSURE: 73 MMHG | TEMPERATURE: 97.9 F | BODY MASS INDEX: 29.92 KG/M2 | WEIGHT: 209 LBS | OXYGEN SATURATION: 94 % | HEIGHT: 70 IN

## 2022-01-01 DIAGNOSIS — R60.0 LOWER EXTREMITY EDEMA: Primary | ICD-10-CM

## 2022-01-01 DIAGNOSIS — I50.43 ACUTE ON CHRONIC COMBINED SYSTOLIC AND DIASTOLIC CHF (CONGESTIVE HEART FAILURE) (HCC): ICD-10-CM

## 2022-01-01 DIAGNOSIS — E87.79 CARDIAC VOLUME OVERLOAD: Primary | ICD-10-CM

## 2022-01-01 DIAGNOSIS — N18.32 STAGE 3B CHRONIC KIDNEY DISEASE (HCC): ICD-10-CM

## 2022-01-01 DIAGNOSIS — N18.4 CKD (CHRONIC KIDNEY DISEASE) STAGE 4, GFR 15-29 ML/MIN (HCC): ICD-10-CM

## 2022-01-01 DIAGNOSIS — R60.0 BILATERAL LEG EDEMA: Primary | ICD-10-CM

## 2022-01-01 DIAGNOSIS — R60.0 LEG EDEMA: ICD-10-CM

## 2022-01-01 DIAGNOSIS — L97.221 CHRONIC ULCER OF LEFT CALF LIMITED TO BREAKDOWN OF SKIN (HCC): ICD-10-CM

## 2022-01-01 DIAGNOSIS — L89.151 PRESSURE INJURY OF SACRAL REGION, STAGE 1: Primary | ICD-10-CM

## 2022-01-01 DIAGNOSIS — E88.09 HYPOALBUMINEMIA: ICD-10-CM

## 2022-01-01 LAB
ALBUMIN SERPL-MCNC: 2.8 G/DL (ref 3.4–5)
ALBUMIN SERPL-MCNC: 2.8 G/DL (ref 3.4–5)
ALBUMIN SERPL-MCNC: 3 G/DL (ref 3.4–5)
ALBUMIN SERPL-MCNC: 3.1 G/DL (ref 3.4–5)
ALBUMIN SERPL-MCNC: 3.2 G/DL (ref 3.4–5)
ALBUMIN/GLOB SERPL: 0.7 {RATIO} (ref 0.8–1.7)
ALBUMIN/GLOB SERPL: 0.8 {RATIO} (ref 0.8–1.7)
ALBUMIN/GLOB SERPL: 0.8 {RATIO} (ref 0.8–1.7)
ALP SERPL-CCNC: 109 U/L (ref 45–117)
ALP SERPL-CCNC: 113 U/L (ref 45–117)
ALP SERPL-CCNC: 117 U/L (ref 45–117)
ALP SERPL-CCNC: 95 U/L (ref 45–117)
ALP SERPL-CCNC: 99 U/L (ref 45–117)
ALT SERPL-CCNC: 12 U/L (ref 16–61)
ALT SERPL-CCNC: 14 U/L (ref 16–61)
ALT SERPL-CCNC: 14 U/L (ref 16–61)
ALT SERPL-CCNC: 15 U/L (ref 16–61)
ALT SERPL-CCNC: 16 U/L (ref 16–61)
ANION GAP SERPL CALC-SCNC: 10 MMOL/L (ref 3–18)
ANION GAP SERPL CALC-SCNC: 10 MMOL/L (ref 3–18)
ANION GAP SERPL CALC-SCNC: 3 MMOL/L (ref 3–18)
ANION GAP SERPL CALC-SCNC: 7 MMOL/L (ref 3–18)
ANION GAP SERPL CALC-SCNC: 9 MMOL/L (ref 3–18)
APPEARANCE UR: CLEAR
APTT PPP: 39 SEC (ref 23–36.4)
ARTERIAL PATENCY WRIST A: POSITIVE
ARTERIAL PATENCY WRIST A: POSITIVE
AST SERPL-CCNC: 19 U/L (ref 10–38)
AST SERPL-CCNC: 20 U/L (ref 10–38)
AST SERPL-CCNC: 21 U/L (ref 10–38)
AST SERPL-CCNC: 23 U/L (ref 10–38)
AST SERPL-CCNC: 23 U/L (ref 10–38)
BACTERIA URNS QL MICRO: NEGATIVE /HPF
BACTERIA URNS QL MICRO: NEGATIVE /HPF
BASE EXCESS BLD CALC-SCNC: 15 MMOL/L
BASE EXCESS BLD CALC-SCNC: 16 MMOL/L
BASE EXCESS BLD CALC-SCNC: 16 MMOL/L
BASE EXCESS BLD CALC-SCNC: 9.4 MMOL/L
BASOPHILS # BLD: 0 K/UL (ref 0–0.1)
BASOPHILS # BLD: 0.2 K/UL (ref 0–0.1)
BASOPHILS # BLD: 0.3 K/UL (ref 0–0.1)
BASOPHILS NFR BLD: 0 % (ref 0–2)
BASOPHILS NFR BLD: 2 % (ref 0–2)
BASOPHILS NFR BLD: 3 % (ref 0–2)
BDY SITE: ABNORMAL
BILIRUB SERPL-MCNC: 0.6 MG/DL (ref 0.2–1)
BILIRUB SERPL-MCNC: 0.6 MG/DL (ref 0.2–1)
BILIRUB SERPL-MCNC: 0.8 MG/DL (ref 0.2–1)
BILIRUB UR QL: NEGATIVE
BNP SERPL-MCNC: 5658 PG/ML (ref 0–1800)
BNP SERPL-MCNC: 5819 PG/ML (ref 0–1800)
BNP SERPL-MCNC: 5937 PG/ML (ref 0–1800)
BODY TEMPERATURE: 97.6
BODY TEMPERATURE: 98
BUN SERPL-MCNC: 102 MG/DL (ref 7–18)
BUN SERPL-MCNC: 109 MG/DL (ref 7–18)
BUN SERPL-MCNC: 110 MG/DL (ref 7–18)
BUN SERPL-MCNC: 114 MG/DL (ref 7–18)
BUN SERPL-MCNC: 120 MG/DL (ref 7–18)
BUN SERPL-MCNC: 122 MG/DL (ref 7–18)
BUN SERPL-MCNC: 125 MG/DL (ref 7–18)
BUN SERPL-MCNC: 79 MG/DL (ref 7–18)
BUN SERPL-MCNC: 95 MG/DL (ref 7–18)
BUN/CREAT SERPL: 34 (ref 12–20)
BUN/CREAT SERPL: 35 (ref 12–20)
BUN/CREAT SERPL: 35 (ref 12–20)
BUN/CREAT SERPL: 36 (ref 12–20)
BUN/CREAT SERPL: 37 (ref 12–20)
BUN/CREAT SERPL: 38 (ref 12–20)
BUN/CREAT SERPL: 38 (ref 12–20)
CA-I SERPL-SCNC: 1.1 MMOL/L (ref 1.12–1.32)
CA-I SERPL-SCNC: 1.18 MMOL/L (ref 1.12–1.32)
CALCIUM SERPL-MCNC: 8.6 MG/DL (ref 8.5–10.1)
CALCIUM SERPL-MCNC: 8.7 MG/DL (ref 8.5–10.1)
CALCIUM SERPL-MCNC: 8.8 MG/DL (ref 8.5–10.1)
CALCIUM SERPL-MCNC: 8.8 MG/DL (ref 8.5–10.1)
CALCIUM SERPL-MCNC: 8.9 MG/DL (ref 8.5–10.1)
CALCIUM SERPL-MCNC: 9 MG/DL (ref 8.5–10.1)
CALCIUM SERPL-MCNC: 9.1 MG/DL (ref 8.5–10.1)
CALCULATED R AXIS, ECG10: -55 DEGREES
CALCULATED R AXIS, ECG10: -60 DEGREES
CALCULATED R AXIS, ECG10: -66 DEGREES
CALCULATED T AXIS, ECG11: -157 DEGREES
CALCULATED T AXIS, ECG11: -157 DEGREES
CALCULATED T AXIS, ECG11: 141 DEGREES
CHLORIDE SERPL-SCNC: 88 MMOL/L (ref 100–111)
CHLORIDE SERPL-SCNC: 88 MMOL/L (ref 100–111)
CHLORIDE SERPL-SCNC: 90 MMOL/L (ref 100–111)
CHLORIDE SERPL-SCNC: 91 MMOL/L (ref 100–111)
CHLORIDE SERPL-SCNC: 93 MMOL/L (ref 100–111)
CHLORIDE SERPL-SCNC: 95 MMOL/L (ref 100–111)
CHLORIDE SERPL-SCNC: 96 MMOL/L (ref 100–111)
CK MB CFR SERPL CALC: 4.8 % (ref 0–4)
CK MB SERPL-MCNC: 3 NG/ML (ref 5–25)
CK SERPL-CCNC: 62 U/L (ref 39–308)
CO2 SERPL-SCNC: 32 MMOL/L (ref 21–32)
CO2 SERPL-SCNC: 34 MMOL/L (ref 21–32)
CO2 SERPL-SCNC: 35 MMOL/L (ref 21–32)
CO2 SERPL-SCNC: 36 MMOL/L (ref 21–32)
CO2 SERPL-SCNC: 37 MMOL/L (ref 21–32)
CO2 SERPL-SCNC: 38 MMOL/L (ref 21–32)
CO2 SERPL-SCNC: 38 MMOL/L (ref 21–32)
COLOR UR: YELLOW
CREAT SERPL-MCNC: 2.26 MG/DL (ref 0.6–1.3)
CREAT SERPL-MCNC: 2.82 MG/DL (ref 0.6–1.3)
CREAT SERPL-MCNC: 2.93 MG/DL (ref 0.6–1.3)
CREAT SERPL-MCNC: 2.95 MG/DL (ref 0.6–1.3)
CREAT SERPL-MCNC: 3.05 MG/DL (ref 0.6–1.3)
CREAT SERPL-MCNC: 3.13 MG/DL (ref 0.6–1.3)
CREAT SERPL-MCNC: 3.14 MG/DL (ref 0.6–1.3)
CREAT SERPL-MCNC: 3.36 MG/DL (ref 0.6–1.3)
CREAT SERPL-MCNC: 3.37 MG/DL (ref 0.6–1.3)
DIAGNOSIS, 93000: NORMAL
DIFFERENTIAL METHOD BLD: ABNORMAL
EOSINOPHIL # BLD: 0 K/UL (ref 0–0.4)
EOSINOPHIL # BLD: 0.1 K/UL (ref 0–0.4)
EOSINOPHIL NFR BLD: 0 % (ref 0–5)
EOSINOPHIL NFR BLD: 1 % (ref 0–5)
EPITH CASTS URNS QL MICRO: ABNORMAL /LPF (ref 0–5)
EPITH CASTS URNS QL MICRO: NEGATIVE /LPF (ref 0–5)
ERYTHROCYTE [DISTWIDTH] IN BLOOD BY AUTOMATED COUNT: 19.4 % (ref 11.6–14.5)
ERYTHROCYTE [DISTWIDTH] IN BLOOD BY AUTOMATED COUNT: 19.4 % (ref 11.6–14.5)
ERYTHROCYTE [DISTWIDTH] IN BLOOD BY AUTOMATED COUNT: 19.5 % (ref 11.6–14.5)
ERYTHROCYTE [DISTWIDTH] IN BLOOD BY AUTOMATED COUNT: 19.6 % (ref 11.6–14.5)
ERYTHROCYTE [DISTWIDTH] IN BLOOD BY AUTOMATED COUNT: 19.9 % (ref 11.6–14.5)
EST. AVERAGE GLUCOSE BLD GHB EST-MCNC: 166 MG/DL
GAS FLOW.O2 O2 DELIVERY SYS: ABNORMAL L/MIN
GAS FLOW.O2 SETTING OXYMISER: 12 BPM
GAS FLOW.O2 SETTING OXYMISER: 14 BPM
GAS FLOW.O2 SETTING OXYMISER: 14 BPM
GAS FLOW.O2 SETTING OXYMISER: 16 BPM
GLOBULIN SER CALC-MCNC: 3.6 G/DL (ref 2–4)
GLOBULIN SER CALC-MCNC: 3.9 G/DL (ref 2–4)
GLOBULIN SER CALC-MCNC: 4 G/DL (ref 2–4)
GLOBULIN SER CALC-MCNC: 4.1 G/DL (ref 2–4)
GLOBULIN SER CALC-MCNC: 4.2 G/DL (ref 2–4)
GLUCOSE BLD STRIP.AUTO-MCNC: 135 MG/DL (ref 70–110)
GLUCOSE BLD STRIP.AUTO-MCNC: 139 MG/DL (ref 70–110)
GLUCOSE BLD STRIP.AUTO-MCNC: 147 MG/DL (ref 70–110)
GLUCOSE BLD STRIP.AUTO-MCNC: 148 MG/DL (ref 70–110)
GLUCOSE BLD STRIP.AUTO-MCNC: 149 MG/DL (ref 70–110)
GLUCOSE BLD STRIP.AUTO-MCNC: 154 MG/DL (ref 70–110)
GLUCOSE BLD STRIP.AUTO-MCNC: 155 MG/DL (ref 70–110)
GLUCOSE BLD STRIP.AUTO-MCNC: 166 MG/DL (ref 70–110)
GLUCOSE BLD STRIP.AUTO-MCNC: 182 MG/DL (ref 70–110)
GLUCOSE BLD STRIP.AUTO-MCNC: 182 MG/DL (ref 70–110)
GLUCOSE BLD STRIP.AUTO-MCNC: 196 MG/DL (ref 70–110)
GLUCOSE BLD STRIP.AUTO-MCNC: 201 MG/DL (ref 70–110)
GLUCOSE BLD STRIP.AUTO-MCNC: 219 MG/DL (ref 70–110)
GLUCOSE BLD STRIP.AUTO-MCNC: 227 MG/DL (ref 70–110)
GLUCOSE BLD STRIP.AUTO-MCNC: 236 MG/DL (ref 70–110)
GLUCOSE BLD STRIP.AUTO-MCNC: 267 MG/DL (ref 70–110)
GLUCOSE BLD STRIP.AUTO-MCNC: 291 MG/DL (ref 70–110)
GLUCOSE SERPL-MCNC: 138 MG/DL (ref 74–99)
GLUCOSE SERPL-MCNC: 140 MG/DL (ref 74–99)
GLUCOSE SERPL-MCNC: 147 MG/DL (ref 74–99)
GLUCOSE SERPL-MCNC: 150 MG/DL (ref 74–99)
GLUCOSE SERPL-MCNC: 158 MG/DL (ref 74–99)
GLUCOSE SERPL-MCNC: 158 MG/DL (ref 74–99)
GLUCOSE SERPL-MCNC: 174 MG/DL (ref 74–99)
GLUCOSE SERPL-MCNC: 198 MG/DL (ref 74–99)
GLUCOSE SERPL-MCNC: 210 MG/DL (ref 74–99)
GLUCOSE UR STRIP.AUTO-MCNC: NEGATIVE MG/DL
HBA1C MFR BLD: 7.4 % (ref 4.2–5.6)
HBV CORE AB SERPL QL IA: NEGATIVE
HBV SURFACE AB SER QL IA: NEGATIVE
HBV SURFACE AB SERPL IA-ACNC: <3.1 MIU/ML
HBV SURFACE AG SER QL: <0.1 INDEX
HBV SURFACE AG SER QL: NEGATIVE
HCO3 BLD-SCNC: 32.3 MMOL/L (ref 22–26)
HCO3 BLD-SCNC: 38.2 MMOL/L (ref 22–26)
HCO3 BLD-SCNC: 39.5 MMOL/L (ref 22–26)
HCO3 BLD-SCNC: 39.7 MMOL/L (ref 22–26)
HCT VFR BLD AUTO: 30.2 % (ref 36–48)
HCT VFR BLD AUTO: 30.6 % (ref 36–48)
HCT VFR BLD AUTO: 30.7 % (ref 36–48)
HCT VFR BLD AUTO: 30.9 % (ref 36–48)
HCT VFR BLD AUTO: 33 % (ref 36–48)
HCT VFR BLD AUTO: 33.6 % (ref 36–48)
HCT VFR BLD AUTO: 34.9 % (ref 36–48)
HEP BS AB COMMENT,HBSAC: ABNORMAL
HGB BLD-MCNC: 10.4 G/DL (ref 13–16)
HGB BLD-MCNC: 8.7 G/DL (ref 13–16)
HGB BLD-MCNC: 8.8 G/DL (ref 13–16)
HGB BLD-MCNC: 8.8 G/DL (ref 13–16)
HGB BLD-MCNC: 8.9 G/DL (ref 13–16)
HGB BLD-MCNC: 9.5 G/DL (ref 13–16)
HGB BLD-MCNC: 9.6 G/DL (ref 13–16)
HGB UR QL STRIP: ABNORMAL
HGB UR QL STRIP: ABNORMAL
HGB UR QL STRIP: NEGATIVE
IMM GRANULOCYTES # BLD AUTO: 0 K/UL
IMM GRANULOCYTES # BLD AUTO: 0 K/UL (ref 0–0.04)
IMM GRANULOCYTES # BLD AUTO: 0.1 K/UL (ref 0–0.04)
IMM GRANULOCYTES NFR BLD AUTO: 0 %
IMM GRANULOCYTES NFR BLD AUTO: 0 % (ref 0–0.5)
IMM GRANULOCYTES NFR BLD AUTO: 1 % (ref 0–0.5)
INR PPP: 1.9 (ref 0.8–1.2)
INR PPP: 2 (ref 0.8–1.2)
INR PPP: 2.3 (ref 0.8–1.2)
KETONES UR QL STRIP.AUTO: NEGATIVE MG/DL
LEUKOCYTE ESTERASE UR QL STRIP.AUTO: NEGATIVE
LYMPHOCYTES # BLD: 0.7 K/UL (ref 0.9–3.6)
LYMPHOCYTES # BLD: 0.8 K/UL (ref 0.9–3.6)
LYMPHOCYTES # BLD: 1 K/UL (ref 0.9–3.6)
LYMPHOCYTES # BLD: 1.2 K/UL (ref 0.9–3.6)
LYMPHOCYTES # BLD: 2.6 K/UL (ref 0.9–3.6)
LYMPHOCYTES NFR BLD: 10 % (ref 21–52)
LYMPHOCYTES NFR BLD: 11 % (ref 21–52)
LYMPHOCYTES NFR BLD: 20 % (ref 21–52)
LYMPHOCYTES NFR BLD: 7 % (ref 21–52)
LYMPHOCYTES NFR BLD: 8 % (ref 21–52)
LYMPHOCYTES NFR BLD: 9 % (ref 21–52)
LYMPHOCYTES NFR BLD: 9 % (ref 21–52)
MAGNESIUM SERPL-MCNC: 2.4 MG/DL (ref 1.6–2.6)
MAGNESIUM SERPL-MCNC: 2.8 MG/DL (ref 1.6–2.6)
MAGNESIUM SERPL-MCNC: 2.8 MG/DL (ref 1.6–2.6)
MAGNESIUM SERPL-MCNC: 2.9 MG/DL (ref 1.6–2.6)
MCH RBC QN AUTO: 21.6 PG (ref 24–34)
MCH RBC QN AUTO: 21.6 PG (ref 24–34)
MCH RBC QN AUTO: 21.8 PG (ref 24–34)
MCH RBC QN AUTO: 21.9 PG (ref 24–34)
MCH RBC QN AUTO: 22.1 PG (ref 24–34)
MCH RBC QN AUTO: 22.2 PG (ref 24–34)
MCH RBC QN AUTO: 23.1 PG (ref 24–34)
MCHC RBC AUTO-ENTMCNC: 28.6 G/DL (ref 31–37)
MCHC RBC AUTO-ENTMCNC: 28.7 G/DL (ref 31–37)
MCHC RBC AUTO-ENTMCNC: 28.8 G/DL (ref 31–37)
MCHC RBC AUTO-ENTMCNC: 29.8 G/DL (ref 31–37)
MCV RBC AUTO: 75 FL (ref 78–100)
MCV RBC AUTO: 75.5 FL (ref 78–100)
MCV RBC AUTO: 75.7 FL (ref 78–100)
MCV RBC AUTO: 76.4 FL (ref 78–100)
MCV RBC AUTO: 76.6 FL (ref 78–100)
MCV RBC AUTO: 77.1 FL (ref 78–100)
MCV RBC AUTO: 77.4 FL (ref 78–100)
METAMYELOCYTES NFR BLD MANUAL: 1 %
MONOCYTES # BLD: 1.2 K/UL (ref 0.05–1.2)
MONOCYTES # BLD: 1.2 K/UL (ref 0.05–1.2)
MONOCYTES # BLD: 1.3 K/UL (ref 0.05–1.2)
MONOCYTES # BLD: 1.3 K/UL (ref 0.05–1.2)
MONOCYTES # BLD: 1.4 K/UL (ref 0.05–1.2)
MONOCYTES # BLD: 1.4 K/UL (ref 0.05–1.2)
MONOCYTES # BLD: 1.6 K/UL (ref 0.05–1.2)
MONOCYTES NFR BLD: 10 % (ref 3–10)
MONOCYTES NFR BLD: 12 % (ref 3–10)
MONOCYTES NFR BLD: 12 % (ref 3–10)
MONOCYTES NFR BLD: 13 % (ref 3–10)
MONOCYTES NFR BLD: 13 % (ref 3–10)
MONOCYTES NFR BLD: 15 % (ref 3–10)
MONOCYTES NFR BLD: 15 % (ref 3–10)
MUCOUS THREADS URNS QL MICRO: ABNORMAL /LPF
NEUTS SEG # BLD: 6.9 K/UL (ref 1.8–8)
NEUTS SEG # BLD: 7.1 K/UL (ref 1.8–8)
NEUTS SEG # BLD: 7.2 K/UL (ref 1.8–8)
NEUTS SEG # BLD: 7.5 K/UL (ref 1.8–8)
NEUTS SEG # BLD: 7.7 K/UL (ref 1.8–8)
NEUTS SEG # BLD: 8.5 K/UL (ref 1.8–8)
NEUTS SEG # BLD: 8.7 K/UL (ref 1.8–8)
NEUTS SEG NFR BLD: 68 % (ref 40–73)
NEUTS SEG NFR BLD: 69 % (ref 40–73)
NEUTS SEG NFR BLD: 74 % (ref 40–73)
NEUTS SEG NFR BLD: 75 % (ref 40–73)
NEUTS SEG NFR BLD: 75 % (ref 40–73)
NEUTS SEG NFR BLD: 76 % (ref 40–73)
NEUTS SEG NFR BLD: 77 % (ref 40–73)
NITRITE UR QL STRIP.AUTO: NEGATIVE
NRBC # BLD: 0 K/UL (ref 0–0.01)
NRBC BLD-RTO: 0 PER 100 WBC
O2/TOTAL GAS SETTING VFR VENT: 30 %
O2/TOTAL GAS SETTING VFR VENT: 30 %
O2/TOTAL GAS SETTING VFR VENT: 40 %
O2/TOTAL GAS SETTING VFR VENT: 60 %
PCO2 BLD: 35.8 MMHG (ref 35–45)
PCO2 BLD: 36 MMHG (ref 35–45)
PCO2 BLD: 42.2 MMHG (ref 35–45)
PCO2 BLD: 47.2 MMHG (ref 35–45)
PEEP RESPIRATORY: 5 CMH2O
PH BLD: 7.53 [PH] (ref 7.35–7.45)
PH BLD: 7.56 [PH] (ref 7.35–7.45)
PH BLD: 7.58 [PH] (ref 7.35–7.45)
PH BLD: 7.64 [PH] (ref 7.35–7.45)
PH UR STRIP: 5 [PH] (ref 5–8)
PH UR STRIP: 6 [PH] (ref 5–8)
PH UR STRIP: 7 [PH] (ref 5–8)
PHOSPHATE SERPL-MCNC: 3 MG/DL (ref 2.5–4.9)
PLATELET # BLD AUTO: 104 K/UL (ref 135–420)
PLATELET # BLD AUTO: 108 K/UL (ref 135–420)
PLATELET # BLD AUTO: 88 K/UL (ref 135–420)
PLATELET # BLD AUTO: 89 K/UL (ref 135–420)
PLATELET # BLD AUTO: 89 K/UL (ref 135–420)
PLATELET # BLD AUTO: 93 K/UL (ref 135–420)
PLATELET # BLD AUTO: 93 K/UL (ref 135–420)
PLATELET COMMENTS,PCOM: ABNORMAL
PO2 BLD: 105 MMHG (ref 80–100)
PO2 BLD: 143 MMHG (ref 80–100)
PO2 BLD: 293 MMHG (ref 80–100)
PO2 BLD: 89 MMHG (ref 80–100)
POTASSIUM SERPL-SCNC: 2.6 MMOL/L (ref 3.5–5.5)
POTASSIUM SERPL-SCNC: 2.9 MMOL/L (ref 3.5–5.5)
POTASSIUM SERPL-SCNC: 3 MMOL/L (ref 3.5–5.5)
POTASSIUM SERPL-SCNC: 3 MMOL/L (ref 3.5–5.5)
POTASSIUM SERPL-SCNC: 3.2 MMOL/L (ref 3.5–5.5)
POTASSIUM SERPL-SCNC: 3.2 MMOL/L (ref 3.5–5.5)
POTASSIUM SERPL-SCNC: 3.3 MMOL/L (ref 3.5–5.5)
POTASSIUM SERPL-SCNC: 3.3 MMOL/L (ref 3.5–5.5)
POTASSIUM SERPL-SCNC: 3.6 MMOL/L (ref 3.5–5.5)
POTASSIUM SERPL-SCNC: 3.9 MMOL/L (ref 3.5–5.5)
POTASSIUM SERPL-SCNC: 4.2 MMOL/L (ref 3.5–5.5)
PROT SERPL-MCNC: 6.4 G/DL (ref 6.4–8.2)
PROT SERPL-MCNC: 6.7 G/DL (ref 6.4–8.2)
PROT SERPL-MCNC: 7.1 G/DL (ref 6.4–8.2)
PROT SERPL-MCNC: 7.2 G/DL (ref 6.4–8.2)
PROT SERPL-MCNC: 7.3 G/DL (ref 6.4–8.2)
PROT UR STRIP-MCNC: 30 MG/DL
PROT UR STRIP-MCNC: NEGATIVE MG/DL
PROT UR STRIP-MCNC: NEGATIVE MG/DL
PROTHROMBIN TIME: 20.7 SEC (ref 11.5–15.2)
PROTHROMBIN TIME: 22 SEC (ref 11.5–15.2)
PROTHROMBIN TIME: 24.5 SEC (ref 11.5–15.2)
Q-T INTERVAL, ECG07: 314 MS
Q-T INTERVAL, ECG07: 356 MS
Q-T INTERVAL, ECG07: 392 MS
QRS DURATION, ECG06: 82 MS
QRS DURATION, ECG06: 84 MS
QRS DURATION, ECG06: 96 MS
QTC CALCULATION (BEZET), ECG08: 412 MS
QTC CALCULATION (BEZET), ECG08: 454 MS
QTC CALCULATION (BEZET), ECG08: 505 MS
RBC # BLD AUTO: 3.94 M/UL (ref 4.35–5.65)
RBC # BLD AUTO: 3.97 M/UL (ref 4.35–5.65)
RBC # BLD AUTO: 4.02 M/UL (ref 4.35–5.65)
RBC # BLD AUTO: 4.12 M/UL (ref 4.35–5.65)
RBC # BLD AUTO: 4.36 M/UL (ref 4.35–5.65)
RBC # BLD AUTO: 4.45 M/UL (ref 4.35–5.65)
RBC # BLD AUTO: 4.51 M/UL (ref 4.35–5.65)
RBC #/AREA URNS HPF: ABNORMAL /HPF (ref 0–5)
RBC #/AREA URNS HPF: NORMAL /HPF (ref 0–5)
RBC MORPH BLD: ABNORMAL
SAO2 % BLD: 100 % (ref 92–97)
SAO2 % BLD: 98.1 % (ref 92–97)
SAO2 % BLD: 98.8 % (ref 92–97)
SAO2 % BLD: 99.4 % (ref 92–97)
SERVICE CMNT-IMP: ABNORMAL
SODIUM SERPL-SCNC: 132 MMOL/L (ref 136–145)
SODIUM SERPL-SCNC: 133 MMOL/L (ref 136–145)
SODIUM SERPL-SCNC: 133 MMOL/L (ref 136–145)
SODIUM SERPL-SCNC: 134 MMOL/L (ref 136–145)
SODIUM SERPL-SCNC: 135 MMOL/L (ref 136–145)
SODIUM SERPL-SCNC: 137 MMOL/L (ref 136–145)
SODIUM SERPL-SCNC: 138 MMOL/L (ref 136–145)
SP GR UR REFRACTOMETRY: 1.01 (ref 1–1.03)
SPECIMEN TYPE: ABNORMAL
TROPONIN-HIGH SENSITIVITY: 102 NG/L (ref 0–78)
TROPONIN-HIGH SENSITIVITY: 119 NG/L (ref 0–78)
TROPONIN-HIGH SENSITIVITY: 24 NG/L (ref 0–78)
TROPONIN-HIGH SENSITIVITY: 30 NG/L (ref 0–78)
TROPONIN-HIGH SENSITIVITY: 50 NG/L (ref 0–78)
TROPONIN-HIGH SENSITIVITY: 72 NG/L (ref 0–78)
UROBILINOGEN UR QL STRIP.AUTO: 0.2 EU/DL (ref 0.2–1)
VENTILATION MODE VENT: ABNORMAL
VENTRICULAR RATE, ECG03: 100 BPM
VENTRICULAR RATE, ECG03: 104 BPM
VENTRICULAR RATE, ECG03: 98 BPM
VT SETTING VENT: 400 ML
VT SETTING VENT: 500 ML
WBC # BLD AUTO: 10.4 K/UL (ref 4.6–13.2)
WBC # BLD AUTO: 10.8 K/UL (ref 4.6–13.2)
WBC # BLD AUTO: 11.2 K/UL (ref 4.6–13.2)
WBC # BLD AUTO: 12.8 K/UL (ref 4.6–13.2)
WBC # BLD AUTO: 9.2 K/UL (ref 4.6–13.2)
WBC # BLD AUTO: 9.4 K/UL (ref 4.6–13.2)
WBC # BLD AUTO: 9.6 K/UL (ref 4.6–13.2)
WBC URNS QL MICRO: ABNORMAL /HPF (ref 0–5)
WBC URNS QL MICRO: NORMAL /HPF (ref 0–5)

## 2022-01-01 PROCEDURE — 97535 SELF CARE MNGMENT TRAINING: CPT

## 2022-01-01 PROCEDURE — 74011250637 HC RX REV CODE- 250/637: Performed by: INTERNAL MEDICINE

## 2022-01-01 PROCEDURE — P9047 ALBUMIN (HUMAN), 25%, 50ML: HCPCS | Performed by: INTERNAL MEDICINE

## 2022-01-01 PROCEDURE — 96365 THER/PROPH/DIAG IV INF INIT: CPT

## 2022-01-01 PROCEDURE — 81001 URINALYSIS AUTO W/SCOPE: CPT

## 2022-01-01 PROCEDURE — 77010033678 HC OXYGEN DAILY

## 2022-01-01 PROCEDURE — 36415 COLL VENOUS BLD VENIPUNCTURE: CPT

## 2022-01-01 PROCEDURE — 71045 X-RAY EXAM CHEST 1 VIEW: CPT

## 2022-01-01 PROCEDURE — 84484 ASSAY OF TROPONIN QUANT: CPT

## 2022-01-01 PROCEDURE — 80053 COMPREHEN METABOLIC PANEL: CPT

## 2022-01-01 PROCEDURE — 86704 HEP B CORE ANTIBODY TOTAL: CPT

## 2022-01-01 PROCEDURE — 36600 WITHDRAWAL OF ARTERIAL BLOOD: CPT

## 2022-01-01 PROCEDURE — 94660 CPAP INITIATION&MGMT: CPT

## 2022-01-01 PROCEDURE — 85025 COMPLETE CBC W/AUTO DIFF WBC: CPT

## 2022-01-01 PROCEDURE — 65610000006 HC RM INTENSIVE CARE

## 2022-01-01 PROCEDURE — 74011000250 HC RX REV CODE- 250: Performed by: INTERNAL MEDICINE

## 2022-01-01 PROCEDURE — 74011000250 HC RX REV CODE- 250: Performed by: HOSPITALIST

## 2022-01-01 PROCEDURE — 74011636637 HC RX REV CODE- 636/637: Performed by: HOSPITALIST

## 2022-01-01 PROCEDURE — 74011000258 HC RX REV CODE- 258: Performed by: INTERNAL MEDICINE

## 2022-01-01 PROCEDURE — 74011000250 HC RX REV CODE- 250: Performed by: EMERGENCY MEDICINE

## 2022-01-01 PROCEDURE — 74011250636 HC RX REV CODE- 250/636

## 2022-01-01 PROCEDURE — 65660000000 HC RM CCU STEPDOWN

## 2022-01-01 PROCEDURE — 99285 EMERGENCY DEPT VISIT HI MDM: CPT

## 2022-01-01 PROCEDURE — 86706 HEP B SURFACE ANTIBODY: CPT

## 2022-01-01 PROCEDURE — 74011250636 HC RX REV CODE- 250/636: Performed by: EMERGENCY MEDICINE

## 2022-01-01 PROCEDURE — 5A12012 PERFORMANCE OF CARDIAC OUTPUT, SINGLE, MANUAL: ICD-10-PCS | Performed by: HOSPITALIST

## 2022-01-01 PROCEDURE — 74018 RADEX ABDOMEN 1 VIEW: CPT

## 2022-01-01 PROCEDURE — 97116 GAIT TRAINING THERAPY: CPT

## 2022-01-01 PROCEDURE — 84132 ASSAY OF SERUM POTASSIUM: CPT

## 2022-01-01 PROCEDURE — 2709999900 HC NON-CHARGEABLE SUPPLY

## 2022-01-01 PROCEDURE — P9047 ALBUMIN (HUMAN), 25%, 50ML: HCPCS | Performed by: HOSPITALIST

## 2022-01-01 PROCEDURE — 77001 FLUOROGUIDE FOR VEIN DEVICE: CPT

## 2022-01-01 PROCEDURE — 82550 ASSAY OF CK (CPK): CPT

## 2022-01-01 PROCEDURE — 74011250636 HC RX REV CODE- 250/636: Performed by: HOSPITALIST

## 2022-01-01 PROCEDURE — 0JH63XZ INSERTION OF TUNNELED VASCULAR ACCESS DEVICE INTO CHEST SUBCUTANEOUS TISSUE AND FASCIA, PERCUTANEOUS APPROACH: ICD-10-PCS | Performed by: RADIOLOGY

## 2022-01-01 PROCEDURE — 97162 PT EVAL MOD COMPLEX 30 MIN: CPT

## 2022-01-01 PROCEDURE — 99152 MOD SED SAME PHYS/QHP 5/>YRS: CPT

## 2022-01-01 PROCEDURE — 83735 ASSAY OF MAGNESIUM: CPT

## 2022-01-01 PROCEDURE — 87340 HEPATITIS B SURFACE AG IA: CPT

## 2022-01-01 PROCEDURE — 74011250636 HC RX REV CODE- 250/636: Performed by: STUDENT IN AN ORGANIZED HEALTH CARE EDUCATION/TRAINING PROGRAM

## 2022-01-01 PROCEDURE — C9113 INJ PANTOPRAZOLE SODIUM, VIA: HCPCS | Performed by: HOSPITALIST

## 2022-01-01 PROCEDURE — 94003 VENT MGMT INPAT SUBQ DAY: CPT

## 2022-01-01 PROCEDURE — P9047 ALBUMIN (HUMAN), 25%, 50ML: HCPCS | Performed by: EMERGENCY MEDICINE

## 2022-01-01 PROCEDURE — 77030020291 HC FLEXSEAL FMS BMS -C

## 2022-01-01 PROCEDURE — P9047 ALBUMIN (HUMAN), 25%, 50ML: HCPCS | Performed by: FAMILY MEDICINE

## 2022-01-01 PROCEDURE — 82962 GLUCOSE BLOOD TEST: CPT

## 2022-01-01 PROCEDURE — 74011250637 HC RX REV CODE- 250/637: Performed by: EMERGENCY MEDICINE

## 2022-01-01 PROCEDURE — 93005 ELECTROCARDIOGRAM TRACING: CPT

## 2022-01-01 PROCEDURE — 74011250636 HC RX REV CODE- 250/636: Performed by: RADIOLOGY

## 2022-01-01 PROCEDURE — 83036 HEMOGLOBIN GLYCOSYLATED A1C: CPT

## 2022-01-01 PROCEDURE — 74011250637 HC RX REV CODE- 250/637: Performed by: HOSPITALIST

## 2022-01-01 PROCEDURE — 92950 HEART/LUNG RESUSCITATION CPR: CPT

## 2022-01-01 PROCEDURE — 96366 THER/PROPH/DIAG IV INF ADDON: CPT

## 2022-01-01 PROCEDURE — 83880 ASSAY OF NATRIURETIC PEPTIDE: CPT

## 2022-01-01 PROCEDURE — 82803 BLOOD GASES ANY COMBINATION: CPT

## 2022-01-01 PROCEDURE — 74011250636 HC RX REV CODE- 250/636: Performed by: INTERNAL MEDICINE

## 2022-01-01 PROCEDURE — 97166 OT EVAL MOD COMPLEX 45 MIN: CPT

## 2022-01-01 PROCEDURE — 85730 THROMBOPLASTIN TIME PARTIAL: CPT

## 2022-01-01 PROCEDURE — 90935 HEMODIALYSIS ONE EVALUATION: CPT

## 2022-01-01 PROCEDURE — 31500 INSERT EMERGENCY AIRWAY: CPT

## 2022-01-01 PROCEDURE — 5A1945Z RESPIRATORY VENTILATION, 24-96 CONSECUTIVE HOURS: ICD-10-PCS | Performed by: HOSPITALIST

## 2022-01-01 PROCEDURE — 77030040393 HC DRSG OPTIFOAM GENT MDII -B

## 2022-01-01 PROCEDURE — 80048 BASIC METABOLIC PNL TOTAL CA: CPT

## 2022-01-01 PROCEDURE — 82553 CREATINE MB FRACTION: CPT

## 2022-01-01 PROCEDURE — 02HV33Z INSERTION OF INFUSION DEVICE INTO SUPERIOR VENA CAVA, PERCUTANEOUS APPROACH: ICD-10-PCS | Performed by: RADIOLOGY

## 2022-01-01 PROCEDURE — 94002 VENT MGMT INPAT INIT DAY: CPT

## 2022-01-01 PROCEDURE — 82330 ASSAY OF CALCIUM: CPT

## 2022-01-01 PROCEDURE — 84100 ASSAY OF PHOSPHORUS: CPT

## 2022-01-01 PROCEDURE — 85610 PROTHROMBIN TIME: CPT

## 2022-01-01 PROCEDURE — 94762 N-INVAS EAR/PLS OXIMTRY CONT: CPT

## 2022-01-01 PROCEDURE — 99233 SBSQ HOSP IP/OBS HIGH 50: CPT | Performed by: NURSE PRACTITIONER

## 2022-01-01 PROCEDURE — 74011000250 HC RX REV CODE- 250: Performed by: RADIOLOGY

## 2022-01-01 PROCEDURE — 93970 EXTREMITY STUDY: CPT

## 2022-01-01 PROCEDURE — 11042 DBRDMT SUBQ TIS 1ST 20SQCM/<: CPT

## 2022-01-01 PROCEDURE — 99222 1ST HOSP IP/OBS MODERATE 55: CPT | Performed by: NURSE PRACTITIONER

## 2022-01-01 PROCEDURE — 96375 TX/PRO/DX INJ NEW DRUG ADDON: CPT

## 2022-01-01 PROCEDURE — 11045 DBRDMT SUBQ TISS EACH ADDL: CPT

## 2022-01-01 PROCEDURE — 5A1D70Z PERFORMANCE OF URINARY FILTRATION, INTERMITTENT, LESS THAN 6 HOURS PER DAY: ICD-10-PCS | Performed by: HOSPITALIST

## 2022-01-01 PROCEDURE — 81003 URINALYSIS AUTO W/O SCOPE: CPT

## 2022-01-01 PROCEDURE — 74011250636 HC RX REV CODE- 250/636: Performed by: FAMILY MEDICINE

## 2022-01-01 PROCEDURE — 96374 THER/PROPH/DIAG INJ IV PUSH: CPT

## 2022-01-01 PROCEDURE — 0BH17EZ INSERTION OF ENDOTRACHEAL AIRWAY INTO TRACHEA, VIA NATURAL OR ARTIFICIAL OPENING: ICD-10-PCS | Performed by: HOSPITALIST

## 2022-01-01 PROCEDURE — 97110 THERAPEUTIC EXERCISES: CPT

## 2022-01-01 RX ORDER — OXYMETAZOLINE HCL 0.05 %
2 SPRAY, NON-AEROSOL (ML) NASAL
Status: DISCONTINUED | OUTPATIENT
Start: 2022-01-01 | End: 2022-01-01 | Stop reason: HOSPADM

## 2022-01-01 RX ORDER — BUMETANIDE 2 MG/1
2 TABLET ORAL 2 TIMES DAILY
Qty: 60 TABLET | Refills: 0 | Status: SHIPPED | OUTPATIENT
Start: 2022-01-01 | End: 2022-01-01 | Stop reason: SDUPTHER

## 2022-01-01 RX ORDER — FACIAL-BODY WIPES
10 EACH TOPICAL DAILY PRN
Status: DISCONTINUED | OUTPATIENT
Start: 2022-01-01 | End: 2022-01-01 | Stop reason: HOSPADM

## 2022-01-01 RX ORDER — INSULIN LISPRO 100 [IU]/ML
INJECTION, SOLUTION INTRAVENOUS; SUBCUTANEOUS EVERY 6 HOURS
Status: DISCONTINUED | OUTPATIENT
Start: 2022-01-01 | End: 2022-01-01 | Stop reason: HOSPADM

## 2022-01-01 RX ORDER — LIDOCAINE HYDROCHLORIDE 40 MG/ML
SOLUTION TOPICAL ONCE
Status: CANCELLED | OUTPATIENT
Start: 2022-01-01 | End: 2022-01-01

## 2022-01-01 RX ORDER — FLUMAZENIL 0.1 MG/ML
0.2 INJECTION INTRAVENOUS
Status: DISCONTINUED | OUTPATIENT
Start: 2022-01-01 | End: 2022-01-01

## 2022-01-01 RX ORDER — METOLAZONE 5 MG/1
5 TABLET ORAL DAILY
Status: DISCONTINUED | OUTPATIENT
Start: 2022-01-01 | End: 2022-01-01

## 2022-01-01 RX ORDER — DEXTROSE MONOHYDRATE 100 MG/ML
0-250 INJECTION, SOLUTION INTRAVENOUS AS NEEDED
Status: DISCONTINUED | OUTPATIENT
Start: 2022-01-01 | End: 2022-01-01 | Stop reason: HOSPADM

## 2022-01-01 RX ORDER — TAMSULOSIN HYDROCHLORIDE 0.4 MG/1
0.4 CAPSULE ORAL DAILY
Status: DISCONTINUED | OUTPATIENT
Start: 2022-01-01 | End: 2022-01-01

## 2022-01-01 RX ORDER — BACITRACIN ZINC AND POLYMYXIN B SULFATE 500; 1000 [USP'U]/G; [USP'U]/G
OINTMENT TOPICAL ONCE
Status: CANCELLED | OUTPATIENT
Start: 2022-01-01 | End: 2022-01-01

## 2022-01-01 RX ORDER — ALBUMIN HUMAN 250 G/1000ML
25 SOLUTION INTRAVENOUS EVERY 6 HOURS
Status: DISCONTINUED | OUTPATIENT
Start: 2022-01-01 | End: 2022-01-01 | Stop reason: HOSPADM

## 2022-01-01 RX ORDER — LIDOCAINE 50 MG/G
OINTMENT TOPICAL ONCE
Status: CANCELLED | OUTPATIENT
Start: 2022-01-01 | End: 2022-01-01

## 2022-01-01 RX ORDER — MUPIROCIN 20 MG/G
OINTMENT TOPICAL ONCE
Status: CANCELLED | OUTPATIENT
Start: 2022-01-01 | End: 2022-01-01

## 2022-01-01 RX ORDER — HEPARIN SODIUM 1000 [USP'U]/ML
10000 INJECTION, SOLUTION INTRAVENOUS; SUBCUTANEOUS
Status: COMPLETED | OUTPATIENT
Start: 2022-01-01 | End: 2022-01-01

## 2022-01-01 RX ORDER — CHLORHEXIDINE GLUCONATE 1.2 MG/ML
10 RINSE ORAL ONCE
Status: COMPLETED | OUTPATIENT
Start: 2022-01-01 | End: 2022-01-01

## 2022-01-01 RX ORDER — METOLAZONE 5 MG/1
10 TABLET ORAL DAILY
Status: DISCONTINUED | OUTPATIENT
Start: 2022-01-01 | End: 2022-01-01 | Stop reason: HOSPADM

## 2022-01-01 RX ORDER — IMATINIB MESYLATE 400 MG/1
200 TABLET, FILM COATED ORAL DAILY
Status: DISCONTINUED | OUTPATIENT
Start: 2022-01-01 | End: 2022-01-01

## 2022-01-01 RX ORDER — NALOXONE HYDROCHLORIDE 0.4 MG/ML
0.1 INJECTION, SOLUTION INTRAMUSCULAR; INTRAVENOUS; SUBCUTANEOUS
Status: DISCONTINUED | OUTPATIENT
Start: 2022-01-01 | End: 2022-01-01 | Stop reason: HOSPADM

## 2022-01-01 RX ORDER — FLUMAZENIL 0.1 MG/ML
INJECTION INTRAVENOUS
Status: DISPENSED
Start: 2022-01-01 | End: 2022-01-01

## 2022-01-01 RX ORDER — LIDOCAINE 40 MG/G
CREAM TOPICAL ONCE
Status: CANCELLED | OUTPATIENT
Start: 2022-01-01 | End: 2022-01-01

## 2022-01-01 RX ORDER — BUMETANIDE 0.25 MG/ML
1 INJECTION INTRAMUSCULAR; INTRAVENOUS ONCE
Status: COMPLETED | OUTPATIENT
Start: 2022-01-01 | End: 2022-01-01

## 2022-01-01 RX ORDER — LIDOCAINE HYDROCHLORIDE 20 MG/ML
JELLY TOPICAL ONCE
Status: CANCELLED | OUTPATIENT
Start: 2022-01-01 | End: 2022-01-01

## 2022-01-01 RX ORDER — SODIUM CHLORIDE 0.9 % (FLUSH) 0.9 %
5-40 SYRINGE (ML) INJECTION AS NEEDED
Status: DISCONTINUED | OUTPATIENT
Start: 2022-01-01 | End: 2022-01-01

## 2022-01-01 RX ORDER — ALBUMIN HUMAN 250 G/1000ML
25 SOLUTION INTRAVENOUS EVERY 6 HOURS
Status: DISCONTINUED | OUTPATIENT
Start: 2022-01-01 | End: 2022-01-01

## 2022-01-01 RX ORDER — HEPARIN SODIUM 5000 [USP'U]/ML
5000 INJECTION, SOLUTION INTRAVENOUS; SUBCUTANEOUS EVERY 8 HOURS
Status: DISCONTINUED | OUTPATIENT
Start: 2022-01-01 | End: 2022-01-01 | Stop reason: HOSPADM

## 2022-01-01 RX ORDER — HEPARIN SODIUM 1000 [USP'U]/ML
3600 INJECTION, SOLUTION INTRAVENOUS; SUBCUTANEOUS
Status: DISCONTINUED | OUTPATIENT
Start: 2022-01-01 | End: 2022-01-01 | Stop reason: HOSPADM

## 2022-01-01 RX ORDER — LIDOCAINE HYDROCHLORIDE 20 MG/ML
JELLY TOPICAL ONCE
Status: COMPLETED | OUTPATIENT
Start: 2022-01-01 | End: 2022-01-01

## 2022-01-01 RX ORDER — MIDAZOLAM HYDROCHLORIDE 1 MG/ML
.5-2 INJECTION, SOLUTION INTRAMUSCULAR; INTRAVENOUS
Status: DISCONTINUED | OUTPATIENT
Start: 2022-01-01 | End: 2022-01-01

## 2022-01-01 RX ORDER — FENTANYL CITRATE 50 UG/ML
INJECTION, SOLUTION INTRAMUSCULAR; INTRAVENOUS
Status: COMPLETED
Start: 2022-01-01 | End: 2022-01-01

## 2022-01-01 RX ORDER — METOLAZONE 2.5 MG/1
2.5 TABLET ORAL DAILY
COMMUNITY
Start: 2021-01-01 | End: 2022-01-01 | Stop reason: SDUPTHER

## 2022-01-01 RX ORDER — CEFAZOLIN SODIUM 1 G/3ML
INJECTION, POWDER, FOR SOLUTION INTRAMUSCULAR; INTRAVENOUS
Status: COMPLETED
Start: 2022-01-01 | End: 2022-01-01

## 2022-01-01 RX ORDER — CLOBETASOL PROPIONATE 0.5 MG/G
OINTMENT TOPICAL ONCE
Status: CANCELLED | OUTPATIENT
Start: 2022-01-01 | End: 2022-01-01

## 2022-01-01 RX ORDER — POTASSIUM CHLORIDE 20 MEQ/1
40 TABLET, EXTENDED RELEASE ORAL
Status: COMPLETED | OUTPATIENT
Start: 2022-01-01 | End: 2022-01-01

## 2022-01-01 RX ORDER — MAGNESIUM SULFATE 100 %
4 CRYSTALS MISCELLANEOUS AS NEEDED
Status: DISCONTINUED | OUTPATIENT
Start: 2022-01-01 | End: 2022-01-01 | Stop reason: HOSPADM

## 2022-01-01 RX ORDER — ALBUMIN HUMAN 250 G/1000ML
12.5 SOLUTION INTRAVENOUS ONCE
Status: COMPLETED | OUTPATIENT
Start: 2022-01-01 | End: 2022-01-01

## 2022-01-01 RX ORDER — BUMETANIDE 2 MG/1
2 TABLET ORAL 2 TIMES DAILY
Qty: 60 TABLET | Refills: 0 | Status: SHIPPED | OUTPATIENT
Start: 2022-01-01

## 2022-01-01 RX ORDER — NALOXONE HYDROCHLORIDE 0.4 MG/ML
INJECTION, SOLUTION INTRAMUSCULAR; INTRAVENOUS; SUBCUTANEOUS
Status: COMPLETED
Start: 2022-01-01 | End: 2022-01-01

## 2022-01-01 RX ORDER — POTASSIUM CHLORIDE 7.45 MG/ML
10 INJECTION INTRAVENOUS
Status: COMPLETED | OUTPATIENT
Start: 2022-01-01 | End: 2022-01-01

## 2022-01-01 RX ORDER — PROPOFOL 10 MG/ML
INJECTION, EMULSION INTRAVENOUS AS NEEDED
Status: DISCONTINUED | OUTPATIENT
Start: 2022-01-01 | End: 2022-01-01 | Stop reason: HOSPADM

## 2022-01-01 RX ORDER — LIDOCAINE HYDROCHLORIDE 20 MG/ML
JELLY TOPICAL ONCE
Status: DISCONTINUED | OUTPATIENT
Start: 2022-01-01 | End: 2022-01-01 | Stop reason: HOSPADM

## 2022-01-01 RX ORDER — OXYMETAZOLINE HCL 0.05 %
2 SPRAY, NON-AEROSOL (ML) NASAL
Status: COMPLETED | OUTPATIENT
Start: 2022-01-01 | End: 2022-01-01

## 2022-01-01 RX ORDER — POTASSIUM CHLORIDE 20 MEQ/1
20 TABLET, EXTENDED RELEASE ORAL
Status: DISCONTINUED | OUTPATIENT
Start: 2022-01-01 | End: 2022-01-01

## 2022-01-01 RX ORDER — NITROGLYCERIN 0.4 MG/1
0.4 TABLET SUBLINGUAL AS NEEDED
Status: DISCONTINUED | OUTPATIENT
Start: 2022-01-01 | End: 2022-01-01 | Stop reason: HOSPADM

## 2022-01-01 RX ORDER — INSULIN LISPRO 100 [IU]/ML
INJECTION, SOLUTION INTRAVENOUS; SUBCUTANEOUS
Status: DISCONTINUED | OUTPATIENT
Start: 2022-01-01 | End: 2022-01-01

## 2022-01-01 RX ORDER — BUMETANIDE 0.25 MG/ML
2 INJECTION INTRAMUSCULAR; INTRAVENOUS 2 TIMES DAILY
Status: DISCONTINUED | OUTPATIENT
Start: 2022-01-01 | End: 2022-01-01 | Stop reason: HOSPADM

## 2022-01-01 RX ORDER — ALBUMIN HUMAN 250 G/1000ML
25 SOLUTION INTRAVENOUS ONCE
Status: COMPLETED | OUTPATIENT
Start: 2022-01-01 | End: 2022-01-01

## 2022-01-01 RX ORDER — FENTANYL CITRATE 50 UG/ML
25 INJECTION, SOLUTION INTRAMUSCULAR; INTRAVENOUS
Status: DISCONTINUED | OUTPATIENT
Start: 2022-01-01 | End: 2022-01-01 | Stop reason: HOSPADM

## 2022-01-01 RX ORDER — METOLAZONE 2.5 MG/1
5 TABLET ORAL DAILY
Qty: 30 TABLET | Refills: 0 | Status: SHIPPED | OUTPATIENT
Start: 2022-01-01

## 2022-01-01 RX ORDER — SODIUM CHLORIDE 0.9 % (FLUSH) 0.9 %
5-40 SYRINGE (ML) INJECTION EVERY 8 HOURS
Status: DISCONTINUED | OUTPATIENT
Start: 2022-01-01 | End: 2022-01-01 | Stop reason: HOSPADM

## 2022-01-01 RX ORDER — FUROSEMIDE 10 MG/ML
100 INJECTION INTRAMUSCULAR; INTRAVENOUS ONCE
Status: COMPLETED | OUTPATIENT
Start: 2022-01-01 | End: 2022-01-01

## 2022-01-01 RX ORDER — ACETAMINOPHEN 650 MG/1
650 SUPPOSITORY RECTAL
Status: DISCONTINUED | OUTPATIENT
Start: 2022-01-01 | End: 2022-01-01 | Stop reason: HOSPADM

## 2022-01-01 RX ORDER — CARVEDILOL 12.5 MG/1
25 TABLET ORAL 2 TIMES DAILY
Status: DISCONTINUED | OUTPATIENT
Start: 2022-01-01 | End: 2022-01-01 | Stop reason: HOSPADM

## 2022-01-01 RX ORDER — BUMETANIDE 0.25 MG/ML
1 INJECTION INTRAMUSCULAR; INTRAVENOUS ONCE
Status: DISCONTINUED | OUTPATIENT
Start: 2022-01-01 | End: 2022-01-01

## 2022-01-01 RX ORDER — BACITRACIN 500 [USP'U]/G
OINTMENT TOPICAL
Status: DISCONTINUED | OUTPATIENT
Start: 2022-01-01 | End: 2022-01-01 | Stop reason: HOSPADM

## 2022-01-01 RX ORDER — FUROSEMIDE 10 MG/ML
40 INJECTION INTRAMUSCULAR; INTRAVENOUS
Status: COMPLETED | OUTPATIENT
Start: 2022-01-01 | End: 2022-01-01

## 2022-01-01 RX ORDER — ONDANSETRON 2 MG/ML
4 INJECTION INTRAMUSCULAR; INTRAVENOUS
Status: DISCONTINUED | OUTPATIENT
Start: 2022-01-01 | End: 2022-01-01 | Stop reason: HOSPADM

## 2022-01-01 RX ORDER — ACETAMINOPHEN 325 MG/1
650 TABLET ORAL
Status: DISCONTINUED | OUTPATIENT
Start: 2022-01-01 | End: 2022-01-01 | Stop reason: HOSPADM

## 2022-01-01 RX ORDER — PROMETHAZINE HYDROCHLORIDE 25 MG/1
12.5 TABLET ORAL
Status: DISCONTINUED | OUTPATIENT
Start: 2022-01-01 | End: 2022-01-01 | Stop reason: HOSPADM

## 2022-01-01 RX ORDER — MIDAZOLAM IN 0.9 % SOD.CHLORID 1 MG/ML
0-10 PLASTIC BAG, INJECTION (ML) INTRAVENOUS
Status: DISCONTINUED | OUTPATIENT
Start: 2022-01-01 | End: 2022-01-01

## 2022-01-01 RX ORDER — MAGNESIUM SULFATE 1 G/100ML
1 INJECTION INTRAVENOUS ONCE
Status: COMPLETED | OUTPATIENT
Start: 2022-01-01 | End: 2022-01-01

## 2022-01-01 RX ORDER — FUROSEMIDE 10 MG/ML
80 INJECTION INTRAMUSCULAR; INTRAVENOUS
Status: COMPLETED | OUTPATIENT
Start: 2022-01-01 | End: 2022-01-01

## 2022-01-01 RX ORDER — MIDAZOLAM HYDROCHLORIDE 1 MG/ML
1 INJECTION, SOLUTION INTRAMUSCULAR; INTRAVENOUS
Status: DISCONTINUED | OUTPATIENT
Start: 2022-01-01 | End: 2022-01-01 | Stop reason: HOSPADM

## 2022-01-01 RX ORDER — EPINEPHRINE 0.1 MG/ML
INJECTION INTRACARDIAC; INTRAVENOUS
Status: COMPLETED | OUTPATIENT
Start: 2022-01-01 | End: 2022-01-01

## 2022-01-01 RX ORDER — HEPARIN SODIUM 200 [USP'U]/100ML
500 INJECTION, SOLUTION INTRAVENOUS
Status: DISCONTINUED | OUTPATIENT
Start: 2022-01-01 | End: 2022-01-01

## 2022-01-01 RX ORDER — SODIUM CHLORIDE 0.9 % (FLUSH) 0.9 %
5-40 SYRINGE (ML) INJECTION EVERY 8 HOURS
Status: DISCONTINUED | OUTPATIENT
Start: 2022-01-01 | End: 2022-01-01

## 2022-01-01 RX ORDER — MIDAZOLAM HYDROCHLORIDE 1 MG/ML
INJECTION, SOLUTION INTRAMUSCULAR; INTRAVENOUS
Status: COMPLETED
Start: 2022-01-01 | End: 2022-01-01

## 2022-01-01 RX ORDER — CHLORHEXIDINE GLUCONATE 1.2 MG/ML
10 RINSE ORAL EVERY 12 HOURS
Status: DISCONTINUED | OUTPATIENT
Start: 2022-01-01 | End: 2022-01-01 | Stop reason: HOSPADM

## 2022-01-01 RX ORDER — BUMETANIDE 0.25 MG/ML
1 INJECTION INTRAMUSCULAR; INTRAVENOUS 2 TIMES DAILY
Status: DISCONTINUED | OUTPATIENT
Start: 2022-01-01 | End: 2022-01-01

## 2022-01-01 RX ORDER — CEFAZOLIN SODIUM 1 G/3ML
2 INJECTION, POWDER, FOR SOLUTION INTRAMUSCULAR; INTRAVENOUS ONCE
Status: CANCELLED | OUTPATIENT
Start: 2022-01-01 | End: 2022-01-01

## 2022-01-01 RX ORDER — TAMSULOSIN HYDROCHLORIDE 0.4 MG/1
0.8 CAPSULE ORAL DAILY
Status: DISCONTINUED | OUTPATIENT
Start: 2022-01-01 | End: 2022-01-01 | Stop reason: HOSPADM

## 2022-01-01 RX ORDER — FENTANYL CITRATE 50 UG/ML
25-100 INJECTION, SOLUTION INTRAMUSCULAR; INTRAVENOUS
Status: DISCONTINUED | OUTPATIENT
Start: 2022-01-01 | End: 2022-01-01

## 2022-01-01 RX ORDER — POTASSIUM CHLORIDE 7.45 MG/ML
10 INJECTION INTRAVENOUS
Status: DISPENSED | OUTPATIENT
Start: 2022-01-01 | End: 2022-01-01

## 2022-01-01 RX ORDER — SODIUM CHLORIDE 0.9 % (FLUSH) 0.9 %
5-40 SYRINGE (ML) INJECTION AS NEEDED
Status: DISCONTINUED | OUTPATIENT
Start: 2022-01-01 | End: 2022-01-01 | Stop reason: HOSPADM

## 2022-01-01 RX ORDER — POTASSIUM CHLORIDE 20 MEQ/1
20 TABLET, EXTENDED RELEASE ORAL
Status: COMPLETED | OUTPATIENT
Start: 2022-01-01 | End: 2022-01-01

## 2022-01-01 RX ORDER — CALCIUM GLUCONATE 20 MG/ML
2 INJECTION, SOLUTION INTRAVENOUS
Status: COMPLETED | OUTPATIENT
Start: 2022-01-01 | End: 2022-01-01

## 2022-01-01 RX ORDER — ATORVASTATIN CALCIUM 20 MG/1
40 TABLET, FILM COATED ORAL DAILY
Status: DISCONTINUED | OUTPATIENT
Start: 2022-01-01 | End: 2022-01-01 | Stop reason: HOSPADM

## 2022-01-01 RX ORDER — SODIUM CHLORIDE 9 MG/ML
20 INJECTION, SOLUTION INTRAVENOUS CONTINUOUS
Status: DISCONTINUED | OUTPATIENT
Start: 2022-01-01 | End: 2022-01-01

## 2022-01-01 RX ORDER — LIDOCAINE HYDROCHLORIDE 10 MG/ML
1-20 INJECTION INFILTRATION; PERINEURAL
Status: COMPLETED | OUTPATIENT
Start: 2022-01-01 | End: 2022-01-01

## 2022-01-01 RX ADMIN — FENTANYL CITRATE 25 MCG: 50 INJECTION, SOLUTION INTRAMUSCULAR; INTRAVENOUS at 16:12

## 2022-01-01 RX ADMIN — ALBUMIN (HUMAN) 25 G: 0.25 INJECTION, SOLUTION INTRAVENOUS at 23:21

## 2022-01-01 RX ADMIN — Medication 6 UNITS: at 21:29

## 2022-01-01 RX ADMIN — Medication 6 UNITS: at 06:45

## 2022-01-01 RX ADMIN — MIDAZOLAM 1 MG: 1 INJECTION INTRAMUSCULAR; INTRAVENOUS at 14:23

## 2022-01-01 RX ADMIN — POTASSIUM CHLORIDE 10 MEQ: 7.46 INJECTION, SOLUTION INTRAVENOUS at 05:47

## 2022-01-01 RX ADMIN — MIDAZOLAM 1 MG/HR: 5 INJECTION INTRAMUSCULAR; INTRAVENOUS at 15:45

## 2022-01-01 RX ADMIN — FUROSEMIDE 80 MG: 10 INJECTION, SOLUTION INTRAVENOUS at 22:14

## 2022-01-01 RX ADMIN — Medication 6 UNITS: at 07:01

## 2022-01-01 RX ADMIN — TAMSULOSIN HYDROCHLORIDE 0.8 MG: 0.4 CAPSULE ORAL at 08:39

## 2022-01-01 RX ADMIN — OXYMETAZOLINE HCL 2 SPRAY: 0.05 SPRAY NASAL at 15:21

## 2022-01-01 RX ADMIN — SODIUM CHLORIDE, PRESERVATIVE FREE 10 ML: 5 INJECTION INTRAVENOUS at 21:02

## 2022-01-01 RX ADMIN — HEPARIN SODIUM 5000 UNITS: 1000 INJECTION INTRAVENOUS; SUBCUTANEOUS at 15:38

## 2022-01-01 RX ADMIN — POTASSIUM CHLORIDE 10 MEQ: 10 INJECTION, SOLUTION INTRAVENOUS at 08:00

## 2022-01-01 RX ADMIN — POTASSIUM CHLORIDE 40 MEQ: 20 TABLET, EXTENDED RELEASE ORAL at 12:27

## 2022-01-01 RX ADMIN — ALBUMIN (HUMAN) 25 G: 0.25 INJECTION, SOLUTION INTRAVENOUS at 05:52

## 2022-01-01 RX ADMIN — CARVEDILOL 25 MG: 12.5 TABLET, FILM COATED ORAL at 08:39

## 2022-01-01 RX ADMIN — CARVEDILOL 25 MG: 12.5 TABLET, FILM COATED ORAL at 22:06

## 2022-01-01 RX ADMIN — HEPARIN SODIUM 3600 UNITS: 1000 INJECTION INTRAVENOUS; SUBCUTANEOUS at 15:00

## 2022-01-01 RX ADMIN — NALOXONE HYDROCHLORIDE 0.1 MG: 0.4 INJECTION, SOLUTION INTRAMUSCULAR; INTRAVENOUS; SUBCUTANEOUS at 14:29

## 2022-01-01 RX ADMIN — TAMSULOSIN HYDROCHLORIDE 0.8 MG: 0.4 CAPSULE ORAL at 08:31

## 2022-01-01 RX ADMIN — ATORVASTATIN CALCIUM 40 MG: 20 TABLET, FILM COATED ORAL at 09:39

## 2022-01-01 RX ADMIN — BUMETANIDE 2 MG: 0.25 INJECTION INTRAMUSCULAR; INTRAVENOUS at 14:26

## 2022-01-01 RX ADMIN — SODIUM CHLORIDE 40 MG: 9 INJECTION INTRAMUSCULAR; INTRAVENOUS; SUBCUTANEOUS at 09:33

## 2022-01-01 RX ADMIN — SODIUM CHLORIDE, PRESERVATIVE FREE 10 ML: 5 INJECTION INTRAVENOUS at 06:00

## 2022-01-01 RX ADMIN — BUMETANIDE 2 MG: 0.25 INJECTION INTRAMUSCULAR; INTRAVENOUS at 21:05

## 2022-01-01 RX ADMIN — POTASSIUM CHLORIDE 10 MEQ: 10 INJECTION, SOLUTION INTRAVENOUS at 07:01

## 2022-01-01 RX ADMIN — IMATINIB MESYLATE 200 MG: 400 TABLET, FILM COATED ORAL at 08:37

## 2022-01-01 RX ADMIN — ACETAMINOPHEN 650 MG: 325 TABLET ORAL at 13:11

## 2022-01-01 RX ADMIN — FENTANYL CITRATE 25 MCG: 50 INJECTION, SOLUTION INTRAMUSCULAR; INTRAVENOUS at 14:13

## 2022-01-01 RX ADMIN — SODIUM CHLORIDE, PRESERVATIVE FREE 10 ML: 5 INJECTION INTRAVENOUS at 22:07

## 2022-01-01 RX ADMIN — Medication 2 UNITS: at 06:00

## 2022-01-01 RX ADMIN — BUMETANIDE 1 MG: 0.25 INJECTION INTRAMUSCULAR; INTRAVENOUS at 21:35

## 2022-01-01 RX ADMIN — EPINEPHRINE 1 MG: 0.1 INJECTION, SOLUTION ENDOTRACHEAL; INTRACARDIAC; INTRAVENOUS at 14:35

## 2022-01-01 RX ADMIN — Medication 9 UNITS: at 11:30

## 2022-01-01 RX ADMIN — ATORVASTATIN CALCIUM 40 MG: 20 TABLET, FILM COATED ORAL at 08:39

## 2022-01-01 RX ADMIN — CEFAZOLIN SODIUM 2000 MG: 1 INJECTION, POWDER, FOR SOLUTION INTRAMUSCULAR; INTRAVENOUS at 14:26

## 2022-01-01 RX ADMIN — SODIUM CHLORIDE, PRESERVATIVE FREE 10 ML: 5 INJECTION INTRAVENOUS at 20:50

## 2022-01-01 RX ADMIN — SODIUM CHLORIDE, PRESERVATIVE FREE 10 ML: 5 INJECTION INTRAVENOUS at 14:00

## 2022-01-01 RX ADMIN — POTASSIUM CHLORIDE 10 MEQ: 7.46 INJECTION, SOLUTION INTRAVENOUS at 20:10

## 2022-01-01 RX ADMIN — CHLORHEXIDINE GLUCONATE 10 ML: 1.2 RINSE ORAL at 20:48

## 2022-01-01 RX ADMIN — POTASSIUM CHLORIDE 40 MEQ: 20 TABLET, EXTENDED RELEASE ORAL at 18:02

## 2022-01-01 RX ADMIN — ATORVASTATIN CALCIUM 40 MG: 20 TABLET, FILM COATED ORAL at 14:26

## 2022-01-01 RX ADMIN — POTASSIUM CHLORIDE 10 MEQ: 7.46 INJECTION, SOLUTION INTRAVENOUS at 21:23

## 2022-01-01 RX ADMIN — Medication 3 UNITS: at 06:36

## 2022-01-01 RX ADMIN — FENTANYL CITRATE 25 MCG: 50 INJECTION, SOLUTION INTRAMUSCULAR; INTRAVENOUS at 14:08

## 2022-01-01 RX ADMIN — SODIUM CHLORIDE, PRESERVATIVE FREE 10 ML: 5 INJECTION INTRAVENOUS at 06:47

## 2022-01-01 RX ADMIN — POTASSIUM CHLORIDE 20 MEQ: 20 TABLET, EXTENDED RELEASE ORAL at 21:01

## 2022-01-01 RX ADMIN — BUMETANIDE 2 MG: 0.25 INJECTION INTRAMUSCULAR; INTRAVENOUS at 20:49

## 2022-01-01 RX ADMIN — ONDANSETRON 4 MG: 2 INJECTION INTRAMUSCULAR; INTRAVENOUS at 03:45

## 2022-01-01 RX ADMIN — Medication 3 UNITS: at 16:30

## 2022-01-01 RX ADMIN — BUMETANIDE 2 MG: 0.25 INJECTION INTRAMUSCULAR; INTRAVENOUS at 09:33

## 2022-01-01 RX ADMIN — OXYMETAZOLINE HCL 2 SPRAY: 0.05 SPRAY NASAL at 01:38

## 2022-01-01 RX ADMIN — LIDOCAINE HYDROCHLORIDE 12 ML: 10 INJECTION, SOLUTION INFILTRATION; PERINEURAL at 15:39

## 2022-01-01 RX ADMIN — METOLAZONE 5 MG: 5 TABLET ORAL at 08:32

## 2022-01-01 RX ADMIN — BUMETANIDE 1 MG: 0.25 INJECTION INTRAMUSCULAR; INTRAVENOUS at 22:06

## 2022-01-01 RX ADMIN — SODIUM CHLORIDE, PRESERVATIVE FREE 10 ML: 5 INJECTION INTRAVENOUS at 05:04

## 2022-01-01 RX ADMIN — CHLORHEXIDINE GLUCONATE 10 ML: 1.2 RINSE ORAL at 14:25

## 2022-01-01 RX ADMIN — POTASSIUM CHLORIDE 10 MEQ: 7.46 INJECTION, SOLUTION INTRAVENOUS at 19:00

## 2022-01-01 RX ADMIN — BUMETANIDE 1 MG: 0.25 INJECTION INTRAMUSCULAR; INTRAVENOUS at 00:03

## 2022-01-01 RX ADMIN — Medication 3 UNITS: at 05:55

## 2022-01-01 RX ADMIN — BUMETANIDE 1 MG: 0.25 INJECTION INTRAMUSCULAR; INTRAVENOUS at 21:01

## 2022-01-01 RX ADMIN — HEPARIN SODIUM 5000 UNITS: 5000 INJECTION INTRAVENOUS; SUBCUTANEOUS at 21:05

## 2022-01-01 RX ADMIN — PROMETHAZINE HYDROCHLORIDE 12.5 MG: 25 TABLET ORAL at 16:18

## 2022-01-01 RX ADMIN — SODIUM CHLORIDE, PRESERVATIVE FREE 10 ML: 5 INJECTION INTRAVENOUS at 21:05

## 2022-01-01 RX ADMIN — ATORVASTATIN CALCIUM 40 MG: 20 TABLET, FILM COATED ORAL at 08:31

## 2022-01-01 RX ADMIN — 0.12% CHLORHEXIDINE GLUCONATE 10 ML: 1.2 RINSE ORAL at 20:49

## 2022-01-01 RX ADMIN — TAMSULOSIN HYDROCHLORIDE 0.8 MG: 0.4 CAPSULE ORAL at 09:39

## 2022-01-01 RX ADMIN — IMATINIB MESYLATE 200 MG: 400 TABLET, FILM COATED ORAL at 09:00

## 2022-01-01 RX ADMIN — POTASSIUM CHLORIDE 10 MEQ: 7.46 INJECTION, SOLUTION INTRAVENOUS at 06:56

## 2022-01-01 RX ADMIN — CHLORHEXIDINE GLUCONATE 10 ML: 1.2 RINSE ORAL at 09:33

## 2022-01-01 RX ADMIN — CARVEDILOL 25 MG: 12.5 TABLET, FILM COATED ORAL at 09:39

## 2022-01-01 RX ADMIN — METOLAZONE 10 MG: 5 TABLET ORAL at 14:26

## 2022-01-01 RX ADMIN — BUMETANIDE 2 MG: 0.25 INJECTION INTRAMUSCULAR; INTRAVENOUS at 21:02

## 2022-01-01 RX ADMIN — CHLORHEXIDINE GLUCONATE 10 ML: 1.2 RINSE ORAL at 21:06

## 2022-01-01 RX ADMIN — ALBUMIN (HUMAN) 12.5 G: 0.25 INJECTION, SOLUTION INTRAVENOUS at 01:47

## 2022-01-01 RX ADMIN — ALBUMIN (HUMAN) 25 G: 0.25 INJECTION, SOLUTION INTRAVENOUS at 22:17

## 2022-01-01 RX ADMIN — CARVEDILOL 25 MG: 12.5 TABLET, FILM COATED ORAL at 23:21

## 2022-01-01 RX ADMIN — POTASSIUM CHLORIDE 10 MEQ: 10 INJECTION, SOLUTION INTRAVENOUS at 09:00

## 2022-01-01 RX ADMIN — TAMSULOSIN HYDROCHLORIDE 0.4 MG: 0.4 CAPSULE ORAL at 09:39

## 2022-01-01 RX ADMIN — TAMSULOSIN HYDROCHLORIDE 0.8 MG: 0.4 CAPSULE ORAL at 14:26

## 2022-01-01 RX ADMIN — MAGNESIUM SULFATE HEPTAHYDRATE 1 G: 1 INJECTION, SOLUTION INTRAVENOUS at 17:00

## 2022-01-01 RX ADMIN — ALBUMIN (HUMAN) 25 G: 0.25 INJECTION, SOLUTION INTRAVENOUS at 18:00

## 2022-01-01 RX ADMIN — HEPARIN SODIUM 5000 UNITS: 5000 INJECTION INTRAVENOUS; SUBCUTANEOUS at 05:03

## 2022-01-01 RX ADMIN — METOLAZONE 10 MG: 5 TABLET ORAL at 09:39

## 2022-01-01 RX ADMIN — Medication 6 UNITS: at 16:30

## 2022-01-01 RX ADMIN — CARVEDILOL 25 MG: 12.5 TABLET, FILM COATED ORAL at 21:05

## 2022-01-01 RX ADMIN — CHLORHEXIDINE GLUCONATE 10 ML: 1.2 RINSE ORAL at 20:50

## 2022-01-01 RX ADMIN — SODIUM CHLORIDE 40 MG: 9 INJECTION INTRAMUSCULAR; INTRAVENOUS; SUBCUTANEOUS at 14:25

## 2022-01-01 RX ADMIN — METOLAZONE 5 MG: 5 TABLET ORAL at 09:39

## 2022-01-01 RX ADMIN — BUMETANIDE 1 MG: 0.25 INJECTION INTRAMUSCULAR; INTRAVENOUS at 08:32

## 2022-01-01 RX ADMIN — BUMETANIDE 2 MG: 0.25 INJECTION INTRAMUSCULAR; INTRAVENOUS at 08:39

## 2022-01-01 RX ADMIN — FUROSEMIDE 100 MG: 10 INJECTION, SOLUTION INTRAMUSCULAR; INTRAVENOUS at 17:48

## 2022-01-01 RX ADMIN — POTASSIUM CHLORIDE 10 MEQ: 10 INJECTION, SOLUTION INTRAVENOUS at 05:51

## 2022-01-01 RX ADMIN — BISACODYL 10 MG: 10 SUPPOSITORY RECTAL at 06:45

## 2022-01-01 RX ADMIN — Medication 3 UNITS: at 23:26

## 2022-01-01 RX ADMIN — SODIUM CHLORIDE, PRESERVATIVE FREE 10 ML: 5 INJECTION INTRAVENOUS at 22:18

## 2022-01-01 RX ADMIN — LIDOCAINE HYDROCHLORIDE: 20 JELLY TOPICAL at 12:00

## 2022-01-01 RX ADMIN — MIDAZOLAM HYDROCHLORIDE 1 MG: 1 INJECTION, SOLUTION INTRAMUSCULAR; INTRAVENOUS at 14:23

## 2022-01-01 RX ADMIN — CARVEDILOL 25 MG: 12.5 TABLET, FILM COATED ORAL at 21:01

## 2022-01-01 RX ADMIN — METOLAZONE 10 MG: 5 TABLET ORAL at 08:39

## 2022-01-01 RX ADMIN — FUROSEMIDE 40 MG: 10 INJECTION, SOLUTION INTRAVENOUS at 16:10

## 2022-01-01 RX ADMIN — CALCIUM GLUCONATE 2 G: 20 INJECTION, SOLUTION INTRAVENOUS at 05:47

## 2022-01-01 RX ADMIN — FENTANYL CITRATE 50 MCG: 50 INJECTION, SOLUTION INTRAMUSCULAR; INTRAVENOUS at 14:23

## 2022-01-01 RX ADMIN — HEPARIN SODIUM 3600 UNITS: 1000 INJECTION INTRAVENOUS; SUBCUTANEOUS at 13:45

## 2022-01-01 RX ADMIN — BUMETANIDE 1 MG: 0.25 INJECTION INTRAMUSCULAR; INTRAVENOUS at 09:39

## 2022-01-01 RX ADMIN — PROPOFOL 50 MG: 10 INJECTION, EMULSION INTRAVENOUS at 14:51

## 2022-01-01 RX ADMIN — Medication 50 MCG/HR: at 15:45

## 2022-01-01 RX ADMIN — ALBUMIN (HUMAN) 12.5 G: 5 SOLUTION INTRAVENOUS at 18:00

## 2022-01-01 RX ADMIN — CARVEDILOL 25 MG: 12.5 TABLET, FILM COATED ORAL at 14:26

## 2022-01-01 RX ADMIN — CARVEDILOL 25 MG: 12.5 TABLET, FILM COATED ORAL at 08:31

## 2022-01-01 RX ADMIN — HEPARIN SODIUM 5000 UNITS: 5000 INJECTION INTRAVENOUS; SUBCUTANEOUS at 20:46

## 2022-01-01 RX ADMIN — EPINEPHRINE 1 MG: 0.1 INJECTION, SOLUTION ENDOTRACHEAL; INTRACARDIAC; INTRAVENOUS at 14:38

## 2022-01-01 RX ADMIN — ALBUMIN (HUMAN) 25 G: 0.25 INJECTION, SOLUTION INTRAVENOUS at 18:49

## 2022-01-01 RX ADMIN — POTASSIUM CHLORIDE 10 MEQ: 7.46 INJECTION, SOLUTION INTRAVENOUS at 18:00

## 2022-02-04 NOTE — ED PROVIDER NOTES
EMERGENCY DEPARTMENT HISTORY AND PHYSICAL EXAM    Date: 2/3/2022  Patient Name: Alissa Gunter    History of Presenting Illness     Chief Complaint   Patient presents with    Leg Swelling         History Provided By: Patient    Additional History (Context):   8:10 PM  Alissa Gunter is a 79 y.o. male with PMHX of hypertension, diabetes, chronic combined systolic and diastolic CHF chronic A. 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 Patient has longstanding history of CHF and has been compliant with medications. Patient is occasionally required O2 at home but recently been doing well without. He was brought in tonight because worsening pedal edema. He does have more VARGAS insomnia but denies any chest pain. He also has a small open wound to the leg. He denies any chest pain headache presyncopal episodes. Social History  Former smoker quit in 2014. He infrequently drinks alcohol uses no drugs. Family History  Only family history his brother with postoperative nausea vomiting      PCP: Kurt Nieves, DO    Current Outpatient Medications   Medication Sig Dispense Refill    bumetanide (BUMEX) 2 mg tablet Take 1 Tablet by mouth two (2) times a day. 60 Tablet 0    metOLazone (ZAROXOLYN) 2.5 mg tablet Take 2.5 mg by mouth daily.  imatinib (Gleevec) 400 mg tablet Take 200 mg by mouth daily.  mecobalamin, vitamin B12, 1,000 mcg TbDi Take 1 Tablet by mouth daily.  glucose 4 gram chewable tablet Take 4 g by mouth daily as needed for Other (low BS).  OXYGEN-AIR DELIVERY SYSTEMS 2 L by IntraNASal route continuous.  insulin glargine (LANTUS,BASAGLAR) 100 unit/mL (3 mL) inpn 3-4 Units by SubCUTAneous route daily as needed for Other (high BS). patient reporting he has no actual sliding scale for medication- takes 3-4 units depending on his BS, only takes if greater than 150.       carvediloL (COREG) 25 mg tablet Take 1 Tablet by mouth two (2) times a day. 60 Tablet 0    apixaban (ELIQUIS) 2.5 mg tablet Take 1 Tablet by mouth every twelve (12) hours. 60 Tablet 0    gabapentin (NEURONTIN) 100 mg capsule Take 1 Capsule by mouth nightly. Max Daily Amount: 100 mg. (Patient taking differently: Take 300 mg by mouth nightly.) 30 Capsule 0    aspirin delayed-release 81 mg tablet Take 81 mg by mouth daily.  vitamin E (AQUA GEMS) 400 unit capsule Take 400 Units by mouth daily.  tamsulosin (FLOMAX) 0.4 mg capsule Take 0.4 mg by mouth daily.  nitroglycerin (NITROSTAT) 0.4 mg SL tablet 1 Tab by SubLINGual route as needed for Chest Pain. Up to 3 doses. 20 Tab 0    atorvastatin (LIPITOR) 40 mg tablet Take 40 mg by mouth daily.          Past History     Past Medical History:  Past Medical History:   Diagnosis Date    CAD (coronary artery disease)     Chronic atrial fibrillation ([facility] [address] 2016    CML (chronic myelocytic leukemia) [facility]     Coagulation disorder (HCC)     bruises easily    Diabetes ([facility] [address]     Hypertension     Nausea & vomiting     with most recent surgery-carotid endardarectomy    TIA (transient ischemic attack) 2016       Past Surgical History:  Past Surgical History:   Procedure Laterality Date    HX CAROTID ENDARTERECTOMY Left 2017    HX CATARACT REMOVAL Bilateral     HX CORONARY ARTERY BYPASS GRAFT      HX HERNIA REPAIR Right     hernia    HX UROLOGICAL      greenlight laser    HX UROLOGICAL      kidney stone    HX UROLOGICAL      Lithotripsy x 2    [place] CARDIAC SURG PROCEDURE UNLIST  3/2014    quadruple bypass       Family History:  Family History   Problem Relation Age of Onset    Post-op Nausea/Vomiting Brother        Social History:  Social History     Tobacco Use    Smoking status: Former Smoker     Packs/day: 1.00     Years: 60.00     Pack years: 60.00     Quit date: 3/23/2014     Years since quittin.8    Smokeless tobacco: Never Used   Substance Use Topics    Alcohol use: Not Currently     Alcohol/week: 2.0 standard drinks     Types: 2 Shots of liquor per week     Comment: per day    Drug use: No       Allergies: Allergies   Allergen Reactions    Adhesive Tape-Silicones Other (comments)     Raw skin    Codeine Nausea and Vomiting    Iodinated Contrast Media Rash     patient states this was years ago - he states that he can tolerate the newer agents         Review of Systems   Review of Systems   Respiratory: Positive for shortness of breath. Cardiovascular: Positive for leg swelling. All other systems reviewed and are negative. Physical Exam     Vitals:    02/04/22 0648 02/04/22 0703 02/04/22 0718 02/04/22 0733   BP:  98/81  117/64   Pulse: 74 78 78    Resp: 11 11     Temp:       SpO2: 100% 95%     Weight:       Height:         Physical Exam  Vitals and nursing note reviewed. Constitutional:       General: He is not in acute distress. Appearance: He is well-developed. He is not diaphoretic. HENT:      Head: Normocephalic and atraumatic. Eyes:      General: No scleral icterus. Extraocular Movements:      Right eye: Normal extraocular motion. Left eye: Normal extraocular motion. Conjunctiva/sclera: Conjunctivae normal.      Pupils: Pupils are equal, round, and reactive to light. Neck:      Trachea: No tracheal deviation. Cardiovascular:      Rate and Rhythm: Normal rate and regular rhythm. Heart sounds: S1 normal and S2 normal. Murmur heard. Systolic murmur is present. Pulmonary:      Effort: Pulmonary effort is normal. No respiratory distress. Breath sounds: Normal breath sounds. No stridor. Abdominal:      General: Bowel sounds are normal. There is no distension. Palpations: Abdomen is soft. Tenderness: There is no abdominal tenderness. There is no rebound. Musculoskeletal:         General: No tenderness. Normal range of motion. Cervical back: Normal range of motion and neck supple.       Right lower leg: 3+ Edema present. Left lower leg: 3+ Edema present. Comments: Grossly unremarkable without abnormalities   Skin:     General: Skin is warm and dry. Capillary Refill: Capillary refill takes less than 2 seconds. Findings: No erythema or rash. Comments: There is a small open wound roughly 1 x 1/4 cm medial aspect of his left lower leg with trace peripheral erythema. There is no induration or purulence. There is no active weeping. There is some irregularity to the surface of the anterior shin on the left lower leg but without any visible vesicles or bulla. Neurological:      Mental Status: He is alert and oriented to person, place, and time. GCS: GCS eye subscore is 4. GCS verbal subscore is 5. GCS motor subscore is 6. Cranial Nerves: No cranial nerve deficit. Motor: No weakness. Psychiatric:         Mood and Affect: Mood normal.         Behavior: Behavior normal.         Thought Content:  Thought content normal.         Judgment: Judgment normal.       Diagnostic Study Results     Labs -  Recent Results (from the past 24 hour(s))   EKG, 12 LEAD, INITIAL    Collection Time: 02/03/22  6:54 PM   Result Value Ref Range    Ventricular Rate 104 BPM    QRS Duration 84 ms    Q-T Interval 314 ms    QTC Calculation (Bezet) 412 ms    Calculated R Axis -66 degrees    Calculated T Axis -157 degrees    Diagnosis       Atrial fibrillation with rapid ventricular response  Left axis deviation  Anterior infarct , age undetermined  Abnormal ECG  Confirmed by Chepe Pantoja MD, [facility] ([id number]) on 2/3/2022 9:35:50 PM     CBC WITH AUTOMATED DIFF    Collection Time: 02/03/22  7:00 PM   Result Value Ref Range    WBC 10.8 4.6 - 13.2 K/uL    RBC 4.51 4.35 - 5.65 M/uL    HGB 10.4 (L) 13.0 - 16.0 g/dL    HCT 34.9 (L) 36.0 - 48.0 %    MCV 77.4 (L) 78.0 - 100.0 FL    MCH 23.1 (L) 24.0 - 34.0 PG    MCHC 29.8 (L) 31.0 - 37.0 g/dL    RDW 19.9 (H) 11.6 - 14.5 %    PLATELET 407 (L) 118 - 420 K/uL    NRBC 0.0 0 PER 100 WBC    ABSOLUTE NRBC 0.00 0.00 - 0.01 K/uL    NEUTROPHILS 69 40 - 73 %    LYMPHOCYTES 11 (L) 21 - 52 %    MONOCYTES 15 (H) 3 - 10 %    EOSINOPHILS 1 0 - 5 %    BASOPHILS 3 (H) 0 - 2 %    IMMATURE GRANULOCYTES 1 (H) 0.0 - 0.5 %    ABS. NEUTROPHILS 7.5 1.8 - 8.0 K/UL    ABS. LYMPHOCYTES 1.2 0.9 - 3.6 K/UL    ABS. MONOCYTES 1.6 (H) 0.05 - 1.2 K/UL    ABS. EOSINOPHILS 0.1 0.0 - 0.4 K/UL    ABS. BASOPHILS 0.3 (H) 0.0 - 0.1 K/UL    ABS. IMM. GRANS. 0.1 (H) 0.00 - 0.04 K/UL    DF AUTOMATED      PLATELET COMMENTS SLIDE ESTIMATE CONFIRMS PLT COUNT      RBC COMMENTS ANISOCYTOSIS  2+        RBC COMMENTS MICROCYTOSIS  1+        RBC COMMENTS HYPOCHROMIA  1+        RBC COMMENTS OVALOCYTES  FEW  TARGET CELLS  FEW       METABOLIC PANEL, COMPREHENSIVE    Collection Time: 02/03/22  7:00 PM   Result Value Ref Range    Sodium 134 (L) 136 - 145 mmol/L    Potassium 3.9 3.5 - 5.5 mmol/L    Chloride 93 (L) 100 - 111 mmol/L    CO2 34 (H) 21 - 32 mmol/L    Anion gap 7 3.0 - 18 mmol/L    Glucose 138 (H) 74 - 99 mg/dL    BUN 95 (H) 7.0 - 18 MG/DL    Creatinine 2.82 (H) 0.6 - 1.3 MG/DL    BUN/Creatinine ratio 34 (H) 12 - 20      GFR est AA 26 (L) >60 ml/min/1.73m2    GFR est non-AA 22 (L) >60 ml/min/1.73m2    Calcium 8.6 8.5 - 10.1 MG/DL    Bilirubin, total 0.6 0.2 - 1.0 MG/DL    ALT (SGPT) 16 16 - 61 U/L    AST (SGOT) 23 10 - 38 U/L    Alk.  phosphatase 117 45 - 117 U/L    Protein, total 7.2 6.4 - 8.2 g/dL    Albumin 3.2 (L) 3.4 - 5.0 g/dL    Globulin 4.0 2.0 - 4.0 g/dL    A-G Ratio 0.8 0.8 - 1.7     NT-PRO BNP    Collection Time: 02/03/22  7:00 PM   Result Value Ref Range    NT pro-BNP 5,937 (H) 0 - 1,800 PG/ML   TROPONIN-HIGH SENSITIVITY    Collection Time: 02/03/22  7:00 PM   Result Value Ref Range    Troponin-High Sensitivity 50 0 - 78 ng/L   CK    Collection Time: 02/03/22  7:00 PM   Result Value Ref Range    CK 62 39 - 308 U/L   CK-MB,QT    Collection Time: 02/03/22  7:00 PM   Result Value Ref Range    CK - MB 3.0 <3.6 ng/ml    CK-MB Index 4.8 (H) 0.0 - 4.0 %   URINALYSIS W/ RFLX MICROSCOPIC    Collection Time: 02/04/22  1:35 AM   Result Value Ref Range    Color YELLOW      Appearance CLEAR      Specific gravity 1.011 1.005 - 1.030      pH (UA) 5.0 5.0 - 8.0      Protein Negative NEG mg/dL    Glucose Negative NEG mg/dL    Ketone Negative NEG mg/dL    Bilirubin Negative NEG      Blood Negative NEG      Urobilinogen 0.2 0.2 - 1.0 EU/dL    Nitrites Negative NEG      Leukocyte Esterase Negative NEG             Radiologic Studies -   DUPLEX LOWER EXT VENOUS BILAT   Final Result      XR CHEST PORT   Final Result      The chest is very similar in appearance to the last examination 7 months ago,   with bilateral effusions, right larger than left, the right side likely   accompanied by atelectasis or pneumonia. CT Results  (Last 48 hours)    None        CXR Results  (Last 48 hours)    None          Medications given in the ED-  Medications   furosemide (LASIX) injection 80 mg (80 mg IntraVENous Given 2/3/22 2214)   bumetanide (BUMEX) injection 1 mg (1 mg IntraVENous Given 2/4/22 0003)   albumin human 25% (BUMINATE) solution 12.5 g (0 g IntraVENous IV Completed 2/4/22 0405)         Medical Decision Making   I am the first provider for this patient. I reviewed the vital signs, available nursing notes, past medical history, past surgical history, family history and social history. Vital Signs-Reviewed the patient's vital signs. Pulse Oximetry Analysis -  100% on 2 L nasal cannula  95% on room air    Cardiac Monitor:  Rate: 85 bpm  Rhythm: Atrial fibrillation with rate control    EKG interpretation: (Preliminary)  7:14 PM   Atrial fibrillation with RVR, rate, there is some LAD with axis of -66. QTC is 412. EKG read by Preeti Russo MD      Records Reviewed: NURSING NOTES AND PREVIOUS MEDICAL RECORDS    Provider Notes (Medical Decision Making):   Patient with chronic A. fib and chronic CHF with exacerbation of leg edema and orthopnea. He denies any chest pain. Given overall presentation is likely resents CHF exacerbation possible but unlikely DVT with associated pulmonary embolism or ACS. He has A. fib but overall is tachycardia was admitted presentation right before I arrived for my shift demonstrates no tachycardia throughout my attendance of his care. His creatinine kidney function made a significant change Case was further reviewed with nephrology. There is no electrolyte abnormalities to correct. Chest x-ray does not look significantly volume overloaded based on oxygen requirement. After completion of diuresis he made a significant provement while allowing to be discharged with outpatient follow-up. Procedures:  Procedures    ED Course:   8:10 PM : Initial assessment performed. The patients presenting problems have been discussed, and they are in agreement with the care plan formulated and outlined with them. I have encouraged them to ask questions as they arise throughout their visit. CONSULT NOTE:   11:35 PM  Jody Gonzalez MD   spoke with Dr. Zahira Ha  Specialty: Nephrology  Discussed pt's hx, disposition, and available diagnostic and imaging results  over the telephone. Reviewed care plans. Consulting physician agrees with plans as outlined. In describing the history and physical presenting symptoms nephrologist recognizes and knows the patient personally. He has treated them in the past.  Would recommend doubling up on his Bumex moving to twice daily treatments as well as ensuring he is taking metolazone as well. Overall I found no indications for admission nephrologist is willing to see him in follow-up. 12:15 AM  Progress note  Patient is making significant improvements in his treatment patient is being diuresed. His blood pressure does not require further management or adjustment. We will add albumin to increase oncotic pressure keeping fluid within the intravascular space.   Overall the patient can be discharged however wife cannot pick him up in the middle the night have no other feasible means of transport. We will take her time with gentle diuresis and albumin infusion and watch him till early morning hours for discharge. 6:30 AM  Progress note  Patient feels well needs diuresed quite a bit perhaps 1-1/2 L of urine. We lost some of the urine and were not able to quantify it for him. He will be discharged with outpatient follow-up with nephrology. CRITICAL CARE NOTE:  7:01 AM  I have spent 42 minutes of critical care time involved in lab review, consultations with specialist, family decision-making, and documentation. During this entire length of time I was immediately available to the patient. Critical Care: The reason for providing this level of medical care for this critically ill patient was due a critical illness that impaired one or more vital organ systems such that there was a high probability of imminent or life threatening deterioration in the patients condition. This care involved high complexity decision making to assess, manipulate, and support vital system functions, to treat this degreee vital organ system failure and to prevent further life threatening deterioration of the patients condition. Diagnosis and Disposition       DISCHARGE NOTE:  6:45 AM  Reyes Krebs Apley's  results have been reviewed with him. He has been counseled regarding his diagnosis, treatment, and plan. He verbally conveys understanding and agreement of the signs, symptoms, diagnosis, treatment and prognosis and additionally agrees to follow up as discussed. He also agrees with the care-plan and conveys that all of his questions have been answered. I have also provided discharge instructions for him that include: educational information regarding their diagnosis and treatment, and list of reasons why they would want to return to the ED prior to their follow-up appointment, should his condition change.  He has been provided with education for proper emergency department utilization. Labs Reviewed   CBC WITH AUTOMATED DIFF - Abnormal; Notable for the following components:       Result Value    HGB 10.4 (*)     HCT 34.9 (*)     MCV 77.4 (*)     MCH 23.1 (*)     MCHC 29.8 (*)     RDW 19.9 (*)     PLATELET 435 (*)     LYMPHOCYTES 11 (*)     MONOCYTES 15 (*)     BASOPHILS 3 (*)     IMMATURE GRANULOCYTES 1 (*)     ABS. MONOCYTES 1.6 (*)     ABS. BASOPHILS 0.3 (*)     ABS. IMM. GRANS. 0.1 (*)     All other components within normal limits   METABOLIC PANEL, COMPREHENSIVE - Abnormal; Notable for the following components:    Sodium 134 (*)     Chloride 93 (*)     CO2 34 (*)     Glucose 138 (*)     BUN 95 (*)     Creatinine 2.82 (*)     BUN/Creatinine ratio 34 (*)     GFR est AA 26 (*)     GFR est non-AA 22 (*)     Albumin 3.2 (*)     All other components within normal limits   NT-PRO BNP - Abnormal; Notable for the following components:    NT pro-BNP 5,937 (*)     All other components within normal limits   CK-MB,QT - Abnormal; Notable for the following components:    CK-MB Index 4.8 (*)     All other components within normal limits   TROPONIN-HIGH SENSITIVITY   CK   URINALYSIS W/ RFLX MICROSCOPIC             7:03 AM  Your results have been reviewed with you. Symptoms, diagnosis, treatment and prognosis have been discussed. Additionally, you agree to follow up as recommended or return to the Emergency Room should your condition change prior to the follow-up appointment. You have indicated agreement with the care-plan and have indicated that all of your questions have been answered. Discharge instructions have also been provided with some educational information regarding diagnosis as well a list of reasons why you should return to the ER prior to their follow-up appointment. Please do not hesitate to call the emergency department if you have additional questions regarding your treatment here today.   Please call your physician to notify them of your evaluation in the ED today and make appropriate follow up appointment as discussed. CLINICAL IMPRESSION:    1. Bilateral leg edema    2. CKD (chronic kidney disease) stage 4, GFR 15-29 ml/min ([facility])    3. Acute on chronic combined systolic and diastolic CHF (congestive heart failure) ([facility])        Davide Edward MD    PLAN:  1. D/C Home  2. Discharge Medication List as of 2/4/2022  6:58 AM      START taking these medications    Details   bumetanide (BUMEX) 2 mg tablet Take 1 Tablet by mouth two (2) times a day., Normal, Disp-60 Tablet, R-0         CONTINUE these medications which have NOT CHANGED    Details   metOLazone (ZAROXOLYN) 2.5 mg tablet Take 2.5 mg by mouth daily. , Historical Med      imatinib (Gleevec) 400 mg tablet Take 200 mg by mouth daily. , Historical Med      mecobalamin, vitamin B12, 1,000 mcg TbDi Take 1 Tablet by mouth daily. , Historical Med      glucose 4 gram chewable tablet Take 4 g by mouth daily as needed for Other (low BS). , Historical Med      OXYGEN-AIR DELIVERY SYSTEMS 2 L by IntraNASal route continuous. , Historical Med      insulin glargine (LANTUS,BASAGLAR) 100 unit/mL (3 mL) inpn 3-4 Units by SubCUTAneous route daily as needed for Other (high BS). patient reporting he has no actual sliding scale for medication- takes 3-4 units depending on his BS, only takes if greater than 150., Historical Med      carvediloL (COREG) 25 mg tablet Take 1 Tablet by mouth two (2) times a day., Normal, Disp-60 Tablet, R-0      apixaban (ELIQUIS) 2.5 mg tablet Take 1 Tablet by mouth every twelve (12) hours. , Normal, Disp-60 Tablet, R-0      gabapentin (NEURONTIN) 100 mg capsule Take 1 Capsule by mouth nightly. Max Daily Amount: 100 mg., Normal, Disp-30 Capsule, R-0      aspirin delayed-release 81 mg tablet Take 81 mg by mouth daily. , Historical Med      vitamin E (AQUA GEMS) 400 unit capsule Take 400 Units by mouth daily. , Historical Med      tamsulosin (FLOMAX) 0.4 mg capsule Take 0.4 mg by mouth daily. , Historical Med      nitroglycerin (NITROSTAT) 0.4 mg SL tablet 1 Tab by SubLINGual route as needed for Chest Pain. Up to 3 doses. , Normal, Disp-20 Tab, R-0      atorvastatin (LIPITOR) 40 mg tablet Take 40 mg by mouth daily. , Historical Med         STOP taking these medications       furosemide (LASIX) 80 mg tablet Comments:   Reason for Stopping:             3.   Follow-up Information     Follow up With Specialties Details Why Contact Info    Margaret Redmond DO Family Medicine In 1 week  [address] [facility]  [phone]      Wiliam Pressley DO Nephrology, Internal Medicine In 1 week  [address] [address] [phone]          _______________________________    This note was partially transcribed via voice recognition software. Although efforts have been made to catch any discrepancies, it may contain sound alike words, grammatical errors, or nonsensical words.

## 2022-02-04 NOTE — ED NOTES
Pt wife has shown up to take pt home. Pt is dressed. And IV taken out. Pt wife wants to speak to the MD before they go home. MD made aware.

## 2022-02-17 NOTE — ED PROVIDER NOTES
50-year-old male history of CAD, A. fib, CML, diabetes, hypertension, CHF presents emergency department with complaint of bilateral leg swelling as well as mild shortness of breath. Patient was seen approximately 1 week ago for similar diuresed in the emergency room and discharged home. Presents today in no acute distress oxygen saturation 94% on his 2 L which she wears at home no tachycardia respiratory initially of 29. And temporary ice was given.      Past Medical History:   Diagnosis Date    CAD (coronary artery disease)     Chronic atrial fibrillation ([facility] [facility] 2016    CML (chronic myelocytic leukemia) ([facility]     Coagulation disorder (HCC)     bruises easily    Diabetes ([facility]     Hypertension     Nausea & vomiting     with most recent surgery-carotid endardarectomy    TIA (transient ischemic attack) 2016       Past Surgical History:   Procedure Laterality Date    HX CAROTID ENDARTERECTOMY Left 2017    HX CATARACT REMOVAL Bilateral     HX CORONARY ARTERY BYPASS GRAFT      HX HERNIA REPAIR Right     hernia    HX UROLOGICAL      greenlight laser    HX UROLOGICAL      kidney stone    HX UROLOGICAL      Lithotripsy x 2    [place] CARDIAC SURG PROCEDURE UNLIST  3/2014    quadruple bypass         Family History:   Problem Relation Age of Onset    Post-op Nausea/Vomiting Brother        Social History     Socioeconomic History    Marital status:      Spouse name: Not on file    Number of children: Not on file    Years of education: Not on file    Highest education level: Not on file   Occupational History    Not on file   Tobacco Use    Smoking status: Former Smoker     Packs/day: 1.00     Years: 60.00     Pack years: 60.00     Quit date: 3/23/2014     Years since quittin.9    Smokeless tobacco: Never Used   Substance and Sexual Activity    Alcohol use: Not Currently     Alcohol/week: 2.0 standard drinks     Types: 2 Shots of liquor per week     Comment: per day    Drug use: No    Sexual activity: Never   Other Topics Concern    Not on file   Social History Narrative    Not on file     Social Determinants of Health     Financial Resource Strain:     Difficulty of Paying Living Expenses: Not on file   Food Insecurity:     Worried About Running Out of Food in the Last Year: Not on file    Lesly of Food in the Last Year: Not on file   Transportation Needs:     Lack of Transportation (Medical): Not on file    Lack of Transportation (Non-Medical): Not on file   Physical Activity:     Days of Exercise per Week: Not on file    Minutes of Exercise per Session: Not on file   Stress:     Feeling of Stress : Not on file   Social Connections:     Frequency of Communication with Friends and Family: Not on file    Frequency of Social Gatherings with Friends and Family: Not on file    Attends Latter-day Services: Not on file    Active Member of [address] [place] [address] [employer] or Organizations: Not on file    Attends Club or Organization Meetings: Not on file    Marital Status: Not on file   Intimate Partner Violence:     Fear of Current or Ex-Partner: Not on file    Emotionally Abused: Not on file    Physically Abused: Not on file    Sexually Abused: Not on file   Housing Stability:     Unable to Pay for Housing in the Last Year: Not on file    Number of [place] in the Last Year: Not on file    Unstable Housing in the Last Year: Not on file         ALLERGIES: Adhesive tape-silicones, Codeine, and Iodinated contrast media    Review of Systems   Constitutional: Negative. HENT: Negative. Eyes: Negative. Respiratory: Positive for chest tightness and shortness of breath. Negative for apnea, cough, choking and stridor. Cardiovascular: Positive for leg swelling. Negative for chest pain and palpitations. Gastrointestinal: Negative. Genitourinary: Negative. Musculoskeletal: Positive for arthralgias and myalgias.  Negative for back pain, gait problem, joint swelling, neck pain and neck stiffness. Skin: Negative. Allergic/Immunologic: Negative. Neurological: Negative. Hematological: Negative. Psychiatric/Behavioral: Negative. Vitals:    02/17/22 1526 02/17/22 1621   BP: 116/76    Pulse: 99    Resp: 20    Temp: 97.9 °F (36.6 °C)    SpO2: 99% 99%   Weight: 94.8 kg (209 lb)    Height: 5' 10\" (1.778 m)             Physical Exam  Vitals and nursing note reviewed. Constitutional:       General: He is not in acute distress. Appearance: He is not ill-appearing, toxic-appearing or diaphoretic. HENT:      Head: Normocephalic and atraumatic. Mouth/Throat:      Mouth: Mucous membranes are moist.      Pharynx: No pharyngeal swelling or oropharyngeal exudate. Neck:      Thyroid: No thyromegaly. Vascular: JVD present. No hepatojugular reflux. Trachea: No tracheal deviation. Cardiovascular:      Rate and Rhythm: Normal rate. Rhythm irregular. Extrasystoles are present. Pulses: No decreased pulses. Heart sounds: Murmur heard. No friction rub. No gallop. Pulmonary:      Effort: Pulmonary effort is normal. No tachypnea, bradypnea or respiratory distress. Breath sounds: Examination of the right-lower field reveals rales. Examination of the left-lower field reveals rales. Rales present. No decreased breath sounds, wheezing or rhonchi. Chest:      Chest wall: No mass, deformity, tenderness or crepitus. Abdominal:      Palpations: Abdomen is soft. There is no hepatomegaly or mass. Musculoskeletal:         General: Normal range of motion. Cervical back: Normal range of motion. Right lower leg: Edema present. Left lower leg: Edema present. Lymphadenopathy:      Cervical: No cervical adenopathy. Skin:     General: Skin is warm and dry. Capillary Refill: Capillary refill takes less than 2 seconds. Neurological:      General: No focal deficit present. Mental Status: He is alert.    Psychiatric:         Mood and Affect: Mood normal.         Behavior: Behavior normal.          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 fib presents emergency department with complaint of bilateral leg swelling and pain as well as mild shortness of breath. Patient was seen for similar symptoms 1 week ago diuresed in the emergency department and discharged home. Presents today no hypoxia on chronic oxygen but maintaining saturation 94%. His main complaint is bilateral lower extremity swelling. IV was started basic labs were sent chest x-ray was done chest x-ray is consistent with prior diagnosis of chronic CHF and fluid overload without any large new effusion. He has a baseline anemia, troponin was not indicative of acute myocardial infarction, proBNP is moderately elevated but basically at baseline for the patient. EKG showed A. fib without RVR. Patient was given 100 mg of Lasix and after review of prior labs and today's labs he also given albumin. After an hour and a half he had no significant diuresis and patient was given an additional 1 mg of Bumex. Procedures        Patient made significant provement after medications and treatment. He was diuresed. Wound was undressed. cdiadm new dressing with Telfa pad bacitracin and Ace wrap. He is referred to wound care to assist with local wound care. He was reminded to take his Bumex 2 mg twice a day in addition to metolazone. We will see Dr. Reshma Martinez in follow-up (patient has not seen the nephrologist since the last time I saw him in the emergency department on February 3).   Tracy Thapa MD  .    Labs Reviewed   CBC WITH AUTOMATED DIFF - Abnormal; Notable for the following components:       Result Value    HGB 9.5 (*)     HCT 33.0 (*)     MCV 75.7 (*)     MCH 21.8 (*)     MCHC 28.8 (*)     RDW 19.4 (*)     PLATELET 89 (*)     NEUTROPHILS 75 (*)     LYMPHOCYTES 10 (*)     MONOCYTES 12 (*)     IMMATURE GRANULOCYTES 1 (*)     ABS. BASOPHILS 0.2 (*)     ABS. IMM. GRANS. 0.1 (*)     All other components within normal limits   PROTHROMBIN TIME + INR - Abnormal; Notable for the following components:    Prothrombin time 22.0 (*)     INR 2.0 (*)     All other components within normal limits   METABOLIC PANEL, COMPREHENSIVE - Abnormal; Notable for the following components:    Sodium 135 (*)     Chloride 95 (*)     CO2 37 (*)     Glucose 174 (*)      (*)     Creatinine 2.95 (*)     BUN/Creatinine ratio 35 (*)     GFR est AA 25 (*)     GFR est non-AA 21 (*)     ALT (SGPT) 15 (*)     Albumin 3.0 (*)     Globulin 4.1 (*)     A-G Ratio 0.7 (*)     All other components within normal limits   NT-PRO BNP - Abnormal; Notable for the following components:    NT pro-BNP 5,819 (*)     All other components within normal limits   URINALYSIS W/ RFLX MICROSCOPIC - Abnormal; Notable for the following components:    Protein 30 (*)     Blood TRACE (*)     All other components within normal limits   URINE MICROSCOPIC ONLY - Abnormal; Notable for the following components:    Mucus FEW (*)     All other components within normal limits   TROPONIN-HIGH SENSITIVITY        No results found for this or any previous visit (from the past 12 hour(s)). 7:49 AM  Your results have been reviewed with you. Symptoms, diagnosis, treatment and prognosis have been discussed. Additionally, you agree to follow up as recommended or return to the Emergency Room should your condition change prior to the follow-up appointment. You have indicated agreement with the care-plan and have indicated that all of your questions have been answered.    Discharge instructions have also been provided with some educational information regarding diagnosis as well a list of reasons why you should return to the ER prior to their follow-up appointment. Please do not hesitate to call the emergency department if you have additional questions regarding your treatment here today. Please call your physician to notify them of your evaluation in the ED today and make appropriate follow up appointment as discussed. CLINICAL IMPRESSION:    1. Lower extremity edema    2. Stage 3b chronic kidney disease ([facility] [address]    3.  Hypoalbuminemia        Davide Edward MD

## 2022-02-18 NOTE — ROUTINE PROCESS
Discharge instructions given to patient. Verbalized understanding. My Medications      CHANGE how you take these medications      Instructions Each Dose to Equal Morning Noon Evening Bedtime   metOLazone 2.5 mg tablet  Commonly known as: ZAROXOLYN  What changed: how much to take    Your last dose was: Your next dose is: Take 2 Tablets by mouth daily. 5 mg                    CONTINUE taking these medications      Instructions Each Dose to Equal Morning Noon Evening Bedtime   bumetanide 2 mg tablet  Commonly known as: [address]    Your last dose was: Your next dose is: Take 1 Tablet by mouth two (2) times a day. 2 mg                    ASK your doctor about these medications      Instructions Each Dose to Equal Morning Noon Evening Bedtime   apixaban 2.5 mg tablet  Commonly known as: ELIQUIS    Your last dose was: Your next dose is: Take 1 Tablet by mouth every twelve (12) hours. 2.5 mg                 aspirin delayed-release 81 mg tablet    Your last dose was: Your next dose is: Take 81 mg by mouth daily. 81 mg                 atorvastatin 40 mg tablet  Commonly known as: LIPITOR    Your last dose was: Your next dose is: Take 40 mg by mouth daily. 40 mg                 carvediloL 25 mg tablet  Commonly known as: COREG    Your last dose was: Your next dose is: Take 1 Tablet by mouth two (2) times a day. 25 mg                 gabapentin 100 mg capsule  Commonly known as: NEURONTIN    Your last dose was: Your next dose is: Take 1 Capsule by mouth nightly. Max Daily Amount: 100 mg.   100 mg                 Gleevec 400 mg tablet  Generic drug: imatinib    Your last dose was: Your next dose is: Take 200 mg by mouth daily. 200 mg                 glucose 4 gram chewable tablet    Your last dose was: Your next dose is: Take 4 g by mouth daily as needed for Other (low BS).    4 g                 insulin glargine 100 unit/mL (3 mL) Inpn  Commonly known as: JESUS RUBIO    Your last dose was: Your next dose is:         3-4 Units by SubCUTAneous route daily as needed for Other (high BS). patient reporting he has no actual sliding scale for medication- takes 3-4 units depending on his BS, only takes if greater than 150.   3-4 Units                 mecobalamin (vitamin B12) 1,000 mcg Tbdi    Your last dose was: Your next dose is: Take 1 Tablet by mouth daily. 1 Tablet                 nitroglycerin 0.4 mg SL tablet  Commonly known as: NITROSTAT    Your last dose was: Your next dose is:         1 Tab by SubLINGual route as needed for Chest Pain. Up to 3 doses. 0.4 mg                 OXYGEN-AIR DELIVERY SYSTEMS    Your last dose was: Your next dose is:         2 L by IntraNASal route continuous. 2 L                 tamsulosin 0.4 mg capsule  Commonly known as: FLOMAX    Your last dose was: Your next dose is: Take 0.4 mg by mouth daily. 0.4 mg                 vitamin E 400 unit capsule  Commonly known as: AQUA GEMS    Your last dose was: Your next dose is: Take 400 Units by mouth daily. 400 Units                       Where to Get Your Medications      These medications were sent to [address] [place] [address] [facility] [address]  [facility], [address] [place] [address] Drive [address]    Phone: [phone]   · bumetanide 2 mg tablet  · metOLazone 2.5 mg tablet       Discussed above medications  Given opportunity to ask questions. Left with all discharge papers.

## 2022-02-18 NOTE — DISCHARGE INSTRUCTIONS
The dosage of the Bumex is 2 mg. The Bumex medication should be taken twice a day. Additionally the metolazone medication should be taken every day. Contact the nephrology department Dr. Reshma Martinez to see him early this coming week. Also contact the wound care clinic for a new leg dressing as early as tomorrow Friday the 18th.     Tracy Thapa MD

## 2022-02-18 NOTE — ED NOTES
Assumed care of the pt from [facility].  Pt sitting on the side of the bed using urinal.

## 2022-02-18 NOTE — ED NOTES
MD aware no urine output after lasix administration. New orders received.  Urinal and call bell within reach

## 2022-02-21 PROBLEM — L89.151 PRESSURE INJURY OF SACRAL REGION, STAGE 1: Status: ACTIVE | Noted: 2022-01-01

## 2022-02-21 PROBLEM — L97.221 CHRONIC ULCER OF LEFT CALF LIMITED TO BREAKDOWN OF SKIN (HCC): Status: ACTIVE | Noted: 2022-01-01

## 2022-02-21 PROBLEM — R60.0 LEG EDEMA: Status: ACTIVE | Noted: 2022-01-01

## 2022-02-21 NOTE — WOUND CARE
Wound Center  Progress Note / Procedure Note    Subjective:     Chief Complaint:  Jennifer Gallardo is a 79 y.o.  male  with leg wounds and sacral wound of few weeks duration. 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 [employer]    History/Chart/Medications reviewed    Wound caused by: leg swelling, pressure  Current wound care:See flowsheet  Offloading wound: yes for legs, no to sacrum- sitting most of the day  Elevating legs: yes  Compression : no  Appetite: fair  Wound associated pain: See flowsheet  Diabetic: yes  Smoker: former, quit 8 years ago  ROS: no N/V/D, no T/chills; no local rash, no chest pain or shortness of breath, no headache or dizzyness      Objective:     Physical Exam:   See flowsheet / nursing notes for vitals  Visit Vitals  /72 (BP 1 Location: Left upper arm, BP Patient Position: At rest;Sitting)   Pulse (!) 121   Temp 97.8 °F (36.6 °C)   Resp 18   SpO2 100%     General: NAD. Hygiene good  Psych: cooperative. No anxiety or depression. Normal mood and affect. Neuro: alert and oriented to person/place/situation. Otherwise nonfocal.  Derm: Normal  turgor for age, dry skin  HEENT: Normocephalic, atraumatic. EOMI. Conjunctiva clear. No scleral icterus. Neck: Normal range of motion. No masses. Chest: Respirations nonlabored  Lower extremities: color normal; temperature normal. Hair growth is not present. Calves are supple, nontender, approximately equally sized in comparison.  Capillary refill <3 sec  Focussed Lower Extremity Exam:  Vascular exam:  Right lower extremity: moderate-severe  edema, foot warm,   DP pulse : 1+  PT pulse: 0  Nails dystrophic   Left lower extremity: moderate-severe  edema, foot warm,   DP pulse : 1+  PT pulse: 0  Nails dystrophic     Ulcer Description:   See Flowsheet           Data Review:              Assessment/Plan     79 y.o. male with CHF, Afic, DM2, chronic h/o leg edema    -left leg ulcers x 3  Opened blisters  Partial thickness  Some slough    Necessitates debridement  for wound healing and to prevent/heal infection  See below    -leg edema      - sacrum  Some redness  Stage 1  Apply vasline 2-3x/daily  Offload  Get into bed few times during day to lay on sides      Following discussed with patient / spouse   Needs :  Serial debridement- debrided today- see note below    Good local wound care  Dressing: mepilex transfer  Frequency : three times a week   Order/initiate Home Health      Edema management:  Compression: Tubigrip x 2 layer      Patient/ spouse understood and agrees with plan. Questions answered. Serial visits and serial debridements also discussed. Follow up with me in 1 week        Procedure:       Ulcer assessment: Due to presence of slough within the wound bed, ulcer requires debridement. Procedure: Debridement:   The indication for debridement was reviewed with patient. Risks of procedure (bleeding, infection, pain) were discussed with patient and consent signed. Questions were answered    Selective debridement   Indication: to remove slough / devitalized tissue/ selectively  Consent in chart   Anesthesia: Topical 2% lidocaine jelly   Instrument: Blade,   Residual Necrosis: Present and scored   Bleeding: <1ml   Hemostasis: Pressure   Patient tolerated procedure well   Procedural Pain: 0  Post - procedural pain: 0    Post debridement measurements: see below  Surface area debrided: below sq. cm  WOUND POA CONDITIONS    Wound Leg lower Left; Anterior ruptured blister (Active)   Wound Image   02/21/22 0909   Wound Etiology Venous 02/21/22 0909   Dressing Status Breakthrough drainage noted 02/21/22 0909   Cleansed Wound cleanser;Vashe 02/21/22 0909   Wound Length (cm) 3 cm 02/21/22 0909   Wound Width (cm) 2 cm 02/21/22 0909   Wound Depth (cm) 0.1 cm 02/21/22 0909   Wound Surface Area (cm^2) 6 cm^2 02/21/22 0909   Wound Volume (cm^3) 0.6 cm^3 02/21/22 0909   Post-Procedure Length (cm) 4.5 cm 02/21/22 0909   Post-Procedure Width (cm) 3.3 cm 02/21/22 0909   Post-Procedure Depth (cm) 0.2 cm 02/21/22 0909   Post-Procedure Surface Area (cm^2) 14.85 cm^2 02/21/22 0909   Post-Procedure Volume (cm^3) 2.97 cm^3 02/21/22 0909   Wound Assessment Devitalized tissue 02/21/22 0909   Drainage Amount Large 02/21/22 0909   Drainage Description Serous 02/21/22 0909   Wound Odor None 02/21/22 0909   Huong-Wound/Incision Assessment Edematous 02/21/22 0909   Edges Flat/open edges 02/21/22 0909   Wound Thickness Description Partial thickness 02/21/22 0909   Number of days: 0       Wound Leg lower Left;Medial ruptured blister (Active)   Wound Image   02/21/22 0909   Wound Etiology Venous 02/21/22 0909   Dressing Status Breakthrough drainage noted 02/21/22 0909   Cleansed Wound cleanser;Vashe 02/21/22 0909   Wound Length (cm) 3 cm 02/21/22 0909   Wound Width (cm) 3 cm 02/21/22 0909   Wound Depth (cm) 0.1 cm 02/21/22 0909   Wound Surface Area (cm^2) 9 cm^2 02/21/22 0909   Wound Volume (cm^3) 0.9 cm^3 02/21/22 0909   Post-Procedure Length (cm) 3 cm 02/21/22 0909   Post-Procedure Width (cm) 3 cm 02/21/22 0909   Post-Procedure Depth (cm) 0.2 cm 02/21/22 0909   Post-Procedure Surface Area (cm^2) 9 cm^2 02/21/22 0909   Post-Procedure Volume (cm^3) 1.8 cm^3 02/21/22 0909   Wound Assessment Devitalized tissue 02/21/22 0909   Drainage Amount Large 02/21/22 0909   Drainage Description Serous 02/21/22 0909   Wound Odor None 02/21/22 0909   Huong-Wound/Incision Assessment Edematous 02/21/22 0909   Edges Flat/open edges 02/21/22 0909   Wound Thickness Description Partial thickness 02/21/22 0909   Number of days: 0       Wound Leg lower Left; Anterior;Mid ruptured blister (Active)   Wound Etiology Venous 02/21/22 0909 Wound Length (cm) 0.5 cm 22 0909   Wound Width (cm) 0.5 cm 22 7550   Wound Depth (cm) 0.1 cm 22 1467   Wound Surface Area (cm^2) 0.25 cm^2 22 0909   Wound Volume (cm^3) 0.025 cm^3 22 0909   Post-Procedure Length (cm) 0.5 cm 22 0909   Post-Procedure Width (cm) 0.5 cm 22 2634   Post-Procedure Depth (cm) 0.2 cm 22 0909   Post-Procedure Surface Area (cm^2) 0.25 cm^2 22 0909   Post-Procedure Volume (cm^3) 0.05 cm^3 22 0909   Number of days: 0             -------    Past Medical History:   Diagnosis Date    CAD (coronary artery disease) 2014    Chronic atrial fibrillation ([facility] [address] 2016    CML (chronic myelocytic leukemia) (HCC)     Coagulation disorder (HCC)     bruises easily    Diabetes ([facility] [address] 2012    Hypertension     Nausea & vomiting     with most recent surgery-carotid endardarectomy    TIA (transient ischemic attack) 2016      Past Surgical History:   Procedure Laterality Date    HX CAROTID ENDARTERECTOMY Left 2017    HX CATARACT REMOVAL Bilateral     HX CORONARY ARTERY BYPASS GRAFT      HX HERNIA REPAIR Right     hernia    HX UROLOGICAL      greenlight laser    HX UROLOGICAL      kidney stone    HX UROLOGICAL      Lithotripsy x 2    [place] CARDIAC SURG PROCEDURE UNLIST  3/2014    quadruple bypass     Family History   Problem Relation Age of Onset    Post-op Nausea/Vomiting Brother       Social History     Tobacco Use    Smoking status: Former Smoker     Packs/day: 1.00     Years: 60.00     Pack years: 60.00     Quit date: 3/23/2014     Years since quittin.9    Smokeless tobacco: Never Used   Substance Use Topics    Alcohol use: Not Currently     Alcohol/week: 2.0 standard drinks     Types: 2 Shots of liquor per week     Comment: per day       Prior to Admission medications    Medication Sig Start Date End Date Taking? Authorizing Provider   bumetanide (BUMEX) 2 mg tablet Take 1 Tablet by mouth two (2) times a day.  22 Marino Flowers MD   metOLazone (ZAROXOLYN) 2.5 mg tablet Take 2 Tablets by mouth daily. 2/17/22   Marino Flowers MD   imatinib (Gleevec) 400 mg tablet Take 200 mg by mouth daily. Provider, Historical   mecobalamin, vitamin B12, 1,000 mcg TbDi Take 1 Tablet by mouth daily. Provider, Historical   glucose 4 gram chewable tablet Take 4 g by mouth daily as needed for Other (low BS). Provider, Historical   OXYGEN-AIR DELIVERY SYSTEMS 2 L by IntraNASal route continuous. Provider, Historical   insulin glargine (LANTUS,BASAGLAR) 100 unit/mL (3 mL) inpn 3-4 Units by SubCUTAneous route daily as needed for Other (high BS). patient reporting he has no actual sliding scale for medication- takes 3-4 units depending on his BS, only takes if greater than 150. Provider, Historical   carvediloL (COREG) 25 mg tablet Take 1 Tablet by mouth two (2) times a day. 7/22/21   Jose Rodriguez MD   apixaban (ELIQUIS) 2.5 mg tablet Take 1 Tablet by mouth every twelve (12) hours. 7/22/21   Jose Rodriguez MD   gabapentin (NEURONTIN) 100 mg capsule Take 1 Capsule by mouth nightly. Max Daily Amount: 100 mg. Patient taking differently: Take 300 mg by mouth nightly. 7/22/21   Jose Rodriguez MD   aspirin delayed-release 81 mg tablet Take 81 mg by mouth daily. Stephan Nance MD   vitamin E (AQUA GEMS) 400 unit capsule Take 400 Units by mouth daily. Stephan Nance MD   tamsulosin (FLOMAX) 0.4 mg capsule Take 0.4 mg by mouth daily. Stephan Nance MD   nitroglycerin (NITROSTAT) 0.4 mg SL tablet 1 Tab by SubLINGual route as needed for Chest Pain. Up to 3 doses. 8/11/18   Sadaf Lei MD   atorvastatin (LIPITOR) 40 mg tablet Take 40 mg by mouth daily.     Stephan Nance MD     Allergies   Allergen Reactions    Adhesive Tape-Silicones Other (comments)     Raw skin    Codeine Nausea and Vomiting    Iodinated Contrast Media Rash     patient states this was years ago - he states that he can tolerate the newer agents          Signed By: Xochitl Watt MD     February 21, 2022

## 2022-02-21 NOTE — WOUND CARE
02/21/22 0909   Wound Leg lower Left; Anterior ruptured blister   Date First Assessed/Time First Assessed: 02/21/22 0907   Present on Hospital Admission: Yes  Primary Wound Type: Vascular  Location: Leg lower  Wound Location Orientation: Left; Anterior  Wound Description: ruptured blister   Wound Image    Wound Etiology Venous   Dressing Status Breakthrough drainage noted   Cleansed Wound cleanser;Vashe   Dressing/Treatment Gauze dressing/dressing sponge;Roll gauze;Tape/Soft cloth adhesive tape; Tubular bandage  (mepilex transfer)   Dressing Change Due 02/28/22   Wound Length (cm) 3 cm   Wound Width (cm) 2 cm   Wound Depth (cm) 0.1 cm   Wound Surface Area (cm^2) 6 cm^2   Wound Volume (cm^3) 0.6 cm^3   Post-Procedure Length (cm) 4.5 cm   Post-Procedure Width (cm) 3.3 cm   Post-Procedure Depth (cm) 0.2 cm   Post-Procedure Surface Area (cm^2) 14.85 cm^2   Post-Procedure Volume (cm^3) 2.97 cm^3   Wound Assessment Devitalized tissue   Drainage Amount Large   Drainage Description Serous   Wound Odor None   Huong-Wound/Incision Assessment Edematous   Edges Flat/open edges   Wound Thickness Description Partial thickness   Wound Leg lower Left;Medial ruptured blister   Date First Assessed/Time First Assessed: 02/21/22 0908   Present on Hospital Admission: Yes  Primary Wound Type: Vascular  Location: Leg lower  Wound Location Orientation: Left;Medial  Wound Description: ruptured blister   Wound Image    Wound Etiology Venous   Dressing Status Breakthrough drainage noted   Cleansed Wound cleanser;Vashe   Dressing/Treatment Gauze dressing/dressing sponge;Roll gauze;Tape/Soft cloth adhesive tape; Tubular bandage  (mepilex transfer)   Dressing Change Due 02/28/22   Wound Length (cm) 3 cm   Wound Width (cm) 3 cm   Wound Depth (cm) 0.1 cm   Wound Surface Area (cm^2) 9 cm^2   Wound Volume (cm^3) 0.9 cm^3   Post-Procedure Length (cm) 3 cm   Post-Procedure Width (cm) 3 cm   Post-Procedure Depth (cm) 0.2 cm   Post-Procedure Surface Area (cm^2) 9 cm^2   Post-Procedure Volume (cm^3) 1.8 cm^3   Wound Assessment Devitalized tissue   Drainage Amount Large   Drainage Description Serous   Wound Odor None   Huong-Wound/Incision Assessment Edematous   Edges Flat/open edges   Wound Thickness Description Partial thickness   Wound Leg lower Left; Anterior;Mid ruptured blister   Date First Assessed/Time First Assessed: 02/21/22 0923   Present on Hospital Admission: Yes  Primary Wound Type: Venous  Location: Leg lower  Wound Location Orientation: Left; Anterior;Mid  Wound Description: ruptured blister   Wound Etiology Venous   Dressing Status Intact;Breakthrough drainage noted   Cleansed Wound cleanser;Vashe   Dressing/Treatment Gauze dressing/dressing sponge;Roll gauze; Tubular bandage  (mepilex transfer)   Dressing Change Due 02/28/22   Wound Length (cm) 0.5 cm   Wound Width (cm) 0.5 cm   Wound Depth (cm) 0.1 cm   Wound Surface Area (cm^2) 0.25 cm^2   Wound Volume (cm^3) 0.025 cm^3   Post-Procedure Length (cm) 0.5 cm   Post-Procedure Width (cm) 0.5 cm   Post-Procedure Depth (cm) 0.2 cm   Post-Procedure Surface Area (cm^2) 0.25 cm^2   Post-Procedure Volume (cm^3) 0.05 cm^3   Wound Assessment Devitalized tissue   Drainage Amount Small   Drainage Description Serous   Wound Odor None   Huong-Wound/Incision Assessment Edematous   Edges Flat/open edges   Wound Thickness Description Partial thickness

## 2022-02-27 NOTE — PROGRESS NOTES
Cardiology Progress Note        Patient: Diya         Sex: male          DOA: 7/11/2021  YOB: 1940      Age:  79 y.o.        LOS:  LOS: 7 days      Patient seen and examined, chart reviewed. Assessment/Plan     Patient Active Problem List   Diagnosis Code    TIA (transient ischemic attack) G45.9    Coronary artery disease involving native coronary artery I25.10    Type II diabetes mellitus, uncontrolled ([facility] E11.65    Dyslipidemia E78.5    Pancytopenia ([facility] [facility] D61.818    CML (chronic myelocytic leukemia) ([facility] C92.10    HTN (hypertension) I10         Stage 3 chronic kidney disease (HCC) N18.30    Hyperglycemia R73.9         Community acquired pneumonia J18.9    Chronic congestive heart failure (HCC) I50.9    SOB (shortness of breath) R06.02    On supplemental oxygen by nasal cannula Z78.9    Pneumonia J18.9    Pulmonary edema J81.1    Sepsis ([facility] A41.9    At[facility] fibrillation [facility] RVR [facility] I48.91    Encounter for palliative care Z51.5    Debility R53.81    Acute on chronic combined systolic and diastolic congestive heart failure (HCC) I50.43    Pulmonary hypertension (HCC) I27.20    Cardiomyopathy (HCC) I42.9       Permanent atrial fibrillation    Echocardiogram reported    · LV: Estimated LVEF is 15 - 20%. Normal wall thickness. Mildly dilated left ventricle. Severely reduced systolic function. Left ventricular diastolic dysfunction E'E= 15.30. · Contrast used: DEFINITY. · LA: Moderately dilated left atrium. · AV: Aortic valve leaflet calcification present. Mild aortic valve regurgitation is present. · MV: Mitral valve thickening. Moderate mitral valve regurgitation is present. · TV: Moderate tricuspid valve regurgitation is present. · PV: Mild pulmonic valve regurgitation is present. · PA: Pulmonary arterial systolic pressure is 55 mmHg.   · IVC: Moderately elevated central venous pressure (8 mmHg); IVC diameter is larger than 21 mm and collapses more than 50% with respiration. Last 24 hour negative balance of 2779 ml  Plan:    Continue aspirin, carvedilol, atorvastatin. Change IV Lasix to 40 mg twice a day. Lovenox is on hold due to bleeding from IV site   Continue IV Milrinone. Strict input output. Monitor renal function and electrolytes. Salt restriction up to 2 g per day and fluid restriction up to 1.2 L per day   Plan discussed with patient and family member.  returns tomorrow              Subjective:    cc:   breathing is better      REVIEW OF SYSTEMS:     General: No fevers or chills. Cardiovascular: No chest pain,No palpitations, No orthopnea, No PND, positive leg swelling, No claudication  Pulmonary: No  dyspnea. Gastrointestinal: No nausea, vomiting, bleeding  Neurology: No Dizziness    Objective:      Visit Vitals  BP (!) 105/54   Pulse 81   Temp 98.4 °F (36.9 °C)   Resp 16   Ht 5' 8\" (1.727 m)   Wt 87.8 kg (193 lb 9 oz)   SpO2 97%   BMI 29.43 kg/m²     Body mass index is 29.43 kg/m². Physical Exam:  General Appearance: Comfortable, not using accessory muscles of respiration. HEENT: CT. HEAD: Atraumatic  NECK: No JVD, no thyroidomeglay. CAROTIDS: no bruit  LUNGS: Clear bilaterally. HEART: S1+S2 audible, irregular irregular, no murmur, no pericardial rub. ABD: Non-tender, BS Audible    EXT: Bilateral + lower extremity edema, and no cyanosis. VASCULAR EXAM: Pulses are intact. PSYCHIATRIC EXAM: Mood is appropriate. MUSCULOSKELETAL: Grossly no joint deformity.   NEUROLOGICAL: AAO times 3, No motor and sensory deficit    Medication:  Current Facility-Administered Medications   Medication Dose Route Frequency    senna-docusate (PERICOLACE) 8.6-50 mg per tablet 2 Tablet  2 Tablet Oral DAILY    furosemide (LASIX) injection 40 mg  40 mg IntraVENous Q12H    phenol throat spray (CHLORASEPTIC) 1 Spray  1 Spray Oral PRN    polyethylene glycol (MIRALAX) packet 17 g  17 g Oral DAILY    [Held by provider] enoxaparin (LOVENOX) injection 140 mg  1.5 mg/kg SubCUTAneous Q24H    imatinib (GLEEVEC) chemo tablet 100 mg (Patient Supplied)  100 mg Oral DAILY    cyanocobalamin tablet 2,000 mcg  2,000 mcg Oral DAILY    vitamin E (AQUA GEMS) capsule 400 Units  400 Units Oral DAILY    albuterol-ipratropium (DUO-NEB) 2.5 MG-0.5 MG/3 ML  3 mL Nebulization Q4H PRN    milrinone (PRIMACOR) 20 MG/100 ML D5W infusion  0.375 mcg/kg/min IntraVENous CONTINUOUS    guaiFENesin ER (MUCINEX) tablet 600 mg  600 mg Oral Q12H    famotidine (PEPCID) tablet 20 mg  20 mg Oral DAILY    magnesium hydroxide (MILK OF MAGNESIA) 400 mg/5 mL oral suspension 30 mL  30 mL Oral DAILY PRN    [Held by provider] amLODIPine (NORVASC) tablet 5 mg  5 mg Oral DAILY    aspirin delayed-release tablet 81 mg  81 mg Oral DAILY    atorvastatin (LIPITOR) tablet 40 mg  40 mg Oral DAILY    carvediloL (COREG) tablet 25 mg  25 mg Oral BID    tamsulosin (FLOMAX) capsule 0.4 mg  0.4 mg Oral DAILY    sodium chloride (NS) flush 5-40 mL  5-40 mL IntraVENous Q8H    sodium chloride (NS) flush 5-40 mL  5-40 mL IntraVENous PRN    acetaminophen (TYLENOL) tablet 650 mg  650 mg Oral Q6H PRN    Or    acetaminophen (TYLENOL) suppository 650 mg  650 mg Rectal Q6H PRN    polyethylene glycol (MIRALAX) packet 17 g  17 g Oral DAILY PRN    ondansetron (ZOFRAN ODT) tablet 4 mg  4 mg Oral Q8H PRN    Or    ondansetron (ZOFRAN) injection 4 mg  4 mg IntraVENous Q6H PRN    insulin lispro (HUMALOG) injection   SubCUTAneous AC&HS               Lab/Data Reviewed:       Recent Labs     07/18/21  0535 07/17/21  0150 07/16/21  0425   WBC 49.0* 50.0* 44.3*   HGB 11.5* 10.8* 10.2*   HCT 38.5 35.1* 34.3*   * 108* 109*     Recent Labs     07/18/21  0535 07/17/21  0150 07/16/21  0425    141 140   K 3.9 4.0 3.4*   CL 98* 103 99*   CO2 37* 36* 36*   * 150* 160*   BUN 37* 37* 41*   CREA 1.69* 1.58* 1.65*   CA 9.3 9.0 8.6     Total time spent 34 minutes    Signed By: Vaibhav Birmingham MD     July 18, 2021 Surgery Progress Note     Subjective/24hour Events: Patient seen and examined at the bedside this morning. No acute events overnight. Pain controlled.     Vital Signs:  Vital Signs Last 24 Hrs  T(C): 36.9 (28 Feb 2022 05:37), Max: 37.4 (27 Feb 2022 17:33)  T(F): 98.4 (28 Feb 2022 05:37), Max: 99.3 (27 Feb 2022 17:33)  HR: 87 (28 Feb 2022 05:37) (83 - 87)  BP: 117/73 (28 Feb 2022 05:37) (97/63 - 119/60)  BP(mean): --  RR: 18 (28 Feb 2022 05:37) (18 - 18)  SpO2: 95% (28 Feb 2022 05:37) (95% - 99%)        I&O's Detail    27 Feb 2022 07:01  -  28 Feb 2022 05:52  --------------------------------------------------------  IN:    Oral Fluid: 1040 mL  Total IN: 1040 mL    OUT:  Total OUT: 0 mL    Total NET: 1040 mL            Physical Exam:  Gen: Obese  Neuro: AAOx3  HEENT: normocephalic, atraumatic, no scleral icterus  CV: S1, S2, RRR  Pulm: Tachypneic, increased work of breathing  Abd: Soft, ND, NT, no rebound, no guarding, no palpable organomegaly/masses  Ext: edematous  Back: approx 3x3cm necrotic area on left ischium with surrounding cellulitis, not indurated no crepitus.     Labs:    02-27    129<L>  |  91<L>  |  50<H>  ----------------------------<  359<H>  4.3   |  23  |  2.13<H>    Ca    9.2      27 Feb 2022 09:54  Phos  4.9     02-27  Mg     2.2     02-27    TPro  6.8  /  Alb  3.3  /  TBili  1.2  /  DBili  x   /  AST  23  /  ALT  15  /  AlkPhos  170<H>  02-26    CAPILLARY BLOOD GLUCOSE      POCT Blood Glucose.: 250 mg/dL (27 Feb 2022 21:58)  POCT Blood Glucose.: 196 mg/dL (27 Feb 2022 17:40)  POCT Blood Glucose.: 306 mg/dL (27 Feb 2022 12:41)  POCT Blood Glucose.: 377 mg/dL (27 Feb 2022 08:46)    LIVER FUNCTIONS - ( 26 Feb 2022 17:38 )  Alb: 3.3 g/dL / Pro: 6.8 g/dL / ALK PHOS: 170 U/L / ALT: 15 U/L / AST: 23 U/L / GGT: x                                 9.3    31.58 )-----------( 275      ( 27 Feb 2022 09:54 )             30.4     PT/INR - ( 26 Feb 2022 17:38 )   PT: 25.1 sec;   INR: 2.08 ratio         PTT - ( 26 Feb 2022 17:38 )  PTT:28.7 sec

## 2022-02-28 PROBLEM — L89.312 PRESSURE INJURY OF RIGHT BUTTOCK, STAGE 2 (HCC): Status: ACTIVE | Noted: 2022-01-01

## 2022-02-28 NOTE — WOUND CARE
Wound Center  Progress Note / Procedure Note    Subjective:     Chief Complaint:  Ketty Thomas is a 79 y.o.  male  with leg wounds and sacral wound of few weeks duration. 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 [employer]    History/Chart/Medications reviewed      Since last visit:  Sacral area ongoing pain, the more he sits  HH started - going well    Wound caused by: leg swelling, pressure  Current wound care:See flowsheet  Offloading wound: yes for legs, no to sacrum- sitting most of the day  Elevating legs: yes  Compression : no  Appetite: fair  Wound associated pain: See flowsheet  Diabetic: yes  Smoker: former, quit 8 years ago  ROS: no N/V/D, no T/chills; no local rash, no chest pain or shortness of breath, no headache or dizzyness      Objective:     Physical Exam:   See flowsheet / nursing notes for vitals  Visit Vitals  /67 (BP 1 Location: Left upper arm, BP Patient Position: At rest;Sitting)   Pulse 77   Temp 97.5 °F (36.4 °C)   Resp 16   SpO2 100%     General: NAD. Hygiene good  Psych: cooperative. No anxiety or depression. Normal mood and affect. Neuro: alert and oriented to person/place/situation. Otherwise nonfocal.  Derm: Normal  turgor for age, dry skin  HEENT: Normocephalic, atraumatic. EOMI. Conjunctiva clear. No scleral icterus. Neck: Normal range of motion. No masses. Chest: Respirations nonlabored  Lower extremities: color normal; temperature normal. Hair growth is not present. Calves are supple, nontender, approximately equally sized in comparison.  Capillary refill <3 sec  Focussed Lower Extremity Exam:  Vascular exam:  Right lower extremity: moderate-severe  edema, foot warm,   DP pulse : 1+  PT pulse: 0  Nails dystrophic   Left lower extremity: moderate-severe  edema, foot warm,   DP pulse : 1+  PT pulse: 0  Nails dystrophic     Ulcer Description:   See Flowsheet           Data Review:              Assessment/Plan     79 y.o. male with CHF, Afic, DM2, chronic h/o leg edema    -left leg ulcers x 3    Partial thickness  Sl improved    Some slough    Necessitates debridement  for wound healing and to prevent/heal infection  See below    -leg edema  Doing well with tubi x 2    - NEW   Right buttocks 1.5x1.1x0.1  Pressure, stage 2  Partial thickness  Limit sitting  Get back into bed to elevate legs and be on sides ot offload buttock wound            Following discussed with patient / spouse   Needs :  Serial debridement- debrided today- see note below    Good local wound care  Dressing: D/C mepilex transfer; start silvercel to legs, duoderm to buttocks (medihoney here)  Frequency : three times a week   Cont  Home Health      Edema management:  Compression: Tubigrip x 2 layer      Nutrition:  Reviewed daily intake- eating pretty well, minimal carbs, plenty of protein, healthy fats    Patient/ spouse understood and agrees with plan. Questions answered. Serial visits and serial debridements - cont    Follow up with me in 1 week        Procedure:       Ulcer assessment: Due to presence of slough within the wound bed, ulcer requires debridement. Procedure: Debridement:   The indication for debridement was reviewed with patient. Risks of procedure (bleeding, infection, pain) were discussed with patient and consent signed.  Questions were answered    Selective debridement   Indication: to remove slough / devitalized tissue/ selectively  Consent in chart   Anesthesia: Topical 2% lidocaine jelly   Instrument: curette  Residual Necrosis: Present and scored   Bleeding: <1ml   Hemostasis: Pressure   Patient tolerated procedure well   Procedural Pain: 0  Post - procedural pain: 0    Post debridement measurements: see below  Surface area debrided: below sq. cm  WOUND POA CONDITIONS    Wound Leg lower Left; Anterior ruptured blister (Active)   Wound Image   02/28/22 0925   Wound Etiology Venous 02/28/22 0925   Dressing Status Breakthrough drainage noted 02/28/22 0925   Cleansed Wound cleanser;Vashe 02/28/22 0925   Dressing/Treatment Gauze dressing/dressing sponge;Roll gauze;Tape/Soft cloth adhesive tape; Tubular bandage 02/21/22 0909   Dressing Change Due 03/07/22 02/28/22 0925   Wound Length (cm) 3.5 cm 02/28/22 0925   Wound Width (cm) 3 cm 02/28/22 0925   Wound Depth (cm) 0.1 cm 02/28/22 0925   Wound Surface Area (cm^2) 10.5 cm^2 02/28/22 0925   Change in Wound Size % -75 02/28/22 0925   Wound Volume (cm^3) 1.05 cm^3 02/28/22 0925   Wound Healing % -75 02/28/22 0925   Post-Procedure Length (cm) 3.6 cm 02/28/22 0925   Post-Procedure Width (cm) 3.1 cm 02/28/22 0925   Post-Procedure Depth (cm) 0.2 cm 02/28/22 0925   Post-Procedure Surface Area (cm^2) 11.16 cm^2 02/28/22 0925   Post-Procedure Volume (cm^3) 2.232 cm^3 02/28/22 0925   Wound Assessment Devitalized tissue 02/28/22 0925   Drainage Amount Moderate 02/28/22 0925   Drainage Description Serous 02/28/22 0925   Wound Odor None 02/28/22 0925   Huong-Wound/Incision Assessment Edematous 02/28/22 0925   Edges Flat/open edges 02/28/22 0925   Wound Thickness Description Partial thickness 02/28/22 0925   Number of days: 7       Wound Leg lower Left;Medial ruptured blister (Active)   Wound Image   02/28/22 0925   Wound Etiology Venous 02/28/22 0925   Dressing Status Breakthrough drainage noted 02/28/22 0925   Cleansed Wound cleanser;Vashe 02/28/22 0925   Dressing/Treatment Gauze dressing/dressing sponge;Roll gauze;Tape/Soft cloth adhesive tape; Tubular bandage 02/21/22 0909   Dressing Change Due 03/07/22 02/28/22 0925   Wound Length (cm) 2.5 cm 02/28/22 0925   Wound Width (cm) 2.4 cm 02/28/22 0925   Wound Depth (cm) 0.1 cm 02/28/22 0925   Wound Surface Area (cm^2) 6 cm^2 02/28/22 4186 Change in Wound Size % 33.33 02/28/22 0925   Wound Volume (cm^3) 0.6 cm^3 02/28/22 0925   Wound Healing % 33 02/28/22 0925   Post-Procedure Length (cm) 2.6 cm 02/28/22 0925   Post-Procedure Width (cm) 2.5 cm 02/28/22 0925   Post-Procedure Depth (cm) 0.2 cm 02/28/22 0925   Post-Procedure Surface Area (cm^2) 6.5 cm^2 02/28/22 0925   Post-Procedure Volume (cm^3) 1.3 cm^3 02/28/22 0925   Wound Assessment Devitalized tissue 02/28/22 0925   Drainage Amount Moderate 02/28/22 0925   Drainage Description Serous 02/28/22 0925   Wound Odor None 02/28/22 0925   Huong-Wound/Incision Assessment Edematous 02/28/22 0925   Edges Flat/open edges 02/28/22 0925   Wound Thickness Description Partial thickness 02/28/22 0925   Number of days: 7       Wound Leg lower Left; Anterior;Mid ruptured blister (Active)   Wound Image   02/28/22 0925   Wound Etiology Venous 02/28/22 0925   Dressing Status Intact;Breakthrough drainage noted 02/28/22 0925   Cleansed Wound cleanser;Vashe 02/28/22 0925   Dressing/Treatment Gauze dressing/dressing sponge;Roll gauze; Tubular bandage 02/21/22 0909   Dressing Change Due 03/07/22 02/28/22 0925   Wound Length (cm) 1 cm 02/28/22 0925   Wound Width (cm) 0.4 cm 02/28/22 0925   Wound Depth (cm) 0.1 cm 02/28/22 0925   Wound Surface Area (cm^2) 0.4 cm^2 02/28/22 0925   Change in Wound Size % -60 02/28/22 0925   Wound Volume (cm^3) 0.04 cm^3 02/28/22 0925   Wound Healing % -60 02/28/22 0925   Post-Procedure Length (cm) 1.1 cm 02/28/22 0925   Post-Procedure Width (cm) 0.5 cm 02/28/22 0925   Post-Procedure Depth (cm) 0.2 cm 02/28/22 0925   Post-Procedure Surface Area (cm^2) 0.55 cm^2 02/28/22 0925   Post-Procedure Volume (cm^3) 0.11 cm^3 02/28/22 0925   Wound Assessment Devitalized tissue 02/28/22 0925   Drainage Amount Scant 02/28/22 0925   Drainage Description Serous 02/28/22 0925   Wound Odor None 02/28/22 0925   Huong-Wound/Incision Assessment Edematous 02/28/22 0925   Edges Flat/open edges 02/28/22 0925   Wound Thickness Description Partial thickness 22 0925   Number of days: 7       Wound Sacral/coccyx stage II (Active)   Number of days: 0             -------    Past Medical History:   Diagnosis Date    CAD (coronary artery disease)     Chronic atrial fibrillation ([facility] 2016    CML (chronic myelocytic leukemia) ([facility]     Coagulation disorder (HCC)     bruises easily    Diabetes ([facility]     Hypertension     Nausea & vomiting     with most recent surgery-carotid endardarectomy    TIA (transient ischemic attack) 2016      Past Surgical History:   Procedure Laterality Date    HX CAROTID ENDARTERECTOMY Left 2017    HX CATARACT REMOVAL Bilateral     HX CORONARY ARTERY BYPASS GRAFT      HX HERNIA REPAIR Right     hernia    HX UROLOGICAL      greenlight laser    HX UROLOGICAL      kidney stone    HX UROLOGICAL      Lithotripsy x 2    CT CARDIAC SURG PROCEDURE UNLIST  3/2014    quadruple bypass     Family History   Problem Relation Age of Onset    Post-op Nausea/Vomiting Brother       Social History     Tobacco Use    Smoking status: Former Smoker     Packs/day: 1.00     Years: 60.00     Pack years: 60.00     Quit date: 3/23/2014     Years since quittin.9    Smokeless tobacco: Never Used   Substance Use Topics    Alcohol use: Not Currently     Alcohol/week: 2.0 standard drinks     Types: 2 Shots of liquor per week     Comment: per day       Prior to Admission medications    Medication Sig Start Date End Date Taking? Authorizing Provider   bumetanide (BUMEX) 2 mg tablet Take 1 Tablet by mouth two (2) times a day. 22   Ulysses Grossman MD   metOLazone (ZAROXOLYN) 2.5 mg tablet Take 2 Tablets by mouth daily. 22   Ulysses Grossman MD   imatinib (Gleevec) 400 mg tablet Take 200 mg by mouth daily. Provider, Historical   mecobalamin, vitamin B12, 1,000 mcg TbDi Take 1 Tablet by mouth daily.     Provider, Historical   glucose 4 gram chewable tablet Take 4 g by mouth daily as needed for Other (low BS). Provider, Historical   OXYGEN-AIR DELIVERY SYSTEMS 2 L by IntraNASal route continuous. Provider, Historical   insulin glargine (LANTUS,BASAGLAR) 100 unit/mL (3 mL) inpn 3-4 Units by SubCUTAneous route daily as needed for Other (high BS). patient reporting he has no actual sliding scale for medication- takes 3-4 units depending on his BS, only takes if greater than 150. Provider, Historical   carvediloL (COREG) 25 mg tablet Take 1 Tablet by mouth two (2) times a day. 7/22/21   Kristyn Rodriguez MD   apixaban (ELIQUIS) 2.5 mg tablet Take 1 Tablet by mouth every twelve (12) hours. 7/22/21   Kristyn Rodriguez MD   gabapentin (NEURONTIN) 100 mg capsule Take 1 Capsule by mouth nightly. Max Daily Amount: 100 mg. Patient taking differently: Take 300 mg by mouth nightly. 7/22/21   Kristyn Rodriguez MD   aspirin delayed-release 81 mg tablet Take 81 mg by mouth daily. Stephan Nance MD   vitamin E (AQUA GEMS) 400 unit capsule Take 400 Units by mouth daily. Stephan Nance MD   tamsulosin (FLOMAX) 0.4 mg capsule Take 0.4 mg by mouth daily. Stephan Nance MD   nitroglycerin (NITROSTAT) 0.4 mg SL tablet 1 Tab by SubLINGual route as needed for Chest Pain. Up to 3 doses. 8/11/18   Manjeet Hernandez MD   atorvastatin (LIPITOR) 40 mg tablet Take 40 mg by mouth daily.     Stephan Nance MD     Allergies   Allergen Reactions    Adhesive Tape-Silicones Other (comments)     Raw skin    Codeine Nausea and Vomiting    Iodinated Contrast Media Rash     patient states this was years ago - he states that he can tolerate the newer agents          Signed By: John Sheehan MD     February 28, 2022

## 2022-02-28 NOTE — WOUND CARE
02/28/22 0925   Wound Leg lower Left; Anterior ruptured blister   Date First Assessed/Time First Assessed: 02/21/22 0907   Present on Hospital Admission: Yes  Primary Wound Type: Vascular  Location: Leg lower  Wound Location Orientation: Left; Anterior  Wound Description: ruptured blister   Wound Image    Wound Etiology Venous   Dressing Status Breakthrough drainage noted   Cleansed Wound cleanser;Vashe   Dressing/Treatment Gauze dressing/dressing sponge;Roll gauze;Silver dressing;Tape/Soft cloth adhesive tape; Tubular bandage   Dressing Change Due 03/07/22   Wound Length (cm) 3.5 cm   Wound Width (cm) 3 cm   Wound Depth (cm) 0.1 cm   Wound Surface Area (cm^2) 10.5 cm^2   Change in Wound Size % -75   Wound Volume (cm^3) 1.05 cm^3   Wound Healing % -75   Post-Procedure Length (cm) 3.6 cm   Post-Procedure Width (cm) 3.1 cm   Post-Procedure Depth (cm) 0.2 cm   Post-Procedure Surface Area (cm^2) 11.16 cm^2   Post-Procedure Volume (cm^3) 2.232 cm^3   Wound Assessment Devitalized tissue   Drainage Amount Moderate   Drainage Description Serous   Wound Odor None   Huong-Wound/Incision Assessment Edematous   Edges Flat/open edges   Wound Thickness Description Partial thickness   Wound Leg lower Left;Medial ruptured blister   Date First Assessed/Time First Assessed: 02/21/22 0908   Present on Hospital Admission: Yes  Primary Wound Type: Vascular  Location: Leg lower  Wound Location Orientation: Left;Medial  Wound Description: ruptured blister   Wound Image    Wound Etiology Venous   Dressing Status Breakthrough drainage noted   Cleansed Wound cleanser;Vashe   Dressing/Treatment Gauze dressing/dressing sponge;Roll gauze;Silver dressing;Tape/Soft cloth adhesive tape; Tubular bandage   Dressing Change Due 03/07/22   Wound Length (cm) 2.5 cm   Wound Width (cm) 2.4 cm   Wound Depth (cm) 0.1 cm   Wound Surface Area (cm^2) 6 cm^2   Change in Wound Size % 33.33   Wound Volume (cm^3) 0.6 cm^3   Wound Healing % 33   Post-Procedure Length (cm) 2.6 cm   Post-Procedure Width (cm) 2.5 cm   Post-Procedure Depth (cm) 0.2 cm   Post-Procedure Surface Area (cm^2) 6.5 cm^2   Post-Procedure Volume (cm^3) 1.3 cm^3   Wound Assessment Devitalized tissue   Drainage Amount Moderate   Drainage Description Serous   Wound Odor None   Huong-Wound/Incision Assessment Edematous   Edges Flat/open edges   Wound Thickness Description Partial thickness   Wound Leg lower Left; Anterior;Mid ruptured blister   Date First Assessed/Time First Assessed: 02/21/22 0923   Present on Hospital Admission: Yes  Primary Wound Type: Venous  Location: Leg lower  Wound Location Orientation: Left; Anterior;Mid  Wound Description: ruptured blister   Wound Image    Wound Etiology Venous   Dressing Status Intact;Breakthrough drainage noted   Cleansed Wound cleanser;Vashe   Dressing/Treatment Silver dressing;Gauze dressing/dressing sponge;Roll gauze;Tape/Soft cloth adhesive tape; Tubular bandage   Dressing Change Due 03/07/22   Wound Length (cm) 1 cm   Wound Width (cm) 0.4 cm   Wound Depth (cm) 0.1 cm   Wound Surface Area (cm^2) 0.4 cm^2   Change in Wound Size % -60   Wound Volume (cm^3) 0.04 cm^3   Wound Healing % -60   Post-Procedure Length (cm) 1.1 cm   Post-Procedure Width (cm) 0.5 cm   Post-Procedure Depth (cm) 0.2 cm   Post-Procedure Surface Area (cm^2) 0.55 cm^2   Post-Procedure Volume (cm^3) 0.11 cm^3   Wound Assessment Devitalized tissue   Drainage Amount Scant   Drainage Description Serous   Wound Odor None   Huong-Wound/Incision Assessment Edematous   Edges Flat/open edges   Wound Thickness Description Partial thickness   Wound Sacral/coccyx stage II   Date First Assessed/Time First Assessed: 02/28/22 0952   Present on Hospital Admission: Yes  Primary Wound Type: Pressure Injury  Location: Sacral/coccyx  Wound Description: stage II   Wound Image    Wound Etiology Pressure Stage 2   Dressing Status New dressing applied   Cleansed Wound cleanser;Vashe   Dressing/Treatment Honey gel/honey paste;Tape/Soft cloth adhesive tape;Gauze dressing/dressing sponge;Silicone border   Dressing Change Due 03/07/22   Wound Length (cm) 1.5 cm   Wound Width (cm) 1.1 cm   Wound Depth (cm) 0.1 cm   Wound Surface Area (cm^2) 1.65 cm^2   Wound Volume (cm^3) 0.165 cm^3   Wound Assessment Pink/red   Drainage Amount Scant   Drainage Description Serous   Wound Odor None   Huong-Wound/Incision Assessment Blanchable erythema;Fragile   Edges Flat/open edges   Wound Thickness Description Partial thickness

## 2022-03-04 PROBLEM — R04.0 BLEEDING NOSE: Status: ACTIVE | Noted: 2022-01-01

## 2022-03-04 PROBLEM — E87.6 HYPOKALEMIA: Status: ACTIVE | Noted: 2022-01-01

## 2022-03-04 PROBLEM — N18.30 ACUTE RENAL FAILURE SUPERIMPOSED ON STAGE 3 CHRONIC KIDNEY DISEASE (HCC): Status: ACTIVE | Noted: 2022-01-01

## 2022-03-04 PROBLEM — I50.9 ACUTE EXACERBATION OF CHF (CONGESTIVE HEART FAILURE) (HCC): Status: ACTIVE | Noted: 2022-01-01

## 2022-03-04 PROBLEM — L97.909 LEG ULCER (HCC): Status: ACTIVE | Noted: 2022-01-01

## 2022-03-04 PROBLEM — N17.9 ACUTE RENAL FAILURE SUPERIMPOSED ON STAGE 3 CHRONIC KIDNEY DISEASE (HCC): Status: ACTIVE | Noted: 2022-01-01

## 2022-03-04 NOTE — CONSULTS
[facility] Consult Note      Patient: Lizzie Ann MRN: [id number]  SSN: [id number]    YOB: 1940  Age: 79 y.o. Sex: male        Date of Consultation: 3/4/2022  Referring Physician: Dr. Nataly Stafford  Reason for Consultation: SOB and CHF    HPI:  I was asked by  for shortness of breath and leg swelling. 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 Patient also have intermittent bleeding from nose.               Past Medical History:   Diagnosis Date    CAD (coronary artery disease) 2014    Chronic atrial fibrillation ([address] 9/2/2016    CML (chronic myelocytic leukemia) (HCC)     Coagulation disorder (HCC)     bruises easily    Diabetes ([address] 2012    Hypertension     Nausea & vomiting     with most recent surgery-carotid endardarectomy    TIA (transient ischemic attack) 8/11/2016     Past Surgical History:   Procedure Laterality Date    HX CAROTID ENDARTERECTOMY Left 05/2017    HX CATARACT REMOVAL Bilateral     HX CORONARY ARTERY BYPASS GRAFT      HX HERNIA REPAIR Right     hernia    HX UROLOGICAL      greenlight laser    HX UROLOGICAL      kidney stone    HX UROLOGICAL      Lithotripsy x 2    [place] CARDIAC SURG PROCEDURE UNLIST  3/2014    quadruple bypass     Current Facility-Administered Medications   Medication Dose Route Frequency    bumetanide (BUMEX) injection 1 mg  1 mg IntraVENous BID    sodium chloride (NS) flush 5-40 mL  5-40 mL IntraVENous Q8H    sodium chloride (NS) flush 5-40 mL  5-40 mL IntraVENous PRN    acetaminophen (TYLENOL) tablet 650 mg  650 mg Oral Q6H PRN    Or    acetaminophen (TYLENOL) suppository 650 mg  650 mg Rectal Q6H PRN    bisacodyL (DULCOLAX) suppository 10 mg  10 mg Rectal DAILY PRN    promethazine (PHENERGAN) tablet 12.5 mg  12.5 mg Oral Q6H PRN    Or    ondansetron (ZOFRAN) injection 4 mg  4 mg IntraVENous Q6H PRN    [START ON 3/5/2022] atorvastatin (LIPITOR) tablet 40 mg  40 mg Oral DAILY    carvediloL (COREG) tablet 25 mg  25 mg Oral BID    nitroglycerin (NITROSTAT) tablet 0.4 mg  0.4 mg SubLINGual PRN    [START ON 3/5/2022] tamsulosin (FLOMAX) capsule 0.4 mg  0.4 mg Oral DAILY    [START ON 3/5/2022] metOLazone (ZAROXOLYN) tablet 5 mg  5 mg Oral DAILY       Allergies and Intolerances: Allergies   Allergen Reactions    Adhesive Tape-Silicones Other (comments)     Raw skin    Codeine Nausea and Vomiting    Iodinated Contrast Media Rash     patient states this was years ago - he states that he can tolerate the newer agents       Family History:   Family History   Problem Relation Age of Onset    Post-op Nausea/Vomiting Brother        Social History:   He  reports that he quit smoking about 7 years ago. He has a 60.00 pack-year smoking history. He has never used smokeless tobacco.  He  reports previous alcohol use of about 2.0 standard drinks of alcohol per week. Review of Systems  Gen: No fever, chills, malaise, weight loss/gain. Heent: No headache, rhinorrhea, epistaxis, ear pain, hearing loss, sinus pain, neck pain/stiffness, sore throat. Heart: No chest pain, palpitations, +VARGAS, pnd, or orthopnea. Resp: No cough, hemoptysis, wheezing and shortness of breath. GI: No nausea, vomiting, diarrhea, constipation, melena or hematochezia. : No urinary obstruction, dysuria or hematuria. Derm: No rash, new skin lesion or pruritis. Musc/skeletal: no bone or joint complains. Vasc: ++ edema, cyanosis or claudication. Endo: No heat/cold intolerance, no polyuria,polydipsia or polyphagia. Neuro: No unilateral weakness, numbness, tingling. No seizures. Heme: No easy bruising or bleeding.         Physical:   Patient Vitals for the past 6 hrs:   Temp Pulse Resp BP SpO2   03/04/22 1705    132/83    03/04/22 1704  86 11  100 %   03/04/22 1650  91 15 125/88 100 %   03/04/22 1635  94 12 132/86 100 %   03/04/22 1620  81 13 125/84 100 %   03/04/22 1610  88  128/71    03/04/22 1528  93 17  97 %   03/04/22 1527  99 23 129/81    03/04/22 1514    127/79 100 %   03/04/22 1511 96.8 °F (36 °C) 94 19 94/80 99 %         Exam:   General Appearance: Comfortable, not using accessory muscles of respiration. Phimosis/bruises on upper extremities  HEENT: CT. HEAD: Atraumatic  NECK: No JVD, no thyroidomeglay. CAROTIDS:  LUNGS: Clear bilaterally. HEART: S1 irregular +S2     ABD: Non-tender, BS Audible    EXT: ++ edema, and no cysnosis. VASCULAR EXAM: Pulses are intact. PSYCHIATRIC EXAM: Mood is appropriate. MUSCULOSKELETAL: Grossly no joint deformity. NEUROLOGICAL: Motor and sensory sytem intact and Cranial nerves II-XII intact.     Review of Data:   LABS:   Lab Results   Component Value Date/Time    WBC 9.4 03/04/2022 03:16 PM    HGB 9.6 (L) 03/04/2022 03:16 PM    HCT 33.6 (L) 03/04/2022 03:16 PM    PLATELET 173 (L) 66/27/1597 03:16 PM     Lab Results   Component Value Date/Time    Sodium 132 (L) 03/04/2022 03:16 PM    Potassium 3.2 (L) 03/04/2022 03:16 PM    Chloride 88 (L) 03/04/2022 03:16 PM    CO2 37 (H) 03/04/2022 03:16 PM    Glucose 210 (H) 03/04/2022 03:16 PM     (H) 03/04/2022 03:16 PM    Creatinine 3.05 (H) 03/04/2022 03:16 PM     Lab Results   Component Value Date/Time    Cholesterol, total 107 08/12/2016 03:20 AM    HDL Cholesterol 56 08/12/2016 03:20 AM    LDL, calculated 26.4 08/12/2016 03:20 AM    Triglyceride 123 08/12/2016 03:20 AM     No components found for: GPT  Lab Results   Component Value Date/Time    Hemoglobin A1c 7.1 (H) 07/12/2021 02:55 AM       RADIOLOGY:  CT Results  (Last 48 hours)    None        CXR Results  (Last 48 hours)               03/04/22 1529  XR CHEST PORT Final result Impression:      Moderate right and small left pleural effusions. Bilateral parenchymal opacities possibly representing atelectasis versus edema. Narrative:  EXAM: XR CHEST PORT       CLINICAL INDICATION/HISTORY: sob   -Additional: None       COMPARISON: 2/17/2022       TECHNIQUE: Frontal view of the chest       _______________       FINDINGS:       HEART AND MEDIASTINUM: Cardiomegaly. Primary sternotomy. LUNGS AND PLEURAL SPACES: Moderate right and small left pleural effusions. Bilateral parenchymal opacities. No pneumothorax.        BONY THORAX AND SOFT TISSUES: No acute osseous abnormality       _______________                   Cardiology Procedures:   Results for orders placed or performed during the hospital encounter of 03/04/22   EKG, 12 LEAD, INITIAL   Result Value Ref Range    Ventricular Rate 100 BPM    QRS Duration 96 ms    Q-T Interval 392 ms    QTC Calculation (Bezet) 505 ms    Calculated R Axis -55 degrees    Calculated T Axis 141 degrees    Diagnosis       Atrial fibrillation with premature ventricular or aberrantly conducted   complexes  Left axis deviation  Anterior infarct (cited on or before 11-JUL-2021)  Prolonged QT  Abnormal ECG  When compared with ECG of 17-FEB-2022 17:43,  QT has lengthened        Echo Results  (Last 48 hours)    None       Cardiolite (Tc-99m Sestamibi) stress test        Impression / Plan:    Patient Active Problem List   Diagnosis Code    TIA (transient ischemic attack) G45.9    Coronary artery disease involving native coronary artery I25.10    Type II diabetes mellitus, uncontrolled ([facility]) E11.65    Dyslipidemia E78.5    Pancytopenia ([facility]) D61.818    CML (chronic myelocytic leukemia) ([facility]) C92.10    Chronic atrial fibrillation ([facility]) I48.20    Stage 3 chronic kidney disease ([facility] N18.30    Hyperglycemia R73.9    Elevated blood pressure reading with diagnosis of hypertension I10    Community acquired pneumonia J18.9    Chronic congestive heart failure ([facility]) I50.9    SOB (shortness of breath) R06.02    On supplemental oxygen by nasal cannula Z78.9    Pneumonia J18.9    Pulmonary edema J81.1    Sepsis ([facility]) A41.9    Atrial fibrillation with RVR ([facility]) I48.91    Encounter for palliative care Z51.5    Debility R53.81    Acute on chronic combined systolic and diastolic congestive heart failure ([facility]) I50.43    Pulmonary hypertension ([facility]) I27.20    Cardiomyopathy ([facility]) I42.9    ELIZABETH (acute kidney injury) ([facility]) N17.9    Pressure injury of sacral region, stage 1 L89.151    Chronic ulcer of left calf limited to breakdown of skin ([facility]) L97.221    Leg edema R60.0    Pressure injury of right buttock, stage 2 ([facility]) L89.312    Acute exacerbation of CHF (congestive heart failure) ([facility]) I50.9    Acute renal failure superimposed on stage 3 chronic kidney disease ([facility]) N17.9, N18.30    Bleeding nose R04.0       Assessment and plan    Acute on chronic combined systolic and diastolic heart failure  Atrial fibrillation  Severe cardiomyopathy  CAD/CABG  Chronic kidney disease, acute on chronic kidney disease  Nosebleed/epistaxis        Plan.     IV diuresis  Fluid restriction 1800 mL/day   salt restriction 2 to 3 g per  Consider restarting Eliquis 2.5 mg twice daily   consider palliative consult  Discussed with patient and wife discussed with Dr. Luis Bowman By: Peyman Mendoza MD     March 4, 2022

## 2022-03-04 NOTE — Clinical Note
Patient noticed to began destating, O2 adjusted to 6 L's nasal cannula. Assisted with airway, chin thrust method, o2 stats increased to 98%.

## 2022-03-04 NOTE — ED PROVIDER NOTES
EMERGENCY DEPARTMENT HISTORY AND PHYSICAL EXAM    Date: 3/4/2022  Patient Name: Francoise Camejo    History of Presenting Illness     Chief Complaint   Patient presents with    Shortness of Breath    Epistaxis         History Provided By: Patient    3:12 PM  Francoise Camejo is a 79 y.o. male with PMHX of CHF with EF of 15 to 20%, atrial fibrillation on Eliquis, chronic kidney disease who presents to the emergency department C/O shortness of breath and lower extremity edema. Patient reports the symptoms have been ongoing since his last ED visit a couple weeks ago. He notes that today he started having a nosebleed from his left nostril that has been difficult to control. He denies any fever, vomiting, bowel or urinary complaints. No clear relieving or exacerbating factors identified. PCP: Lilian Barajas, DO    Current Outpatient Medications   Medication Sig Dispense Refill    bumetanide (BUMEX) 2 mg tablet Take 1 Tablet by mouth two (2) times a day. 60 Tablet 0    metOLazone (ZAROXOLYN) 2.5 mg tablet Take 2 Tablets by mouth daily. 30 Tablet 0    imatinib (Gleevec) 400 mg tablet Take 200 mg by mouth daily.  mecobalamin, vitamin B12, 1,000 mcg TbDi Take 1 Tablet by mouth daily.  glucose 4 gram chewable tablet Take 4 g by mouth daily as needed for Other (low BS).  OXYGEN-AIR DELIVERY SYSTEMS 2 L by IntraNASal route continuous.  insulin glargine (LANTUS,BASAGLAR) 100 unit/mL (3 mL) inpn 3-4 Units by SubCUTAneous route daily as needed for Other (high BS). patient reporting he has no actual sliding scale for medication- takes 3-4 units depending on his BS, only takes if greater than 150.  carvediloL (COREG) 25 mg tablet Take 1 Tablet by mouth two (2) times a day. 60 Tablet 0    apixaban (ELIQUIS) 2.5 mg tablet Take 1 Tablet by mouth every twelve (12) hours. 60 Tablet 0    gabapentin (NEURONTIN) 100 mg capsule Take 1 Capsule by mouth nightly. Max Daily Amount: 100 mg.  (Patient taking differently: Take 300 mg by mouth nightly.) 30 Capsule 0    aspirin delayed-release 81 mg tablet Take 81 mg by mouth daily.  vitamin E (AQUA GEMS) 400 unit capsule Take 400 Units by mouth daily.  tamsulosin (FLOMAX) 0.4 mg capsule Take 0.4 mg by mouth daily.  nitroglycerin (NITROSTAT) 0.4 mg SL tablet 1 Tab by SubLINGual route as needed for Chest Pain. Up to 3 doses. 20 Tab 0    atorvastatin (LIPITOR) 40 mg tablet Take 40 mg by mouth daily. Past History       Past Medical History:  Past Medical History:   Diagnosis Date    CAD (coronary artery disease)     Chronic atrial fibrillation ([facility] [address] 2016    CML (chronic myelocytic leukemia) ([facility] [address]     Coagulation disorder (HCC)     bruises easily    Diabetes ([facility] [address]     Hypertension     Nausea & vomiting     with most recent surgery-carotid endardarectomy    TIA (transient ischemic attack) 2016       Past Surgical History:  Past Surgical History:   Procedure Laterality Date    HX CAROTID ENDARTERECTOMY Left 2017    HX CATARACT REMOVAL Bilateral     HX CORONARY ARTERY BYPASS GRAFT      HX HERNIA REPAIR Right     hernia    HX UROLOGICAL      greenlight laser    HX UROLOGICAL      kidney stone    HX UROLOGICAL      Lithotripsy x 2    [place] CARDIAC SURG PROCEDURE UNLIST  3/2014    quadruple bypass       Family History:  Family History   Problem Relation Age of Onset    Post-op Nausea/Vomiting Brother        Social History:  Social History     Tobacco Use    Smoking status: Former Smoker     Packs/day: 1.00     Years: 60.00     Pack years: 60.00     Quit date: 3/23/2014     Years since quittin.9    Smokeless tobacco: Never Used   Substance Use Topics    Alcohol use: Not Currently     Alcohol/week: 2.0 standard drinks     Types: 2 Shots of liquor per week     Comment: per day    Drug use: No       Allergies:   Allergies   Allergen Reactions    Adhesive Tape-Silicones Other (comments)     Raw skin    Codeine Nausea and Vomiting    Iodinated Contrast Media Rash     patient states this was years ago - he states that he can tolerate the newer agents         Review of Systems   Review of Systems   Constitutional: Negative for fever. HENT: Positive for nosebleeds. Respiratory: Positive for shortness of breath. Cardiovascular: Positive for leg swelling. Gastrointestinal: Negative for abdominal pain. All other systems reviewed and are negative.         Physical Exam     Vitals:    03/04/22 1635 03/04/22 1650 03/04/22 1704 03/04/22 1705   BP: 132/86 125/88  132/83   Pulse: 94 91 86    Resp: 12 15 11    Temp:       SpO2: 100% 100% 100%    Weight:         Physical Exam    Nursing notes and vital signs reviewed    Constitutional: Non toxic appearing, moderate distress  Head: Normocephalic, Atraumatic  Eyes: EOMI  Neck: Supple  Cardiovascular: Irregularly irregular rate and rhythm, no murmurs, rubs, or gallops  Chest: Normal work of breathing and chest excursion bilaterally  Lungs: Diminished to ausculation bilaterally  Abdomen: Soft, non tender, + distended  Back: No evidence of trauma or deformity  Extremities: No evidence of trauma or deformity, severe bilateral symmetric LE edema  Skin: Warm and dry, normal cap refill  Neuro: Alert and appropriate  Psychiatric: Normal mood and affect      Diagnostic Study Results     Labs -     Recent Results (from the past 12 hour(s))   EKG, 12 LEAD, INITIAL    Collection Time: 03/04/22  3:10 PM   Result Value Ref Range    Ventricular Rate 100 BPM    QRS Duration 96 ms    Q-T Interval 392 ms    QTC Calculation (Bezet) 505 ms    Calculated R Axis -55 degrees    Calculated T Axis 141 degrees    Diagnosis       Atrial fibrillation with premature ventricular or aberrantly conducted   complexes  Left axis deviation  Anterior infarct (cited on or before 11-JUL-2021)  Prolonged QT  Abnormal ECG  When compared with ECG of 17-FEB-2022 17:43,  QT has lengthened     CBC WITH AUTOMATED DIFF    Collection Time: 03/04/22  3:16 PM   Result Value Ref Range    WBC 9.4 4.6 - 13.2 K/uL    RBC 4.45 4.35 - 5.65 M/uL    HGB 9.6 (L) 13.0 - 16.0 g/dL    HCT 33.6 (L) 36.0 - 48.0 %    MCV 75.5 (L) 78.0 - 100.0 FL    MCH 21.6 (L) 24.0 - 34.0 PG    MCHC 28.6 (L) 31.0 - 37.0 g/dL    RDW 19.6 (H) 11.6 - 14.5 %    PLATELET 992 (L) 447 - 420 K/uL    NRBC 0.0 0  WBC    ABSOLUTE NRBC 0.00 0.00 - 0.01 K/uL    NEUTROPHILS 77 (H) 40 - 73 %    LYMPHOCYTES 8 (L) 21 - 52 %    MONOCYTES 13 (H) 3 - 10 %    EOSINOPHILS 0 0 - 5 %    BASOPHILS 2 0 - 2 %    IMMATURE GRANULOCYTES 0 0.0 - 0.5 %    ABS. NEUTROPHILS 7.2 1.8 - 8.0 K/UL    ABS. LYMPHOCYTES 0.8 (L) 0.9 - 3.6 K/UL    ABS. MONOCYTES 1.2 0.05 - 1.2 K/UL    ABS. EOSINOPHILS 0.0 0.0 - 0.4 K/UL    ABS. BASOPHILS 0.2 (H) 0.0 - 0.1 K/UL    ABS. IMM. GRANS. 0.0 0.00 - 0.04 K/UL    DF MANUAL      PLATELET COMMENTS DECREASED PLATELETS      RBC COMMENTS ANISOCYTOSIS  1+        RBC COMMENTS HYPOCHROMIA  1+        RBC COMMENTS TARGET CELLS  1+       METABOLIC PANEL, COMPREHENSIVE    Collection Time: 03/04/22  3:16 PM   Result Value Ref Range    Sodium 132 (L) 136 - 145 mmol/L    Potassium 3.2 (L) 3.5 - 5.5 mmol/L    Chloride 88 (L) 100 - 111 mmol/L    CO2 37 (H) 21 - 32 mmol/L    Anion gap 7 3.0 - 18 mmol/L    Glucose 210 (H) 74 - 99 mg/dL     (H) 7.0 - 18 MG/DL    Creatinine 3.05 (H) 0.6 - 1.3 MG/DL    BUN/Creatinine ratio 36 (H) 12 - 20      GFR est AA 24 (L) >60 ml/min/1.73m2    GFR est non-AA 20 (L) >60 ml/min/1.73m2    Calcium 8.8 8.5 - 10.1 MG/DL    Bilirubin, total 0.8 0.2 - 1.0 MG/DL    ALT (SGPT) 14 (L) 16 - 61 U/L    AST (SGOT) 21 10 - 38 U/L    Alk.  phosphatase 109 45 - 117 U/L    Protein, total 7.3 6.4 - 8.2 g/dL    Albumin 3.1 (L) 3.4 - 5.0 g/dL    Globulin 4.2 (H) 2.0 - 4.0 g/dL    A-G Ratio 0.7 (L) 0.8 - 1.7     NT-PRO BNP    Collection Time: 03/04/22  3:16 PM   Result Value Ref Range    NT pro-BNP 5,658 (H) 0 - 1,800 PG/ML   TROPONIN-HIGH SENSITIVITY    Collection Time: 03/04/22  3:16 PM   Result Value Ref Range    Troponin-High Sensitivity 30 0 - 78 ng/L   PROTHROMBIN TIME + INR    Collection Time: 03/04/22  3:16 PM   Result Value Ref Range    Prothrombin time 24.5 (H) 11.5 - 15.2 sec    INR 2.3 (H) 0.8 - 1.2     PTT    Collection Time: 03/04/22  3:16 PM   Result Value Ref Range    aPTT 39.0 (H) 23.0 - 36.4 SEC   URINALYSIS W/ RFLX MICROSCOPIC    Collection Time: 03/04/22  5:00 PM   Result Value Ref Range    Color YELLOW      Appearance CLEAR      Specific gravity 1.009 1.005 - 1.030      pH (UA) 7.0 5.0 - 8.0      Protein Negative NEG mg/dL    Glucose Negative NEG mg/dL    Ketone Negative NEG mg/dL    Bilirubin Negative NEG      Blood TRACE (A) NEG      Urobilinogen 0.2 0.2 - 1.0 EU/dL    Nitrites Negative NEG      Leukocyte Esterase Negative NEG         Radiologic Studies -   XR CHEST PORT   Final Result      Moderate right and small left pleural effusions. Bilateral parenchymal opacities possibly representing atelectasis versus edema. CT Results  (Last 48 hours)    None        CXR Results  (Last 48 hours)               03/04/22 1529  XR CHEST PORT Final result    Impression:      Moderate right and small left pleural effusions. Bilateral parenchymal opacities possibly representing atelectasis versus edema. Narrative:  EXAM: XR CHEST PORT       CLINICAL INDICATION/HISTORY: sob   -Additional: None       COMPARISON: 2/17/2022       TECHNIQUE: Frontal view of the chest       _______________       FINDINGS:       HEART AND MEDIASTINUM: Cardiomegaly. Primary sternotomy. LUNGS AND PLEURAL SPACES: Moderate right and small left pleural effusions. Bilateral parenchymal opacities. No pneumothorax.        BONY THORAX AND SOFT TISSUES: No acute osseous abnormality       _______________                 Medications given in the ED-  Medications   oxymetazoline (AFRIN) 0.05 % nasal spray 2 Spray (2 Sprays Both Nostrils Given 3/4/22 1521)   furosemide (LASIX) injection 40 mg (40 mg IntraVENous Given 3/4/22 1610)         Medical Decision Making   I am the first provider for this patient. I reviewed the vital signs, available nursing notes, past medical history, past surgical history, family history and social history. Vital Signs-Reviewed the patient's vital signs. Pulse Oximetry Analysis - 99% on home 2 L nasal cannula, not hypoxic     Cardiac Monitor:  Rate: 98 bpm  Rhythm: Atrial fibrillation    EKG interpretation: (Preliminary)  EKG read by Dr. Nunu Powell at 3:15 PM  Atrial fibrillation with PVCs at a rate of 100 bpm, QRS duration 96 ms, QTC of 505 ms, similar compared to prior from February 17, 2022    Records Reviewed: Nursing Notes, Old Medical Records and Previous electrocardiograms    Provider Notes (Medical Decision Making): Francoise Camejo is a 79 y.o. male presenting for shortness of breath and worsening lower extremity edema and a nosebleed. Nosebleed resolved here with application of topical Afrin. On review of patient's records this is his third ED visit in 30 days. His kidney function is steadily worsening at each visit and he is requiring IV diuresis at increased frequency. He has severe CHF with an EF of 15 to 20%. IV diuresis initiated here and discussed with cardiology and hospitalist for further in-hospital evaluation management. Patient is wife understand and agree with this plan. Procedures:  Procedures    ED Course:   CONSULT NOTE:   5:05 PM  Dr. Nunu Powell spoke with Dr. Nirmal Renee   Specialty: Cardiology  Discussed pt's hx, disposition, and available diagnostic and imaging results over the telephone. Reviewed care plans. Familiar with this patient, admit to hospitalist, IV diuresis. CONSULT NOTE:   5:19 PM  Dr. Nunu Powell spoke with Dr. Delma Matos   Specialty: Hospitalist  Discussed pt's hx, disposition, and available diagnostic and imaging results over the telephone. Reviewed care plans. Accepts to telemetry.      5:24 PM  Updated patient on all results and plan. All questions answered. Diagnosis and Disposition     Critical Care Time: None    Core Measures:  For Hospitalized Patients:    1. Hospitalization Decision Time:  The decision to hospitalize the patient was made by Dr. Jin Balderas at 5:20 PM on 3/4/2022    2. Aspirin: Aspirin was not given because the patient did not present with a stroke at the time of their Emergency Department evaluation    5:06 PM  Patient is being admitted to the hospital by Dr. Natasha Ellis. The results of their tests and reasons for their admission have been discussed with them and/or available family. They convey agreement and understanding for the need to be admitted and for their admission diagnosis. CONDITIONS ON ADMISSION:  Sepsis is not present at the time of admission. Deep Vein Thrombosis maybe present at the time of admission. Thrombosis maybe present at the time of admission. Urinary Tract Infection is not present at the time of admission. Pneumonia is not present at the time of admission. MRSA is not present at the time of admission. Wound infection is not present at the time of admission. Pressure Ulcer is not present at the time of admission. CLINICAL IMPRESSION:    1. Cardiac volume overload      _______________________________      Please note that this dictation was completed with [employer]oom[employer], the computer voice recognition software. Quite often unanticipated grammatical, syntax, homophones, and other interpretive errors are inadvertently transcribed by the computer software. Please disregard these errors. Please excuse any errors that have escaped final proofreading.

## 2022-03-04 NOTE — ED NOTES
Pt placed on full cardiac monitor  PIV established, blood collected and walked to lab  Pt medicated per [facility]  Pt provided urinal for urine specimen

## 2022-03-04 NOTE — H&P
History & Physical    Patient: Giovani Hidalgo MRN: [id number]  CSN: [id number]    YOB: 1940  Age: 79 y.o.   Sex: male      DOA: 3/4/2022  Primary Care Provider:  Galileo Sage DO      Assessment/Plan     Hospital Problems  Date Reviewed: 7/20/2017          Codes Class Noted POA    Acute renal failure superimposed on stage 3 chronic kidney disease ([facility] ICD-10-CM: N17.9, N18.30  ICD-9-CM: 584.9, 585.3  3/4/2022 Unknown        Bleeding nose ICD-10-CM: R04.0  ICD-9-CM: 784.7  3/4/2022 Unknown        Leg ulcer ([facility] ICD-10-CM: L97.909  ICD-9-CM: 707.10  3/4/2022 Unknown        Hypokalemia ICD-10-CM: E87.6  ICD-9-CM: 276.8  3/4/2022 Unknown        Cardiomyopathy ([facility] ICD-10-CM: I42.9  ICD-9-CM: 425.4  7/15/2021 Yes        Debility ICD-10-CM: R53.81  ICD-9-CM: 799.3  Unknown Yes        * (Principal) Acute on chronic combined systolic and diastolic congestive heart failure ([facility] ICD-10-CM: I50.43  ICD-9-CM: 428.43, 428.0  7/12/2021 Yes        Atrial fibrillation with RVR ([facility] ICD-10-CM: I48.91  ICD-9-CM: 427.31  7/11/2021 Yes        CML (chronic myelocytic leukemia) ([facility] ICD-10-CM: C92.10  ICD-9-CM: 205.10  9/2/2016 Yes        Chronic atrial fibrillation (HCC) ICD-10-CM: I48.20  ICD-9-CM: 427.31  9/2/2016 Yes        Type II diabetes mellitus, uncontrolled ([facility] ICD-10-CM: E11.65  ICD-9-CM: 250.02  8/11/2016 Yes                Admit to tele      hypoxia due to chf exacerbation   Continue wean off nc o2          Acute diastolic /systolic CHF extubation-acute on chronic combined   Ef 15-20 %   On bumex Coreg, cardiology consulted   Discussed with Dr. Dariel Coelho care recommended   Low-salt diet, fluid restriction      New onset atrial fibrillation  Coreg for rate control,   On eliquis at home , hold due to nose bleeding -restart tomorrow         Hypertension  Continue home meds      Peripheral edema  Continue bumex  watch for renal function and electrolytes     History of CML    f/u voa ,continue home meds     Acute kidney injury with chronic kidney disease 3   Nephrologist consult      Nose bleeding stopped     Hypokalemia  K replacement    DM ssi     Leg ulcer   Wound care consult     Poor prognosis , palliative care consult        AC:  I have had a discussion with patient regarding code status. Particularly, described potential options in event of cardiac or respiratory arrest. I have explained what being full code entails, including cardiorespiratory resuscitation attempts with chest compressions, potential cariodioversion/ \" shocks\" as well as intubation. I have also explained Do not resuscitate which would mean we allow a natural death with out aggressive interventions. Full code AC 32 min     Please note that this dictation was completed with [employer] [place], the computer voice recognition software. Quite often unanticipated grammatical, syntax, homophones, and other interpretive errors are inadvertently transcribed by the computer software. Please disregard these errors. Please excuse any errors that have escaped final proofreading    Estimate  length of stay : tbd     DVT :  eliquis   CC: sob        HPI:     Nelson Hogan is a 79 y.o. male with chronic A. fib on Eliquis, cardiomyopathy with EF 15 to 20%, CKD, diabetes presented to ER due to worsening shortness of breath for 3 days. Associated with bilateral leg edema. He has been shortness of breath over 1-1/2-month, he has visits to ER for several times, follow-up with cardiologist and the nephrologist as outpatient. He  also reported on and off nosebleeding. He has been on Eliquis at home. Received epi sprain nosebleeding stopped in ER. He was found hypoxia in ER, chest x-ray bilateral pleural effusion. Cardiology has been consulted.       Wife was at the bedside,  Visit Vitals  /79   Pulse 92   Temp 97.4 °F (36.3 °C)   Resp 16   Wt 92.5 kg (203 lb 14.4 oz)   SpO2 100%   BMI 29.26 kg/m²    O2 Flow Rate (L/min): 2 l/min O2 Device: Nasal cannula      Past Medical History:   Diagnosis Date    CAD (coronary artery disease)     Chronic [facility] fibrillation [facility] 2016    CML (chronic myelocytic leukemia) ([facility]     Coagulation disorder (HCC)     bruises easily    Diabetes ([facility]     Hypertension     Nausea & vomiting     with most recent surgery-carotid endardarectomy    TIA (transient ischemic attack) 2016       Past Surgical History:   Procedure Laterality Date    HX CAROTID ENDARTERECTOMY Left 2017    HX CATARACT REMOVAL Bilateral     HX CORONARY ARTERY BYPASS GRAFT      HX HERNIA REPAIR Right     hernia    HX UROLOGICAL      greenlight laser    HX UROLOGICAL      kidney stone    HX UROLOGICAL      Lithotripsy x 2    [place] CARDIAC SURG PROCEDURE UNLIST  3/2014    quadruple bypass       Family History   Problem Relation Age of Onset    Post-op Nausea/Vomiting Brother        Social History     Socioeconomic History    Marital status:    Tobacco Use    Smoking status: Former Smoker     Packs/day: 1.00     Years: 60.00     Pack years: 60.00     Quit date: 3/23/2014     Years since quittin.9    Smokeless tobacco: Never Used   Substance and Sexual Activity    Alcohol use: Not Currently     Alcohol/week: 2.0 standard drinks     Types: 2 Shots of liquor per week     Comment: per day    Drug use: No    Sexual activity: Never       Prior to Admission medications    Medication Sig Start Date End Date Taking? Authorizing Provider   bumetanide (BUMEX) 2 mg tablet Take 1 Tablet by mouth two (2) times a day. 22   Lashell Padilla MD   metOLazone (ZAROXOLYN) 2.5 mg tablet Take 2 Tablets by mouth daily. 22   Lashell Padilla MD   imatinib (Gleevec) 400 mg tablet Take 200 mg by mouth daily. Provider, Historical   mecobalamin, vitamin B12, 1,000 mcg TbDi Take 1 Tablet by mouth daily. Provider, Historical   glucose 4 gram chewable tablet Take 4 g by mouth daily as needed for Other (low BS). Provider, Historical   OXYGEN-AIR DELIVERY SYSTEMS 2 L by IntraNASal route continuous. Provider, Historical   insulin glargine (LANTUS,BASAGLAR) 100 unit/mL (3 mL) inpn 3-4 Units by SubCUTAneous route daily as needed for Other (high BS). patient reporting he has no actual sliding scale for medication- takes 3-4 units depending on his BS, only takes if greater than 150. Provider, Historical   carvediloL (COREG) 25 mg tablet Take 1 Tablet by mouth two (2) times a day. 7/22/21   Janel Rodriguez MD   apixaban (ELIQUIS) 2.5 mg tablet Take 1 Tablet by mouth every twelve (12) hours. 7/22/21   Janel Rodriguez MD   gabapentin (NEURONTIN) 100 mg capsule Take 1 Capsule by mouth nightly. Max Daily Amount: 100 mg. Patient taking differently: Take 300 mg by mouth nightly. 7/22/21   Janel Rodriguez MD   aspirin delayed-release 81 mg tablet Take 81 mg by mouth daily. Stephan Nance MD   vitamin E (AQUA GEMS) 400 unit capsule Take 400 Units by mouth daily. Stephan Nance MD   tamsulosin (FLOMAX) 0.4 mg capsule Take 0.4 mg by mouth daily. Stephan Nance MD   nitroglycerin (NITROSTAT) 0.4 mg SL tablet 1 Tab by SubLINGual route as needed for Chest Pain. Up to 3 doses. 8/11/18   Adam Beyer MD   atorvastatin (LIPITOR) 40 mg tablet Take 40 mg by mouth daily. Stephan Nance MD       Allergies   Allergen Reactions    Adhesive Tape-Silicones Other (comments)     Raw skin    Codeine Nausea and Vomiting    Iodinated Contrast Media Rash     patient states this was years ago - he states that he can tolerate the newer agents       Review of Systems  Gen: No fever, chills, malaise, weight loss/gain. Heent: No headache, rhinorrhea, + epistaxis, ear pain, hearing loss, sinus pain, neck pain/stiffness, sore throat. Heart: No chest pain, palpitations, VARGAS, pnd, or orthopnea. Resp: No cough, hemoptysis, wheezing and + shortness of breath.    GI: No nausea, vomiting, diarrhea, constipation, melena or hematochezia. : No urinary obstruction, dysuria or hematuria. Derm: Skin lesion on leg  Musc/skeletal: no bone or joint complains. Vasc: No edema, cyanosis or claudication. Endo: No heat/cold intolerance, no polyuria,polydipsia or polyphagia. Neuro: No unilateral weakness, numbness, tingling. No seizures. Heme: + Easy bruising or bleeding. Physical Exam:     Physical Exam:  Visit Vitals  /79   Pulse 92   Temp 97.4 °F (36.3 °C)   Resp 16   Wt 92.5 kg (203 lb 14.4 oz)   SpO2 100%   BMI 29.26 kg/m²    O2 Flow Rate (L/min): 2 l/min O2 Device: Nasal cannula    Temp (24hrs), Av.1 °F (36.2 °C), Min:96.8 °F (36 °C), Max:97.4 °F (36.3 °C)    No intake/output data recorded.  0701 -  1900  In: -   Out: 400 [Urine:400]    General:  Awake, cooperative, no distress. Head:  Normocephalic, without obvious abnormality, atraumatic. Eyes:  Conjunctivae/corneas clear, sclera anicteric, PERRL, EOMs intact. Nose: Nares normal. No drainage or sinus tenderness. Throat: Lips, mucosa, and tongue normal. .   Neck: Supple, symmetrical, trachea midline, no adenopathy. Lungs:    Mild coarse BS       Heart:  Regular rate and rhythm, S1, S2 normal, no murmur, click, rub or gallop. Abdomen: Soft, non-tender. Bowel sounds normal. No masses,  No organomegaly. Extremities: Extremities normal, atraumatic, no cyanosis , + edema. Pulses: 2+ and symmetric all extremities. Skin: Skin color-pink, texture, turgor normal. No rashes, ulcer noted leg, capillary refill normal    Neurologic: CNII-XII intact. No focal motor or sensory deficit.        Labs Reviewed:    BMP:   Lab Results   Component Value Date/Time     (L) 2022 03:16 PM    K 3.2 (L) 2022 03:16 PM    CL 88 (L) 2022 03:16 PM    CO2 37 (H) 2022 03:16 PM    AGAP 7 2022 03:16 PM     (H) 2022 03:16 PM     (H) 2022 03:16 PM    CREA 3.05 (H) 2022 03:16 PM    GFRAA 24 (L) 03/04/2022 03:16 PM    GFRNA 20 (L) 03/04/2022 03:16 PM     CMP:   Lab Results   Component Value Date/Time     (L) 03/04/2022 03:16 PM    K 3.2 (L) 03/04/2022 03:16 PM    CL 88 (L) 03/04/2022 03:16 PM    CO2 37 (H) 03/04/2022 03:16 PM    AGAP 7 03/04/2022 03:16 PM     (H) 03/04/2022 03:16 PM     (H) 03/04/2022 03:16 PM    CREA 3.05 (H) 03/04/2022 03:16 PM    GFRAA 24 (L) 03/04/2022 03:16 PM    GFRNA 20 (L) 03/04/2022 03:16 PM    CA 8.8 03/04/2022 03:16 PM    ALB 3.1 (L) 03/04/2022 03:16 PM    TP 7.3 03/04/2022 03:16 PM    GLOB 4.2 (H) 03/04/2022 03:16 PM    AGRAT 0.7 (L) 03/04/2022 03:16 PM    ALT 14 (L) 03/04/2022 03:16 PM     CBC:   Lab Results   Component Value Date/Time    WBC 9.4 03/04/2022 03:16 PM    HGB 9.6 (L) 03/04/2022 03:16 PM    HCT 33.6 (L) 03/04/2022 03:16 PM     (L) 03/04/2022 03:16 PM     All Cardiac Markers in the last 24 hours: No results found for: CPK, CK, CKMMB, CKMB, RCK3, CKMBT, CKNDX, CKND1, TARA, TROPT, TROIQ, CIARRA, TROPT, TNIPOC, BNP, BNPP  Recent Glucose Results:   Lab Results   Component Value Date/Time     (H) 03/04/2022 03:16 PM     ABG: No results found for: PH, PHI, PCO2, PCO2I, PO2, PO2I, HCO3, HCO3I, FIO2, FIO2I  COAGS:   Lab Results   Component Value Date/Time    APTT 39.0 (H) 03/04/2022 03:16 PM    PTP 24.5 (H) 03/04/2022 03:16 PM    INR 2.3 (H) 03/04/2022 03:16 PM     Liver Panel:   Lab Results   Component Value Date/Time    ALB 3.1 (L) 03/04/2022 03:16 PM    TP 7.3 03/04/2022 03:16 PM    GLOB 4.2 (H) 03/04/2022 03:16 PM    AGRAT 0.7 (L) 03/04/2022 03:16 PM    ALT 14 (L) 03/04/2022 03:16 PM     03/04/2022 03:16 PM     Pancreatic Markers: No results found for: AMYLPOCT, AML, LIPPOCT, LPSE    XR CHEST PORT    Result Date: 3/4/2022  EXAM: XR CHEST PORT CLINICAL INDICATION/HISTORY: sob -Additional: None COMPARISON: 2/17/2022 TECHNIQUE: Frontal view of the chest _______________ FINDINGS: HEART AND MEDIASTINUM: Cardiomegaly. Primary sternotomy. LUNGS AND PLEURAL SPACES: Moderate right and small left pleural effusions. Bilateral parenchymal opacities. No pneumothorax. BONY THORAX AND SOFT TISSUES: No acute osseous abnormality _______________     Moderate right and small left pleural effusions. Bilateral parenchymal opacities possibly representing atelectasis versus edema. 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 Cardiomegaly and mediastinal contours are within normal limits for portable radiograph. There is small right pleural effusion with right lung base airspace disease. There is left lung base atelectasis or minimal infiltrate. There is a background of moderate emphysema. No acute osseous findings. ________________      Small right effusion with right lung base atelectasis and/or infiltrate. Minimal atelectasis and/or infiltrate left lung base. XR CHEST PORT    Result Date: 2/3/2022  EXAM: CHEST RADIOGRAPH CLINICAL INDICATION/HISTORY: sob -Additional: None COMPARISON: 7/11/2021 TECHNIQUE: Portable frontal view of the chest _______________ FINDINGS: SUPPORT DEVICES: None. HEART AND MEDIASTINUM: Mild cardiomegaly unchanged, status post sternotomy. LUNGS AND PLEURAL SPACES: There are bilateral pleural effusions, moderate to large on the right and small on the left. The appearance is very similar to that seen previously. In the right lung base, there is also some patchy opacity representing consolidation, either atelectasis or pneumonia. Upper lungs are clear. BONY THORAX AND SOFT TISSUES: Unremarkable. _______________     The chest is very similar in appearance to the last examination 7 months ago, with bilateral effusions, right larger than left, the right side likely accompanied by atelectasis or pneumonia.     DUPLEX LOWER EXT VENOUS BILAT    Result Date: 2/4/2022  · No evidence of deep vein thrombosis in the right lower extremity. · No evidence of deep vein thrombosis in the left lower extremity. · Subcutaneous fluid noted throughout bilateral lower extremities.       Procedures/imaging: see electronic medical records for all procedures/Xrays and details which were not copied into this note but were reviewed prior to creation of Yuliet Dee MD, Internal Medicine     CC: Margaret Redmond DO

## 2022-03-04 NOTE — ED TRIAGE NOTES
Patient arrives to triage via wheelchair with 2L NC, complaints of shortness of breath, leg swelling \"since the last time I was here\", and nose bleed since last night, on eliqis

## 2022-03-04 NOTE — Clinical Note
Patient O2 stats decreasing again with noted bradycardia on maclab monintor. Patient assessed and no pulse felt on palpation, Code blue called.

## 2022-03-04 NOTE — PROGRESS NOTES
Pt arrived to floor with ER nurse.   Pt in bed calm, dinner ordered tele applied and vitals obtained    Alejandrina Dent RN

## 2022-03-05 NOTE — ROUTINE PROCESS
Bedside and Verbal shift change report given to Celsa Ga RN  (oncoming nurse) by Anjelica Lopez RN  (offgoing nurse). Report given with SBAR, Kardex, Intake/Output and Recent Results.

## 2022-03-05 NOTE — CONSULTS
RENAL CONSULT  3/5/2022    Patient:  Jennifer Gallardo  :  1940  Gender:  male  MRN #:  [id number]    Consulting Physician:  Roxanne Martinez DO,  Assessment:    )Principal Problem:    Acute on chronic combined systolic and diastolic congestive heart failure ([address] (2021)    Active Problems:    Type II diabetes mellitus, uncontrolled ([address] (2016)      CML (chronic myelocytic leukemia) ([address] (2016)      Chronic atrial fibrillation (HCC) (2016)      Atrial fibrillation with RVR ([address] (2021)      Debility ()      Cardiomyopathy ([address] (7/15/2021)      Acute renal failure superimposed on stage 3 chronic kidney disease ([address] (3/4/2022)      Bleeding nose (3/4/2022)      Leg ulcer ([address] (3/4/2022)      Hypokalemia (3/4/2022)         Plan:    PT most likely had urinary retention and will put of foss in, hold Eliquis for possible bleeding from foss insertion while plt is low and pt has uremia  If No urinary retention then pt will need to consider dialysis, will d.w pt and wife if it comes to that. History of Present Illness:  Jennifer Gallardo is a 79y.o. year old male with ongoing DM, CAD, CABG, A fib, BPH has been admitted with fluid overload, worsening renal function  He has significant dysuria. I was asked to see.          Past Medical History:   Diagnosis Date    CAD (coronary artery disease)     Chronic atrial fibrillation ([address] 2016    CML (chronic myelocytic leukemia) ([address]     Coagulation disorder (HCC)     bruises easily    Diabetes ([address]     Hypertension     Nausea & vomiting     with most recent surgery-carotid endardarectomy    TIA (transient ischemic attack) 2016     Past Surgical History:   Procedure Laterality Date    HX CAROTID ENDARTERECTOMY Left 2017    HX CATARACT REMOVAL Bilateral     HX CORONARY ARTERY BYPASS GRAFT      HX HERNIA REPAIR Right     hernia    HX UROLOGICAL      greenlight laser    HX UROLOGICAL      kidney stone    HX UROLOGICAL      Lithotripsy x 2    [place] CARDIAC SURG PROCEDURE UNLIST  3/2014    quadruple bypass     Family History   Problem Relation Age of Onset    Post-op Nausea/Vomiting Brother      Allergies   Allergen Reactions    Adhesive Tape-Silicones Other (comments)     Raw skin    Codeine Nausea and Vomiting    Iodinated Contrast Media Rash     patient states this was years ago - he states that he can tolerate the newer agents     Current Facility-Administered Medications   Medication Dose Route Frequency Provider Last Rate Last Admin    oxymetazoline (AFRIN) 0.05 % nasal spray 2 Spray  2 Spray Both Nostrils DAILY PRN Shania Cabrales MD   2 [place] at 03/05/22 0138    bumetanide (BUMEX) injection 1 mg  1 mg IntraVENous BID Sydnee Castillo MD   1 mg at 03/05/22 3795    sodium chloride (NS) flush 5-40 mL  5-40 mL IntraVENous Q8H Sydnee Castillo MD        sodium chloride (NS) flush 5-40 mL  5-40 mL IntraVENous PRN Sydnee Castillo MD        acetaminophen (TYLENOL) tablet 650 mg  650 mg Oral Q6H PRN Sydnee Castillo MD        Or    acetaminophen (TYLENOL) suppository 650 mg  650 mg Rectal Q6H PRN Sydnee Castillo MD        bisacodyL (DULCOLAX) suppository 10 mg  10 mg Rectal DAILY PRN Sydnee Castillo MD        promethazine (PHENERGAN) tablet 12.5 mg  12.5 mg Oral Q6H PRN Sydnee Castillo MD        Or    [facility]ansetron [facility]) injection 4 mg  4 mg IntraVENous Q6H PRN Sydnee Castillo MD   4 mg at 03/05/22 0345    atorvastatin (LIPITOR) tablet 40 mg  40 mg Oral DAILY Sydnee Castillo MD   40 mg at 03/05/22 0939    carvediloL (COREG) tablet 25 mg  25 mg Oral BID Sydnee Castillo MD   25 mg at 03/05/22 0190    nitroglycerin (NITROSTAT) tablet 0.4 mg  0.4 mg SubLINGual PRN Sydnee Castillo MD        tamsulosin [facility]) capsule 0.4 mg  0.4 mg Oral DAILY Sydnee Castillo MD   0.4 mg at 03/05/22 8519    metOLazone (ZAROXOLYN) tablet 5 mg  5 mg Oral DAILY Sydnee Castillo MD   5 mg at 03/05/22 7043    insulin lispro (HUMALOG) injection   SubCUTAneous AC&HS Sydnee Castillo MD   6 Units at 03/05/22 0701    glucose chewable tablet 16 g  4 Tablet Oral PRN Liliam Vides MD        glucagon [facility]) injection 1 mg  1 mg IntraMUSCular PRN Liliam Vides MD        dextrose 10% infusion 0-250 mL  0-250 mL IntraVENous PRN Liliam Vides MD        imatinib (GLEEVEC) chemo tablet 200 mg (Patient Supplied)  200 mg Oral DAILY Liliam Vides MD        apixaban [facility]) tablet 2.5 mg  2.5 mg Oral Q12H Liliam Vides MD           Review of Symptoms:  Below symptoms negative if not in [place].   Consitutional Symptoms: Fever, weight loss, weight gain, fatigue  Eyes:  pain, sudden vision loss, blurry vision, double vision             bleeding nose, sore throat, hoarseness  GI: nausea, vomiting, diarrhea, pain, blood in stool  Pulmonary; Cough, Shortness of breath, Wheezing's  Cardiac: chest pain, palpitation  Musculoskeletal: difficulty walking, falls, pain over muscle, joint pain, weakness  : dysuria, blood in urine, pain with urination, difficulty voiding  Neurologia: dizziness, syncope, focal weakness, difficulty speaking  Integumentary: rash, redness, ulcer   Psychiatric:  Depression suicidal ideation    Objective:  Visit Vitals  /73   Pulse 72   Temp 97.9 °F (36.6 °C)   Resp 18   Wt 92.5 kg (203 lb 14.4 oz)   SpO2 100%   BMI 29.26 kg/m²       Appear stated age  Hard of hearing  Abdomen is protuberant s but soft  anasarca noted      Laboratory Data:  Lab Results   Component Value Date     (H) 03/05/2022     (H) 03/04/2022     (H) 02/17/2022     (L) 03/05/2022     (L) 03/04/2022     (L) 02/17/2022    CO2 38 (H) 03/05/2022    CO2 37 (H) 03/04/2022    CO2 37 (H) 02/17/2022     Lab Results   Component Value Date    WBC 9.2 03/05/2022    HGB 8.9 (L) 03/05/2022    HCT 30.9 (L) 03/05/2022       Imaging have been reviewed:  )XR CHEST PORT    Result Date: 3/4/2022  EXAM: XR CHEST PORT CLINICAL INDICATION/HISTORY: sob -Additional: None COMPARISON: 2/17/2022 TECHNIQUE: Frontal view of the chest _______________ FINDINGS: HEART AND MEDIASTINUM: Cardiomegaly. Primary sternotomy. LUNGS AND PLEURAL SPACES: Moderate right and small left pleural effusions. Bilateral parenchymal opacities. No pneumothorax. BONY THORAX AND SOFT TISSUES: No acute osseous abnormality _______________     Moderate right and small left pleural effusions. Bilateral parenchymal opacities possibly representing atelectasis versus edema. 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 Cardiomegaly and mediastinal contours are within normal limits for portable radiograph. There is small right pleural effusion with right lung base airspace disease. There is left lung base atelectasis or minimal infiltrate. There is a background of moderate emphysema. No acute osseous findings. ________________      Small right effusion with right lung base atelectasis and/or infiltrate. Minimal atelectasis and/or infiltrate left lung base. XR CHEST PORT    Result Date: 2/3/2022  EXAM: CHEST RADIOGRAPH CLINICAL INDICATION/HISTORY: sob -Additional: None COMPARISON: 7/11/2021 TECHNIQUE: Portable frontal view of the chest _______________ FINDINGS: SUPPORT DEVICES: None. HEART AND MEDIASTINUM: Mild cardiomegaly unchanged, status post sternotomy. LUNGS AND PLEURAL SPACES: There are bilateral pleural effusions, moderate to large on the right and small on the left. The appearance is very similar to that seen previously. In the right lung base, there is also some patchy opacity representing consolidation, either atelectasis or pneumonia. Upper lungs are clear. BONY THORAX AND SOFT TISSUES: Unremarkable. _______________     The chest is very similar in appearance to the last examination 7 months ago, with bilateral effusions, right larger than left, the right side likely accompanied by atelectasis or pneumonia.     DUPLEX LOWER EXT VENOUS BILAT    Result Date: 2/4/2022  · No evidence of deep vein thrombosis in the right lower extremity. · No evidence of deep vein thrombosis in the left lower extremity. · Subcutaneous fluid noted throughout bilateral lower extremities.           )You Mosher DO,

## 2022-03-05 NOTE — PROGRESS NOTES
Hospitalist Progress Note    Patient: Isa Edward MRN: [id number]  CSN: [id number]    YOB: 1940  Age: 79 y.o. Sex: male    DOA: 3/4/2022 LOS:  LOS: 1 day                Assessment and Plan:    Principal Problem:    Acute on chronic combined systolic and diastolic congestive heart failure ([facility] [address] (7/12/2021)    Active Problems:    Type II diabetes mellitus, uncontrolled ([facility] (8/11/2016)      CML (chronic myelocytic leukemia) ([facility] [address] (9/2/2016)      Chronic atrial fibrillation (HCC) (9/2/2016)      Atrial fibrillation with RVR ([facility] [address] (7/11/2021)      Debility ()      Cardiomyopathy ([facility] (7/15/2021)      Acute renal failure superimposed on stage 3 chronic kidney disease ([facility] (3/4/2022)      Bleeding nose (3/4/2022)      Leg ulcer ([facility] (3/4/2022)      Hypokalemia (3/4/2022)        Acute on chronic chf: with EF15 percent ,  on dietary restrictions and diuretics  Will hopefully wean off oxygen as diuresis improved    New onset Afib: on coreg , will restart anticoagulation if nose bleeding is not resumed    HTN:    DM: ssi'            Chief complaint:  Isa Edward is a 79 y.o. male with chronic A. fib on Eliquis, cardiomyopathy with EF 15 to 20%, CKD, diabetes presented to ER due to worsening shortness of breath for 3 days. Associated with bilateral leg edema. Subjective:    He is still short of breath  Marginally better      Review of systems:    General: No fevers or chills. Cardiovascular: No chest pain or pressure. No palpitations. Pulmonary: No shortness of breath. Gastrointestinal: No nausea, vomiting. Objective:    Vital signs/Intake and Output:  Visit Vitals  /73   Pulse 72   Temp 97.9 °F (36.6 °C)   Resp 18   Wt 92.5 kg (203 lb 14.4 oz)   SpO2 100%   BMI 29.26 kg/m²     Current Shift:  03/05 0701 - 03/05 1900  In: -   Out: 275 [Urine:275]  Last three shifts:  03/03 1901 - 03/05 0700  In: -   Out: 1050 [Urine:1050]    Physical Exam:  General: NAD, AAOx3. Non-toxic. HEENT: NC/AT. PERRLA, EOMI.  MMM. Lungs: Nml inspection. CTA B/L. No wheezing, rales or rhonchi. Heart:  S1S2 RRR,  PMI mid 5th IC space. No M/RG. Abdomen: Soft, NT/ND.  BS+. No peritoneal signs. Extremities: No C/C/E. Psych:   Nml affect. Neurologic:  2-12 intact. Strength 5/5 throughout. Sensation symmetrical.          Labs: Results:       Chemistry Recent Labs     03/05/22 0420 03/04/22  1516   * 210*   * 132*   K 3.0* 3.2*   CL 90* 88*   CO2 38* 37*   * 109*   CREA 3.13* 3.05*   CA 8.7 8.8   AGAP 7 7   BUCR 36* 36*   AP  --  109   TP  --  7.3   ALB  --  3.1*   GLOB  --  4.2*   AGRAT  --  0.7*      CBC w/Diff Recent Labs     03/05/22 0420 03/04/22  1516   WBC 9.2 9.4   RBC 4.12* 4.45   HGB 8.9* 9.6*   HCT 30.9* 33.6*   PLT 88* 104*   GRANS 75* 77*   LYMPH 7* 8*   EOS 1 0      Cardiac Enzymes No results for input(s): CPK, CKND1, TARA in the last 72 hours. No lab exists for component: CKRMB, TROIP   Coagulation Recent Labs     03/04/22  1516   PTP 24.5*   INR 2.3*   APTT 39.0*       Lipid Panel Lab Results   Component Value Date/Time    Cholesterol, total 107 08/12/2016 03:20 AM    HDL Cholesterol 56 08/12/2016 03:20 AM    LDL, calculated 26.4 08/12/2016 03:20 AM    VLDL, calculated 24.6 08/12/2016 03:20 AM    Triglyceride 123 08/12/2016 03:20 AM    CHOL/HDL Ratio 1.9 08/12/2016 03:20 AM      BNP No results for input(s): BNPP in the last 72 hours.    Liver Enzymes Recent Labs     03/04/22  1516   TP 7.3   ALB 3.1*         Thyroid Studies Lab Results   Component Value Date/Time    TSH 1.34 07/12/2021 02:55 AM        Procedures/imaging: see electronic medical records for all procedures/Xrays and details which were not copied into this note but were reviewed prior to creation of Plan

## 2022-03-05 NOTE — PROGRESS NOTES
Cardiology Progress Note        Patient: Guillermo Verduzco        Sex: male          DOA: 3/4/2022  YOB: 1940      Age:  79 y.o.        LOS:  LOS: 1 day   Assessment/Plan     Principal Problem:    Acute on chronic combined systolic and diastolic congestive heart failure ([address] (7/12/2021)    Active Problems:    Type II diabetes mellitus, uncontrolled ([address] (8/11/2016)      CML (chronic myelocytic leukemia) ([address] (9/2/2016)      Chronic atrial fibrillation (HCC) (9/2/2016)      Atrial fibrillation with RVR ([address] (7/11/2021)      Debility ()      Cardiomyopathy ([address] (7/15/2021)      Acute renal failure superimposed on stage 3 chronic kidney disease ([address] (3/4/2022)      Bleeding nose (3/4/2022)      Leg ulcer ([address] (3/4/2022)      Hypokalemia (3/4/2022)        Plan:    Acute on chronic CHF  Chronic A. Fib  Chronic cardiomyopathy patient have declined ICD in the past  CAD  CABG  Acute on chronic renal failure      Continue with current diuretic  Discussed with patient and wife. Subjective:    cc:  Shortness of breath  Leg swelling      REVIEW OF SYSTEMS:     General: No fevers or chills. Cardiovascular: No chest pain or pressure. No palpitations. ++ ankle swelling  Pulmonary: No SOB, orthopnea, PND  Gastrointestinal: No nausea, vomiting or diarrhea      Objective:      Visit Vitals  /73   Pulse 72   Temp 97.9 °F (36.6 °C)   Resp 18   Wt 92.5 kg (203 lb 14.4 oz)   SpO2 100%   BMI 29.26 kg/m²     Body mass index is 29.26 kg/m². Physical Exam:  General Appearance: Comfortable, not using accessory muscles of respiration. Forearm bruises looks better, color of patient is looking better. NECK: No JVD, no thyroidomeglay. LUNGS: Clear bilaterally. HEART: S1 irregular+S2 audible,    ABD: Non-tender, BS Audible    EXT: ++ edema, and no cysnosis. VASCULAR EXAM: Pulses are intact. PSYCHIATRIC EXAM: Mood is appropriate.     Medication:  Current Facility-Administered Medications   Medication Dose Route Frequency    oxymetazoline (AFRIN) 0.05 % nasal spray 2 Spray  2 Spray Both Nostrils DAILY PRN    [START ON 3/6/2022] tamsulosin (FLOMAX) capsule 0.8 mg  0.8 mg Oral DAILY    bumetanide (BUMEX) injection 1 mg  1 mg IntraVENous BID    sodium chloride (NS) flush 5-40 mL  5-40 mL IntraVENous Q8H    sodium chloride (NS) flush 5-40 mL  5-40 mL IntraVENous PRN    acetaminophen (TYLENOL) tablet 650 mg  650 mg Oral Q6H PRN    Or    acetaminophen (TYLENOL) suppository 650 mg  650 mg Rectal Q6H PRN    bisacodyL (DULCOLAX) suppository 10 mg  10 mg Rectal DAILY PRN    promethazine (PHENERGAN) tablet 12.5 mg  12.5 mg Oral Q6H PRN    Or    ondansetron (ZOFRAN) injection 4 mg  4 mg IntraVENous Q6H PRN    atorvastatin (LIPITOR) tablet 40 mg  40 mg Oral DAILY    carvediloL (COREG) tablet 25 mg  25 mg Oral BID    nitroglycerin (NITROSTAT) tablet 0.4 mg  0.4 mg SubLINGual PRN    metOLazone (ZAROXOLYN) tablet 5 mg  5 mg Oral DAILY    insulin lispro (HUMALOG) injection   SubCUTAneous AC&HS    glucose chewable tablet 16 g  4 Tablet Oral PRN    glucagon (GLUCAGEN) injection 1 mg  1 mg IntraMUSCular PRN    dextrose 10% infusion 0-250 mL  0-250 mL IntraVENous PRN    imatinib (GLEEVEC) chemo tablet 200 mg (Patient Supplied)  200 mg Oral DAILY    apixaban (ELIQUIS) tablet 2.5 mg  2.5 mg Oral Q12H               Lab/Data Reviewed:  Procedures/imaging: see electronic medical records for all procedures/Xrays   and details which were not copied into this note but were reviewed prior to creation of Plan       All lab results for the last 24 hours reviewed.      Recent Labs     03/05/22 0420 03/04/22  1516   WBC 9.2 9.4   HGB 8.9* 9.6*   HCT 30.9* 33.6*   PLT 88* 104*     Recent Labs     03/05/22  0420 03/04/22  1516   * 132*   K 3.0* 3.2*   CL 90* 88*   CO2 38* 37*   * 210*   * 109*   CREA 3.13* 3.05*   CA 8.7 8.8       RADIOLOGY:  CT Results  (Last 48 hours)    None        CXR Results  (Last 48 hours)               03/04/22 1529  XR CHEST PORT Final result    Impression:      Moderate right and small left pleural effusions. Bilateral parenchymal opacities possibly representing atelectasis versus edema. Narrative:  EXAM: XR CHEST PORT       CLINICAL INDICATION/HISTORY: sob   -Additional: None       COMPARISON: 2/17/2022       TECHNIQUE: Frontal view of the chest       _______________       FINDINGS:       HEART AND MEDIASTINUM: Cardiomegaly. Primary sternotomy. LUNGS AND PLEURAL SPACES: Moderate right and small left pleural effusions. Bilateral parenchymal opacities. No pneumothorax.        BONY THORAX AND SOFT TISSUES: No acute osseous abnormality       _______________                   Cardiology Procedures:   Results for orders placed or performed during the hospital encounter of 03/04/22   EKG, 12 LEAD, INITIAL   Result Value Ref Range    Ventricular Rate 100 BPM    QRS Duration 96 ms    Q-T Interval 392 ms    QTC Calculation (Bezet) 505 ms    Calculated R Axis -55 degrees    Calculated T Axis 141 degrees    Diagnosis       Atrial fibrillation with premature ventricular or aberrantly conducted   complexes  Left axis deviation  Anterior infarct (cited on or before 11-JUL-2021)  Prolonged QT  Abnormal ECG  When compared with ECG of 17-FEB-2022 17:43,  QT has lengthened        Echo Results  (Last 48 hours)    None       Cardiolite (Tc-99m Sestamibi) stress test    Signed By: Sienna Frost MD     March 5, 2022

## 2022-03-05 NOTE — PROGRESS NOTES
0122 The patient is c/o a nose bleed. Added the humidifier to the oxygen and will call the MD on call. The patient is requesting a nasal spray. 9967 After speaking to Dr Severo Merl and receiving orders I administered 2 sprays of afrin as ordered prn.     0345 After c/o nausea administered 4mg IV zofran as ordered prn for nausea. [id number] Shift Summary: The pt rested little overnight with a few complaints. All were addressed. No other concerns noted.      Nightshift Chart Audit Completed

## 2022-03-05 NOTE — PROGRESS NOTES
Reason for Admission:   Chart reviewed; per H&P, patient is a \"79 y.o. male with chronic A. fib on Eliquis, cardiomyopathy with EF 15 to 20%, CKD, diabetes presented to ER due to worsening shortness of breath for 3 days. Associated with bilateral leg edema. He has been shortness of breath over 1-1/2-month, he has visits to ER for several times, follow-up with cardiologist and the nephrologist as outpatient. He  also reported on and off nosebleeding. He has been on Eliquis at home. Received epi sprain nosebleeding stopped in ER. He was found hypoxia in ER, chest x-ray bilateral pleural effusion. \"                 RUR Score:     High: 22%      PCP: First and Last name:  Rayshawn Bowles DO; also sees Dr. Ramesh Elizalde at the [facility]     Name of Practice:  Family Practice   Are you a current patient: Yes/No:  yes   Approximate date of last visit: one month ago   Can you do a virtual visit with your PCP:  no             Resources/supports as identified by patient/family:                   Top Challenges facing patient (as identified by patient/family and CM): Finances/Medication cost?      Obtains meds through the [employer]? Support system or lack thereof? Wife, daughter and son                     Living arrangements? Lives with wife           Self-care/ADLs/Cognition?   Alert and appropriate          Current Advanced Directive/Advance Care Plan:  Full Code      Healthcare Decision Maker:   Click here to complete HealthCare Decision Makers including selection of the Healthcare Decision Maker Relationship (ie \"Primary\")      Primary Decision MakerRjakob Davis - [phone]    Secondary Decision Maker: Puneet Heaton - [phone]    Supplemental (Other) Decision Maker: Lincoln Johsnon - [phone]    Payor Source Payor: [employer] / Plan: [address] [place] [address] HMO / Product Type: Managed Care Medicare / Plan for utilizing home health:    TBD                 Transition of Care Plan:      Care manager met with patient who stated that he obtains his meds and O2 through the [place] but was unable to provide the name of the DME company - he thought it was called [employer]' - stated he uses it 2L continuously. Care manage also contacted spouse who also was unclear of name of DME company; she stated they do have access to a walker if needed and were hoping that he may be discharge by his [birth year]. Care manager will continue to follow for discharge needs and monitor PT/OT recommendations for [place] vs SNF. Care Management Interventions  PCP Verified by CM:  Yes  Mode of Transport at Discharge: Self  Transition of Care Consult (CM Consult): Discharge Planning  Support Systems: Spouse/Significant Other,Child(nicko)  Confirm Follow Up Transport: Family  The Plan for Transition of Care is Related to the Following Treatment Goals : acute on chronic CHF  The Patient and/or Patient Representative was Provided with a Choice of Provider and Agrees with the Discharge Plan?: Yes  Name of the Patient Representative Who was Provided with a Choice of Provider and Agrees with the Discharge Plan: Franca Soliman, patient  Discharge Location  Patient Expects to be Discharged to[de-identified] Home with family assistance

## 2022-03-05 NOTE — PROGRESS NOTES
Problem: Mobility Impaired (Adult and Pediatric)  Goal: *Acute Goals and Plan of Care (Insert Text)  Description: Physical Therapy Goals  Initiated 3/5/2022 and to be accomplished within 7 day(s)  1. Patient will move from supine to sit and sit to supine in bed with modified independence. 2.  Patient will transfer from bed to chair and chair to bed with modified independence using the least restrictive device. 3.  Patient will perform sit to stand with modified independence. 4.  Patient will ambulate with modified independence for 100 feet with the least restrictive device. 5.  Patient will ascend/descend 3 stairs with handrail(s) with supervision/set-up. Note:   physical Therapy EVALUATION    Patient: Sharon Og (79 y.o. male)  Date: 3/5/2022  Primary Diagnosis: Acute exacerbation of CHF (congestive heart failure) ([facility] [address] [I50.9]  Precautions:  Fall    ASSESSMENT :  Based on the objective data described below, the patient presents with lower extremity weakness, decreased gait quality and endurance, impaired bed mobility and transfers, and overall limitations in functional mobility. Pt performed supine to sit with ModA, sit to stand with Clint. Patient ambulated 5 feet x3 with RW, GB applied, CGA. Pt performed standing there ex with rest breaks and CG-Clint for dynamic standing balance. Pt sitting up in chair at end of session. Patient would benefit from skilled inpatient physical therapy to address deficits, progress as tolerated to achieve long term goals and allow safe discharge. Patient will benefit from skilled intervention to address the above impairments.   Patients rehabilitation potential is considered to be Good  Factors which may influence rehabilitation potential include:   []         None noted  []         Mental ability/status  [x]         Medical condition  []         Home/family situation and support systems  []         Safety awareness  []         Pain tolerance/management  [] Other: PLAN :  Recommendations and Planned Interventions:  [x]           Bed Mobility Training             []    Neuromuscular Re-Education  [x]           Transfer Training                   []    Orthotic/Prosthetic Training  [x]           Gait Training                          [x]    Modalities  [x]           Therapeutic Exercises          []    Edema Management/Control  [x]           Therapeutic Activities            [x]    Patient and Family Training/Education  []           Other (comment):    Frequency/Duration: Patient will be followed by physical therapy 1-2 times per day to address goals. Discharge Recommendations: Naldo Olsen  Further Equipment Recommendations for Discharge: N/A     SUBJECTIVE:   Patient stated I am doing ok, it feels good to get up.     OBJECTIVE DATA SUMMARY:     Past Medical History:   Diagnosis Date    CAD (coronary artery disease) 2014    Chronic atrial fibrillation ([facility] [address] 9/2/2016    CML (chronic myelocytic leukemia) ([facility]     Coagulation disorder ([facility] [address]     bruises easily    Diabetes ([facility] 2012    Hypertension     Nausea & vomiting     with most recent surgery-carotid endardarectomy    TIA (transient ischemic attack) 8/11/2016     Past Surgical History:   Procedure Laterality Date    HX CAROTID ENDARTERECTOMY Left 05/2017    HX CATARACT REMOVAL Bilateral     HX CORONARY ARTERY BYPASS GRAFT      HX HERNIA REPAIR Right     hernia    HX UROLOGICAL      greenlight laser    HX UROLOGICAL      kidney stone    HX UROLOGICAL      Lithotripsy x 2    RI CARDIAC SURG PROCEDURE UNLIST  3/2014    quadruple bypass     Barriers to Learning/Limitations: None  Compensate with: Visual Cues, Verbal Cues, and Tactile Cues  Prior Level of Function/Home Situation: Independent amb without AD  Home Situation  Home Environment: Private residence  # Steps to Enter: 3  Rails to Enter: Yes  Hand Rails : Bilateral  One/Two Story Residence: One story  Living Alone: No  Support Systems: Spouse/Significant Other,Child(incko)  Patient Expects to be Discharged to[de-identified] Home with family assistance  Current DME Used/Available at Home: Oxygen, portable,Walker, rolling  Tub or Shower Type: Tub/Shower combination  Critical Behavior:  Neurologic State: Alert  Orientation Level: Oriented X4  Cognition: Appropriate for age attention/concentration; Follows commands  Safety/Judgement: Fall prevention  Psychosocial  Patient Behaviors: Calm; Cooperative  Purposeful Interaction: Yes  Pt Identified Daily Priority: Clinical issues (comment); Communication issues (comment); Family issues (comment)  Caritas Process: Nurture loving kindness;Enable shawanda/hope;Establish trust;Nurture spiritual self  Caring Interventions: Reassure; Therapeutic modalities  Reassure: Caring rounds  Therapeutic Modalities: Humor; Intentional therapeutic touch  Skin Condition/Temp: Warm;Dry  Skin Integrity: Blister; Wound (add Wound LDA)  Skin Integumentary  Skin Color: Appropriate for ethnicity  Skin Condition/Temp: Warm;Dry  Skin Integrity: Blister; Wound (add Wound LDA)  Turgor: Shiney/hard  Hair Growth: Sparce  Varicosities: Absent  Nails: Exceptions to WDL  Exceptions to WDL: Thick  Strength:    Strength: Generally decreased, functional  Tone & Sensation:   Sensation: Intact  Range Of Motion:  AROM: Generally decreased, functional  Functional Mobility:  Bed Mobility:  Supine to Sit: Moderate assistance  Transfers:  Sit to Stand: Minimum assistance  Stand to Sit: Minimum assistance  Balance:   Sitting: Intact  Standing: Intact; With support  Ambulation/Gait Training:  Distance (ft): 5 Feet (ft) (x3)  Assistive Device: Gait belt;Walker, rolling  Ambulation - Level of Assistance: Contact guard assistance   Gait Description (WDL): Exceptions to WDL  Gait Abnormalities: Decreased step clearance  Stance: Left decreased;Right decreased  Speed/Katerine: Slow  Step Length: Right shortened;Left shortened  Activity Tolerance:   Good  Please refer to the flowsheet for vital signs taken during this treatment. After treatment:   []         Patient left in no apparent distress sitting up in chair  [x]         Patient left in no apparent distress in bed  [x]         Call bell left within reach  [x]         Nursing notified  []         Caregiver present  []         Bed alarm activated    COMMUNICATION/EDUCATION:   [x]         Fall prevention education was provided and the patient/caregiver indicated understanding. [x]         Patient/family have participated as able in goal setting and plan of care. [x]         Patient/family agree to work toward stated goals and plan of care. []         Patient understands intent and goals of therapy, but is neutral about his/her participation. []         Patient is unable to participate in goal setting and plan of care.     Thank you for this referral.  Maycol Keenan   Time Calculation: 44 mins   Eval Complexity: History: MEDIUM  Complexity : 1-2 comorbidities / personal factors will impact the outcome/ POC Exam:MEDIUM Complexity : 3 Standardized tests and measures addressing body structure, function, activity limitation and / or participation in recreation  Presentation: MEDIUM Complexity : Evolving with changing characteristics  Clinical Decision Making:Medium Complexity    Overall Complexity:MEDIUM

## 2022-03-05 NOTE — PROGRESS NOTES
Problem: Self Care Deficits Care Plan (Adult)  Goal: *Acute Goals and Plan of Care (Insert Text)  Description: Occupational Therapy Goals  Initiated 3/5/2022 within 7 day(s). 1.  Patient will perform grooming with supervision/set-up standing at sink. 2.  Patient will perform upper body dressing with supervision/set-up. 3.  Patient will perform lower body dressing with minimal assistance/contact guard assist and use of AEs as needed. 4.  Patient will perform toilet transfers with minimal assistance/contact guard assist.  5.  Patient will perform all aspects of toileting with minimal assistance/contact guard assist.  6.  Patient will participate in upper extremity therapeutic exercise/activities with supervision/set-up for 10 minutes. 7.  Patient will utilize energy conservation techniques during functional activities with verbal, visual, and tactile cues. OCCUPATIONAL THERAPY EVALUATION    Patient: Nakita Dietz (17 y.o. male)  Date: 3/5/2022  Primary Diagnosis: Acute exacerbation of CHF (congestive heart failure) ([facility] [I50.9]        Precautions:   Fall  PLOF: mod I for ADLs and transfers     ASSESSMENT :  Based on the objective data described below, the patient presents with decreased strength, endurance and balance for carryover of ADLs and transfers following aove mentioned medical diagnosis. Pt presented seated in chair at the beginning of session, participating with PT. Pt was assisted with setting up for grooming activities seated in chair at the end of session in NAD. Patient will benefit from skilled intervention to address the above impairments.   Patient's rehabilitation potential is considered to be Good  Factors which may influence rehabilitation potential include:   []             None noted  []             Mental ability/status  [x]             Medical condition  []             Home/family situation and support systems  []             Safety awareness  []             Pain tolerance/management  []             Other:      PLAN :  Recommendations and Planned Interventions:   [x]               Self Care Training                  [x]      Therapeutic Activities  [x]               Functional Mobility Training   []      Cognitive Retraining  [x]               Therapeutic Exercises           [x]      Endurance Activities  [x]               Balance Training                    []      Neuromuscular Re-Education  []               Visual/Perceptual Training     [x]      Home Safety Training  [x]               Patient Education                   [x]      Family Training/Education  []               Other (comment):    Frequency/Duration: Patient will be followed by occupational therapy 1-2 times per day/4-7 days per week to address goals. Discharge Recommendations: Rehab  Further Equipment Recommendations for Discharge: N/A     SUBJECTIVE:   Patient stated  I am doing alright.     OBJECTIVE DATA SUMMARY:     Past Medical History:   Diagnosis Date    CAD (coronary artery disease) 2014    Chronic atrial fibrillation ([facility] 9/2/2016    CML (chronic myelocytic leukemia) ([facility]     Coagulation disorder ([facility]     bruises easily    Diabetes ([facility] 2012    Hypertension     Nausea & vomiting     with most recent surgery-carotid endardarectomy    TIA (transient ischemic attack) 8/11/2016     Past Surgical History:   Procedure Laterality Date    HX CAROTID ENDARTERECTOMY Left 05/2017    HX CATARACT REMOVAL Bilateral     HX CORONARY ARTERY BYPASS GRAFT      HX HERNIA REPAIR Right     hernia    HX UROLOGICAL      greenlight laser    HX UROLOGICAL      kidney stone    HX UROLOGICAL      Lithotripsy x 2    DE CARDIAC SURG PROCEDURE UNLIST  3/2014    quadruple bypass     Barriers to Learning/Limitations: None  Compensate with: visual, verbal, tactile, kinesthetic cues/model    Home Situation:   Home Situation  Home Environment: Private residence  # Steps to Enter: 3  Rails to Enter: Yes  Hand Rails : Bilateral  One/Two Story Residence: One story  Living Alone: No  Support Systems: Spouse/Significant Other  Patient Expects to be Discharged to[de-identified] Home  Current DME Used/Available at Home: Oxygen, portable,Walker, rolling  Tub or Shower Type: Tub/Shower combination  []  Right hand dominant   []  Left hand dominant    Cognitive/Behavioral Status:  Neurologic State: Alert  Orientation Level: Oriented X4  Cognition: Appropriate for age attention/concentration; Follows commands  Safety/Judgement: Fall prevention    Skin: intact  Edema: none    Vision/Perceptual:    Tracking: Able to track stimulus in all quadrants w/o difficulty    Coordination: BUE  Coordination: Within functional limits  Fine Motor Skills-Upper: Left Intact; Right Intact    Gross Motor Skills-Upper: Left Intact; Right Intact  Balance:  Sitting: Intact  Strength: BUE  Strength: Generally decreased, functional  Tone & Sensation: BUE  Sensation: Intact  Range of Motion: BUE  AROM: Generally decreased, functional  Functional Mobility and Transfers for ADLs:  Bed Mobility:  Transfers:  ADL Assessment:   Feeding: Independent    Oral Facial Hygiene/Grooming: Contact guard assistance;Stand-by assistance    Upper Body Dressing: Supervision    Lower Body Dressing: Moderate assistance    Toileting: Moderate assistance  ADL Intervention:  Cognitive Retraining  Safety/Judgement: Fall prevention  Pain:  Pain level pre-treatment: 0/10   Pain level post-treatment: 0/10   Pain Intervention(s): Medication (see MAR); Rest, Ice, Repositioning   Response to intervention: Nurse notified, See doc flow    Activity Tolerance:   Fair     Please refer to the flowsheet for vital signs taken during this treatment.   After treatment:   [x] Patient left in no apparent distress sitting up in chair  [] Patient left in no apparent distress in bed  [x] Call bell left within reach  [x] Nursing notified  [x] Caregiver present  [] Bed alarm activated    COMMUNICATION/EDUCATION:   [x] Role of Occupational Therapy in the acute care setting  [x] Home safety education was provided and the patient/caregiver indicated understanding. [x] Patient/family have participated as able in goal setting and plan of care. [x] Patient/family agree to work toward stated goals and plan of care. [] Patient understands intent and goals of therapy, but is neutral about his/her participation. [] Patient is unable to participate in goal setting and plan of care. Thank you for this referral.  Luzmaria Porter OTR/L  Time Calculation: 11 mins    Eval Complexity: History: MEDIUM Complexity : Expanded review of history including physical, cognitive and psychosocial  history ; Examination: MEDIUM Complexity : 3-5 performance deficits relating to physical, cognitive , or psychosocial skils that result in activity limitations and / or participation restrictions; Decision Making:MEDIUM Complexity : Patient may present with comorbidities that affect occupational performnce.  Miniml to moderate modification of tasks or assistance (eg, physical or verbal ) with assesment(s) is necessary to enable patient to complete evaluation

## 2022-03-05 NOTE — INTERDISCIPLINARY ROUNDS
0700 Assumed care of pt from RANDY Hudson RN. Completed assessment of pt. No complaints of pain/discomfort. Call light within reach. 21  Per Dr. Deon Cuellar orders, placed foss catheter due to possible retention. Pt tolerated well.

## 2022-03-05 NOTE — PROGRESS NOTES
Problem: Falls - Risk of  Goal: *Absence of Falls  Description: Document Phuong Roni Fall Risk and appropriate interventions in the flowsheet.   Outcome: Progressing Towards Goal  Note: Fall Risk Interventions:  Mobility Interventions: Bed/chair exit alarm              Elimination Interventions: Bed/chair exit alarm,Call light in reach              Problem: Patient Education: Go to Patient Education Activity  Goal: Patient/Family Education  Outcome: Progressing Towards Goal

## 2022-03-06 NOTE — PROGRESS NOTES
1214 Pt refused to have blood sugar taken. Pt stated he only takes his blood sugar at morning and night.

## 2022-03-06 NOTE — ROUTINE PROCESS
Bedside and Verbal shift change report given to ESCOBAR Sanchez (oncoming nurse) by ASTRID Rod RN (offgoing nurse). Report included the following information SBAR, Kardex, Intake/Output, MAR and Recent Results.

## 2022-03-06 NOTE — PROGRESS NOTES
0700 Assumed care of pt. Completed assessment of pt. No complaints of pain/discomfort. 0830 Held eliquis due to hematuria.

## 2022-03-06 NOTE — ROUTINE PROCESS
Bedside and Verbal shift change report given to Jane Rapp (oncoming nurse) by Satya Valentine RN (offgoing nurse). Report included the following information SBAR, Kardex, MAR and Recent Results.

## 2022-03-06 NOTE — PROGRESS NOTES
Problem: Falls - Risk of  Goal: *Absence of Falls  Description: Document Aristeo Loza Fall Risk and appropriate interventions in the flowsheet.   Outcome: Progressing Towards Goal  Note: Fall Risk Interventions:  Mobility Interventions: Assess mobility with egress test         Medication Interventions: Evaluate medications/consider consulting pharmacy    Elimination Interventions: Call light in reach

## 2022-03-06 NOTE — PROGRESS NOTES
Cassandra Jones 281 care from Lamont Ascencio, RUDDY.    3522 Assessment completed, dressing changed. [address] due to hematuria, also refusing glucose checks at bedtime. Will notify provider. 2300 Bedside and Verbal shift change report given to RUDDY Kuhn (oncoming nurse) by Tessie Loving RN   (offgoing nurse). Report included the following information SBAR, Kardex, ED Summary, Procedure Summary, Intake/Output, MAR, Recent Results and Med Rec Status. 2310 Provider made aware of assessment findings no new orders.

## 2022-03-06 NOTE — PROGRESS NOTES
Hospitalist Progress Note    Patient: Toma Torres MRN: [id number]  CSN: [id number]    YOB: 1940  Age: 79 y.o. Sex: male    DOA: 3/4/2022 LOS:  LOS: 2 days                Assessment and Plan:    Principal Problem:    Acute on chronic combined systolic and diastolic congestive heart failure ([address] (7/12/2021)    Active Problems:    Type II diabetes mellitus, uncontrolled ([address] (8/11/2016)      CML (chronic myelocytic leukemia) ([address] (9/2/2016)      Chronic atrial fibrillation ([address] (9/2/2016)      Atrial fibrillation with RVR ([address] (7/11/2021)      Debility ()      Cardiomyopathy ([address] (7/15/2021)      Acute renal failure superimposed on stage 3 chronic kidney disease ([address] (3/4/2022)      Bleeding nose (3/4/2022)      Leg ulcer ([address] (3/4/2022)      Hypokalemia (3/4/2022)      Acute on chronic chf: with EF15 percent ,  on dietary restrictions and diuretics    CRI:  His creatinine is holding  Even with the diuresis    Will hopefully wean off oxygen as diuresis improved. He has consitent negative fluid balance based on I/O chart     New onset Afib: on coreg , we had restarted eliquis but it was held because of hematuria     HTN: stable on coreg       DM: ssi'    Urinary retention: with Padilla      will replace K today           Chief complaint:  Daly Smoker a 79 y. o. male with chronic A. fib on Eliquis, cardiomyopathy with EF 15 to 20%, CKD, diabetes presented to ER due to worsening shortness of breath for 3 days.  Associated with bilateral leg edema. Subjective:    Feels a little better today overall        Review of systems:    General: No fevers or chills. Cardiovascular: No chest pain or pressure. No palpitations. Pulmonary: No shortness of breath. Gastrointestinal: No nausea, vomiting.      Objective:    Vital signs/Intake and Output:  Visit Vitals  /87   Pulse 90   Temp 97.7 °F (36.5 °C)   Resp 18   Wt 95.2 kg (209 lb 12.8 oz)   SpO2 100%   BMI 30.10 kg/m²     Current Shift: No intake/output data recorded. Last three shifts:  03/04 1901 - 03/06 0700  In: -   Out: 2625 [Urine:2625]    Physical Exam:  General: NAD, AAOx3. Non-toxic. HEENT: NC/AT. PERRLA, EOMI.  MMM. Lungs: Nml inspection. CTA B/L. No wheezing, rales or rhonchi. Heart:  S1S2 RRR,  PMI mid 5th IC space. No M/RG. Abdomen: Soft, NT/ND.  BS+. No peritoneal signs. Extremities: No C/C/E. Psych:   Nml affect. Neurologic:  2-12 intact. Strength 5/5 throughout. Sensation symmetrical.          Labs: Results:       Chemistry Recent Labs     03/06/22 0341 03/05/22 0420 03/04/22  1516   * 150* 210*   * 135* 132*   K 3.3* 3.0* 3.2*   CL 88* 90* 88*   CO2 35* 38* 37*   * 114* 109*   CREA 3.14* 3.13* 3.05*   CA 9.0 8.7 8.8   AGAP 10 7 7   BUCR 38* 36* 36*   AP 99  --  109   TP 6.7  --  7.3   ALB 2.8*  --  3.1*   GLOB 3.9  --  4.2*   AGRAT 0.7*  --  0.7*      CBC w/Diff Recent Labs     03/06/22 0341 03/05/22 0420 03/04/22  1516   WBC 10.4 9.2 9.4   RBC 3.97* 4.12* 4.45   HGB 8.8* 8.9* 9.6*   HCT 30.6* 30.9* 33.6*   PLT 89* 88* 104*   GRANS 74* 75* 77*   LYMPH 9* 7* 8*   EOS 0 1 0      Cardiac Enzymes No results for input(s): CPK, CKND1, TARA in the last 72 hours. No lab exists for component: CKRMB, TROIP   Coagulation Recent Labs     03/04/22  1516   PTP 24.5*   INR 2.3*   APTT 39.0*       Lipid Panel Lab Results   Component Value Date/Time    Cholesterol, total 107 08/12/2016 03:20 AM    HDL Cholesterol 56 08/12/2016 03:20 AM    LDL, calculated 26.4 08/12/2016 03:20 AM    VLDL, calculated 24.6 08/12/2016 03:20 AM    Triglyceride 123 08/12/2016 03:20 AM    CHOL/HDL Ratio 1.9 08/12/2016 03:20 AM      BNP No results for input(s): BNPP in the last 72 hours.    Liver Enzymes Recent Labs     03/06/22  0341   TP 6.7   ALB 2.8*   AP 99      Thyroid Studies Lab Results   Component Value Date/Time    TSH 1.34 07/12/2021 02:55 AM        Procedures/imaging: see electronic medical records for all procedures/Xrays and details which were not copied into this note but were reviewed prior to creation of Plan

## 2022-03-06 NOTE — PROGRESS NOTES
Cardiology Progress Note        Patient: Chidi Ann        Sex: male          DOA: 3/4/2022  YOB: 1940      Age:  79 y.o.        LOS:  LOS: 2 days   Assessment/Plan     Principal Problem:    Acute on chronic combined systolic and diastolic congestive heart failure ([address] (7/12/2021)    Active Problems:    Type II diabetes mellitus, uncontrolled ([address] (8/11/2016)      CML (chronic myelocytic leukemia) ([facility] (9/2/2016)      Chronic atrial fibrillation (HCC) (9/2/2016)      Atrial fibrillation with RVR ([address] (7/11/2021)      Debility ()      Cardiomyopathy ([facility] (7/15/2021)      Acute renal failure superimposed on stage 3 chronic kidney disease ([address] (3/4/2022)      Bleeding nose (3/4/2022)      Leg ulcer ([address] (3/4/2022)      Hypokalemia (3/4/2022)        Plan:  Patient is being scheduled for possible dialysis tomorrow  Eliquis is on hold  A. fib is rate controlled  Discussed with patient and wife  patient reiterated he does not want future ICD  Continue with current medication treatment. Acute on chronic CHF  Chronic A. Fib  Chronic cardiomyopathy patient have declined ICD in the past  CAD  CABG  Acute on chronic renal failure      Continue with current diuretic  Discussed with patient and wife. Subjective:    cc:  Shortness of breath  Leg swelling      REVIEW OF SYSTEMS:     General: No fevers or chills. Cardiovascular: No chest pain or pressure. No palpitations. ++ ankle swelling  Pulmonary: No SOB, orthopnea, PND  Gastrointestinal: No nausea, vomiting or diarrhea      Objective:      Visit Vitals  /72   Pulse 90   Temp 97.9 °F (36.6 °C)   Resp 16   Wt 95.2 kg (209 lb 12.8 oz)   SpO2 100%   BMI 30.10 kg/m²     Body mass index is 30.1 kg/m². Physical Exam:  General Appearance: Comfortable, not using accessory muscles of respiration. Forearm bruises looks better, color of patient is looking better. NECK: No JVD, no thyroidomeglay. LUNGS: Clear bilaterally. HEART: S1 irregular+S2 audible,    ABD: Non-tender, BS Audible    EXT: ++ edema, and no cysnosis. VASCULAR EXAM: Pulses are intact. PSYCHIATRIC EXAM: Mood is appropriate. Medication:  Current Facility-Administered Medications   Medication Dose Route Frequency    bumetanide (BUMEX) injection 2 mg  2 mg IntraVENous BID    [START ON 3/7/2022] metOLazone (ZAROXOLYN) tablet 10 mg  10 mg Oral DAILY    oxymetazoline (AFRIN) 0.05 % nasal spray 2 Spray  2 Spray Both Nostrils DAILY PRN    tamsulosin (FLOMAX) capsule 0.8 mg  0.8 mg Oral DAILY    sodium chloride (NS) flush 5-40 mL  5-40 mL IntraVENous Q8H    sodium chloride (NS) flush 5-40 mL  5-40 mL IntraVENous PRN    acetaminophen (TYLENOL) tablet 650 mg  650 mg Oral Q6H PRN    Or    acetaminophen (TYLENOL) suppository 650 mg  650 mg Rectal Q6H PRN    bisacodyL (DULCOLAX) suppository 10 mg  10 mg Rectal DAILY PRN    promethazine (PHENERGAN) tablet 12.5 mg  12.5 mg Oral Q6H PRN    Or    ondansetron (ZOFRAN) injection 4 mg  4 mg IntraVENous Q6H PRN    atorvastatin (LIPITOR) tablet 40 mg  40 mg Oral DAILY    carvediloL (COREG) tablet 25 mg  25 mg Oral BID    nitroglycerin (NITROSTAT) tablet 0.4 mg  0.4 mg SubLINGual PRN    insulin lispro (HUMALOG) injection   SubCUTAneous AC&HS    glucose chewable tablet 16 g  4 Tablet Oral PRN    glucagon (GLUCAGEN) injection 1 mg  1 mg IntraMUSCular PRN    dextrose 10% infusion 0-250 mL  0-250 mL IntraVENous PRN    imatinib (GLEEVEC) chemo tablet 200 mg (Patient Supplied)  200 mg Oral DAILY    [Held by provider] apixaban (ELIQUIS) tablet 2.5 mg  2.5 mg Oral Q12H               Lab/Data Reviewed:  Procedures/imaging: see electronic medical records for all procedures/Xrays   and details which were not copied into this note but were reviewed prior to creation of Plan       All lab results for the last 24 hours reviewed.      Recent Labs     03/06/22  0341 03/05/22  0420 03/04/22  1516   WBC 10.4 9.2 9.4   HGB 8.8* 8.9* 9.6*   HCT 30.6* 30.9* 33.6*   PLT 89* 88* 104*     Recent Labs     03/06/22  0341 03/05/22  0420 03/04/22  1516   * 135* 132*   K 3.3* 3.0* 3.2*   CL 88* 90* 88*   CO2 35* 38* 37*   * 150* 210*   * 114* 109*   CREA 3.14* 3.13* 3.05*   CA 9.0 8.7 8.8       RADIOLOGY:  CT Results  (Last 48 hours)    None        CXR Results  (Last 48 hours)    None            Cardiology Procedures:   Results for orders placed or performed during the hospital encounter of 03/04/22   EKG, 12 LEAD, INITIAL   Result Value Ref Range    Ventricular Rate 100 BPM    QRS Duration 96 ms    Q-T Interval 392 ms    QTC Calculation (Bezet) 505 ms    Calculated R Axis -55 degrees    Calculated T Axis 141 degrees    Diagnosis       Atrial fibrillation with premature ventricular or aberrantly conducted   complexes  Left axis deviation  Anterior infarct (cited on or before 11-JUL-2021)  Prolonged QT  Abnormal ECG  When compared with ECG of 17-FEB-2022 17:43,  QT has lengthened        Echo Results  (Last 48 hours)    None       Cardiolite (Tc-99m Sestamibi) stress test    Signed By: Jalyn Broussard MD     March 6, 2022

## 2022-03-06 NOTE — PROGRESS NOTES
Assessment:     Acute on chronic combined systolic and diastolic congestive heart failure ([facility] (7/12/2021)         Type II diabetes mellitus, uncontrolled ([address] (8/11/2016)       CML (chronic myelocytic leukemia) ([facility] (9/2/2016)       Chronic atrial fibrillation ([address] (9/2/2016)       Atrial fibrillation with RVR ([facility] (7/11/2021)       Debility ()       Cardiomyopathy ([address] (7/15/2021)       Acute renal failure superimposed on stage 3 chronic kidney disease ([address] (3/4/2022)       Bleeding nose (3/4/2022)       Leg ulcer ([address] (3/4/2022)       Hypokalemia (3/4/2022)        Plan:  Continue with current plan today  D/w that he needs dialysis, so he is agreeing. Will get Vascular consult and then HD in am           CC: ARF, fluid overload  Interval History: no interval change since yesterday      Subjective:   PT does not have any somatic complaints                Blood pressure 121/83, pulse 95, temperature 97.3 °F (36.3 °C), resp. rate 17, weight 95.2 kg (209 lb 12.8 oz), SpO2 100 %.       NAD, Conversant  Psychicatric: good judgment and insights, alert and oreinted  Elvakarishma Fayey stated age  Hard of hearing  Abdomen is protuberant s but soft  anasarca noted         Intake/Output Summary (Last 24 hours) at 3/6/2022 1242  Last data filed at 3/6/2022 1127  Gross per 24 hour   Intake 850 ml   Output 1700 ml   Net -850 ml      Recent Labs     03/06/22  0341   WBC 10.4     Lab Results   Component Value Date/Time    Sodium 133 (L) 03/06/2022 03:41 AM    Potassium 3.3 (L) 03/06/2022 03:41 AM    Chloride 88 (L) 03/06/2022 03:41 AM    CO2 35 (H) 03/06/2022 03:41 AM    Anion gap 10 03/06/2022 03:41 AM    Glucose 158 (H) 03/06/2022 03:41 AM     (H) 03/06/2022 03:41 AM    Creatinine 3.14 (H) 03/06/2022 03:41 AM    BUN/Creatinine ratio 38 (H) 03/06/2022 03:41 AM    GFR est AA 23 (L) 03/06/2022 03:41 AM    GFR est non-AA 19 (L) 03/06/2022 03:41 AM    Calcium 9.0 03/06/2022 03:41 AM        Current Facility-Administered Medications   Medication Dose Route Frequency Provider Last Rate Last Admin    oxymetazoline (AFRIN) 0.05 % nasal spray 2 Spray  2 Spray Both Nostrils DAILY PRN Almaz Cabrales MD   2 Spray at 03/05/22 0138    tamsulosin (FLOMAX) capsule 0.8 mg  0.8 mg Oral DAILY Dalia Gabrielsandra R, DO   0.8 mg at 03/06/22 0831    bumetanide (BUMEX) injection 1 mg  1 mg IntraVENous BID Trish Perkins MD   1 mg at 03/06/22 0913    sodium chloride (NS) flush 5-40 mL  5-40 mL IntraVENous Q8H Trish Perkins MD   10 mL at 03/06/22 6813    sodium chloride (NS) flush 5-40 mL  5-40 mL IntraVENous PRN Trish Perkins MD        acetaminophen (TYLENOL) tablet 650 mg  650 mg Oral Q6H PRN Trish Perkins MD        Or    acetaminophen (TYLENOL) suppository 650 mg  650 mg Rectal Q6H PRN Trish Perkins MD        bisacodyL (DULCOLAX) suppository 10 mg  10 mg Rectal DAILY PRN Trish Perkins MD        promethazine (PHENERGAN) tablet 12.5 mg  12.5 mg Oral Q6H PRN Trish Perkins MD        Or    [facility]ron [facility]) injection 4 mg  4 mg IntraVENous Q6H PRN Trish Perkins MD   4 mg at 03/05/22 0345    atorvastatin (LIPITOR) tablet 40 mg  40 mg Oral DAILY Trish Perkins MD   40 mg at 03/06/22 0831    carvediloL (COREG) tablet 25 mg  25 mg Oral BID Trish Perkins MD   25 mg at 03/06/22 0831    nitroglycerin (NITROSTAT) tablet 0.4 mg  0.4 mg SubLINGual PRN Trish Perkins MD        metOLazone (ZAROXOLYN) tablet 5 mg  5 mg Oral DAILY Trish Perkins MD   5 mg at 03/06/22 3546    insulin lispro (HUMALOG) injection   SubCUTAneous AC&HS Trish Perkins MD   3 Units at 03/06/22 0636    glucose chewable tablet 16 g  4 Tablet Oral PRN Trish Perkins MD        glucagon [facility]) injection 1 mg  1 mg IntraMUSCular PRN Trish Perkins MD        dextrose 10% infusion 0-250 mL  0-250 mL IntraVENous PRN Trish Perkins MD        imatinib (GLEEVEC) chemo tablet 200 mg (Patient Supplied)  200 mg Oral DAILY Trish Perkins MD   200 mg at 03/06/22 0900    apixaban (ELIQUIS) tablet 2.5 mg  2.5 mg Oral Q12H Trish Perkins MD

## 2022-03-07 PROBLEM — R60.1 ANASARCA: Status: ACTIVE | Noted: 2022-01-01

## 2022-03-07 PROBLEM — I46.9 CARDIAC ARREST (HCC): Status: ACTIVE | Noted: 2022-01-01

## 2022-03-07 NOTE — CONSULTS
Phone: [phone]  Paging : [phone]      Hematology / Oncology Initial Consult Note    Admit Date: 3/4/2022    Reason for Consult:CML    Requesting Physician: Dr. Sydnee Castillo    Assessment:     Johnna Cruz is a 79 y.o., WHITE/NON-, male, who I have been asked to see for [facility]    Principal Problem:    Acute on chronic combined systolic and diastolic congestive heart failure ([address] (7/12/2021)    Active Problems:    Type II diabetes mellitus, uncontrolled ([address] (8/11/2016)      CML (chronic myelocytic leukemia) ([address] (9/2/2016)      Chronic atrial fibrillation ([address] (9/2/2016)      Atrial fibrillation with RVR ([address] (7/11/2021)      Debility ()      Cardiomyopathy ([address] (7/15/2021)      Acute renal failure superimposed on stage 3 chronic kidney disease ([address] (3/4/2022)      Bleeding nose (3/4/2022)      Leg ulcer ([address] (3/4/2022)      Hypokalemia (3/4/2022)      Anasarca (3/7/2022)      Cardiac arrest ([address] (3/7/2022)        Plan:     1. CML has never been in remission and gleevec was at 200mg due to fluid retention. Now without apparent leukocytosis, can hold gleevec in lieu of resp failure. Will check flow cytometry if leukocytosis develops, given recent thrombocytopenia prior to admission . At this moment, CML is not the life limiting factor and holding gleevec is reasonable. 2. Multiple comorbidities per ICU care. Holding gleevec to avoid worsening fluid retention by Gleevec. Will follow peripherally with you. History of Present Illness: Johnna Cruz is a 79 y.o. male who has multiple comorbidites, with CAD CHF HTN DM CKD now admitted after cardiac arrest, now intubated and with rising Cr. He has a history of CML on gleevec 200mg daily due to fluid retention with good control. He never achieved molecular remission,  Most recently his cbc started to show worsening thrombocytopenia with plt around 100K. No leukocytosis, anemia with Hb 10s. Known renal insufficiency Cr 2.8. Oncology is consulted regarding [address]      ROS:    Not able    Past Medical History:   Diagnosis Date    CAD (coronary artery disease)     Chronic atrial fibrillation ([facility] 2016    CML (chronic myelocytic leukemia) ([facility]     Coagulation disorder (HCC)     bruises easily    Diabetes ([facility]     Hypertension     Nausea & vomiting     with most recent surgery-carotid endardarectomy    TIA (transient ischemic attack) 2016       Past Surgical History:   Procedure Laterality Date    HX CAROTID ENDARTERECTOMY Left 2017    HX CATARACT REMOVAL Bilateral     HX CORONARY ARTERY BYPASS GRAFT      HX HERNIA REPAIR Right     hernia    HX UROLOGICAL      greenlight laser    HX UROLOGICAL      kidney stone    HX UROLOGICAL      Lithotripsy x 2    MT CARDIAC SURG PROCEDURE UNLIST  3/2014    quadruple bypass       Family History   Problem Relation Age of Onset    Post-op Nausea/Vomiting Brother        Social History     Socioeconomic History    Marital status:    Tobacco Use    Smoking status: Former Smoker     Packs/day: 1.00     Years: 60.00     Pack years: 60.00     Quit date: 3/23/2014     Years since quittin.9    Smokeless tobacco: Never Used   Substance and Sexual Activity    Alcohol use: Not Currently     Alcohol/week: 2.0 standard drinks     Types: 2 Shots of liquor per week     Comment: per day    Drug use: No    Sexual activity: Never       Current Facility-Administered Medications   Medication Dose Route Frequency    sodium chloride (NS) flush 5-40 mL  5-40 mL IntraVENous Q8H    naloxone (NARCAN) injection 0.1 mg  0.1 mg IntraVENous Multiple    flumazeniL (ROMAZICON) 0.1 mg/mL injection        potassium chloride (K-DUR, KLOR-CON M20) SR tablet 20 mEq  20 mEq Oral NOW    [START ON 3/8/2022] heparin (porcine) injection 5,000 Units  5,000 Units SubCUTAneous Q8H    albumin human 25% (BUMINATE) solution 25 g  25 g IntraVENous Q6H    flumazeniL (ROMAZICON) 0.1 mg/mL injection        fentaNYL (PF) 900 mcg/30 ml infusion soln  0-200 mcg/hr IntraVENous TITRATE    midazolam in normal saline (VERSED) 1 mg/mL infusion  0-10 mg/hr IntraVENous TITRATE    midazolam (VERSED) injection 1 mg  1 mg IntraVENous Q2H PRN    fentaNYL citrate (PF) injection 25 mcg  25 mcg IntraVENous Q4H PRN    chlorhexidine (PERIDEX) 0.12 % mouthwash 10 mL  10 mL Oral ONCE    chlorhexidine (PERIDEX) 0.12 % mouthwash 10 mL  10 mL Oral Q12H    ELECTROLYTE REPLACEMENT PROTOCOL - Potassium Renal Dosing  1 Each Other PRN    ELECTROLYTE REPLACEMENT PROTOCOL  - Phosphorus Renal Dosing  1 Each Other PRN    ELECTROLYTE REPLACEMENT PROTOCOL - Magnesium   1 Each Other PRN    ELECTROLYTE REPLACEMENT PROTOCOL - Calcium   1 Each Other PRN    magnesium sulfate 1 g/100 ml IVPB (premix or compounded)  1 g IntraVENous ONCE    bumetanide (BUMEX) injection 2 mg  2 mg IntraVENous BID    metOLazone (ZAROXOLYN) tablet 10 mg  10 mg Oral DAILY    oxymetazoline (AFRIN) 0.05 % nasal spray 2 Spray  2 Spray Both Nostrils DAILY PRN    tamsulosin (FLOMAX) capsule 0.8 mg  0.8 mg Oral DAILY    sodium chloride (NS) flush 5-40 mL  5-40 mL IntraVENous Q8H    sodium chloride (NS) flush 5-40 mL  5-40 mL IntraVENous PRN    acetaminophen (TYLENOL) tablet 650 mg  650 mg Oral Q6H PRN    Or    acetaminophen (TYLENOL) suppository 650 mg  650 mg Rectal Q6H PRN    bisacodyL (DULCOLAX) suppository 10 mg  10 mg Rectal DAILY PRN    promethazine (PHENERGAN) tablet 12.5 mg  12.5 mg Oral Q6H PRN    Or    ondansetron (ZOFRAN) injection 4 mg  4 mg IntraVENous Q6H PRN    atorvastatin (LIPITOR) tablet 40 mg  40 mg Oral DAILY    carvediloL (COREG) tablet 25 mg  25 mg Oral BID    nitroglycerin (NITROSTAT) tablet 0.4 mg  0.4 mg SubLINGual PRN    insulin lispro (HUMALOG) injection   SubCUTAneous AC&HS    glucose chewable tablet 16 g  4 Tablet Oral PRN    glucagon (GLUCAGEN) injection 1 mg  1 mg IntraMUSCular PRN    dextrose 10% infusion 0-250 mL  0-250 mL IntraVENous PRN    imatinib (GLEEVEC) chemo tablet 200 mg (Patient Supplied)  200 mg Oral DAILY    [Held by provider] apixaban (ELIQUIS) tablet 2.5 mg  2.5 mg Oral Q12H     Facility-Administered Medications Ordered in Other Encounters   Medication Dose Route Frequency    propofoL (DIPRIVAN) 10 mg/mL injection   IntraVENous PRN       Prior to Admission medications    Medication Sig Start Date End Date Taking? Authorizing Provider   bumetanide (BUMEX) 2 mg tablet Take 1 Tablet by mouth two (2) times a day. 2/17/22   José Gonsalez MD   metOLazone (ZAROXOLYN) 2.5 mg tablet Take 2 Tablets by mouth daily. 2/17/22   José Gonsalez MD   imatinib (Gleevec) 400 mg tablet Take 200 mg by mouth daily. Provider, Historical   mecobalamin, vitamin B12, 1,000 mcg TbDi Take 1 Tablet by mouth daily. Provider, Historical   glucose 4 gram chewable tablet Take 4 g by mouth daily as needed for Other (low BS). Provider, Historical   OXYGEN-AIR DELIVERY SYSTEMS 2 L by IntraNASal route continuous. Provider, Historical   insulin glargine (LANTUS,BASAGLAR) 100 unit/mL (3 mL) inpn 3-4 Units by SubCUTAneous route daily as needed for Other (high BS). patient reporting he has no actual sliding scale for medication- takes 3-4 units depending on his BS, only takes if greater than 150. Provider, Historical   carvediloL (COREG) 25 mg tablet Take 1 Tablet by mouth two (2) times a day. 7/22/21   Janel Rodriguez MD   apixaban (ELIQUIS) 2.5 mg tablet Take 1 Tablet by mouth every twelve (12) hours. 7/22/21   Janel Rodriguez MD   gabapentin (NEURONTIN) 100 mg capsule Take 1 Capsule by mouth nightly. Max Daily Amount: 100 mg. Patient taking differently: Take 300 mg by mouth nightly. 7/22/21   Janel Rodriguez MD   aspirin delayed-release 81 mg tablet Take 81 mg by mouth daily. Other, MD Stephan   vitamin E (AQUA GEMS) 400 unit capsule Take 400 Units by mouth daily.     Other, MD Stephan   tamsulosin (FLOMAX) 0.4 mg capsule Take 0.4 mg by mouth daily. Stephan Nance MD   nitroglycerin (NITROSTAT) 0.4 mg SL tablet 1 Tab by SubLINGual route as needed for Chest Pain. Up to 3 doses. 18   Nicole Taylor MD   atorvastatin (LIPITOR) 40 mg tablet Take 40 mg by mouth daily. Stephan Nance MD       Allergies   Allergen Reactions    Adhesive Tape-Silicones Other (comments)     Raw skin    Codeine Nausea and Vomiting    Iodinated Contrast Media Rash     patient states this was years ago - he states that he can tolerate the newer agents       ECOG PS:    Physical Exam:    Temp (24hrs), Av.1 °F (36.7 °C), Min:97.9 °F (36.6 °C), Max:98.4 °F (36.9 °C)  VSIP    Intake/Output Summary (Last 24 hours) at 3/7/2022 1730  Last data filed at 3/7/2022 0054  Gross per 24 hour   Intake 200 ml   Output 800 ml   Net -600 ml   RESULTRCNT(24h)XR CHEST PORT    Result Date: 3/7/2022  EXAM: XR CHEST PORT CLINICAL INDICATION/HISTORY: sob -Additional: None COMPARISON: 3/4/2022 TECHNIQUE: Portable frontal view of the chest _______________ FINDINGS: SUPPORT DEVICES: Endotracheal tube in the upper thoracic trachea. Right IJV tunneled dialysis catheter at the cavoatrial junction. HEART AND MEDIASTINUM: Cardiomegaly. LUNGS AND PLEURAL SPACES: Moderate loculated right effusion adjacent atelectasis. No pneumothorax. _______________     Moderate loculated right effusion adjacent atelectasis. XR CHEST PORT    Result Date: 3/4/2022  EXAM: XR CHEST PORT CLINICAL INDICATION/HISTORY: sob -Additional: None COMPARISON: 2022 TECHNIQUE: Frontal view of the chest _______________ FINDINGS: HEART AND MEDIASTINUM: Cardiomegaly. Primary sternotomy. LUNGS AND PLEURAL SPACES: Moderate right and small left pleural effusions. Bilateral parenchymal opacities. No pneumothorax. BONY THORAX AND SOFT TISSUES: No acute osseous abnormality _______________     Moderate right and small left pleural effusions.  Bilateral parenchymal opacities possibly representing atelectasis versus edema. 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 Cardiomegaly and mediastinal contours are within normal limits for portable radiograph. There is small right pleural effusion with right lung base airspace disease. There is left lung base atelectasis or minimal infiltrate. There is a background of moderate emphysema. No acute osseous findings. ________________      Small right effusion with right lung base atelectasis and/or infiltrate. Minimal atelectasis and/or infiltrate left lung base.         General: intubated, anasarca   Heart:a fib on tele  Lungs:on vent  ABD: bowel sound present, soft,   Extremities: + pitting edema  Skin: No rash, oozing of clear fluid from LEs  Neuro: not assessed    Recent Results (from the past 24 hour(s))   METABOLIC PANEL, BASIC    Collection Time: 03/07/22  2:45 AM   Result Value Ref Range    Sodium 133 (L) 136 - 145 mmol/L    Potassium 3.2 (L) 3.5 - 5.5 mmol/L    Chloride 90 (L) 100 - 111 mmol/L    CO2 36 (H) 21 - 32 mmol/L    Anion gap 7 3.0 - 18 mmol/L    Glucose 158 (H) 74 - 99 mg/dL     (H) 7.0 - 18 MG/DL    Creatinine 3.37 (H) 0.6 - 1.3 MG/DL    BUN/Creatinine ratio 36 (H) 12 - 20      GFR est AA 21 (L) >60 ml/min/1.73m2    GFR est non-AA 18 (L) >60 ml/min/1.73m2    Calcium 8.9 8.5 - 10.1 MG/DL   CBC WITH AUTOMATED DIFF    Collection Time: 03/07/22  2:45 AM   Result Value Ref Range    WBC 11.2 4.6 - 13.2 K/uL    RBC 3.94 (L) 4.35 - 5.65 M/uL    HGB 8.7 (L) 13.0 - 16.0 g/dL    HCT 30.2 (L) 36.0 - 48.0 %    MCV 76.6 (L) 78.0 - 100.0 FL    MCH 22.1 (L) 24.0 - 34.0 PG    MCHC 28.8 (L) 31.0 - 37.0 g/dL    RDW 19.4 (H) 11.6 - 14.5 %    PLATELET 93 (L) 374 - 420 K/uL    NRBC 0.0 0  WBC    ABSOLUTE NRBC 0.00 0.00 - 0.01 K/uL    NEUTROPHILS 76 (H) 40 - 73 %    LYMPHOCYTES 9 (L) 21 - 52 %    MONOCYTES 12 (H) 3 - 10 %    EOSINOPHILS 0 0 - 5 %    BASOPHILS 2 0 - 2 %    IMMATURE GRANULOCYTES 1 (H) 0.0 - 0.5 %    ABS. NEUTROPHILS 8.5 (H) 1.8 - 8.0 K/UL    ABS. LYMPHOCYTES 1.0 0.9 - 3.6 K/UL    ABS. MONOCYTES 1.4 (H) 0.05 - 1.2 K/UL    ABS. EOSINOPHILS 0.0 0.0 - 0.4 K/UL    ABS. BASOPHILS 0.2 (H) 0.0 - 0.1 K/UL    ABS. IMM. GRANS. 0.1 (H) 0.00 - 0.04 K/UL    DF AUTOMATED     MAGNESIUM    Collection Time: 03/07/22  2:45 AM   Result Value Ref Range    Magnesium 2.9 (H) 1.6 - 2.6 mg/dL   GLUCOSE, POC    Collection Time: 03/07/22  6:14 AM   Result Value Ref Range    Glucose (POC) 201 (H) 70 - 110 mg/dL   PROTHROMBIN TIME + INR    Collection Time: 03/07/22 10:30 AM   Result Value Ref Range    Prothrombin time 20.7 (H) 11.5 - 15.2 sec    INR 1.9 (H) 0.8 - 1.2     CBC WITH AUTOMATED DIFF    Collection Time: 03/07/22  3:02 PM   Result Value Ref Range    WBC 12.8 4.6 - 13.2 K/uL    RBC 4.02 (L) 4.35 - 5.65 M/uL    HGB 8.8 (L) 13.0 - 16.0 g/dL    HCT 30.7 (L) 36.0 - 48.0 %    MCV 76.4 (L) 78.0 - 100.0 FL    MCH 21.9 (L) 24.0 - 34.0 PG    MCHC 28.7 (L) 31.0 - 37.0 g/dL    RDW 19.5 (H) 11.6 - 14.5 %    PLATELET 93 (L) 652 - 420 K/uL    NRBC 0.0 0  WBC    ABSOLUTE NRBC 0.00 0.00 - 0.01 K/uL    NEUTROPHILS PENDING %    LYMPHOCYTES PENDING %    MONOCYTES PENDING %    EOSINOPHILS PENDING %    BASOPHILS PENDING %    IMMATURE GRANULOCYTES PENDING %    ABS. NEUTROPHILS PENDING K/UL    ABS. LYMPHOCYTES PENDING K/UL    ABS. MONOCYTES PENDING K/UL    ABS. EOSINOPHILS PENDING K/UL    ABS. BASOPHILS PENDING K/UL    ABS. IMM. GRANS.  PENDING K/UL    DF PENDING    METABOLIC PANEL, COMPREHENSIVE    Collection Time: 03/07/22  3:02 PM   Result Value Ref Range    Sodium 135 (L) 136 - 145 mmol/L    Potassium 3.0 (L) 3.5 - 5.5 mmol/L    Chloride 91 (L) 100 - 111 mmol/L    CO2 37 (H) 21 - 32 mmol/L    Anion gap 7 3.0 - 18 mmol/L    Glucose 198 (H) 74 - 99 mg/dL     (H) 7.0 - 18 MG/DL    Creatinine 3.36 (H) 0.6 - 1.3 MG/DL    BUN/Creatinine ratio 37 (H) 12 - 20      GFR est AA 21 (L) >60 ml/min/1.73m2    GFR est non-AA 18 (L) >60 ml/min/1.73m2    Calcium 8.7 8.5 - 10.1 MG/DL    Bilirubin, total 0.8 0.2 - 1.0 MG/DL    ALT (SGPT) 14 (L) 16 - 61 U/L    AST (SGOT) 23 10 - 38 U/L    Alk. phosphatase 95 45 - 117 U/L    Protein, total 6.4 6.4 - 8.2 g/dL    Albumin 2.8 (L) 3.4 - 5.0 g/dL    Globulin 3.6 2.0 - 4.0 g/dL    A-G Ratio 0.8 0.8 - 1.7     TROPONIN-HIGH SENSITIVITY    Collection Time: 03/07/22  3:02 PM   Result Value Ref Range    Troponin-High Sensitivity 72 0 - 78 ng/L   MAGNESIUM    Collection Time: 03/07/22  3:02 PM   Result Value Ref Range    Magnesium 2.8 (H) 1.6 - 2.6 mg/dL   GLUCOSE, POC    Collection Time: 03/07/22  3:07 PM   Result Value Ref Range    Glucose (POC) 227 (H) 70 - 110 mg/dL   BLOOD GAS, ARTERIAL POC    Collection Time: 03/07/22  4:02 PM   Result Value Ref Range    Device: ADULT VENT      FIO2 (POC) 60 %    pH (POC) 7.64 (HH) 7.35 - 7.45      pCO2 (POC) 36.0 35.0 - 45.0 MMHG    pO2 (POC) 293 (H) 80 - 100 MMHG    HCO3 (POC) 38.2 (H) 22 - 26 MMOL/L    sO2 (POC) 100.0 (H) 92 - 97 %    Base excess (POC) 16.0 mmol/L    Mode ASSIST CONTROL      Tidal volume 500 ml    Set Rate 16 bpm    PEEP/CPAP (POC) 5 cmH2O    Site LEFT BRACHIAL      Specimen type (POC) ARTERIAL      Performed by [facility] CHEST PORT    Result Date: 3/7/2022  EXAM: XR CHEST PORT CLINICAL INDICATION/HISTORY: sob -Additional: None COMPARISON: 3/4/2022 TECHNIQUE: Portable frontal view of the chest _______________ FINDINGS: SUPPORT DEVICES: Endotracheal tube in the upper thoracic trachea. Right IJV tunneled dialysis catheter at the cavoatrial junction. HEART AND MEDIASTINUM: Cardiomegaly. LUNGS AND PLEURAL SPACES: Moderate loculated right effusion adjacent atelectasis. No pneumothorax. _______________     Moderate loculated right effusion adjacent atelectasis.      XR CHEST PORT    Result Date: 3/4/2022  EXAM: XR CHEST PORT CLINICAL INDICATION/HISTORY: sob -Additional: None COMPARISON: 2/17/2022 TECHNIQUE: Frontal view of the chest _______________ FINDINGS: HEART AND MEDIASTINUM: Cardiomegaly. Primary sternotomy. LUNGS AND PLEURAL SPACES: Moderate right and small left pleural effusions. Bilateral parenchymal opacities. No pneumothorax. BONY THORAX AND SOFT TISSUES: No acute osseous abnormality _______________     Moderate right and small left pleural effusions. Bilateral parenchymal opacities possibly representing atelectasis versus edema. 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 Cardiomegaly and mediastinal contours are within normal limits for portable radiograph. There is small right pleural effusion with right lung base airspace disease. There is left lung base atelectasis or minimal infiltrate. There is a background of moderate emphysema. No acute osseous findings. ________________      Small right effusion with right lung base atelectasis and/or infiltrate. Minimal atelectasis and/or infiltrate left lung base.         Leatha Robins MD, PhD  Office: [phone]  Cell: [phone]

## 2022-03-07 NOTE — PROGRESS NOTES
Code called and Pt was intubated with 8.0 ET taped at 22cm lip line transferred to Icu on vent and placed on 980 vent  rate 16 peep 5 and fio2 60%

## 2022-03-07 NOTE — ROUTINE PROCESS
Bedside and Verbal shift change report given to Belinda Meza RN (oncoming nurse) by Elie Espinoza RN (offgoing nurse). Report included the following information SBAR, Kardex, MAR and Recent Results.

## 2022-03-07 NOTE — CONSULTS
Palliative Medicine Consult    Patient Name: Khurram Edwards  YOB: 1940    Date of Initial Consult: 3/7/2022  Reason for Consult: Goals of care discussions  Requesting Provider: Dr. Tyrese Mccarthy   Primary Care Physician: Gómez Grijalva DO      SUMMARY:   Khurram Edwards is a 79 y.o. with a past history of chronic CHF, HTN, CML, CKD stage 4, DM2, and cardiomyopathy, who was admitted on 3/4/2022 from home with a diagnosis of acute on chronic combined systolic and diastolic congestive heart failure, acute on chronic kidney disease stage IV, and A. fib. Current medical issues leading to Palliative Medicine involvement include: Goals of care discussions. PALLIATIVE DIAGNOSES:   1. Goals of care discussions  2. Acute on chronic combined systolic and diastolic congestive heart failure  3. Acute on chronic kidney disease stage IV  4. Advanced age/debility       PLAN:   1. Goals of care discussions: Palliative medicine team including Ede Guerrero RN and I met with patient at patient's bedside. Patient is awake, alert, and oriented x4. He has a flat affect, but did engage in goals of care discussions. Patient has an AMD on file naming his daughter Toya Davis, and then son Regan. He confirms this is correct. He reviewed his current health situation, and states that he has declined an AICD placement for his cardiomyopathy. Patient states he does not recognize the benefit of AICD, as he already knows his heart is weak and recognizes his life may be short. Patient also states he is starting dialysis today or tomorrow, and is open to try it as it is uncomfortable and difficult for him to breathe with fluid overload. He understands that dialysis is his choice, and if he is having difficulty tolerating it he may stop at any time. Discussed the benefits and burdens of CPR in the event of cardiopulmonary arrest in the setting of advanced age with chronic comorbidities.  Discussed the benefits and burdens of intubation in the event of respiratory decline pre-arrest.  Patient did not understand that successful resuscitation includes intubation. At this time he wants efforts at resuscitation, but would not want to be on the ventilator long-term should he not be able to liberate from the ventilator. He would like more time to think about this, and we will revisit again during this hospitalization. Continue full code with full interventions at this time. Support offered to patient who is overall worried about how his wife is going to handle his declining health. 2. Acute on chronic combined systolic and diastolic congestive heart failure: With cardiomyopathy, patient has declined an AICD in the past.  Followed by cardiology. Plan for dialysis for fluid removal, nephrology following. 3. Acute on chronic kidney disease stage IV: Plan for dialysis today for fluid removal, nephrology following. 4. Advanced age/debility: 62-year-old male who lives at home with his wife. He was ambulatory without any assistive devices prior to admission, but notes that he has been progressively getting weaker and is interested in working with PT/OT for cane and walker suggestions. 5. Initial consult note routed to primary continuity provider  6.  Communicated plan of care with: Palliative IDT       GOALS OF CARE / TREATMENT PREFERENCES:   [====Goals of Care====]  GOALS OF CARE:Full code with full interventions  Patient/Health Care Proxy Stated Goals: Prolong life      TREATMENT PREFERENCES:   Code Status: Full Code    Advance Care Planning:  Advance Care Planning 3/7/2022   Patient's Healthcare Decision Maker is: Named in scanned ACP document   Primary Decision Maker Name -   Primary Decision Maker Phone Number -   Primary Decision Maker Relationship to Patient -   Confirm Advance Directive Yes, on file   Does the patient have other document types -       Medical Interventions: Full interventions            The palliative care team has discussed with patient / health care proxy about goals of care / treatment preferences for patient.  [====Goals of Care====]         HISTORY:     History obtained from: patient, chart    CHIEF COMPLAINT: fluid overload, starting dialysis today    HPI/SUBJECTIVE:    The patient is:   [x] Verbal and participatory  [] Non-participatory due to:   Oriented x 4, engaged in goals of care discussions    Clinical Pain Assessment (nonverbal scale for severity on nonverbal patients):   Clinical Pain Assessment  Severity: 3            FUNCTIONAL ASSESSMENT:     Palliative Performance Scale (PPS):  PPS: 50       PSYCHOSOCIAL/SPIRITUAL SCREENING:     Advance Care Planning:  Advance Care Planning 3/7/2022   Patient's Healthcare Decision Maker is: Named in scanned ACP document   Primary Decision Maker Name -   Primary Decision [address] Phone Number -   Primary Decision Maker Relationship to Patient -   Confirm Advance Directive Yes, on file   Does the patient have other document types -        Any spiritual / [employer]ology concerns:  [] Yes /  [x] No    Caregiver Burnout:  [] Yes /  [] No /  [x] No Caregiver Present      Anticipatory grief assessment:   [x] Normal  / [] Maladaptive            REVIEW OF SYSTEMS:     Positive and pertinent negative findings in ROS are noted above in HPI. The following systems were [x] reviewed / [] unable to be reviewed as noted in HPI  Other findings are noted below. Systems: constitutional, ears/nose/mouth/throat, respiratory, gastrointestinal, genitourinary, musculoskeletal, integumentary, neurologic, psychiatric, endocrine. Positive findings noted below.   Modified ESAS Completed by: provider   Fatigue: 5       Pain: 3 (abdominal discomfort)   Anxiety: 0 Nausea: 0     Dyspnea: 4 (exertional)     Constipation: No              PHYSICAL EXAM:     From RN flowsheet:  Wt Readings from Last 3 Encounters:   03/07/22 95.9 kg (211 lb 8 oz)   02/17/22 94.8 kg (209 lb)   02/03/22 93.9 kg (207 lb)     Blood pressure (!) 136/91, pulse 85, temperature 98.1 °F (36.7 °C), resp. rate 18, weight 95.9 kg (211 lb 8 oz), SpO2 100 %.     Pain Scale 1: Numeric (0 - 10)  Pain Intensity 1: 0                    Constitutional: Awake, alert, NAD, appears acute on chronically ill   Eyes: pupils equal, anicteric  ENMT: no nasal discharge, moist mucous membranes  Cardiovascular: regular rhythm, distal pulses intact  Respiratory: breathing not labored, symmetric  Gastrointestinal: distended, non-tender   Musculoskeletal: no deformity, no tenderness to palpation  Skin: warm, dry  Neurologic: following commands, moving all extremities, oriented x 4  Psychiatric: flat affect, no hallucinations       HISTORY:     Principal Problem:    Acute on chronic combined systolic and diastolic congestive heart failure (HCC) (7/12/2021)    Active Problems:    Type II diabetes mellitus, uncontrolled ([address] (8/11/2016)      CML (chronic myelocytic leukemia) ([address] (9/2/2016)      Chronic atrial fibrillation (HCC) (9/2/2016)      Atrial fibrillation with RVR ([address] (7/11/2021)      Debility ()      Cardiomyopathy ([address] (7/15/2021)      Acute renal failure superimposed on stage 3 chronic kidney disease ([address] (3/4/2022)      Bleeding nose (3/4/2022)      Leg ulcer ([address] (3/4/2022)      Hypokalemia (3/4/2022)      Past Medical History:   Diagnosis Date    CAD (coronary artery disease) 2014    Chronic atrial fibrillation ([address] 9/2/2016    [facility]     Coagulation disorder ([facility])     bruises easily    Diabetes ([address] 2012    Hypertension     Nausea & vomiting     with most recent surgery-carotid endardarectomy    TIA (transient ischemic attack) 8/11/2016      Past Surgical History:   Procedure Laterality Date    HX CAROTID ENDARTERECTOMY Left 05/2017    HX CATARACT REMOVAL Bilateral     HX CORONARY ARTERY BYPASS GRAFT      HX HERNIA REPAIR Right     hernia    HX UROLOGICAL      greenlight laser    HX UROLOGICAL      kidney stone    HX UROLOGICAL      Lithotripsy x 2    MT CARDIAC SURG PROCEDURE UNLIST  3/2014    quadruple bypass      Family History   Problem Relation Age of Onset    Post-op Nausea/Vomiting Brother       History reviewed, no pertinent family history.   Social History     Tobacco Use    Smoking status: Former Smoker     Packs/day: 1.00     Years: 60.00     Pack years: 60.00     Quit date: 3/23/2014     Years since quittin.9    Smokeless tobacco: Never Used   Substance Use Topics    Alcohol use: Not Currently     Alcohol/week: 2.0 standard drinks     Types: 2 Shots of liquor per week     Comment: per day     Allergies   Allergen Reactions    Adhesive Tape-Silicones Other (comments)     Raw skin    Codeine Nausea and Vomiting    Iodinated Contrast Media Rash     patient states this was years ago - he states that he can tolerate the newer agents      Current Facility-Administered Medications   Medication Dose Route Frequency    bumetanide (BUMEX) injection 2 mg  2 mg IntraVENous BID    metOLazone (ZAROXOLYN) tablet 10 mg  10 mg Oral DAILY    oxymetazoline (AFRIN) 0.05 % nasal spray 2 Spray  2 Spray Both Nostrils DAILY PRN    tamsulosin (FLOMAX) capsule 0.8 mg  0.8 mg Oral DAILY    sodium chloride (NS) flush 5-40 mL  5-40 mL IntraVENous Q8H    sodium chloride (NS) flush 5-40 mL  5-40 mL IntraVENous PRN    acetaminophen (TYLENOL) tablet 650 mg  650 mg Oral Q6H PRN    Or    acetaminophen (TYLENOL) suppository 650 mg  650 mg Rectal Q6H PRN    bisacodyL (DULCOLAX) suppository 10 mg  10 mg Rectal DAILY PRN    promethazine (PHENERGAN) tablet 12.5 mg  12.5 mg Oral Q6H PRN    Or    ondansetron (ZOFRAN) injection 4 mg  4 mg IntraVENous Q6H PRN    atorvastatin (LIPITOR) tablet 40 mg  40 mg Oral DAILY    carvediloL (COREG) tablet 25 mg  25 mg Oral BID    nitroglycerin (NITROSTAT) tablet 0.4 mg  0.4 mg SubLINGual PRN    insulin lispro (HUMALOG) injection   SubCUTAneous AC&HS    glucose chewable tablet 16 g  4 Tablet Oral PRN    glucagon (GLUCAGEN) injection 1 mg  1 mg IntraMUSCular PRN    dextrose 10% infusion 0-250 mL  0-250 mL IntraVENous PRN    imatinib (GLEEVEC) chemo tablet 200 mg (Patient Supplied)  200 mg Oral DAILY    [Held by provider] apixaban (ELIQUIS) tablet 2.5 mg  2.5 mg Oral Q12H          LAB AND IMAGING FINDINGS:     Lab Results   Component Value Date/Time    WBC 11.2 03/07/2022 02:45 AM    HGB 8.7 (L) 03/07/2022 02:45 AM    PLATELET 93 (L) 52/07/4267 02:45 AM     Lab Results   Component Value Date/Time    Sodium 133 (L) 03/07/2022 02:45 AM    Potassium 3.2 (L) 03/07/2022 02:45 AM    Chloride 90 (L) 03/07/2022 02:45 AM    CO2 36 (H) 03/07/2022 02:45 AM     (H) 03/07/2022 02:45 AM    Creatinine 3.37 (H) 03/07/2022 02:45 AM    Calcium 8.9 03/07/2022 02:45 AM    Magnesium 2.9 (H) 03/07/2022 02:45 AM    Phosphorus 4.4 07/12/2021 02:55 AM      Lab Results   Component Value Date/Time    Alk. phosphatase 99 03/06/2022 03:41 AM    Protein, total 6.7 03/06/2022 03:41 AM    Albumin 2.8 (L) 03/06/2022 03:41 AM    Globulin 3.9 03/06/2022 03:41 AM     Lab Results   Component Value Date/Time    INR 1.9 (H) 03/07/2022 10:30 AM    Prothrombin time 20.7 (H) 03/07/2022 10:30 AM    aPTT 39.0 (H) 03/04/2022 03:16 PM      No results found for: IRON, FE, TIBC, IBCT, PSAT, FERR   No results found for: PH, PCO2, PO2  No components found for: [place]   Lab Results   Component Value Date/Time    CK 62 02/03/2022 07:00 PM    CK - MB 3.0 02/03/2022 07:00 PM                Total time: 50 minutes  Counseling / coordination time, spent as noted above:   > 50% counseling / coordination?: yes, patient and medical team    Prolonged service was provided for  []30 min   []75 min in face to face time in the presence of the patient, spent as noted above. Time Start:   Time End:   Note: this can only be billed with 48235 (initial) or 45732 (follow up). If multiple start / stop times, list each separately.

## 2022-03-07 NOTE — PROGRESS NOTES
Problem: Falls - Risk of  Goal: *Absence of Falls  Description: Document Josh Mccollum Fall Risk and appropriate interventions in the flowsheet. 3/7/2022 0659 by Yanira Casas RN  Outcome: Progressing Towards Goal  Note: Fall Risk Interventions:  Mobility Interventions: Assess mobility with egress test,Communicate number of staff needed for ambulation/transfer,Patient to call before getting OOB,Utilize walker, cane, or other assistive device         Medication Interventions: Patient to call before getting OOB,Evaluate medications/consider consulting pharmacy,Teach patient to arise slowly    Elimination Interventions: Call light in reach,Toilet paper/wipes in reach,Patient to call for help with toileting needs,Stay With Me (per policy),Toileting schedule/hourly rounds           3/7/2022 0652 by Yanira Casas RN  Outcome: Progressing Towards Goal  Note: Fall Risk Interventions:  Mobility Interventions: Assess mobility with egress test,Communicate number of staff needed for ambulation/transfer,Patient to call before getting OOB,Utilize walker, cane, or other assistive device         Medication Interventions: Patient to call before getting OOB,Evaluate medications/consider consulting pharmacy,Teach patient to arise slowly    Elimination Interventions: Call light in reach,Toilet paper/wipes in reach,Patient to call for help with toileting needs,Stay With Me (per policy),Toileting schedule/hourly rounds              Problem: Pressure Injury - Risk of  Goal: *Prevention of pressure injury  Description: Document Willy Scale and appropriate interventions in the flowsheet.   3/7/2022 0659 by Yanira Casas RN  Outcome: Progressing Towards Goal  Note: Pressure Injury Interventions:  Sensory Interventions: Assess changes in LOC,Minimize linen layers,Monitor skin under medical devices,Pad between skin to skin,Pressure redistribution bed/mattress (bed type)    Moisture Interventions: Absorbent underpads,Apply protective barrier, creams and emollients,Internal/External urinary devices,Maintain skin hydration (lotion/cream),Minimize layers    Activity Interventions: Pressure redistribution bed/mattress(bed type),PT/OT evaluation    Mobility Interventions: Float heels,HOB 30 degrees or less,Pressure redistribution bed/mattress (bed type),PT/OT evaluation    Nutrition Interventions: Document food/fluid/supplement intake    Friction and Shear Interventions: HOB 30 degrees or less,Lift sheet,Foam dressings/transparent film/skin sealants,Minimize layers             3/7/2022 2040 by Maddison Jackson RN  Outcome: Progressing Towards Goal  Note: Pressure Injury Interventions:  Sensory Interventions: Assess changes in LOC,Minimize linen layers,Monitor skin under medical devices,Pad between skin to skin,Pressure redistribution bed/mattress (bed type)    Moisture Interventions: Absorbent underpads,Apply protective barrier, creams and emollients,Internal/External urinary devices,Maintain skin hydration (lotion/cream),Minimize layers    Activity Interventions: Pressure redistribution bed/mattress(bed type),PT/OT evaluation    Mobility Interventions: Float heels,HOB 30 degrees or less,Pressure redistribution bed/mattress (bed type),PT/OT evaluation    Nutrition Interventions: Document food/fluid/supplement intake    Friction and Shear Interventions: HOB 30 degrees or less,Lift sheet,Foam dressings/transparent film/skin sealants,Minimize layers

## 2022-03-07 NOTE — CONSULTS
[facility]  Pulmonary, Critical Care, and Sleep Medicine    Name: Isa Edward MRN: [id number]   : 1940 Hospital: [facility] [place]    Date: 3/7/2022  Room: [address] [place] [address] Note                                              Consult requesting physician: Dr. Steven Gongora  Reason for Consult: Cardiac arrest    IMPRESSION:   Cardiac arrest.  Acute renal failure superimposed on stage III chronic kidney disease. Anasarca. Acute on chronic combined systolic and diastolic congestive heart failure. Atrial fibrillation with RVR.       Active Hospital Problems    Diagnosis Date Noted    Anasarca 2022    Cardiac arrest ([facility] 2022    Acute renal failure superimposed on stage 3 chronic kidney disease ([facility] 2022    Bleeding nose 2022    Leg ulcer ([facility] 2022    Hypokalemia 2022    Cardiomyopathy [facility] 07/15/2021    Acute on chronic combined systolic and diastolic congestive heart failure ([facility] 2021    Debility     Atrial fibrillation with RVR ([facility]) 2021    Chronic atrial fibrillation ([facility] 2016    CML (chronic myelocytic leukemia) ([facility] 2016    Type II diabetes mellitus, uncontrolled ([facility] 2016   ·      Patient Active Problem List   Diagnosis Code    TIA (transient ischemic attack) G45.9    Coronary artery disease involving native coronary artery I25.10    Type II diabetes mellitus, uncontrolled ([facility]) E11.65    Dyslipidemia E78.5    Pancytopenia ([facility]) D61.818    CML (chronic myelocytic leukemia) ([facility]) C92.10    Chronic atrial fibrillation ([facility]) I48.20    Stage 3 chronic kidney disease ([facility]) N18.30    Hyperglycemia R73.9    Elevated blood pressure reading with diagnosis of hypertension I10    Chronic congestive heart failure ([facility]) I50.9    Pulmonary edema J81.1    Sepsis ([facility]) A41.9    Atrial fibrillation with RVR ([facility]) I48.91    Encounter for palliative care Z51.5    Debility R53.81    Acute on chronic combined systolic and diastolic congestive heart failure (HCC) I50.43    Pulmonary hypertension (HCC) I27.20    Cardiomyopathy (HCC) I42.9    ELIZABETH (acute kidney injury) ([facility] N17.9    Pressure injury of sacral region, stage 1 L89.151    Chronic ulcer of left calf limited to breakdown of skin ([facility]) L97.221    Leg edema R60.0    Acute exacerbation of CHF (congestive heart failure) ([facility]) I50.9    Acute renal failure superimposed on stage 3 chronic kidney disease (HCC) N17.9, N18.30    Leg ulcer ([facility]) L97.909    Anasarca R60.1    Cardiac arrest ([facility]) I46.9         · Code status: Full Code       RECOMMENDATIONS:     Respiratory: Acute respiratory failure; intubated during PEA cardiac arrest.  CXR postintubation reviewed: ETT and right TDC in place. Right loculated effusion with atelectasis. ABG after intubation showed respiratory alkalosis; ventilator adjusted and minute ventilation reduced. FiO2 reduced. Now placed on [facility] 12/400/40/5. Repeat ABG in 1 hour; ordered and discussed with RT. Sedation: Versed and fentanyl drip. Versed and fentanyl as needed boluses. Ventilator bundle & Sedation protocol followed. Daily sedation holiday, assessment for readiness for SBT and then re-titrate if required. Chlorhexidine mouth washes. Keep SPO2 >=92%. HOB 30 degree elevation all the time. Aggressive pulmonary toileting. Aspiration precautions. Incentive spirometry. CVS:   HTN, A. 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 PEA cardiac arrest for about 5 minutes with 3 rounds of CPR and 2 epi. Check serial cardiac enzymes. Continue diuretics for anasarca and CHF; monitor urine output and electrolytes. Monitor hemodynamics. Continue IV albumin. Continue Lipitor, Coreg. Diuretics: Bumex 2 mg twice daily, metolazone 10 mg daily; per cardiologist and nephrologist.    Echo 7/12/2021: LVEF 15 to 20%. Estimated PASP 55 mmHg.   PVL LE 2/3/2022: No DVT. Cardiologist on board. ID:   No leukocytosis or fever. No sign of infection. Follow cultures. Deescalate antibiotic when appropriate. Hematology/Oncology:   History of CML, on Gleevec. Dr. Andie Tim consulted [facility]. Renal:   ARF on CKD; with anasarca recommended hemodialysis. S/p right chest TDC placed by IR 3/7/2022. Since patient had cardiac arrest today, will hold off on hemodialysis today per nephrology recommendations; consider hemodialysis tomorrow if remains stable. D/w Dr. Vivian Segal. GI/:   Mild hematuria; monitor. Ascites due to volume overload/anasarca. Continue Flomax. Endocrine: Monitor BS. SSI. Neurology: Sedated and so limited neurologic exam.  When lowering down sedation, if any mental status issues, then consider CTA. Psychiatry: No acute issues. Toxicology: No acute issues. Pain/Sedation: Sedation protocol as above. Skin/Wound: Continue local care at the leg wounds. Electrolytes: Replace electrolytes per ICU electrolyte replacement protocol. IVF: None    Nutrition: N.p.o.    Prophylaxis: DVT Prophylaxis: SCD/Heparin 5000 every 8 hours. GI Prophylaxis: Protonix. Restraints:   Wrist soft restraints for patient interfering with medical therapy/management and patient safety. Lines/Tubes: PIV  Midline in place. ETT: 3/7/2022. OGT: To be placed on 3/7/2022. Padilla: In place before coming to ICU (Medically necessary for strict input/output monitoring in critically ill patient, will remove it when not needed. Padilla bundle followed). Very poor overall prognosis. Advance Directive/Palliative Care: On board. Will defer respective systems problem management to primary and other respective consultant and follow patient in ICU with primary and other medical team.  Further recommendations will be based on the patient's response to recommended treatment and results of the investigation ordered.   Quality Care: PPI, DVT prophylaxis, HOB elevated, Infection control all reviewed and addressed. Care of plan d/w RN, RT, MDR, hospitalist team.  D/w family-wife (answered all questions to satisfaction). High complexity decision making was performed during the evaluation of this patient at high risk for decompensation with multiple organ involvement. Total critical care time spent rendering care exclusive of procedures/family discussion/coordination of care: 47 minutes. Payal Chapa Spouse [phone]   gurdeep Chapman Daughter [phone]   Aysha Wells [phone]     Family discussion: I called and spoke with patient's wife Ric Cyr and updated with all above, very poor prognosis and critical conditions discussed and explained; answered all questions to her satisfaction. Subjective/History of Present Illness:     Patient is a 79 y.o. 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 fib, CAD, CABG, HTN, DM, CML, ARF on CKD, anasarca; admitted with CHF exacerbation; underwent right chest TDC placement by IR; had PEA cardiac arrest; intubated and transferred to ICU. Patient is intubated and sedated and so history/review of system taken from Dr. [facility] and EMR. In IR, patient became bradycardic and had PEA cardiac arrest; received 2 rounds of epinephrine and 3 rounds of CPR, about 5 minutes of CPR; intubated; regained all of cardiac rhythm; transferred to ICU.      3/7/2022 :     Seen in ICU room 107. Intubated and sedated. Anasarca with ascites and severe leg edema and arm edema. No fever. Blood pressure stable. I/O last 24 hrs:      Intake/Output Summary (Last 24 hours) at 3/7/2022 1613  Last data filed at 3/7/2022 0054  Gross per 24 hour   Intake 200 ml   Output 800 ml   Net -600 ml         The patient is critically ill and can not provide additional history due to Ventilated    History taken from EMR, Dr. Sal Santacruz, Dr. Lazarus Sena    Review of Systems:  ROS not obtained due to patient factor. Allergies   Allergen Reactions    Adhesive Tape-Silicones Other (comments)     Raw skin    Codeine Nausea and Vomiting    Iodinated Contrast Media Rash     patient states this was years ago - he states that he can tolerate the newer agents      Past Medical History:   Diagnosis Date    CAD (coronary artery disease)     Chronic [facility] fibrillation ([facility] 2016    CML (chronic myelocytic leukemia) ([facility]     Coagulation disorder (HCC)     bruises easily    Diabetes ([facility] 2012    Hypertension     Nausea & vomiting     with most recent surgery-carotid endardarectomy    TIA (transient ischemic attack) 2016      Past Surgical History:   Procedure Laterality Date    HX CAROTID ENDARTERECTOMY Left 2017    HX CATARACT REMOVAL Bilateral     HX CORONARY ARTERY BYPASS GRAFT      HX HERNIA REPAIR Right     hernia    HX UROLOGICAL      greenlight laser    HX UROLOGICAL      kidney stone    HX UROLOGICAL      Lithotripsy x 2    SD CARDIAC SURG PROCEDURE UNLIST  3/2014    quadruple bypass      Social History     Tobacco Use    Smoking status: Former Smoker     Packs/day: 1.00     Years: 60.00     Pack years: 60.00     Quit date: 3/23/2014     Years since quittin.9    Smokeless tobacco: Never Used   Substance Use Topics    Alcohol use: Not Currently     Alcohol/week: 2.0 standard drinks     Types: 2 Shots of liquor per week     Comment: per day      Family History   Problem Relation Age of Onset    Post-op Nausea/Vomiting Brother       Prior to Admission medications    Medication Sig Start Date End Date Taking? Authorizing Provider   bumetanide (BUMEX) 2 mg tablet Take 1 Tablet by mouth two (2) times a day. 22   Ivonne Gomes MD   metOLazone (ZAROXOLYN) 2.5 mg tablet Take 2 Tablets by mouth daily. 22   Ivonne Gomes MD   imatinib (Gleevec) 400 mg tablet Take 200 mg by mouth daily.     Provider, Historical   mecobalamin, vitamin B12, 1,000 mcg TbDi Take 1 Tablet by mouth daily. Provider, Historical   glucose 4 gram chewable tablet Take 4 g by mouth daily as needed for Other (low BS). Provider, Historical   OXYGEN-AIR DELIVERY SYSTEMS 2 L by IntraNASal route continuous. Provider, Historical   insulin glargine (LANTUS,BASAGLAR) 100 unit/mL (3 mL) inpn 3-4 Units by SubCUTAneous route daily as needed for Other (high BS). patient reporting he has no actual sliding scale for medication- takes 3-4 units depending on his BS, only takes if greater than 150. Provider, Historical   carvediloL (COREG) 25 mg tablet Take 1 Tablet by mouth two (2) times a day. 7/22/21   Vickey Rodriguez MD   apixaban (ELIQUIS) 2.5 mg tablet Take 1 Tablet by mouth every twelve (12) hours. 7/22/21   Vickey Rodriguez MD   gabapentin (NEURONTIN) 100 mg capsule Take 1 Capsule by mouth nightly. Max Daily Amount: 100 mg. Patient taking differently: Take 300 mg by mouth nightly. 7/22/21   Vickey Rodriguez MD   aspirin delayed-release 81 mg tablet Take 81 mg by mouth daily. Stephan Nance MD   vitamin E (AQUA GEMS) 400 unit capsule Take 400 Units by mouth daily. Stephan Nance MD   tamsulosin (FLOMAX) 0.4 mg capsule Take 0.4 mg by mouth daily. Stephan Nance MD   nitroglycerin (NITROSTAT) 0.4 mg SL tablet 1 Tab by SubLINGual route as needed for Chest Pain. Up to 3 doses. 8/11/18   Carolina Arredondo MD   atorvastatin (LIPITOR) 40 mg tablet Take 40 mg by mouth daily.     Stephan Nance MD     Current Facility-Administered Medications   Medication Dose Route Frequency    sodium chloride (NS) flush 5-40 mL  5-40 mL IntraVENous Q8H    flumazeniL (ROMAZICON) 0.1 mg/mL injection        potassium chloride (K-DUR, KLOR-CON M20) SR tablet 20 mEq  20 mEq Oral NOW    [START ON 3/8/2022] heparin (porcine) injection 5,000 Units  5,000 Units SubCUTAneous Q8H    albumin human 25% (BUMINATE) solution 25 g  25 g IntraVENous Q6H    flumazeniL (ROMAZICON) 0.1 mg/mL injection        fentaNYL (PF) 900 mcg/30 ml infusion soln  0-200 mcg/hr IntraVENous TITRATE    midazolam in normal saline (VERSED) 1 mg/mL infusion  0-10 mg/hr IntraVENous TITRATE    chlorhexidine (PERIDEX) 0.12 % mouthwash 10 mL  10 mL Oral ONCE    chlorhexidine (PERIDEX) 0.12 % mouthwash 10 mL  10 mL Oral Q12H    bumetanide (BUMEX) injection 2 mg  2 mg IntraVENous BID    metOLazone (ZAROXOLYN) tablet 10 mg  10 mg Oral DAILY    tamsulosin (FLOMAX) capsule 0.8 mg  0.8 mg Oral DAILY    sodium chloride (NS) flush 5-40 mL  5-40 mL IntraVENous Q8H    atorvastatin (LIPITOR) tablet 40 mg  40 mg Oral DAILY    carvediloL (COREG) tablet 25 mg  25 mg Oral BID    insulin lispro (HUMALOG) injection   SubCUTAneous AC&HS    imatinib (GLEEVEC) chemo tablet 200 mg (Patient Supplied)  200 mg Oral DAILY    [Held by provider] apixaban (ELIQUIS) tablet 2.5 mg  2.5 mg Oral Q12H         Objective:   Vital Signs:    Visit Vitals  /68   Pulse 100   Temp 98.1 °F (36.7 °C)   Resp 16   Wt 95.9 kg (211 lb 8 oz)   SpO2 100%   BMI 30.35 kg/m²       O2 Device: Ventilator   O2 Flow Rate (L/min): 3 l/min   Temp (24hrs), Av.1 °F (36.7 °C), Min:97.9 °F (36.6 °C), Max:98.4 °F (36.9 °C)       Intake/Output:   Last shift:      No intake/output data recorded.     Last 3 shifts:  190 -  0700  In: 1250 [P.O.:1250]  Out: 2750 [Urine:2750]      Intake/Output Summary (Last 24 hours) at 3/7/2022 1613  Last data filed at 3/7/2022 0054  Gross per 24 hour   Intake 200 ml   Output 800 ml   Net -600 ml       Last 3 Recorded Weights in this Encounter    22 1819 22 2353 22 0404   Weight: 92.5 kg (203 lb 14.4 oz) 95.2 kg (209 lb 12.8 oz) 95.9 kg (211 lb 8 oz)         Ventilator Settings:  Mode Rate Tidal Volume Pressure FiO2 PEEP   Assist control   500 ml    60 % 5 cm H20     Peak airway pressure: 28 cm H2O    Plateau pressure:     Tidal volume:    Minute ventilation: 14.3 l/min No results for input(s): PHI, PHI, POC2, PCO2I, PO2, PO2I, HCO3, HCO3I, FIO2, FIO2I in the last 72 hours. Physical Exam:     General/Neurology: Intubated, on ventilator, sedated. Moving all 4 extremities. Head:   Normocephalic, without obvious abnormality, atraumatic. Eye:   Pupils bilateral equal and reactive to light. No scleral icterus, no pallor, no cyanosis. Nose:   No sinus tenderness  Throat:  Lips, mucosa, and tongue normal. No oral thrush. Neck:   Supple, symmetric. No lymphadenopathy. Trachea midline  Lung:   Reduced air entry at the bilateral lower lobes/bases. No wheezing. No prolonged expiration. Heart:   Irregular. S1 S2 present. No murmur. No JVD. Abdomen:  Soft. NT. Anasarca present. Extremities:  3+ bilateral pitting upper extremity and 4+ leg edema with oozing of serous fluid. No cyanosis. No clubbing. Pulses: 2+ and symmetric in DP. Capillary refill: normal  Lymphatic:  No cervical or supraclavicular palpable lymphadenopathy. Musculoskeletal: No joint swelling. No tenderness. Skin:   Leg wound under dressing.       Data:       Recent Results (from the past 24 hour(s))   GLUCOSE, POC    Collection Time: 03/06/22  4:21 PM   Result Value Ref Range    Glucose (POC) 236 (H) 70 - 527 mg/dL   METABOLIC PANEL, BASIC    Collection Time: 03/07/22  2:45 AM   Result Value Ref Range    Sodium 133 (L) 136 - 145 mmol/L    Potassium 3.2 (L) 3.5 - 5.5 mmol/L    Chloride 90 (L) 100 - 111 mmol/L    CO2 36 (H) 21 - 32 mmol/L    Anion gap 7 3.0 - 18 mmol/L    Glucose 158 (H) 74 - 99 mg/dL     (H) 7.0 - 18 MG/DL    Creatinine 3.37 (H) 0.6 - 1.3 MG/DL    BUN/Creatinine ratio 36 (H) 12 - 20      GFR est AA 21 (L) >60 ml/min/1.73m2    GFR est non-AA 18 (L) >60 ml/min/1.73m2    Calcium 8.9 8.5 - 10.1 MG/DL   CBC WITH AUTOMATED DIFF    Collection Time: 03/07/22  2:45 AM   Result Value Ref Range    WBC 11.2 4.6 - 13.2 K/uL    RBC 3.94 (L) 4.35 - 5.65 M/uL    HGB 8.7 (L) 13.0 - 16.0 g/dL HCT 30.2 (L) 36.0 - 48.0 %    MCV 76.6 (L) 78.0 - 100.0 FL    MCH 22.1 (L) 24.0 - 34.0 PG    MCHC 28.8 (L) 31.0 - 37.0 g/dL    RDW 19.4 (H) 11.6 - 14.5 %    PLATELET 93 (L) 309 - 420 K/uL    NRBC 0.0 0  WBC    ABSOLUTE NRBC 0.00 0.00 - 0.01 K/uL    NEUTROPHILS 76 (H) 40 - 73 %    LYMPHOCYTES 9 (L) 21 - 52 %    MONOCYTES 12 (H) 3 - 10 %    EOSINOPHILS 0 0 - 5 %    BASOPHILS 2 0 - 2 %    IMMATURE GRANULOCYTES 1 (H) 0.0 - 0.5 %    ABS. NEUTROPHILS 8.5 (H) 1.8 - 8.0 K/UL    ABS. LYMPHOCYTES 1.0 0.9 - 3.6 K/UL    ABS. MONOCYTES 1.4 (H) 0.05 - 1.2 K/UL    ABS. EOSINOPHILS 0.0 0.0 - 0.4 K/UL    ABS. BASOPHILS 0.2 (H) 0.0 - 0.1 K/UL    ABS. IMM. GRANS. 0.1 (H) 0.00 - 0.04 K/UL    DF AUTOMATED     MAGNESIUM    Collection Time: 03/07/22  2:45 AM   Result Value Ref Range    Magnesium 2.9 (H) 1.6 - 2.6 mg/dL   GLUCOSE, POC    Collection Time: 03/07/22  6:14 AM   Result Value Ref Range    Glucose (POC) 201 (H) 70 - 110 mg/dL   PROTHROMBIN TIME + INR    Collection Time: 03/07/22 10:30 AM   Result Value Ref Range    Prothrombin time 20.7 (H) 11.5 - 15.2 sec    INR 1.9 (H) 0.8 - 1.2     CBC WITH AUTOMATED DIFF    Collection Time: 03/07/22  3:02 PM   Result Value Ref Range    WBC 12.8 4.6 - 13.2 K/uL    RBC 4.02 (L) 4.35 - 5.65 M/uL    HGB 8.8 (L) 13.0 - 16.0 g/dL    HCT 30.7 (L) 36.0 - 48.0 %    MCV 76.4 (L) 78.0 - 100.0 FL    MCH 21.9 (L) 24.0 - 34.0 PG    MCHC 28.7 (L) 31.0 - 37.0 g/dL    RDW 19.5 (H) 11.6 - 14.5 %    PLATELET 93 (L) 200 - 420 K/uL    NRBC 0.0 0  WBC    ABSOLUTE NRBC 0.00 0.00 - 0.01 K/uL    NEUTROPHILS PENDING %    LYMPHOCYTES PENDING %    MONOCYTES PENDING %    EOSINOPHILS PENDING %    BASOPHILS PENDING %    IMMATURE GRANULOCYTES PENDING %    ABS. NEUTROPHILS PENDING K/UL    ABS. LYMPHOCYTES PENDING K/UL    ABS. MONOCYTES PENDING K/UL    ABS. EOSINOPHILS PENDING K/UL    ABS. BASOPHILS PENDING K/UL    ABS. IMM. GRANS.  PENDING K/UL    DF PENDING    GLUCOSE, POC    Collection Time: 03/07/22  3:07 PM Result Value Ref Range    Glucose (POC) 227 (H) 70 - 110 mg/dL         Chemistry Recent Labs     03/07/22  0245 03/06/22  0341 03/05/22  0420   * 158* 150*   * 133* 135*   K 3.2* 3.3* 3.0*   CL 90* 88* 90*   CO2 36* 35* 38*   * 120* 114*   CREA 3.37* 3.14* 3.13*   CA 8.9 9.0 8.7   MG 2.9* 2.9* 2.9*   AGAP 7 10 7   BUCR 36* 38* 36*   AP  --  99  --    TP  --  6.7  --    ALB  --  2.8*  --    GLOB  --  3.9  --    AGRAT  --  0.7*  --         Lactic Acid Lactic acid   Date Value Ref Range Status   07/11/2021 1.3 0.4 - 2.0 MMOL/L Final     No results for input(s): LAC in the last 72 hours. Liver Enzymes Protein, total   Date Value Ref Range Status   03/06/2022 6.7 6.4 - 8.2 g/dL Final     Albumin   Date Value Ref Range Status   03/06/2022 2.8 (L) 3.4 - 5.0 g/dL Final     Globulin   Date Value Ref Range Status   03/06/2022 3.9 2.0 - 4.0 g/dL Final     A-G Ratio   Date Value Ref Range Status   03/06/2022 0.7 (L) 0.8 - 1.7   Final     Alk.  phosphatase   Date Value Ref Range Status   03/06/2022 99 45 - 117 U/L Final     Recent Labs     03/06/22  0341   TP 6.7   ALB 2.8*   GLOB 3.9   AGRAT 0.7*   AP 99        CBC w/Diff Recent Labs     03/07/22  1502 03/07/22  0245 03/06/22  0341   WBC 12.8 11.2 10.4   RBC 4.02* 3.94* 3.97*   HGB 8.8* 8.7* 8.8*   HCT 30.7* 30.2* 30.6*   PLT 93* 93* 89*   GRANS PENDING 76* 74*   LYMPH PENDING 9* 9*   EOS PENDING 0 0        Cardiac Enzymes No results found for: CPK, CK, CKMMB, CKMB, RCK3, CKMBT, CKNDX, CKND1, TARA, TROPT, TROIQ, CIARRA, TROPT, TNIPOC, BNP, BNPP     BNP No results found for: BNP, BNPP, XBNPT     Coagulation Recent Labs     03/07/22  1030   PTP 20.7*   INR 1.9*         Thyroid  Lab Results   Component Value Date/Time    TSH 1.34 07/12/2021 02:55 AM       No results found for: T4     Urinalysis Lab Results   Component Value Date/Time    Color YELLOW 03/04/2022 05:00 PM    Appearance CLEAR 03/04/2022 05:00 PM    Specific gravity 1.009 03/04/2022 05:00 PM    pH (UA) 7.0 03/04/2022 05:00 PM    Protein Negative 03/04/2022 05:00 PM    Glucose Negative 03/04/2022 05:00 PM    Ketone Negative 03/04/2022 05:00 PM    Bilirubin Negative 03/04/2022 05:00 PM    Urobilinogen 0.2 03/04/2022 05:00 PM    Nitrites Negative 03/04/2022 05:00 PM    Leukocyte Esterase Negative 03/04/2022 05:00 PM    Epithelial cells Negative 03/04/2022 05:00 PM    Bacteria Negative 03/04/2022 05:00 PM    WBC 0 to 3 03/04/2022 05:00 PM    RBC 0 to 5 03/04/2022 05:00 PM        Micro  No results for input(s): SDES, CULT in the last 72 hours. No results for input(s): CULT in the last 72 hours. Culture data during this hospitalization. All Micro Results     None             XR CHEST PORT    Result Date: 3/4/2022  EXAM: XR CHEST PORT CLINICAL INDICATION/HISTORY: sob -Additional: None COMPARISON: 2/17/2022 TECHNIQUE: Frontal view of the chest _______________ FINDINGS: HEART AND MEDIASTINUM: Cardiomegaly. Primary sternotomy. LUNGS AND PLEURAL SPACES: Moderate right and small left pleural effusions. Bilateral parenchymal opacities. No pneumothorax. BONY THORAX AND SOFT TISSUES: No acute osseous abnormality _______________     Moderate right and small left pleural effusions. Bilateral parenchymal opacities possibly representing atelectasis versus edema. 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 Cardiomegaly and mediastinal contours are within normal limits for portable radiograph. There is small right pleural effusion with right lung base airspace disease. There is left lung base atelectasis or minimal infiltrate. There is a background of moderate emphysema. No acute osseous findings. ________________      Small right effusion with right lung base atelectasis and/or infiltrate. Minimal atelectasis and/or infiltrate left lung base.         LE Doppler 2/3/2022 · No evidence of deep vein thrombosis in the right lower extremity. · No evidence of deep vein thrombosis in the left lower extremity. · Subcutaneous fluid noted throughout bilateral lower extremities. ECHO 7/12/2021: Result status: Final result  · LV: Estimated LVEF is 15 - 20%. Normal wall thickness. Mildly dilated left ventricle. Severely reduced systolic function. Left ventricular diastolic dysfunction E'E= 15.30. · Contrast used: DEFINITY. · LA: Moderately dilated left atrium. · AV: Aortic valve leaflet calcification present. Mild aortic valve regurgitation is present. · MV: Mitral valve thickening. Moderate mitral valve regurgitation is present. · TV: Moderate tricuspid valve regurgitation is present. · PV: Mild pulmonic valve regurgitation is present. · PA: Pulmonary arterial systolic pressure is 55 mmHg. · IVC: Moderately elevated central venous pressure (8 mmHg); IVC diameter is larger than 21 mm and collapses more than 50% with respiration. Images report reviewed by me:  CT (Most Recent) (CT chest reviewed by me) Results from Hospital Encounter encounter on 09/06/18    CT HEAD WO CONT    Narrative  EXAM: CT head    INDICATION: Slurred speech. COMPARISON: CT 8/11/16 and MRI brain 8/12/2016    TECHNIQUE: Axial CT imaging of the head was performed without intravenous  contrast. Sagittal and coronal reconstructions were obtained. One or more dose reduction techniques were used on this CT: automated exposure  control, adjustment of the mAs and/or kVp according to patient's size, and  iterative reconstruction techniques. The specific techniques utilized on this CT  exam have been documented in the patient's electronic medical record.    _______________    FINDINGS:    BRAIN AND POSTERIOR FOSSA: The sulci, folia, ventricles and basal cisterns are  within normal limits for the patient?s age. There is no intracranial hemorrhage,  mass effect, or midline shift. Minimal low-attenuation changes deep white matter  compatible with chronic microvascular insufficiency. Moderate cortical volume  loss but within normal range for age. EXTRA-AXIAL SPACES AND MENINGES: There are no abnormal extra-axial fluid  collections. CALVARIUM: Intact. SINUSES: Clear. OTHER: None.    _______________    Impression  IMPRESSION:    No acute intracranial abnormalities. Stable findings. CRITICAL RESULT: Critical result called to Dr. Zac Galicia at 10:43 AM on 9/6/2018. Results clearly understood and acknowledged. CXR reviewed by me:  XR (Most Recent). CXR  reviewed by me and compared with previous CXR Results from Hospital Encounter encounter on 03/04/22    XR CHEST PORT    Narrative  EXAM: XR CHEST PORT    CLINICAL INDICATION/HISTORY: sob  -Additional: None    COMPARISON: 3/4/2022    TECHNIQUE: Portable frontal view of the chest    _______________    FINDINGS:    SUPPORT DEVICES: Endotracheal tube in the upper thoracic trachea. Right IJV  tunneled dialysis catheter at the cavoatrial junction. HEART AND MEDIASTINUM: Cardiomegaly. LUNGS AND PLEURAL SPACES: Moderate loculated right effusion adjacent  atelectasis. No pneumothorax.    _______________    Impression  Moderate loculated right effusion adjacent atelectasis. ·Please note: Voice-recognition software may have been used to generate this report, which may have resulted in some phonetic-based errors in grammar and contents. Even though attempts were made to correct all the mistakes, some may have been missed, and remained in the body of the document.       Ruchi Moreno MD  3/7/2022

## 2022-03-07 NOTE — PROGRESS NOTES
Pt refused PT due to:  []  Nausea/vomiting  []  Eating  []  Pain  []  Pt lethargic  [x]  Off Unit: pt preparing to leave unit for dialysis catheter placement. Then plans for HD. Will follow up as able. Will f/u later as schedule allows. Thank you.   Harinder Trevino

## 2022-03-07 NOTE — PROGRESS NOTES
conducted an initial consultation and Spiritual Assessment for Yuniel Davila, who is a 79 y. o.,male. Patient's Primary Language is: [place]. According to the patient's EMR Scientology Affiliation is: Silas Sandy.      The reason the Patient came to the hospital is:   Patient Active Problem List    Diagnosis Date Noted    Acute exacerbation of CHF (congestive heart failure) ([address] 03/04/2022    Acute renal failure superimposed on stage 3 chronic kidney disease ([address] 03/04/2022    Bleeding nose 03/04/2022    Leg ulcer ([address] 03/04/2022    Hypokalemia 03/04/2022    Pressure injury of right buttock, stage 2 ([address] 02/28/2022    Pressure injury of sacral region, stage 1 02/21/2022    Chronic ulcer of left calf limited to breakdown of skin ([address] 02/21/2022    Leg edema 02/21/2022    ELIZABETH (acute kidney injury) ([address] 07/22/2021    Pulmonary hypertension ([address] 07/15/2021    Cardiomyopathy ([address] 07/15/2021    Acute on chronic combined systolic and diastolic congestive heart failure ([address] 07/12/2021    Encounter for palliative care     Debility     Pneumonia 07/11/2021    Pulmonary edema 07/11/2021    Sepsis ([address] 07/11/2021    Atrial fibrillation with RVR ([address] 07/11/2021    Community acquired pneumonia 12/12/2020    Chronic congestive heart failure ([address] 12/12/2020    SOB (shortness of breath) 12/12/2020    On supplemental oxygen by nasal cannula 12/12/2020    Hyperglycemia 09/06/2018    Elevated blood pressure reading with diagnosis of hypertension 09/06/2018    Stage 3 chronic kidney disease ([address] 08/10/2018    Pancytopenia ([address] 09/02/2016    CML (chronic myelocytic leukemia) ([address] 09/02/2016    Chronic atrial fibrillation ([address] 09/02/2016    TIA (transient ischemic attack) 08/11/2016    Coronary artery disease involving native coronary artery 08/11/2016    Type II diabetes mellitus, uncontrolled [facility] 08/11/2016    Dyslipidemia 08/11/2016        The  provided the following Interventions:  Initiated a relationship of care and support. Explored issues of shawanda, belief, spirituality and Latter day/ritual needs while hospitalized. Listened empathically. Offered prayer and assurance of continued prayers on patient's behalf. The following outcomes where achieved:  Patient shared limited information about both their medical narrative and spiritual journey/beliefs.  confirmed Patient's Spiritism Affiliation. Patient expressed gratitude for 's visit. Assessment:  Patient does not have any Latter day/cultural needs that will affect patient's preferences in health care. There are no spiritual or Latter day issues which require intervention at this time. Plan:  Chaplains will continue to follow and will provide pastoral care on an as needed/requested basis.  recommends bedside caregivers page  on duty if patient shows signs of acute spiritual or emotional distress.     [address]

## 2022-03-07 NOTE — PROGRESS NOTES
Case discussed with Dr. Lexii Wahl. Will have oncologist on board for her cml. Wife was updated per Dr. Nelson Sharp.

## 2022-03-07 NOTE — ACP (ADVANCE CARE PLANNING)
Advance Care Planning     General Advance Care Planning (ACP) Conversation  Date of Conversation: 3/7/2022  Conducted with: Patient with Decision Making Capacity    Healthcare Decision Maker:     Primary Decision Maker: Ebony Griggs - Daughter - [phone]    Secondary Decision Maker: Annia Calvo - Son - [phone]    Supplemental (Other) Decision Maker: Richard Sher - [phone]    Today we documented Decision Maker(s) consistent with ACP documents on file. Content/Action Overview: Has ACP document(s) on file - reflects the patient's care preferences  Reviewed DNR/DNI and patient elects Full Code (Attempt Resuscitation)    Length of Voluntary ACP Conversation in minutes:  <16 minutes (Non-Billable)     3/7/2022 0925 Seen today in room 343 along with Sharif Hernandez NP. Lying in bed with head of bed elevated. Asked to be assisted to repositioned in bed--he was unable to do independently. Awake, alert. Oriented x 4 Respirations unlabored. Oxygen on at 2 lpm per NC . Able to speak in full sentences. States he did not feel well overall. Quite fatigued. Came to the ED from home on 3/4/2022 per POV for shortness of breath, leg swelling, and epistaxis. Admitted for telemetry, CHF management (cardiology consultation, diuresis), afib management (coreg, anticoagulation being held 2/2 epistaxis), nephrology consultation for worsening renal status, DM II management    PMH (from record review) significant for on home O2 @ 2 lpm per NC, CKD, afib, CHF, EF of 15-20% 7/2021, CAD s/p CABG x 4 vessels, CML, DM II, TIA    The palliative care team has been consulted for goals of care discussion    Introduced the role of palliative medicine for the hospitalized patient. He was familiar with the team as we had met during previous admissions. Lives in a single family home with his wife, Gissel Conley. Prior to admission, he was independent in ADLs.  Uses nothing for ambulation assistance but states that he thinks he will need to use something when he gets home. Zion Mckeon He previously completed and AMD naming his daughter, Chai Marvin, and his son, Karolynn Nyhan, as his MPOAs. He confirmed this was still accurate. Extensive discussion regarding current medical issues:  · Discussed his renal status. He said \"It might work. It might not\" He understands that there ar plans for dialysis catheter to be inserted so that he can be dialyzed to assist with fluid management. He understands the 3 x/week commitment to get to a center. He does not think his wife can provide transportation so something will have to be worked out to support him. We did discuss that if he believes the burden of getting to a dialysis center and being there for a couple of hours is too much to bear, he can stop at anytime knowing that his life will, most likely, end sooner. · Discussed CHF. He has previously declined AICD placement (\"I don't think I would make it through the procedure\") and is aware of his poor ejection fraction. He has confidence that his cardiologist is giving him accurate information and he feels comfortable with his current medical plan. · Code status: During his previous admission he was firm in a \"FULL CODE\" status. He says now that he wants \"one shot, and if that doesn't work, pull the plug\". Discussed the difficulties with a one and done plan because if ROSC is achieved, he will be on a ventilator. It is very important to him that he be able to speak to his family and it was explained that he cannot speak when on a ventilator. Explained that if he cannot be liberated from a ventilator, then his family would be responsible for letting the medical team know how long he should remain on the ventilator before compassionately extubated. Encouraged him to consider setting guidelines that would help his family know how to implement his choices.  He voiced concern about his wife's ability to process all of the intricacies of his health status. He does believe that his daughter and son understand how sick he is and will support their mother through decision making and death, if that happens. He will remain FULL CODE. We will continue goals of care discussions. Disposition plan: to be determined based on response to treatment and recommendations from medical and ancillary teams. Palliative care will continue to follow Johnna Cruz  and his family during his hospitalization and support them as they make healthcare decisions and define goals of care.       Marisol Charlton RN, MSN  Palliative Medicine  P: [phone]

## 2022-03-07 NOTE — PROGRESS NOTES
Hospitalist Progress Note-critical care note     Patient: Isa Edward MRN: [id number]  CSN: [id number]    YOB: 1940  Age: 79 y.o.   Sex: male    DOA: 3/4/2022 LOS:  LOS: 3 days            Chief complaint: anasarca, cornelio on ckd3,  chf exacerbation, leg ulcer , afib with rvr ,     Assessment/Plan         Hospital Problems  Date Reviewed: 7/20/2017          Codes Class Noted POA    Anasarca ICD-10-CM: R60.1  ICD-9-CM: 782.3  3/7/2022 Unknown        Acute renal failure superimposed on stage 3 chronic kidney disease ([facility] ICD-10-CM: N17.9, N18.30  ICD-9-CM: 584.9, 585.3  3/4/2022 Unknown        Bleeding nose ICD-10-CM: R04.0  ICD-9-CM: 784.7  3/4/2022 Unknown        Leg ulcer ([facility] ICD-10-CM: L97.909  ICD-9-CM: 707.10  3/4/2022 Unknown        Hypokalemia ICD-10-CM: E87.6  ICD-9-CM: 276.8  3/4/2022 Unknown        Cardiomyopathy ([facility] ICD-10-CM: I42.9  ICD-9-CM: 425.4  7/15/2021 Yes        Debility ICD-10-CM: R53.81  ICD-9-CM: 799.3  Unknown Yes        * (Principal) Acute on chronic combined systolic and diastolic congestive heart failure ([facility] ICD-10-CM: I50.43  ICD-9-CM: 428.43, 428.0  7/12/2021 Yes        Atrial fibrillation with RVR ([facility] ICD-10-CM: I48.91  ICD-9-CM: 427.31  7/11/2021 Yes        CML (chronic myelocytic leukemia) ([facility] ICD-10-CM: C92.10  ICD-9-CM: 205.10  9/2/2016 Yes        Chronic atrial fibrillation ([facility] ICD-10-CM: I48.20  ICD-9-CM: 427.31  9/2/2016 Yes        Type II diabetes mellitus, uncontrolled ([facility] ICD-10-CM: E11.65  ICD-9-CM: 250.02  8/11/2016 Yes               hypoxia due to chf exacerbation   Continue wean off nc o2          Acute diastolic /systolic CHF extubation-acute on chronic combined   Ef 15-20 %   On bumex Coreg,  declined the offer of acid   Cardiologist f/u   Low-salt diet, fluid restriction      New onset atrial fibrillation  Coreg for rate control,   On eliquis at home , hold due to hematuria          Hypertension  Continue home meds      Peripheral edema  Continue bumex  watch for renal function and electrolytes     History of CML    f/u voa ,continue home meds      Acute kidney injury with chronic kidney disease 3   Nephrologist consult -planning hd      Anasarca   bumex , albumin, planning hd      Hypokalemia  K replacement     DM ssi      Leg ulcer   Wound care consult     Subjective: Swelling     His albumin is low, will give 2 days-will help swelling     Prognosis is very poor. Palliative care team f/u      Rn: reported corn stick to his throat-ate dinner and no issue     Very high risk for blood clots-try heparin for dvt prophy   Addendum:   Code blue was called. intensivist and oncologist consulted   35 total min's spent on patient care including >50% on counseling/coordinating care. Disposition :tbd,   Review of systems:    General: No fevers or chills. Cardiovascular: No chest pain or pressure. No palpitations. Pulmonary: No shortness of breath. Gastrointestinal: No nausea, vomiting. Vital signs/Intake and Output:  Visit Vitals  /79 (BP 1 Location: Left upper arm, BP Patient Position: At rest)   Pulse 83   Temp 98.1 °F (36.7 °C)   Resp 11   Wt 95.9 kg (211 lb 8 oz)   SpO2 100%   BMI 30.35 kg/m²     Current Shift:  No intake/output data recorded. Last three shifts:  03/05 1901 - 03/07 0700  In: 1250 [P.O.:1250]  Out: 2750 [Urine:2750]    Physical Exam:  General: WD, WN. Alert, cooperative, no acute distress    HEENT: NC, Atraumatic. PERRLA, anicteric sclerae. Lungs: CTA Bilaterally. No Wheezing/Rhonchi/Rales. Heart:  Regular  rhythm,  No murmur, No Rubs, No Gallops  Abdomen: Soft, Non distended, Non tender. +Bowel sounds, swelling   Extremities: No c/c/edema , ulcer noted on leg   Psych:   Not anxious or agitated. Neurologic:  No acute neurological deficit.              Labs: Results:       Chemistry Recent Labs     03/07/22  0245 03/06/22  0341 03/05/22  0420 03/04/22  1516 03/04/22  1516   * 158* 150*   < > 210*   NA 133* 133* 135*   < > 132*   K 3.2* 3.3* 3.0*   < > 3.2*   CL 90* 88* 90*   < > 88*   CO2 36* 35* 38*   < > 37*   * 120* 114*   < > 109*   CREA 3.37* 3.14* 3.13*   < > 3.05*   CA 8.9 9.0 8.7   < > 8.8   AGAP 7 10 7   < > 7   BUCR 36* 38* 36*   < > 36*   AP  --  99  --   --  109   TP  --  6.7  --   --  7.3   ALB  --  2.8*  --   --  3.1*   GLOB  --  3.9  --   --  4.2*   AGRAT  --  0.7*  --   --  0.7*    < > = values in this interval not displayed. CBC w/Diff Recent Labs     03/07/22  0245 03/06/22  0341 03/05/22  0420   WBC 11.2 10.4 9.2   RBC 3.94* 3.97* 4.12*   HGB 8.7* 8.8* 8.9*   HCT 30.2* 30.6* 30.9*   PLT 93* 89* 88*   GRANS 76* 74* 75*   LYMPH 9* 9* 7*   EOS 0 0 1      Cardiac Enzymes No results for input(s): CPK, CKND1, TARA in the last 72 hours. No lab exists for component: CKRMB, TROIP   Coagulation Recent Labs     03/07/22  1030 03/04/22  1516   PTP 20.7* 24.5*   INR 1.9* 2.3*   APTT  --  39.0*       Lipid Panel Lab Results   Component Value Date/Time    Cholesterol, total 107 08/12/2016 03:20 AM    HDL Cholesterol 56 08/12/2016 03:20 AM    LDL, calculated 26.4 08/12/2016 03:20 AM    VLDL, calculated 24.6 08/12/2016 03:20 AM    Triglyceride 123 08/12/2016 03:20 AM    CHOL/HDL Ratio 1.9 08/12/2016 03:20 AM      BNP No results for input(s): BNPP in the last 72 hours. Liver Enzymes Recent Labs     03/06/22  0341   TP 6.7   ALB 2.8*   AP 99      Thyroid Studies Lab Results   Component Value Date/Time    TSH 1.34 07/12/2021 02:55 AM        Procedures/imaging: see electronic medical records for all procedures/Xrays and details which were not copied into this note but were reviewed prior to creation of Plan    XR CHEST PORT    Result Date: 3/4/2022  EXAM: XR CHEST PORT CLINICAL INDICATION/HISTORY: sob -Additional: None COMPARISON: 2/17/2022 TECHNIQUE: Frontal view of the chest _______________ FINDINGS: HEART AND MEDIASTINUM: Cardiomegaly. Primary sternotomy.  LUNGS AND PLEURAL SPACES: Moderate right and small left pleural effusions. Bilateral parenchymal opacities. No pneumothorax. BONY THORAX AND SOFT TISSUES: No acute osseous abnormality _______________     Moderate right and small left pleural effusions. Bilateral parenchymal opacities possibly representing atelectasis versus edema. 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 Cardiomegaly and mediastinal contours are within normal limits for portable radiograph. There is small right pleural effusion with right lung base airspace disease. There is left lung base atelectasis or minimal infiltrate. There is a background of moderate emphysema. No acute osseous findings. ________________      Small right effusion with right lung base atelectasis and/or infiltrate. Minimal atelectasis and/or infiltrate left lung base.       Bel Cook MD

## 2022-03-07 NOTE — PROGRESS NOTES
Bedside and Verbal shift change report given to Jamie Seth (oncoming nurse) by Jose Wong (offgoing nurse). Report included the following information SBAR, Kardex, MAR and Recent Results.

## 2022-03-07 NOTE — PROGRESS NOTES
Cassandra Jones 281 care from Lamont Ascencio, RUDDY.    6065 Nursing assistant assisted pt to the bathroom and noticed blood coming from foss made this RN aware. Shift uneventful. No new issues to report. 0700 Bedside and Verbal shift change report given to Lucina Vega (oncoming nurse) by Tessie Loving RN   (offgoing nurse). Report included the following information SBAR, Kardex, ED Summary, Procedure Summary, Intake/Output, MAR, Recent Results and Med Rec Status.    e

## 2022-03-07 NOTE — PROCEDURES
Interventional Radiology Brief Procedure Note    Interventional Radiologist: Devyn Doss MD    Pre-operative Diagnosis: Renal failure    Post-operative Diagnosis: Same as pre-operative diagnosis    Procedure(s) Performed: Tunneled dialysis catheter placement    Anesthesia:  Local and Moderate Sedation    Findings: Tunneled dialysis catheter placed via RIJV. Ready for use. Complications: Upon completion of catheter insertion patient started to desat 60s but that resolved with patient breathing. Narcan 0.2 mg was administered. Patient has was in a. Fib and then went into PEA. Code blue was called and compressions initiated. Vitals and rhythm restored with CPR.      Estimated Blood Loss:  minimal    Specimens: None      Devyn Doss MD  [facility]  3/7/2022

## 2022-03-07 NOTE — PROGRESS NOTES
responded to a \"Code S' for Theodore Myles, who is a 79 y. o.,male. Patients Primary Language is: [place]. The reason the Patient came to the hospital is:   Patient Active Problem List    Diagnosis Date Noted    Anasarca 03/07/2022    Acute exacerbation of CHF (congestive heart failure) ([address] 03/04/2022    Acute renal failure superimposed on stage 3 chronic kidney disease ([address] 03/04/2022    Bleeding nose 03/04/2022    Leg ulcer ([address] 03/04/2022    Hypokalemia 03/04/2022    Pressure injury of right buttock, stage 2 ([address] 02/28/2022    Pressure injury of sacral region, stage 1 02/21/2022    Chronic ulcer of left calf limited to breakdown of skin ([address] 02/21/2022    Leg edema 02/21/2022    ELIZABETH (acute kidney injury) ([address] 07/22/2021    Pulmonary hypertension ([address] 07/15/2021    Cardiomyopathy ([address] 07/15/2021    Acute on chronic combined systolic and diastolic congestive heart failure ([address] 07/12/2021    Encounter for palliative care     Debility     Pneumonia 07/11/2021    Pulmonary edema 07/11/2021    Sepsis ([address] 07/11/2021    Atrial fibrillation with RVR ([address] 07/11/2021    Community acquired pneumonia 12/12/2020    Chronic congestive heart failure (HCC) 12/12/2020    SOB (shortness of breath) 12/12/2020    On supplemental oxygen by nasal cannula 12/12/2020    Hyperglycemia 09/06/2018    Elevated blood pressure reading with diagnosis of hypertension 09/06/2018    Stage 3 chronic kidney disease ([address] 08/10/2018    Pancytopenia ([address] 09/02/2016    CML (chronic myelocytic leukemia) ([address] 09/02/2016    Chronic atrial fibrillation ([address] 09/02/2016    TIA (transient ischemic attack) 08/11/2016    Coronary artery disease involving native coronary artery 08/11/2016    Type II diabetes mellitus, uncontrolled ([address] 08/11/2016    Dyslipidemia 08/11/2016        The  provided the following Interventions:  Initiated a relationship of care and support.    Provided Crisis Pastoral Care to the wife  Listened empathically to wife now  for 58 years. Offered assurance of continued prayers on patient's behalf. Chart reviewed. The following outcomes were achieved:      Assessment:    Plan:  Chaplains will continue to follow and will provide pastoral care on an as needed/requested basis.  recommends bedside caregivers page  on duty if patient shows signs of acute spiritual or emotional distress.     Sister Libramariela Ochoa [place], [address]  [phone]

## 2022-03-07 NOTE — ANESTHESIA PROCEDURE NOTES
Emergent Intubation  Performed by: Gilberto Negron CRNA  Authorized by: Gilberto Negron CRNA     Emergent Intubation:   Patient location: IR Replaced by [facility]. Date/Time:  3/7/2022 2:52 PM  Indications:  Respiratory failure  Spontaneous Ventilation: present    Level of Consciousness: unresponsive  Preoxygenated:  Yes      Airway Documentation:   Airway:  ETT - Cuffed  Technique:  Intubating stylet  Advanced Technique:  Vázquez  Insertion Site:  Oral  Blade Type:  Morgan  Blade Size:  4  ETT size (mm):  8.0  ETT Line Alex:  Gumline  ETT Insertion depth (cm):  22  Placement verified by: auscultation, EtCO2 and BBS    Attempts:  1  Difficult airway: No

## 2022-03-07 NOTE — PROGRESS NOTES
Code blue note    Post procedure patient developed bradycardia and then   PEA  And responded to 3 rounds of EPI and cpr  Post Code developed hypoxemia  to86 percent intubated for airway protection  Mrs Vasquez Marie notified patient being transferred to ICU 7 labs  And xray ordered   ICU attending notified

## 2022-03-07 NOTE — PROGRESS NOTES
CM notes that patient emergently intubated after placement of dialysis catheter. CM to continue to monitor and remain available as appropriate. Care Management Interventions  PCP Verified by CM:  Yes  Mode of Transport at Discharge: Self  Transition of Care Consult (CM Consult): Discharge Planning  Support Systems: Spouse/Significant Other,Child(nicko)  Confirm Follow Up Transport: Family  The Plan for Transition of Care is Related to the Following Treatment Goals : acute on chronic CHF  The Patient and/or Patient Representative was Provided with a Choice of Provider and Agrees with the Discharge Plan?: Yes  Name of the Patient Representative Who was Provided with a Choice of Provider and Agrees with the Discharge Plan: Franca Soliman, patient  Discharge Location  Patient Expects to be Discharged to[de-identified] Home with family assistance

## 2022-03-07 NOTE — PROGRESS NOTES
Assessment:     Pankaj Beckwith is a 79 y.o. male with chronic A. fib on Eliquis, cardiomyopathy with EF 15 to 20%, CKD stage 4 , diabetes presented to ER due to worsening shortness of breath for 3 days prior to admission       Acute on chronic combined systolic and diastolic congestive heart failure   CML (chronic myelocytic leukemia)      Chronic atrial fibrillation       CKD stage 4             Plan:-   He still has significant fluid overload, not able to managed at home and in house by diuretics with frequent ED and hospital visit   Dr Da Willis had discussed dialyis with patient over the weekend for volume management and he agreed to initiate dialysis   I explained the procedure, risk and benefit of dialysis including medical management. He is agreeable to start HD  Will have TDC placed in the afternoon and first session of dialysis after cathter today   Second session HD tomorrow   Case management consult for outpatient dialysis chair in [facility] Iv bumex and metolazone  Appreciate cardiology assistance, he declined ICD placement  Intake and output   Less than 2 gram salt per day   fluid restriction to 1.2 liter  Dose all meds for current eGFR   Hold apixaban he has hematuria          Subjective:     He still has significant leg edema and shortness of breath   Urinating fine   Denied other symptoms         Blood pressure (!) 136/91, pulse 85, temperature 98.1 °F (36.7 °C), resp. rate 18, weight 95.9 kg (211 lb 8 oz), SpO2 100 %.     Awake and alert  Crackles in ling exam   3+ Edema in B/L LE         Intake/Output Summary (Last 24 hours) at 3/7/2022 1119  Last data filed at 3/7/2022 0054  Gross per 24 hour   Intake 1250 ml   Output 1550 ml   Net -300 ml      Recent Labs     03/07/22  0245   WBC 11.2     Lab Results   Component Value Date/Time    Sodium 133 (L) 03/07/2022 02:45 AM    Potassium 3.2 (L) 03/07/2022 02:45 AM    Chloride 90 (L) 03/07/2022 02:45 AM    CO2 36 (H) 03/07/2022 02:45 AM    Anion gap 7 03/07/2022 02:45 AM    Glucose 158 (H) 03/07/2022 02:45 AM     (H) 03/07/2022 02:45 AM    Creatinine 3.37 (H) 03/07/2022 02:45 AM    BUN/Creatinine ratio 36 (H) 03/07/2022 02:45 AM    GFR est AA 21 (L) 03/07/2022 02:45 AM    GFR est non-AA 18 (L) 03/07/2022 02:45 AM    Calcium 8.9 03/07/2022 02:45 AM        Current Facility-Administered Medications   Medication Dose Route Frequency Provider Last Rate Last Admin    bumetanide (BUMEX) injection 2 mg  2 mg IntraVENous BID You Gómez DO   2 mg at 03/07/22 0839    metOLazone (ZAROXOLYN) tablet 10 mg  10 mg Oral DAILY You Gómez DO   10 mg at 03/07/22 0839    oxymetazoline (AFRIN) 0.05 % nasal spray 2 Spray  2 Spray Both Nostrils DAILY PRN Tere Cabrales MD   2 [place] at 03/05/22 0138    tamsulosin (FLOMAX) capsule 0.8 mg  0.8 mg Oral DAILY You Gómez DO   0.8 mg at 03/07/22 0839    sodium chloride (NS) flush 5-40 mL  5-40 mL IntraVENous Q8H Carolina Arredondo MD   10 mL at 03/06/22 2102    sodium chloride (NS) flush 5-40 mL  5-40 mL IntraVENous PRN Carolina Arredondo MD        acetaminophen (TYLENOL) tablet 650 mg  650 mg Oral Q6H PRN Carolina Arredondo MD        Or    acetaminophen (TYLENOL) suppository 650 mg  650 mg Rectal Q6H PRN Carolina Arredondo MD        bisacodyL (DULCOLAX) suppository 10 mg  10 mg Rectal DAILY PRN Carolina Arredondo MD   10 mg at 03/07/22 0645    promethazine (PHENERGAN) tablet 12.5 mg  12.5 mg Oral Q6H PRN Carolina Arredondo MD   12.5 mg at 03/06/22 1618    Or    ondansetron (ZOFRAN) injection 4 mg  4 mg IntraVENous Q6H PRN Carolina Arredondo MD   4 mg at 03/05/22 0345    atorvastatin (LIPITOR) tablet 40 mg  40 mg Oral DAILY Carolina Arredondo MD   40 mg at 03/07/22 0839    carvediloL (COREG) tablet 25 mg  25 mg Oral BID Carolina Arredondo MD   25 mg at 03/07/22 5853    nitroglycerin (NITROSTAT) tablet 0.4 mg  0.4 mg SubLINGual PRN Carolina Arredondo MD        insulin lispro (HUMALOG) injection   SubCUTAneous AC&HS Carolina Arredondo MD   6 Units at 03/07/22 0645    glucose chewable tablet 16 g  4 Tablet Oral PRN Oliver Cervantes MD        glucagon [facility]) injection 1 mg  1 mg IntraMUSCular PRN Oliver Cervantes MD        dextrose 10% infusion 0-250 mL  0-250 mL IntraVENous PRN Oliver Cervantes MD        imatinib (GLEEVEC) chemo tablet 200 mg (Patient Supplied)  200 mg Oral DAILY Oliver Cervantes MD   200 mg at 03/07/22 2225    [Held by provider] apixaban [facility]ve) tablet 2.5 mg  2.5 mg Oral Q12H MD Cecilia Lindquist MD  [facility].- Nephrology

## 2022-03-07 NOTE — DIABETES MGMT
Diabetes/ Glycemic Control Plan of Care  Recommendations:   Recommend to initiate basal insulin    Assessment: Known history of diabetes and CKD stage 4 (started HD). A1c appropriate for age and conditions. POC glucose ranging 182-236 mg/dl over 24 hours. Fasting FSBG today 201 mg/dl. DX:   1. Cardiac volume overload              Recent Glucose Results:   Lab Results   Component Value Date/Time     (H) 03/07/2022 02:45 AM    GLUCPOC 201 (H) 03/07/2022 06:14 AM    GLUCPOC 236 (H) 03/06/2022 04:21 PM      Within target range (70-180mg/dL): No  Current insulin orders: Humalog ACHS  Total Daily Dose previous 24 hours = 12 units Humalog  Current A1c:   Lab Results   Component Value Date/Time    Hemoglobin A1c 7.4 (H) 03/04/2022 03:16 PM      equivalent  to ave Blood Glucose of 166 mg/dl for 2-3 months prior to admission  Adequate glycemic control PTA: Yes  Nutrition/Diet:   Active Orders   Diet    DIET NPO Sips of Water with Meds      Meal Intake:  Patient Vitals for the past 168 hrs:   % Diet Eaten   03/06/22 1748 76 - 100%   03/06/22 1219 76 - 100%   03/06/22 1121 76 - 100%     Home diabetes medications:   Key Antihyperglycemic Medications             insulin glargine (LANTUS,BASAGLAR) 100 unit/mL (3 mL) inpn 3-4 Units by SubCUTAneous route daily as needed for Other (high BS). patient reporting he has no actual sliding scale for medication- takes 3-4 units depending on his BS, only takes if greater than 150. Plan/Goals:   Blood glucose will be within target of 70 - 180 mg/dl within 72 hours  Reinforce dietary and medication compliance at home.        Yanira Cota RD  Glycemic Control Team  [phone]    Monday-Friday   9 am - 3 pm

## 2022-03-08 PROBLEM — J96.01 ACUTE RESPIRATORY FAILURE WITH HYPOXIA (HCC): Status: ACTIVE | Noted: 2022-01-01

## 2022-03-08 NOTE — PROGRESS NOTES
Cardiology Progress Note        Patient: Faina Keller        Sex: male          DOA: 3/4/2022  YOB: 1940      Age:  79 y.o.        LOS:  LOS: 3 days   Assessment/Plan     Principal Problem:    Acute on chronic combined systolic and diastolic congestive heart failure ([address] (7/12/2021)    Active Problems:    Type II diabetes mellitus, uncontrolled ([address] (8/11/2016)      CML (chronic myelocytic leukemia) ([address] (9/2/2016)      Chronic atrial fibrillation (HCC) (9/2/2016)      Atrial fibrillation with RVR ([address] (7/11/2021)      Debility ()      Cardiomyopathy ([address] (7/15/2021)      Acute renal failure superimposed on stage 3 chronic kidney disease ([address] (3/4/2022)      Bleeding nose (3/4/2022)      Leg ulcer ([address] (3/4/2022)      Hypokalemia (3/4/2022)      Anasarca (3/7/2022)      Cardiac arrest ([address] (3/7/2022)        Plan:    Hypoxic respiratory failure and PEA arrest  Patient is intubated  Continue with supportive treatment  Resume Eliquis when permissive    Patient is being scheduled for possible dialysis tomorrow  Eliquis is on hold  A. fib is rate controlled  Discussed with patient and wife  patient reiterated he does not want future ICD  Continue with current medication treatment. Acute on chronic CHF  Chronic A. Fib  Chronic cardiomyopathy patient have declined ICD in the past  CAD  CABG  Acute on chronic renal failure      Continue with current diuretic  Discussed with patient and wife. Subjective:    cc:  Shortness of breath  Leg swelling      REVIEW OF SYSTEMS:      limited due to intubation /sedation      Objective:      Visit Vitals  /65 (BP Patient Position: At rest)   Pulse 84   Temp 97.8 °F (36.6 °C)   Resp 14   Wt 95.9 kg (211 lb 8 oz)   SpO2 98%   BMI 30.35 kg/m²     Body mass index is 30.35 kg/m². Physical Exam:  General Appearance:  Intubated, comfortable on sedation.    Forearm bruises looks better  NECK: No JVD, no thyroidomeglay. LUNGS: Clear bilaterally. HEART: S1 irregular+S2 audible,    ABD: Non-tender, BS Audible    EXT: + edema, and no cysnosis. VASCULAR EXAM: Pulses are intact.     PSYCHIATRIC EXAM:  intubated    Medication:  Current Facility-Administered Medications   Medication Dose Route Frequency    naloxone (NARCAN) injection 0.1 mg  0.1 mg IntraVENous Multiple    flumazeniL (ROMAZICON) 0.1 mg/mL injection        [START ON 3/8/2022] heparin (porcine) injection 5,000 Units  5,000 Units SubCUTAneous Q8H    albumin human 25% (BUMINATE) solution 25 g  25 g IntraVENous Q6H    flumazeniL (ROMAZICON) 0.1 mg/mL injection        fentaNYL (PF) 900 mcg/30 ml infusion soln  0-200 mcg/hr IntraVENous TITRATE    midazolam in normal saline (VERSED) 1 mg/mL infusion  0-10 mg/hr IntraVENous TITRATE    midazolam (VERSED) injection 1 mg  1 mg IntraVENous Q2H PRN    fentaNYL citrate (PF) injection 25 mcg  25 mcg IntraVENous Q4H PRN    chlorhexidine (PERIDEX) 0.12 % mouthwash 10 mL  10 mL Oral ONCE    chlorhexidine (PERIDEX) 0.12 % mouthwash 10 mL  10 mL Oral Q12H    ELECTROLYTE REPLACEMENT PROTOCOL - Potassium Renal Dosing  1 Each Other PRN    ELECTROLYTE REPLACEMENT PROTOCOL  - Phosphorus Renal Dosing  1 Each Other PRN    ELECTROLYTE REPLACEMENT PROTOCOL - Magnesium   1 Each Other PRN    ELECTROLYTE REPLACEMENT PROTOCOL - Calcium   1 Each Other PRN    potassium chloride 10 mEq in 100 ml IVPB  10 mEq IntraVENous Q1H    bumetanide (BUMEX) injection 2 mg  2 mg IntraVENous BID    metOLazone (ZAROXOLYN) tablet 10 mg  10 mg Oral DAILY    oxymetazoline (AFRIN) 0.05 % nasal spray 2 Spray  2 Spray Both Nostrils DAILY PRN    tamsulosin (FLOMAX) capsule 0.8 mg  0.8 mg Oral DAILY    sodium chloride (NS) flush 5-40 mL  5-40 mL IntraVENous Q8H    sodium chloride (NS) flush 5-40 mL  5-40 mL IntraVENous PRN    acetaminophen (TYLENOL) tablet 650 mg  650 mg Oral Q6H PRN    Or    acetaminophen (TYLENOL) suppository 650 mg 650 mg Rectal Q6H PRN    bisacodyL (DULCOLAX) suppository 10 mg  10 mg Rectal DAILY PRN    promethazine (PHENERGAN) tablet 12.5 mg  12.5 mg Oral Q6H PRN    Or    ondansetron (ZOFRAN) injection 4 mg  4 mg IntraVENous Q6H PRN    atorvastatin (LIPITOR) tablet 40 mg  40 mg Oral DAILY    carvediloL (COREG) tablet 25 mg  25 mg Oral BID    nitroglycerin (NITROSTAT) tablet 0.4 mg  0.4 mg SubLINGual PRN    insulin lispro (HUMALOG) injection   SubCUTAneous AC&HS    glucose chewable tablet 16 g  4 Tablet Oral PRN    glucagon (GLUCAGEN) injection 1 mg  1 mg IntraMUSCular PRN    dextrose 10% infusion 0-250 mL  0-250 mL IntraVENous PRN    [Held by provider] apixaban (ELIQUIS) tablet 2.5 mg  2.5 mg Oral Q12H     Facility-Administered Medications Ordered in Other Encounters   Medication Dose Route Frequency    propofoL (DIPRIVAN) 10 mg/mL injection   IntraVENous PRN               Lab/Data Reviewed:  Procedures/imaging: see electronic medical records for all procedures/Xrays   and details which were not copied into this note but were reviewed prior to creation of Plan       All lab results for the last 24 hours reviewed. Recent Labs     03/07/22  1502 03/07/22  0245 03/06/22  0341   WBC 12.8 11.2 10.4   HGB 8.8* 8.7* 8.8*   HCT 30.7* 30.2* 30.6*   PLT 93* 93* 89*     Recent Labs     03/07/22  1502 03/07/22  0245 03/06/22  0341   * 133* 133*   K 3.0* 3.2* 3.3*   CL 91* 90* 88*   CO2 37* 36* 35*   * 158* 158*   * 122* 120*   CREA 3.36* 3.37* 3.14*   CA 8.7 8.9 9.0       RADIOLOGY:  CT Results  (Last 48 hours)    None        CXR Results  (Last 48 hours)               03/07/22 1519  XR CHEST PORT Final result    Impression:      Moderate loculated right effusion adjacent atelectasis.         Narrative:  EXAM: XR CHEST PORT       CLINICAL INDICATION/HISTORY: sob   -Additional: None       COMPARISON: 3/4/2022       TECHNIQUE: Portable frontal view of the chest       _______________       FINDINGS: SUPPORT DEVICES: Endotracheal tube in the upper thoracic trachea. Right IJV   tunneled dialysis catheter at the cavoatrial junction. HEART AND MEDIASTINUM: Cardiomegaly. LUNGS AND PLEURAL SPACES: Moderate loculated right effusion adjacent   atelectasis.  No pneumothorax.       _______________                   Cardiology Procedures:   Results for orders placed or performed during the hospital encounter of 03/04/22   EKG, 12 LEAD, INITIAL   Result Value Ref Range    Ventricular Rate 100 BPM    QRS Duration 96 ms    Q-T Interval 392 ms    QTC Calculation (Bezet) 505 ms    Calculated R Axis -55 degrees    Calculated T Axis 141 degrees    Diagnosis       Atrial fibrillation with premature ventricular or aberrantly conducted   complexes  Left axis deviation  Anterior infarct (cited on or before 11-JUL-2021)  Prolonged QT  Abnormal ECG  When compared with ECG of 17-FEB-2022 17:43,  QT has lengthened  Confirmed by Ashwin Maciel MD. ([id number]) on 3/6/2022 10:43:55 PM        Echo Results  (Last 48 hours)    None       Cardiolite (Tc-99m Sestamibi) stress test    Signed By: Robles Cook MD     March 7, 2022

## 2022-03-08 NOTE — PROGRESS NOTES
Patient completed hemodialysis with about 700 mL fluid removed; the goal was to remove 1500 mL but patient blood pressure decreased and so he could not tolerate further fluid removal.  There is another plan of repeat dialysis tomorrow morning. Patient was placed on SBT after stopping sedation and completion of dialysis. Patient is alert awake and responsive appropriately; but appears weak and lethargic. Wife and daughter at bedside. He is at a higher risk of failure from liberation of ventilator due to lethargy, weakness and CHF; after removal of positive airway pressure, he can have CHF exacerbation; all of this discussed with wife/daughter at bedside. Wife/daughter both agreed to continue partial code; no CPR in the event of cardiac arrest.  They will discuss among themselves if they would like to be reintubated after liberation from ventilator when he meets criteria for extubation; and will let us know. I do not think he is a good candidate to be extubated today. Patient will be placed back on [facility] ventilator mode and minimal sedation is required; discussed with RN/RT. After dialysis tomorrow, will reattempt SBT and evaluate for extubation/liberation from ventilator. Discussed with Dr. Jadyn Tran as well.     Tucker Cummins MD 3/8/2022 4:06 PM

## 2022-03-08 NOTE — PROGRESS NOTES
Intervention: Monitor/Manage Fluid Electrolyte/Acid Base Balance    PROBLEM: HEMODIALYSIS ADULT    INTERVENTION: Hemodynamic stabilization  Maintain BP WNL for this patient while on hemodialysis    INTERVENTION: Fluid Management  Will attempt 1500 ml total fluid removal as tolerated    INTERVENTION: Metabolic / Electrolyte Imbalance Management  4 K+ potassium, 2.5 Ca+ bath today with dialysis    GOAL: Signs and symptoms of listed potential problems will be absent or manageable  (reference Hemodialysis ADULT CPG)    OUTCOME: Progressing  HD planned for 2.5 hours today      Problem: Chronic Renal Failure  Goal: *Fluid and electrolytes stabilized  Outcome: Progressing Towards Goal     Problem: Patient Education: Go to Patient Education Activity  Goal: Patient/Family Education  Outcome: Progressing Towards Goal

## 2022-03-08 NOTE — PROGRESS NOTES
Attempt for OT treatment. Chrat reviewed. Pt sedated on mechanical vent at this time. Will d/c current OT orders. Please reorder therapy when pt off the vent and medically stable.  Thank you for the referral.     Ever Singh, OTR/L

## 2022-03-08 NOTE — PROGRESS NOTES
Cardiology Progress Note        Patient: Yesika Mar        Sex: male          DOA: 3/4/2022  YOB: 1940      Age:  79 y.o.        LOS:  LOS: 4 days   Assessment/Plan     Principal Problem:    Acute on chronic combined systolic and diastolic congestive heart failure ([address] (7/12/2021)    Active Problems:    Type II diabetes mellitus, uncontrolled ([address] (8/11/2016)      CML (chronic myelocytic leukemia) ([address] (9/2/2016)      Chronic atrial fibrillation (HCC) (9/2/2016)      Atrial fibrillation with RVR ([address] (7/11/2021)      Debility ()      Cardiomyopathy ([address] (7/15/2021)      Acute renal failure superimposed on stage 3 chronic kidney disease ([address] (3/4/2022)      Bleeding nose (3/4/2022)      Leg ulcer ([address] (3/4/2022)      Hypokalemia (3/4/2022)      Anasarca (3/7/2022)      Cardiac arrest ([address] (3/7/2022)      Acute respiratory failure with hypoxia ([address] (3/8/2022)        Plan:  Patient is status post hemodialysis with 700 mL fluid removal  BP stable  Chronic atrial fibrillation      Discussed with the patient's wife and daughter in the room  Discussed with Dr. Zaid Marina with current medical treatment. Hypoxic respiratory failure and PEA arrest  Patient is intubated  Continue with supportive treatment  Resume Eliquis when permissive    Patient is being scheduled for possible dialysis tomorrow  Eliquis is on hold  A. fib is rate controlled  Discussed with patient and wife  patient reiterated he does not want future ICD  Continue with current medication treatment. Acute on chronic CHF  Chronic A. Fib  Chronic cardiomyopathy patient have declined ICD in the past  CAD  CABG  Acute on chronic renal failure      Continue with current diuretic  Discussed with patient and wife.                       Subjective:    cc:  Shortness of breath  Leg swelling      REVIEW OF SYSTEMS:      limited due to intubation /sedation      Objective:      Visit Vitals  /70 (BP 1 Location: Left upper arm, BP Patient Position: At rest)   Pulse 84   Temp 97.5 °F (36.4 °C) (Oral)   Resp 12   Wt 91.2 kg (201 lb 1 oz)   SpO2 97%   BMI 28.85 kg/m²     Body mass index is 28.85 kg/m². Physical Exam:  General Appearance:  Intubated, comfortable on sedation. Forearm bruises looks better  NECK: No JVD, no thyroidomeglay. LUNGS: Clear bilaterally. HEART: S1 irregular+S2 audible,    ABD: Mildly distended   EXT: + edema, and no cysnosis. VASCULAR EXAM: Pulses are intact.     PSYCHIATRIC EXAM:  intubated    Medication:  Current Facility-Administered Medications   Medication Dose Route Frequency    pantoprazole (PROTONIX) 40 mg in 0.9% sodium chloride 10 mL injection  40 mg IntraVENous DAILY    heparin (porcine) 1,000 unit/mL injection 3,600 Units  3,600 Units Hemodialysis DIALYSIS CONTINUOUS    naloxone (NARCAN) injection 0.1 mg  0.1 mg IntraVENous Multiple    heparin (porcine) injection 5,000 Units  5,000 Units SubCUTAneous Q8H    fentaNYL (PF) 900 mcg/30 ml infusion soln  0-200 mcg/hr IntraVENous TITRATE    midazolam in normal saline (VERSED) 1 mg/mL infusion  0-10 mg/hr IntraVENous TITRATE    midazolam (VERSED) injection 1 mg  1 mg IntraVENous Q2H PRN    fentaNYL citrate (PF) injection 25 mcg  25 mcg IntraVENous Q4H PRN    chlorhexidine (PERIDEX) 0.12 % mouthwash 10 mL  10 mL Oral Q12H    ELECTROLYTE REPLACEMENT PROTOCOL - Potassium Renal Dosing  1 Each Other PRN    ELECTROLYTE REPLACEMENT PROTOCOL  - Phosphorus Renal Dosing  1 Each Other PRN    ELECTROLYTE REPLACEMENT PROTOCOL - Magnesium   1 Each Other PRN    ELECTROLYTE REPLACEMENT PROTOCOL - Calcium   1 Each Other PRN    insulin lispro (HUMALOG) injection   SubCUTAneous Q6H    bumetanide (BUMEX) injection 2 mg  2 mg IntraVENous BID    metOLazone (ZAROXOLYN) tablet 10 mg  10 mg Oral DAILY    oxymetazoline (AFRIN) 0.05 % nasal spray 2 Spray  2 Spray Both Nostrils DAILY PRN    tamsulosin (FLOMAX) capsule 0.8 mg  0.8 mg Oral DAILY    sodium chloride (NS) flush 5-40 mL  5-40 mL IntraVENous Q8H    sodium chloride (NS) flush 5-40 mL  5-40 mL IntraVENous PRN    acetaminophen (TYLENOL) tablet 650 mg  650 mg Oral Q6H PRN    Or    acetaminophen (TYLENOL) suppository 650 mg  650 mg Rectal Q6H PRN    bisacodyL (DULCOLAX) suppository 10 mg  10 mg Rectal DAILY PRN    promethazine (PHENERGAN) tablet 12.5 mg  12.5 mg Oral Q6H PRN    Or    ondansetron (ZOFRAN) injection 4 mg  4 mg IntraVENous Q6H PRN    atorvastatin (LIPITOR) tablet 40 mg  40 mg Oral DAILY    carvediloL (COREG) tablet 25 mg  25 mg Oral BID    nitroglycerin (NITROSTAT) tablet 0.4 mg  0.4 mg SubLINGual PRN    glucose chewable tablet 16 g  4 Tablet Oral PRN    glucagon (GLUCAGEN) injection 1 mg  1 mg IntraMUSCular PRN    dextrose 10% infusion 0-250 mL  0-250 mL IntraVENous PRN    [Held by provider] apixaban (ELIQUIS) tablet 2.5 mg  2.5 mg Oral Q12H     Facility-Administered Medications Ordered in Other Encounters   Medication Dose Route Frequency    propofoL (DIPRIVAN) 10 mg/mL injection   IntraVENous PRN               Lab/Data Reviewed:  Procedures/imaging: see electronic medical records for all procedures/Xrays   and details which were not copied into this note but were reviewed prior to creation of Plan       All lab results for the last 24 hours reviewed. Recent Labs     03/07/22  1502 03/07/22  0245 03/06/22  0341   WBC 12.8 11.2 10.4   HGB 8.8* 8.7* 8.8*   HCT 30.7* 30.2* 30.6*   PLT 93* 93* 89*     Recent Labs     03/08/22  1500 03/08/22  0447 03/07/22  1502 03/07/22  0245 03/07/22  0245   NA  --  137 135*  --  133*   K 3.3* 2.6* 3.0*   < > 3.2*   CL  --  90* 91*  --  90*   CO2  --  38* 37*  --  36*   GLU  --  140* 198*  --  158*   BUN  --  110* 125*  --  122*   CREA  --  2.93* 3.36*  --  3.37*   CA  --  8.8 8.7  --  8.9    < > = values in this interval not displayed.        RADIOLOGY:  CT Results  (Last 48 hours)    None        CXR Results  (Last 48 hours) 03/08/22 0618  XR CHEST PORT Final result    Impression:      Moderate loculated right and small left pleural effusions with adjacent   atelectasis. Narrative:  EXAM: XR CHEST PORT       CLINICAL INDICATION/HISTORY: Intubated, on ventilator   -Additional: None       COMPARISON: One day prior       TECHNIQUE: Portable frontal view of the chest       _______________       FINDINGS:       SUPPORT DEVICES: Endotracheal tube in the upper thoracic trachea. Enteric tube   in the stomach. Right IJV tunneled dialysis catheter at the cavoatrial junction. HEART AND MEDIASTINUM: Cardiomegaly. LUNGS AND PLEURAL SPACES: Moderate loculated right effusion adjacent   atelectasis. Small left effusion/atelectasis. No pneumothorax.       _______________           03/07/22 1519  XR CHEST PORT Final result    Impression:      Moderate loculated right effusion adjacent atelectasis. Narrative:  EXAM: XR CHEST PORT       CLINICAL INDICATION/HISTORY: sob   -Additional: None       COMPARISON: 3/4/2022       TECHNIQUE: Portable frontal view of the chest       _______________       FINDINGS:       SUPPORT DEVICES: Endotracheal tube in the upper thoracic trachea. Right IJV   tunneled dialysis catheter at the cavoatrial junction. HEART AND MEDIASTINUM: Cardiomegaly. LUNGS AND PLEURAL SPACES: Moderate loculated right effusion adjacent   atelectasis.  No pneumothorax.       _______________                   Cardiology Procedures:   Results for orders placed or performed during the hospital encounter of 03/04/22   EKG, 12 LEAD, INITIAL   Result Value Ref Range    Ventricular Rate 100 BPM    QRS Duration 96 ms    Q-T Interval 392 ms    QTC Calculation (Bezet) 505 ms    Calculated R Axis -55 degrees    Calculated T Axis 141 degrees    Diagnosis       Atrial fibrillation with premature ventricular or aberrantly conducted   complexes  Left axis deviation  Anterior infarct (cited on or before 11-JUL-2021)  Prolonged QT  Abnormal ECG  When compared with ECG of 17-FEB-2022 17:43,  QT has lengthened  Confirmed by Leonardo Mckeon MD. ([id number]) on 3/6/2022 10:43:55 PM        Echo Results  (Last 48 hours)    None       Cardiolite (Tc-99m Sestamibi) stress test    Signed By: Margaret Buitrago MD     March 8, 2022

## 2022-03-08 NOTE — PROGRESS NOTES
Palliative Medicine Consult    Patient Name: Myra Hernandez  YOB: 1940    Date of Initial Consult: 3/7/2022  Date of follow-up: 3/8/2022  Reason for Consult: Goals of care discussions  Requesting Provider: Dr. Aniya Lindo   Primary Care Physician: Blessing Aquino DO      SUMMARY:   Myra Hernandez is a 79 y.o. with a past history of chronic CHF, HTN, CML, CKD stage 4, DM2, and cardiomyopathy, who was admitted on 3/4/2022 from home with a diagnosis of acute on chronic combined systolic and diastolic congestive heart failure, acute on chronic kidney disease stage IV, and A. fib. Current medical issues leading to Palliative Medicine involvement include: Goals of care discussions. 3/8/2022: Patient  is orally intubated on mechanical ventilator. Noted events yesterday- patient had a cardiac arrest with ROSC within approximately 5 minutes. PALLIATIVE DIAGNOSES:   1. Goals of care discussions  2. Acute on chronic combined systolic and diastolic congestive heart failure  3. Acute on chronic kidney disease stage IV  4. Advanced age/debility       PLAN:   3/8/2022: Palliative medicine team including Hannah Walker RN and I met with patient at patient's bedside. He is orally intubated on mechanical ventilator. Noted events yesterday- patient had a cardiac arrest with ROSC within approximately 5 minutes. Met with patient's family today including daughter/medical power of  Patricio Henderson, and patient's wife Reta Hansen. Medical update provided, and reviewed discussion that we had with patient yesterday (see below). Family acknowledged that patient has been tired for quite some time. Family reports that patient has also shared with his family numerous times that he would want to be resuscitated in the event of cardiopulmonary arrest, but if he were not doing well post resuscitation he would not want to stay on machines or be resuscitated again.   In honoring patient's wishes, family would like to continue full aggressive measures while alive, including continued intubation, and dialysis initiation. Family would like DNR in the event of cardiopulmonary arrest.  Orders placed for Partial code: DNR (including no chest compressions, no ACLS meds, and no shock) in the event of cardiac arrest. Continue full interventions prearrest.  Family would like to give some patient some time to see if he is able to liberate from ventilator, but they understand that there is a risk he may not be able to wean from a ventilator safely. Briefly discussed compassionate extubation as a future option, as family is clear in honoring his wish to not maintain life on life support should patient not recover enough to be liberated from the ventilator. We will continue to follow closely with you. See previous discussions below    3/7/2022: Goals of care discussions: Palliative medicine team including Jaime García RN and I met with patient at patient's bedside. Patient is awake, alert, and oriented x4. He has a flat affect, but did engage in goals of care discussions. Patient has an AMD on file naming his daughter Blossom Perez, and then son Wayne Pagan. He confirms this is correct. He reviewed his current health situation, and states that he has declined an AICD placement for his cardiomyopathy. Patient states he does not recognize the benefit of AICD, as he already knows his heart is weak and recognizes his life may be short. Patient also states he is starting dialysis today or tomorrow, and is open to try it as it is uncomfortable and difficult for him to breathe with fluid overload. He understands that dialysis is his choice, and if he is having difficulty tolerating it he may stop at any time. Discussed the benefits and burdens of CPR in the event of cardiopulmonary arrest in the setting of advanced age with chronic comorbidities.  Discussed the benefits and burdens of intubation in the event of respiratory decline pre-arrest.  Patient did not understand that successful resuscitation includes intubation. At this time he wants efforts at resuscitation, but would not want to be on the ventilator long-term should he not be able to liberate from the ventilator. He would like more time to think about this, and we will revisit again during this hospitalization. Continue full code with full interventions at this time. Support offered to patient who is overall worried about how his wife is going to handle his declining health. 1. Acute on chronic combined systolic and diastolic congestive heart failure: With cardiomyopathy, patient has declined an AICD in the past.  Followed by cardiology. Plan for dialysis for fluid removal, nephrology following. 2. Acute on chronic kidney disease stage IV: Plan for dialysis today for fluid removal, nephrology following. 3. Advanced age/debility: 48-year-old male who lives at home with his wife. He was ambulatory without any assistive devices prior to admission, but notes that he has been progressively getting weaker and is interested in working with PT/OT for cane and walker suggestions. 4. Initial consult note routed to primary continuity provider  5. Communicated plan of care with: Palliative IDT       GOALS OF CARE / TREATMENT PREFERENCES:   [====Goals of Care====]  GOALS OF CARE: Partial code: DNR (including no chest compressions, no ACLS meds, and no shock) in the event of cardiac arrest. Continue intubation at this time.   Patient/Health Care Proxy Stated Goals: Prolong life      TREATMENT PREFERENCES:   Code Status: Partial Code    Advance Care Planning:  Advance Care Planning 3/7/2022   Patient's Healthcare Decision Maker is: Named in scanned ACP document   Primary Decision Maker Name -   Primary Decision Maker Phone Number -   Primary Decision Maker Relationship to Patient -   Confirm Advance Directive Yes, on file   Does the patient have other document types -       Medical Interventions: Full interventions            The palliative care team has discussed with patient / health care proxy about goals of care / treatment preferences for patient.  [====Goals of Care====]         HISTORY:     History obtained from: patient, chart, family    CHIEF COMPLAINT: fluid overload, cardiac arrest 3/7/2022    HPI/SUBJECTIVE:    The patient is:   [] Verbal and participatory  [x] Non-participatory due to:   Orally intubated on mechanical ventilator, sedated    Clinical Pain Assessment (nonverbal scale for severity on nonverbal patients):   Clinical Pain Assessment  Severity: 3    Adult Nonverbal Pain Scale  Face: No particular expression or smile  Activity (Movement): Seeking attention through movement or slow, cautious movement  Guarding: Lying quietly, no positioning of hands over areas of body  Physiology (Vital Signs): Stable vital signs  Respiratory: Baseline RR/SpO2 compliant with ventilator  Total Score: 1       FUNCTIONAL ASSESSMENT:     Palliative Performance Scale (PPS):  PPS: 30        PSYCHOSOCIAL/SPIRITUAL SCREENING:     Advance Care Planning:  Advance Care Planning 3/7/2022   Patient's Healthcare Decision Maker is: Named in scanned ACP document   Primary Decision Maker Name -   Primary Decision Maker Phone Number -   Primary Decision Maker Relationship to Patient -   Confirm Advance Directive Yes, on file   Does the patient have other document types -        Any spiritual / Latter day concerns:  [] Yes /  [x] No    Caregiver Burnout:  [] Yes /  [] No /  [x] No Caregiver Present      Anticipatory grief assessment:   [x] Normal  / [] Maladaptive            REVIEW OF SYSTEMS:     Positive and pertinent negative findings in ROS are noted above in HPI. The following systems were [x] reviewed / [] unable to be reviewed as noted in HPI  Other findings are noted below.   Systems: constitutional, ears/nose/mouth/throat, respiratory, gastrointestinal, genitourinary, musculoskeletal, integumentary, neurologic, psychiatric, endocrine. Positive findings noted below. Modified ESAS Completed by: provider   Fatigue: 5       Pain: 3 (abdominal discomfort)   Anxiety: 0 Nausea: 0     Dyspnea: 4 (exertional)     Constipation: No              PHYSICAL EXAM:     From RN flowsheet:  Wt Readings from Last 3 Encounters:   03/07/22 95.9 kg (211 lb 8 oz)   02/17/22 94.8 kg (209 lb)   02/03/22 93.9 kg (207 lb)     Blood pressure (!) 94/58, pulse 88, temperature 97.5 °F (36.4 °C), resp. rate 12, weight 95.9 kg (211 lb 8 oz), SpO2 100 %.     Pain Scale 1: FLACC  Pain Intensity 1: 0              Pain Intervention(s) 1: Medication (see MAR)     Constitutional: Sedated, appears acutely on chronically ill  Eyes: Closed  ENMT: Orally intubated  Cardiovascular: regular rhythm, distal pulses intact  Respiratory: Orally intubated on mechanical ventilator  Gastrointestinal: distended   Musculoskeletal: no deformity  Skin: warm, dry  Neurologic: Sedated, does move upper extremities with verbal stimuli         HISTORY:     Principal Problem:    Acute on chronic combined systolic and diastolic congestive heart failure ([address] (7/12/2021)    Active Problems:    Type II diabetes mellitus, uncontrolled ([address] (8/11/2016)      CML (chronic myelocytic leukemia) ([address] (9/2/2016)      Chronic atrial fibrillation ([address] (9/2/2016)      Atrial fibrillation with RVR ([address] (7/11/2021)      Debility ()      Cardiomyopathy ([address] (7/15/2021)      Acute renal failure superimposed on stage 3 chronic kidney disease ([address] (3/4/2022)      Bleeding nose (3/4/2022)      Leg ulcer ([address] (3/4/2022)      Hypokalemia (3/4/2022)      Anasarca (3/7/2022)      Cardiac arrest ([address] (3/7/2022)      Acute respiratory failure with hypoxia ([address] (3/8/2022)      Past Medical History:   Diagnosis Date    CAD (coronary artery disease) 2014    Chronic atrial fibrillation ([address] 9/2/2016    CML (chronic myelocytic leukemia) (HCC)     Coagulation disorder (HCC)     bruises easily    [facility]     Hypertension     Nausea & vomiting     with most recent surgery-carotid endardarectomy    TIA (transient ischemic attack) 2016      Past Surgical History:   Procedure Laterality Date    HX CAROTID ENDARTERECTOMY Left 2017    HX CATARACT REMOVAL Bilateral     HX CORONARY ARTERY BYPASS GRAFT      HX HERNIA REPAIR Right     hernia    HX UROLOGICAL      greenlight laser    HX UROLOGICAL      kidney stone    HX UROLOGICAL      Lithotripsy x 2    IR INSERT TUNL CVC W/O PORT OVER 5 YR  3/7/2022    NM CARDIAC SURG PROCEDURE UNLIST  3/2014    quadruple bypass      Family History   Problem Relation Age of Onset    Post-op Nausea/Vomiting Brother       History reviewed, no pertinent family history.   Social History     Tobacco Use    Smoking status: Former Smoker     Packs/day: 1.00     Years: 60.00     Pack years: 60.00     Quit date: 3/23/2014     Years since quittin.9    Smokeless tobacco: Never Used   Substance Use Topics    Alcohol use: Not Currently     Alcohol/week: 2.0 standard drinks     Types: 2 Shots of liquor per week     Comment: per day     Allergies   Allergen Reactions    Adhesive Tape-Silicones Other (comments)     Raw skin    Codeine Nausea and Vomiting    Iodinated Contrast Media Rash     patient states this was years ago - he states that he can tolerate the newer agents      Current Facility-Administered Medications   Medication Dose Route Frequency    pantoprazole (PROTONIX) 40 mg in 0.9% sodium chloride 10 mL injection  40 mg IntraVENous DAILY    naloxone (NARCAN) injection 0.1 mg  0.1 mg IntraVENous Multiple    heparin (porcine) injection 5,000 Units  5,000 Units SubCUTAneous Q8H    fentaNYL (PF) 900 mcg/30 ml infusion soln  0-200 mcg/hr IntraVENous TITRATE    midazolam in normal saline (VERSED) 1 mg/mL infusion  0-10 mg/hr IntraVENous TITRATE    midazolam (VERSED) injection 1 mg  1 mg IntraVENous Q2H PRN    fentaNYL citrate (PF) injection 25 mcg  25 mcg IntraVENous Q4H PRN    chlorhexidine (PERIDEX) 0.12 % mouthwash 10 mL  10 mL Oral Q12H    ELECTROLYTE REPLACEMENT PROTOCOL - Potassium Renal Dosing  1 Each Other PRN    ELECTROLYTE REPLACEMENT PROTOCOL  - Phosphorus Renal Dosing  1 Each Other PRN    ELECTROLYTE REPLACEMENT PROTOCOL - Magnesium   1 Each Other PRN    ELECTROLYTE REPLACEMENT PROTOCOL - Calcium   1 Each Other PRN    insulin lispro (HUMALOG) injection   SubCUTAneous Q6H    bumetanide (BUMEX) injection 2 mg  2 mg IntraVENous BID    metOLazone (ZAROXOLYN) tablet 10 mg  10 mg Oral DAILY    oxymetazoline (AFRIN) 0.05 % nasal spray 2 Spray  2 Spray Both Nostrils DAILY PRN    tamsulosin (FLOMAX) capsule 0.8 mg  0.8 mg Oral DAILY    sodium chloride (NS) flush 5-40 mL  5-40 mL IntraVENous Q8H    sodium chloride (NS) flush 5-40 mL  5-40 mL IntraVENous PRN    acetaminophen (TYLENOL) tablet 650 mg  650 mg Oral Q6H PRN    Or    acetaminophen (TYLENOL) suppository 650 mg  650 mg Rectal Q6H PRN    bisacodyL (DULCOLAX) suppository 10 mg  10 mg Rectal DAILY PRN    promethazine (PHENERGAN) tablet 12.5 mg  12.5 mg Oral Q6H PRN    Or    ondansetron (ZOFRAN) injection 4 mg  4 mg IntraVENous Q6H PRN    atorvastatin (LIPITOR) tablet 40 mg  40 mg Oral DAILY    carvediloL (COREG) tablet 25 mg  25 mg Oral BID    nitroglycerin (NITROSTAT) tablet 0.4 mg  0.4 mg SubLINGual PRN    glucose chewable tablet 16 g  4 Tablet Oral PRN    glucagon (GLUCAGEN) injection 1 mg  1 mg IntraMUSCular PRN    dextrose 10% infusion 0-250 mL  0-250 mL IntraVENous PRN    [Held by provider] apixaban (ELIQUIS) tablet 2.5 mg  2.5 mg Oral Q12H     Facility-Administered Medications Ordered in Other Encounters   Medication Dose Route Frequency    propofoL (DIPRIVAN) 10 mg/mL injection   IntraVENous PRN          LAB AND IMAGING FINDINGS:     Lab Results   Component Value Date/Time    WBC 12.8 03/07/2022 03:02 PM    HGB 8.8 (L) 03/07/2022 03:02 PM    PLATELET 93 (L) 03/07/2022 03:02 PM     Lab Results   Component Value Date/Time    Sodium 137 03/08/2022 04:47 AM    Potassium 2.6 (LL) 03/08/2022 04:47 AM    Chloride 90 (L) 03/08/2022 04:47 AM    CO2 38 (H) 03/08/2022 04:47 AM     (H) 03/08/2022 04:47 AM    Creatinine 2.93 (H) 03/08/2022 04:47 AM    Calcium 8.8 03/08/2022 04:47 AM    Magnesium 2.8 (H) 03/08/2022 04:47 AM    Phosphorus 4.4 07/12/2021 02:55 AM      Lab Results   Component Value Date/Time    Alk. phosphatase 95 03/07/2022 03:02 PM    Protein, total 6.4 03/07/2022 03:02 PM    Albumin 2.8 (L) 03/07/2022 03:02 PM    Globulin 3.6 03/07/2022 03:02 PM     Lab Results   Component Value Date/Time    INR 1.9 (H) 03/07/2022 10:30 AM    Prothrombin time 20.7 (H) 03/07/2022 10:30 AM    aPTT 39.0 (H) 03/04/2022 03:16 PM      No results found for: IRON, FE, TIBC, IBCT, PSAT, FERR   No results found for: PH, PCO2, PO2  No components found for: [place]   Lab Results   Component Value Date/Time    CK 62 02/03/2022 07:00 PM    CK - MB 3.0 02/03/2022 07:00 PM                Total time: 35 minutes  Counseling / coordination time, spent as noted above:   > 50% counseling / coordination?: yes, patient, family, and medical team    Prolonged service was provided for  []30 min   []75 min in face to face time in the presence of the patient, spent as noted above. Time Start:   Time End:   Note: this can only be billed with 38575 (initial) or 12141 (follow up). If multiple start / stop times, list each separately.

## 2022-03-08 NOTE — PROGRESS NOTES
Physician Progress Note      Oswaldo Rojas  CSN #:                  [id number]  :                       1940  ADMIT DATE:       3/4/2022 3:02 PM  [address] [place] DATE:  RESPONDING  PROVIDER #:        Luis Fernando DELACRUZ MD          QUERY TEXT:    Patient admitted with CHF exacerbation, noted to have atrial fibrillation and is maintained on Eliquis. If possible, please document in progress notes and discharge summary if you are evaluating and/or treating any of the following: The medical record reflects the following:    Risk Factors: Atrial fibrillation, PMH of CAD, cardiomyopathy, CABG, atrial fibrillation, severe combined systolic and diastolic heart failure, TIA, chronic leukemia, chronic renal disease came to the hospital with acute on chronic CHF symptoms    Clinical Indicators:  > Per H&P and PN- On Eliquis at home  > EKG 3/4- Atrial fibrillation with premature ventricular or aberrantly conducted complexes    Treatment: Receiving Coreg, Eliquis, Cardiology consult, EKG    Thank you,  Marichuy Armstrong RN, BSN CRCR  [email or web address]  Options provided:  -- Secondary hypercoagulable state in a patient with atrial fibrillation  -- Other - I will add my own diagnosis  -- Disagree - Not applicable / Not valid  -- Disagree - Clinically unable to determine / Unknown  -- Refer to Clinical Documentation Reviewer    PROVIDER RESPONSE TEXT:    This patient has secondary hypercoagulable state related to atrial fibrillation.     Query created by: Chata Pearson on 3/7/2022 5:23 PM      Electronically signed by:  Luis Fernando Spence MD 3/8/2022 11:03 AM

## 2022-03-08 NOTE — ADVANCED PRACTICE NURSE
Called STAT to unit to evaluate potential inadvertent extubation. Tarun with RT stated the ETT appeared out further than previously noted, he pushed ETT down to 23 cm at lip where it appeared during my assessment. Pulse oximetry with poor waveform, new pulse oximeter placed on right ear with reading of 100%. Pt receiving tidal volumes in 400-500s via ventilator. Bilat breath sounds noted, however diminished to right side. Recommended stat chest x ray to evaluate ETT placement since patient appears stable during my assessment.

## 2022-03-08 NOTE — PROGRESS NOTES
Assessment:     Kirti Engel is a 79 y.o. male with chronic A. fib on Eliquis, cardiomyopathy with EF 15 to 20%, CKD stage 4 , diabetes presented to ER due to worsening shortness of breath for 3 days prior to admission       Acute on chronic combined systolic and diastolic congestive heart failure   CML (chronic myelocytic leukemia)      Chronic atrial fibrillation       CKD stage 4      ROS/Relevant events:   He had cardiac arrest after [facility] placement, 3 rounds of epi and CPR  Remained in ICU in mechanical ventilator  BP stable not on vasopressors   Decent urine output overnight   Unable to obtained review of systems         Plan:-  - Renal function is slightly better than yesterday. He has decent urine output on diuretics but still has significant fluid overload , he has recurrent ED visit, severely short off breath prior to admission due to volume overload requiring oxygen due to advanced cardiomyopathy   EF is 15-20 % he has refused ICD placement. He agreed to initiate dialysis for ultrafiltration for volume management. Will plan to dialyze him today for 2.5 hours and next session tomorrow     Case management consult for outpatient dialysis chair in [facility] Iv bumex and metolazone  Appreciate cardiology assistance  Intake and output   Less than 2 gram salt per day   fluid restriction to 1.2 liter  Dose all meds for current eGFR   Hold apixaban he has hematuria    Hypokalemia- Agree with replace to keep level around 4-4.5 meq/lt      Rest of the management per primary team        Subjective:   Unable to verbalize complaints as he is on vent       Blood pressure 125/62, pulse 76, temperature 97.6 °F (36.4 °C), resp. rate 22, weight 95.9 kg (211 lb 8 oz), SpO2 100 %.     Sedated on mechanical vent   Crackles in lung exam   3+ Edema in B/L LE  Abdomen is distended and edematous          Intake/Output Summary (Last 24 hours) at 3/8/2022 1148  Last data filed at 3/8/2022 0800  Gross per 24 hour   Intake 508.78 ml   Output 3050 ml   Net -2541.22 ml      Recent Labs     03/07/22  1502   WBC 12.8     Lab Results   Component Value Date/Time    Sodium 137 03/08/2022 04:47 AM    Potassium 2.6 (LL) 03/08/2022 04:47 AM    Chloride 90 (L) 03/08/2022 04:47 AM    CO2 38 (H) 03/08/2022 04:47 AM    Anion gap 9 03/08/2022 04:47 AM    Glucose 140 (H) 03/08/2022 04:47 AM     (H) 03/08/2022 04:47 AM    Creatinine 2.93 (H) 03/08/2022 04:47 AM    BUN/Creatinine ratio 38 (H) 03/08/2022 04:47 AM    GFR est AA 25 (L) 03/08/2022 04:47 AM    GFR est non-AA 21 (L) 03/08/2022 04:47 AM    Calcium 8.8 03/08/2022 04:47 AM        Current Facility-Administered Medications   Medication Dose Route Frequency Provider Last Rate Last Admin    pantoprazole (PROTONIX) 40 mg in 0.9% sodium chloride 10 mL injection  40 mg IntraVENous DAILY Rupali Lacy MD   40 mg at 03/08/22 0933    naloxone (NARCAN) injection 0.1 mg  0.1 mg IntraVENous Multiple Giovanna Kaur MD   0.1 mg at 03/07/22 1429    heparin (porcine) injection 5,000 Units  5,000 Units SubCUTAneous Q8H Rupali Lacy MD        fentaNYL (PF) 900 mcg/30 ml infusion soln  0-200 mcg/hr IntraVENous TITRATE Mirela Frye MD   Stopped at 03/08/22 0800    midazolam in normal saline (VERSED) 1 mg/mL infusion  0-10 mg/hr IntraVENous TITRATE Mirela Frye MD 1 mL/hr at 03/07/22 2257 1 mg/hr at 03/07/22 2257    midazolam (VERSED) injection 1 mg  1 mg IntraVENous Q2H PRN Mirela Frye MD        fentaNYL citrate (PF) injection 25 mcg  25 mcg IntraVENous Q4H PRN Mirela Frye MD        chlorhexidine (PERIDEX) 0.12 % mouthwash 10 mL  10 mL Oral Q12H Mirela Frye MD   10 mL at 03/08/22 0933    ELECTROLYTE REPLACEMENT PROTOCOL - Potassium Renal Dosing  1 Each Other PRN Center MD Regino        ELECTROLYTE REPLACEMENT PROTOCOL  - Phosphorus Renal Dosing  1 Each Other PRN Center MD Regino        ELECTROLYTE REPLACEMENT PROTOCOL - Magnesium   1 Each Other PRN Center MD Regino        ELECTROLYTE REPLACEMENT PROTOCOL - Calcium 1 Each Other PRN Bobbi Wade MD        insulin lispro (HUMALOG) injection   SubCUTAneous Q6H Bobbi Wade MD   3 Units at 03/08/22 0555    bumetanide (BUMEX) injection 2 mg  2 mg IntraVENous BID You Gómez R, DO   2 mg at 03/08/22 0933    metOLazone (ZAROXOLYN) tablet 10 mg  10 mg Oral DAILY You Gómez R, DO   10 mg at 03/08/22 1555    oxymetazoline (AFRIN) 0.05 % nasal spray 2 Spray  2 Spray Both Nostrils DAILY PRN Tricia Cabrales MD   2 Spray at 03/05/22 0138    tamsulosin (FLOMAX) capsule 0.8 mg  0.8 mg Oral DAILY You Gómez R, DO   0.8 mg at 03/08/22 0939    sodium chloride (NS) flush 5-40 mL  5-40 mL IntraVENous Q8H Bobbi Wade MD   10 mL at 03/08/22 0600    sodium chloride (NS) flush 5-40 mL  5-40 mL IntraVENous PRN Bobbi Wade MD        acetaminophen (TYLENOL) tablet 650 mg  650 mg Oral Q6H PRN Bobbi Wade MD   650 mg at 03/07/22 1311    Or    acetaminophen (TYLENOL) suppository 650 mg  650 mg Rectal Q6H PRN Bobbi Wade MD        bisacodyL (DULCOLAX) suppository 10 mg  10 mg Rectal DAILY PRN Bobbi Wade MD   10 mg at 03/07/22 0645    promethazine (PHENERGAN) tablet 12.5 mg  12.5 mg Oral Q6H PRN Bobbi Wade MD   12.5 mg at 03/06/22 1618    Or    ondansetron (ZOFRAN) injection 4 mg  4 mg IntraVENous Q6H PRN Bobbi Wade MD   4 mg at 03/05/22 0345    atorvastatin (LIPITOR) tablet 40 mg  40 mg Oral DAILY Bobbi Wade MD   40 mg at 03/08/22 9789    carvediloL (COREG) tablet 25 mg  25 mg Oral BID Bobbi Wade MD   25 mg at 03/08/22 9776    nitroglycerin (NITROSTAT) tablet 0.4 mg  0.4 mg SubLINGual PRN Bobbi Wade MD        glucose chewable tablet 16 g  4 Tablet Oral PRN Bobbi Wade MD        glucagon [facility]) injection 1 mg  1 mg IntraMUSCular PRN Bobbi Wade MD        dextrose 10% infusion 0-250 mL  0-250 mL IntraVENous PRHUNG Wade MD        [Held by provider] [facility]) tablet 2.5 mg  2.5 mg Oral Q12H Bobbi Wade MD         Facility-Administered Medications Ordered in Other Encounters   Medication Dose Route Frequency Provider Last Rate Last Admin    propofoL (DIPRIVAN) 10 mg/mL injection   IntraVENous PRN Fausto CHRISTIAN CRNA   50 mg at 03/07/22 1451        Approx 35     minutes of critical care time spent in the direct evaluation and formulation of treatment plan of this high risk patient. The reason for providing this level of medical care was due a critical illness that impaired one or more vital organ systems such that there was a high probability of imminent or life threatening deterioration in the patients condition. This care involved high complexity decision making to assess, manipulate, and support vital system functions, to treat this degreee vital organ system failure and to prevent further life threatening deterioration of the patients condition.          Rosalinda Hand MD  [facility].- Nephrology

## 2022-03-08 NOTE — PROGRESS NOTES
P[facility] Specialists  Pulmonary, Critical Care, and Sleep Medicine    Name: Franca Soliman MRN: [id number]   : 1940 Hospital: [facility] [place]    Date: 3/8/2022  Room: [address] [place] [address] Note                                              Consult requesting physician: Dr. Ramon Hamilton  Reason for Consult: Cardiac arrest    IMPRESSION:   Cardiac arrest.  Acute renal failure superimposed on stage III chronic kidney disease. Anasarca. Acute on chronic combined systolic and diastolic congestive heart failure. Atrial fibrillation with RVR.       Active Hospital Problems    Diagnosis Date Noted    Acute respiratory failure with hypoxia ([facility] 2022    Anasarca 2022    Cardiac arrest ([facility] 2022    Acute renal failure superimposed on stage 3 chronic kidney disease ([address] 2022    Bleeding nose 2022    Leg ulcer ([address] 2022    Hypokalemia 2022    Cardiomyopathy ([facility] 07/15/2021    Acute on chronic combined systolic and diastolic congestive heart failure ([address] 2021    Debility     Atrial fibrillation with RVR ([facility]) 2021    Chronic atrial fibrillation ([facility] 2016    CML (chronic myelocytic leukemia) ([facility] 2016    Type II diabetes mellitus, uncontrolled ([facility] 2016   ·      Patient Active Problem List   Diagnosis Code    TIA (transient ischemic attack) G45.9    Coronary artery disease involving native coronary artery I25.10    Type II diabetes mellitus, uncontrolled ([address] E11.65    Dyslipidemia E78.5    Pancytopenia ([facility]) D61.818    CML (chronic myelocytic leukemia) ([facility]) C92.10    Chronic atrial fibrillation (HCC) I48.20    Stage 3 chronic kidney disease (HCC) N18.30    Hyperglycemia R73.9    Elevated blood pressure reading with diagnosis of hypertension I10    Chronic congestive heart failure (HCC) I50.9    Pulmonary edema J81.1    Sepsis (HCC) A41.9    Atrial fibrillation with RVR ([facility]) I48.91    Encounter for palliative care Z51.5    Debility R53.81    Acute on chronic combined systolic and diastolic congestive heart failure (HCC) I50.43    Pulmonary hypertension ([facility]) I27.20    Cardiomyopathy ([facility]) I42.9    ELIZABETH (acute kidney injury) ([facility] N17.9    Pressure injury of sacral region, stage 1 L89.151    Chronic ulcer of left calf limited to breakdown of skin ([facility]) L97.221    Leg edema R60.0    Acute exacerbation of CHF (congestive heart failure) ([facility]) I50.9    Acute renal failure superimposed on stage 3 chronic kidney disease (HCC) N17.9, N18.30    Leg ulcer ([facility]) L97.909    Anasarca R60.1    Cardiac arrest ([facility]) I46.9         · Code status: Partial Code       RECOMMENDATIONS:     Respiratory: Acute respiratory failure; intubated during PEA cardiac arrest.  CXR today reviewed: Right loculated pleural effusion; right TDC, OGT and ETT in good position. ABG with alkalosis; minute ventilation adjusted. Now on [facility] 12/400/35/6. Sedation: Continue Versed and fentanyl drip and as needed boluses as needed. Patient is deeply sedated and so DC Versed drip; lower down fentanyl. Discussed with RN. After hemodialysis, will stop sedation and evaluate for SBT. Ventilator bundle & Sedation protocol followed. Daily sedation holiday, assessment for readiness for SBT and then re-titrate if required. Chlorhexidine mouth washes. Keep SPO2 >=92%. HOB 30 degree elevation all the time. Aggressive pulmonary toileting. Aspiration precautions. Incentive spirometry. CVS:   HTN, A. 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 PEA cardiac arrest on 3/7/2022 while placing TDC, PEA cardiac arrest for about 5 minutes with 3 rounds of CPR and 2 epi. Troponin mildly elevated; improving. Continue diuretics for anasarca and CHF; monitor urine output and electrolytes. Monitor hemodynamics. Continue IV albumin. Continue Lipitor, Coreg.   Diuretics: Bumex 2 mg twice daily, metolazone 10 mg daily; per cardiologist and nephrologist.  Eliquis on hold. Patient is following all commands on minimal sedation on ventilator. We will start heparin drip without bolus; discussed with RN. Echo 7/12/2021: LVEF 15 to 20%. Estimated PASP 55 mmHg. PVL LE 2/3/2022: No DVT. Cardiologist on board. ID:   No fever or leukocytosis. No sign of infection. Follow cultures. Hematology/Oncology:   History of CML; [address] held. Dr. Otilio Fleischer appreciated. Renal:   ARF on CKD; with anasarca recommended hemodialysis. S/p right chest TDC placed by IR 3/7/2022. Getting hemodialyzed now and another hemodialysis planned for tomorrow. Hypokalemia being managed by nephrologist in dialysis fluid. Nephrology Dr. Tara Florentino on board. GI/:   Mild hematuria; resolved. Ascites due to volume overload/anasarca. Continue Flomax. Endocrine: Monitor BS. SSI. Neurology: Moving all 4 extremities and following simple commands, while on minimal sedation on ventilator. Psychiatry: No acute issues. Toxicology: No acute issues. Pain/Sedation: Sedation protocol as above. Skin/Wound: Continue local care at the leg wounds. Electrolytes: Replace electrolytes per ICU electrolyte replacement protocol. IVF: None    Nutrition: N.p.o.    Prophylaxis: DVT Prophylaxis: SCD/anticoagulation as above. GI Prophylaxis: Protonix. Restraints:   Wrist soft restraints for patient interfering with medical therapy/management and patient safety. Lines/Tubes: PIV  Midline in place. ETT: 3/7/2022. OGT: To be placed on 3/7/2022. Padilla: In place before coming to ICU (Medically necessary for strict input/output monitoring in critically ill patient, will remove it when not needed. Padilla bundle followed). Very poor overall prognosis. Advance Directive/Palliative Care: Discussed with palliative care team who discussed with family, patient is partial code.   No CPR in the event of cardiac arrest.    Will defer respective systems problem management to primary and other respective consultant and follow patient in ICU with primary and other medical team.  Further recommendations will be based on the patient's response to recommended treatment and results of the investigation ordered. Quality Care: PPI, DVT prophylaxis, HOB elevated, Infection control all reviewed and addressed. Care of plan d/w RN, RT, MDR, hospitalist team  D/w family-wife (answered all questions to satisfaction). High complexity decision making was performed during the evaluation of this patient at high risk for decompensation with multiple organ involvement. Total critical care time spent rendering care exclusive of procedures/family discussion/coordination of care: 40 minutes. John Lima Spouse [phone]   gurdeep Chapman Daughter [phone]   Yaa Mondragon [phone]     Family discussion: I called and spoke with patient's wife Juan Lindo and updated with all above, very poor prognosis and critical conditions discussed and explained; answered all questions to her satisfaction. Subjective/History of Present Illness:     Patient is a 79 y.o. 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 fib, CAD, CABG, HTN, DM, CML, ARF on CKD, anasarca; admitted with CHF exacerbation; underwent right chest TDC placement by IR; had PEA cardiac arrest; intubated and transferred to ICU. Patient is intubated and sedated and so history/review of system taken from Dr. Ramon Hamilton and EMR. In IR, patient became bradycardic and had PEA cardiac arrest; received 2 rounds of epinephrine and 3 rounds of CPR, about 5 minutes of CPR; intubated; regained all of cardiac rhythm; transferred to ICU.      3/8/2022 :     Remains in ICU room 107. Remains on ventilator; sedated with Versed 1 and fentanyl 25 drip.   Not on vasopressors. ABG with alkalosis; minute ventilation adjusted. Blood pressure stable. No fever. Getting hemodialysis now. Persistent anasarca and ascites. No other overnight issues reported. [facility]. I/O last 24 hrs: Intake/Output Summary (Last 24 hours) at 3/8/2022 1422  Last data filed at 3/8/2022 1236  Gross per 24 hour   Intake 1147.88 ml   Output 4150 ml   Net -3002.12 ml         The patient is critically ill and can not provide additional history due to Ventilated    History taken from EMR, Dr. Jun Adrian, Dr. Raghu Ann    Review of Systems:  ROS not obtained due to patient factor.        Allergies   Allergen Reactions    Adhesive Tape-Silicones Other (comments)     Raw skin    Codeine Nausea and Vomiting    Iodinated Contrast Media Rash     patient states this was years ago - he states that he can tolerate the newer agents      Past Medical History:   Diagnosis Date    CAD (coronary artery disease)     Chronic atrial fibrillation ([address] 2016    [facility]     Coagulation disorder (HCC)     bruises easily    Diabetes ([address] 2012    Hypertension     Nausea & vomiting     with most recent surgery-carotid endardarectomy    TIA (transient ischemic attack) 2016      Past Surgical History:   Procedure Laterality Date    HX CAROTID ENDARTERECTOMY Left 2017    HX CATARACT REMOVAL Bilateral     HX CORONARY ARTERY BYPASS GRAFT      HX HERNIA REPAIR Right     hernia    HX UROLOGICAL      greenlight laser    HX UROLOGICAL      kidney stone    HX UROLOGICAL      Lithotripsy x 2    IR INSERT TUNL CVC W/O PORT OVER 5 YR  3/7/2022    OK CARDIAC SURG PROCEDURE UNLIST  3/2014    quadruple bypass      Social History     Tobacco Use    Smoking status: Former Smoker     Packs/day: 1.00     Years: 60.00     Pack years: 60.00     Quit date: 3/23/2014     Years since quittin.9    Smokeless tobacco: Never Used   Substance Use Topics    Alcohol use: Not Currently     Alcohol/week: 2.0 standard drinks     Types: 2 Shots of liquor per week     Comment: per day      Family History   Problem Relation Age of Onset    Post-op Nausea/Vomiting Brother       Prior to Admission medications    Medication Sig Start Date End Date Taking? Authorizing Provider   bumetanide (BUMEX) 2 mg tablet Take 1 Tablet by mouth two (2) times a day. 2/17/22   Bimal Tenorio MD   metOLazone (ZAROXOLYN) 2.5 mg tablet Take 2 Tablets by mouth daily. 2/17/22   Bimal Tenorio MD   imatinib (Gleevec) 400 mg tablet Take 200 mg by mouth daily. Provider, Historical   mecobalamin, vitamin B12, 1,000 mcg TbDi Take 1 Tablet by mouth daily. Provider, Historical   glucose 4 gram chewable tablet Take 4 g by mouth daily as needed for Other (low BS). Provider, Historical   OXYGEN-AIR DELIVERY SYSTEMS 2 L by IntraNASal route continuous. Provider, Historical   insulin glargine (LANTUS,BASAGLAR) 100 unit/mL (3 mL) inpn 3-4 Units by SubCUTAneous route daily as needed for Other (high BS). patient reporting he has no actual sliding scale for medication- takes 3-4 units depending on his BS, only takes if greater than 150. Provider, Historical   carvediloL (COREG) 25 mg tablet Take 1 Tablet by mouth two (2) times a day. 7/22/21   Jf Rodriguez MD   apixaban (ELIQUIS) 2.5 mg tablet Take 1 Tablet by mouth every twelve (12) hours. 7/22/21   Jf Rodriguez MD   gabapentin (NEURONTIN) 100 mg capsule Take 1 Capsule by mouth nightly. Max Daily Amount: 100 mg. Patient taking differently: Take 300 mg by mouth nightly. 7/22/21   Jf Rodriguez MD   aspirin delayed-release 81 mg tablet Take 81 mg by mouth daily. Lee Ann, MD Stephan   vitamin E (AQUA GEMS) 400 unit capsule Take 400 Units by mouth daily. Stephan Nance MD   tamsulosin (FLOMAX) 0.4 mg capsule Take 0.4 mg by mouth daily.     Stephan Nance MD   nitroglycerin (NITROSTAT) 0.4 mg SL tablet 1 Tab by SubLINGual route as needed for Chest Pain. Up to 3 doses. 18   Oliver Cervantes MD   atorvastatin (LIPITOR) 40 mg tablet Take 40 mg by mouth daily.     Stephan Nance MD     Current Facility-Administered Medications   Medication Dose Route Frequency    pantoprazole (PROTONIX) 40 mg in 0.9% sodium chloride 10 mL injection  40 mg IntraVENous DAILY    heparin (porcine) injection 5,000 Units  5,000 Units SubCUTAneous Q8H    fentaNYL (PF) 900 mcg/30 ml infusion soln  0-200 mcg/hr IntraVENous TITRATE    midazolam in normal saline (VERSED) 1 mg/mL infusion  0-10 mg/hr IntraVENous TITRATE    chlorhexidine (PERIDEX) 0.12 % mouthwash 10 mL  10 mL Oral Q12H    insulin lispro (HUMALOG) injection   SubCUTAneous Q6H    bumetanide (BUMEX) injection 2 mg  2 mg IntraVENous BID    metOLazone (ZAROXOLYN) tablet 10 mg  10 mg Oral DAILY    tamsulosin (FLOMAX) capsule 0.8 mg  0.8 mg Oral DAILY    sodium chloride (NS) flush 5-40 mL  5-40 mL IntraVENous Q8H    atorvastatin (LIPITOR) tablet 40 mg  40 mg Oral DAILY    carvediloL (COREG) tablet 25 mg  25 mg Oral BID    [Held by provider] apixaban (ELIQUIS) tablet 2.5 mg  2.5 mg Oral Q12H         Objective:   Vital Signs:    Visit Vitals  /67   Pulse 90   Temp 97.5 °F (36.4 °C)   Resp 10   Wt 95.9 kg (211 lb 8 oz)   SpO2 100%   BMI 30.35 kg/m²       O2 Device: Ventilator,Endotracheal tube   O2 Flow Rate (L/min): 3 l/min   Temp (24hrs), Av.6 °F (36.4 °C), Min:97.5 °F (36.4 °C), Max:97.9 °F (36.6 °C)       Intake/Output:   Last shift:       07 -  1900  In: 739.1 [I.V.:639.1]  Out: 1100 [Urine:1100]    Last 3 shifts:  1901 -  0700  In: 408.8 [I.V.:408.8]  Out: 3850 [Urine:3850]      Intake/Output Summary (Last 24 hours) at 3/8/2022 1422  Last data filed at 3/8/2022 1236  Gross per 24 hour   Intake 1147.88 ml   Output 4150 ml   Net -3002.12 ml       Last 3 Recorded Weights in this Encounter    22 1819 22 2353 22 0404   Weight: 92.5 kg (203 lb 14.4 oz) 95.2 kg (209 lb 12.8 oz) 95.9 kg (211 lb 8 oz)         Ventilator Settings:  Mode Rate Tidal Volume Pressure FiO2 PEEP   Assist control   400 ml    30 % 5 cm H20     Peak airway pressure: 28 cm H2O    Plateau pressure:     Tidal volume:    Minute ventilation: 8.54 l/min     Recent Labs     03/08/22  0431 03/07/22  1744 03/07/22  1602   PHI 7.58* 7.53* 7.64*   PCO2I 42.2 47.2* 36.0   PO2I 89 143* 293*   HCO3I 39.7* 39.5* 38.2*   FIO2I 30 40 60       Physical Exam:       General/Neurology: Intubated; on ventilator; sedated. Head:   NCAT. Eye:   Pupils equally reactive to light. No icterus/pallor/cyanosis. Nose:   No nasal drainage/discharge. Neck:   Trachea midline. Lung:   Reduced air entry at the bases. No crackles, wheezing, prolonged expiration. Heart:   Irregular. No murmur or JVD. Abdomen:  Soft. Distended with ascites. Nontender. Extremities:  2-3+ bilateral upper and lower extremity edema. No cyanosis or clubbing. Leg wound under dressing. Pulses: 2+ and symmetric in DP. Data:       Recent Results (from the past 24 hour(s))   CBC WITH AUTOMATED DIFF    Collection Time: 03/07/22  3:02 PM   Result Value Ref Range    WBC 12.8 4.6 - 13.2 K/uL    RBC 4.02 (L) 4.35 - 5.65 M/uL    HGB 8.8 (L) 13.0 - 16.0 g/dL    HCT 30.7 (L) 36.0 - 48.0 %    MCV 76.4 (L) 78.0 - 100.0 FL    MCH 21.9 (L) 24.0 - 34.0 PG    MCHC 28.7 (L) 31.0 - 37.0 g/dL    RDW 19.5 (H) 11.6 - 14.5 %    PLATELET 93 (L) 790 - 420 K/uL    NRBC 0.0 0  WBC    ABSOLUTE NRBC 0.00 0.00 - 0.01 K/uL    NEUTROPHILS 68 40 - 73 %    LYMPHOCYTES 20 (L) 21 - 52 %    MONOCYTES 10 3 - 10 %    EOSINOPHILS 1 0 - 5 %    BASOPHILS 0 0 - 2 %    METAMYELOCYTES 1 (H) 0 %    IMMATURE GRANULOCYTES 0 %    ABS. NEUTROPHILS 8.7 (H) 1.8 - 8.0 K/UL    ABS. LYMPHOCYTES 2.6 0.9 - 3.6 K/UL    ABS. MONOCYTES 1.3 (H) 0.05 - 1.2 K/UL    ABS. EOSINOPHILS 0.1 0.0 - 0.4 K/UL    ABS. BASOPHILS 0.0 0.0 - 0.1 K/UL    ABS. IMM.  GRANS. 0.0 K/UL    DF MANUAL      PLATELET COMMENTS DECREASED PLATELETS      RBC COMMENTS ANISOCYTOSIS  1+        RBC COMMENTS MICROCYTOSIS  1+        RBC COMMENTS HYPOCHROMIA  2+        RBC COMMENTS        POLYCHROMASIA  SLIGHT  OVALOCYTES  FEW  TARGET CELLS  FEW  SCHISTOCYTES  FEW     METABOLIC PANEL, COMPREHENSIVE    Collection Time: 03/07/22  3:02 PM   Result Value Ref Range    Sodium 135 (L) 136 - 145 mmol/L    Potassium 3.0 (L) 3.5 - 5.5 mmol/L    Chloride 91 (L) 100 - 111 mmol/L    CO2 37 (H) 21 - 32 mmol/L    Anion gap 7 3.0 - 18 mmol/L    Glucose 198 (H) 74 - 99 mg/dL     (H) 7.0 - 18 MG/DL    Creatinine 3.36 (H) 0.6 - 1.3 MG/DL    BUN/Creatinine ratio 37 (H) 12 - 20      GFR est AA 21 (L) >60 ml/min/1.73m2    GFR est non-AA 18 (L) >60 ml/min/1.73m2    Calcium 8.7 8.5 - 10.1 MG/DL    Bilirubin, total 0.8 0.2 - 1.0 MG/DL    ALT (SGPT) 14 (L) 16 - 61 U/L    AST (SGOT) 23 10 - 38 U/L    Alk. phosphatase 95 45 - 117 U/L    Protein, total 6.4 6.4 - 8.2 g/dL    Albumin 2.8 (L) 3.4 - 5.0 g/dL    Globulin 3.6 2.0 - 4.0 g/dL    A-G Ratio 0.8 0.8 - 1.7     TROPONIN-HIGH SENSITIVITY    Collection Time: 03/07/22  3:02 PM   Result Value Ref Range    Troponin-High Sensitivity 72 0 - 78 ng/L   MAGNESIUM    Collection Time: 03/07/22  3:02 PM   Result Value Ref Range    Magnesium 2.8 (H) 1.6 - 2.6 mg/dL   HEP B SURFACE AG    Collection Time: 03/07/22  3:02 PM   Result Value Ref Range    Hepatitis B surface Ag <0.10 <1.00 Index    Hep B surface Ag Interp. Negative NEG     HEPATITIS B CORE AB, TOTAL    Collection Time: 03/07/22  3:02 PM   Result Value Ref Range    Hep B Core Ab, total Negative Negative     HEP B SURFACE AB    Collection Time: 03/07/22  3:02 PM   Result Value Ref Range    Hepatitis B surface Ab <3.10 (L) >10.0 mIU/mL    Hep B surface Ab Interp.  Negative (A) POS      Hep B surface Ab comment        Samples with a  value of 10 mIU/mL or greater are considered positive (protective immunity) in accordance with the CDC guidelines.    GLUCOSE, POC    Collection Time: 03/07/22  3:07 PM   Result Value Ref Range    Glucose (POC) 227 (H) 70 - 110 mg/dL   BLOOD GAS, ARTERIAL POC    Collection Time: 03/07/22  4:02 PM   Result Value Ref Range    Device: ADULT VENT      FIO2 (POC) 60 %    pH (POC) 7.64 (HH) 7.35 - 7.45      pCO2 (POC) 36.0 35.0 - 45.0 MMHG    pO2 (POC) 293 (H) 80 - 100 MMHG    HCO3 (POC) 38.2 (H) 22 - 26 MMOL/L    sO2 (POC) 100.0 (H) 92 - 97 %    Base excess (POC) 16.0 mmol/L    Mode ASSIST CONTROL      Tidal volume 500 ml    Set Rate 16 bpm    PEEP/CPAP (POC) 5 cmH2O    Site LEFT BRACHIAL      Specimen type (POC) ARTERIAL      Performed by Juli Pineda    BLOOD GAS, ARTERIAL POC    Collection Time: 03/07/22  5:44 PM   Result Value Ref Range    Device: ADULT VENT      FIO2 (POC) 40 %    pH (POC) 7.53 (H) 7.35 - 7.45      pCO2 (POC) 47.2 (H) 35.0 - 45.0 MMHG    pO2 (POC) 143 (H) 80 - 100 MMHG    HCO3 (POC) 39.5 (H) 22 - 26 MMOL/L    sO2 (POC) 99.4 (H) 92 - 97 %    Base excess (POC) 15.0 mmol/L    Mode ASSIST CONTROL      Tidal volume 400 ml    Set Rate 14 bpm    PEEP/CPAP (POC) 5 cmH2O    Site LEFT BRACHIAL      Specimen type (POC) ARTERIAL      Performed by Juli Pineda    GLUCOSE, POC    Collection Time: 03/07/22  6:22 PM   Result Value Ref Range    Glucose (POC) 196 (H) 70 - 110 mg/dL   TROPONIN-HIGH SENSITIVITY    Collection Time: 03/07/22  9:30 PM   Result Value Ref Range    Troponin-High Sensitivity 119 (HH) 0 - 78 ng/L   GLUCOSE, POC    Collection Time: 03/07/22 11:15 PM   Result Value Ref Range    Glucose (POC) 182 (H) 70 - 110 mg/dL   BLOOD GAS, ARTERIAL POC    Collection Time: 03/08/22  4:31 AM   Result Value Ref Range    Device: ADULT VENT      FIO2 (POC) 30 %    pH (POC) 7.58 (HH) 7.35 - 7.45      pCO2 (POC) 42.2 35.0 - 45.0 MMHG    pO2 (POC) 89 80 - 100 MMHG    HCO3 (POC) 39.7 (H) 22 - 26 MMOL/L    sO2 (POC) 98.1 (H) 92 - 97 %    Base excess (POC) 16.0 mmol/L    Mode ASSIST CONTROL      Tidal volume 400 ml    Set Rate 14 bpm    PEEP/CPAP (POC) 5 cmH2O    Allens test (POC) Positive      Site LEFT RADIAL      Patient temp. 97.6      Specimen type (POC) ARTERIAL      Performed by Roxanne Johansen    MAGNESIUM    Collection Time: 03/08/22  4:47 AM   Result Value Ref Range    Magnesium 2.8 (H) 1.6 - 2.6 mg/dL   METABOLIC PANEL, BASIC    Collection Time: 03/08/22  4:47 AM   Result Value Ref Range    Sodium 137 136 - 145 mmol/L    Potassium 2.6 (LL) 3.5 - 5.5 mmol/L    Chloride 90 (L) 100 - 111 mmol/L    CO2 38 (H) 21 - 32 mmol/L    Anion gap 9 3.0 - 18 mmol/L    Glucose 140 (H) 74 - 99 mg/dL     (H) 7.0 - 18 MG/DL    Creatinine 2.93 (H) 0.6 - 1.3 MG/DL    BUN/Creatinine ratio 38 (H) 12 - 20      GFR est AA 25 (L) >60 ml/min/1.73m2    GFR est non-AA 21 (L) >60 ml/min/1.73m2    Calcium 8.8 8.5 - 10.1 MG/DL   TROPONIN-HIGH SENSITIVITY    Collection Time: 03/08/22  4:47 AM   Result Value Ref Range    Troponin-High Sensitivity 102 (HH) 0 - 78 ng/L   GLUCOSE, POC    Collection Time: 03/08/22  5:39 AM   Result Value Ref Range    Glucose (POC) 154 (H) 70 - 110 mg/dL   GLUCOSE, POC    Collection Time: 03/08/22 11:32 AM   Result Value Ref Range    Glucose (POC) 139 (H) 70 - 110 mg/dL         Chemistry Recent Labs     03/08/22  0447 03/07/22  1502 03/07/22  0245 03/06/22  0341 03/06/22  0341   * 198* 158*   < > 158*    135* 133*   < > 133*   K 2.6* 3.0* 3.2*   < > 3.3*   CL 90* 91* 90*   < > 88*   CO2 38* 37* 36*   < > 35*   * 125* 122*   < > 120*   CREA 2.93* 3.36* 3.37*   < > 3.14*   CA 8.8 8.7 8.9   < > 9.0   MG 2.8* 2.8* 2.9*   < > 2.9*   AGAP 9 7 7   < > 10   BUCR 38* 37* 36*   < > 38*   AP  --  95  --   --  99   TP  --  6.4  --   --  6.7   ALB  --  2.8*  --   --  2.8*   GLOB  --  3.6  --   --  3.9   AGRAT  --  0.8  --   --  0.7*    < > = values in this interval not displayed.         Lactic Acid Lactic acid   Date Value Ref Range Status   07/11/2021 1.3 0.4 - 2.0 MMOL/L Final     No results for input(s): LAC in the last 72 hours. Liver Enzymes Protein, total   Date Value Ref Range Status   03/07/2022 6.4 6.4 - 8.2 g/dL Final     Albumin   Date Value Ref Range Status   03/07/2022 2.8 (L) 3.4 - 5.0 g/dL Final     Globulin   Date Value Ref Range Status   03/07/2022 3.6 2.0 - 4.0 g/dL Final     A-G Ratio   Date Value Ref Range Status   03/07/2022 0.8 0.8 - 1.7   Final     Alk. phosphatase   Date Value Ref Range Status   03/07/2022 95 45 - 117 U/L Final     Recent Labs     03/07/22  1502 03/06/22  0341   TP 6.4 6.7   ALB 2.8* 2.8*   GLOB 3.6 3.9   AGRAT 0.8 0.7*   AP 95 99        CBC w/Diff Recent Labs     03/07/22  1502 03/07/22  0245 03/06/22  0341   WBC 12.8 11.2 10.4   RBC 4.02* 3.94* 3.97*   HGB 8.8* 8.7* 8.8*   HCT 30.7* 30.2* 30.6*   PLT 93* 93* 89*   GRANS 68 76* 74*   LYMPH 20* 9* 9*   EOS 1 0 0        Cardiac Enzymes No results found for: CPK, CK, CKMMB, CKMB, RCK3, CKMBT, CKNDX, CKND1, TARA, TROPT, TROIQ, CIARRA, TROPT, TNIPOC, BNP, BNPP     BNP No results found for: BNP, BNPP, XBNPT     Coagulation Recent Labs     03/07/22  1030   PTP 20.7*   INR 1.9*         Thyroid  Lab Results   Component Value Date/Time    TSH 1.34 07/12/2021 02:55 AM       No results found for: T4     Urinalysis Lab Results   Component Value Date/Time    Color YELLOW 03/04/2022 05:00 PM    Appearance CLEAR 03/04/2022 05:00 PM    Specific gravity 1.009 03/04/2022 05:00 PM    pH (UA) 7.0 03/04/2022 05:00 PM    Protein Negative 03/04/2022 05:00 PM    Glucose Negative 03/04/2022 05:00 PM    Ketone Negative 03/04/2022 05:00 PM    Bilirubin Negative 03/04/2022 05:00 PM    Urobilinogen 0.2 03/04/2022 05:00 PM    Nitrites Negative 03/04/2022 05:00 PM    Leukocyte Esterase Negative 03/04/2022 05:00 PM    Epithelial cells Negative 03/04/2022 05:00 PM    Bacteria Negative 03/04/2022 05:00 PM    WBC 0 to 3 03/04/2022 05:00 PM    RBC 0 to 5 03/04/2022 05:00 PM        Micro  No results for input(s): SDES, CULT in the last 72 hours.   No results for input(s): CULT in the last 72 hours. Culture data during this hospitalization. All Micro Results     None             XR CHEST PORT    Result Date: 3/4/2022  EXAM: XR CHEST PORT CLINICAL INDICATION/HISTORY: sob -Additional: None COMPARISON: 2/17/2022 TECHNIQUE: Frontal view of the chest _______________ FINDINGS: HEART AND MEDIASTINUM: Cardiomegaly. Primary sternotomy. LUNGS AND PLEURAL SPACES: Moderate right and small left pleural effusions. Bilateral parenchymal opacities. No pneumothorax. BONY THORAX AND SOFT TISSUES: No acute osseous abnormality _______________     Moderate right and small left pleural effusions. Bilateral parenchymal opacities possibly representing atelectasis versus edema. 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 Cardiomegaly and mediastinal contours are within normal limits for portable radiograph. There is small right pleural effusion with right lung base airspace disease. There is left lung base atelectasis or minimal infiltrate. There is a background of moderate emphysema. No acute osseous findings. ________________      Small right effusion with right lung base atelectasis and/or infiltrate. Minimal atelectasis and/or infiltrate left lung base. LE Doppler 2/3/2022 · No evidence of deep vein thrombosis in the right lower extremity. · No evidence of deep vein thrombosis in the left lower extremity. · Subcutaneous fluid noted throughout bilateral lower extremities. ECHO 7/12/2021: Result status: Final result  · LV: Estimated LVEF is 15 - 20%. Normal wall thickness. Mildly dilated left ventricle. Severely reduced systolic function. Left ventricular diastolic dysfunction E'E= 15.30. · Contrast used: DEFINITY. · LA: Moderately dilated left atrium.   · AV: Aortic valve leaflet calcification present. Mild aortic valve regurgitation is present. · MV: Mitral valve thickening. Moderate mitral valve regurgitation is present. · TV: Moderate tricuspid valve regurgitation is present. · PV: Mild pulmonic valve regurgitation is present. · PA: Pulmonary arterial systolic pressure is 55 mmHg. · IVC: Moderately elevated central venous pressure (8 mmHg); IVC diameter is larger than 21 mm and collapses more than 50% with respiration. Images report reviewed by me:  CT (Most Recent) (CT chest reviewed by me) Results from Hospital Encounter encounter on 09/06/18    CT HEAD WO CONT    Narrative  EXAM: CT head    INDICATION: Slurred speech. COMPARISON: CT 8/11/16 and MRI brain 8/12/2016    TECHNIQUE: Axial CT imaging of the head was performed without intravenous  contrast. Sagittal and coronal reconstructions were obtained. One or more dose reduction techniques were used on this CT: automated exposure  control, adjustment of the mAs and/or kVp according to patient's size, and  iterative reconstruction techniques. The specific techniques utilized on this CT  exam have been documented in the patient's electronic medical record.    _______________    FINDINGS:    BRAIN AND POSTERIOR FOSSA: The sulci, folia, ventricles and basal cisterns are  within normal limits for the patient?s age. There is no intracranial hemorrhage,  mass effect, or midline shift. Minimal low-attenuation changes deep white matter  compatible with chronic microvascular insufficiency. Moderate cortical volume  loss but within normal range for age. EXTRA-AXIAL SPACES AND MENINGES: There are no abnormal extra-axial fluid  collections. CALVARIUM: Intact. SINUSES: Clear. OTHER: None.    _______________    Impression  IMPRESSION:    No acute intracranial abnormalities. Stable findings. CRITICAL RESULT: Critical result called to Dr. Ana Diallo at 10:43 AM on 9/6/2018. Results clearly understood and acknowledged.          CXR reviewed by me:  XR (Most Recent). CXR  reviewed by me and compared with previous CXR Results from Hospital Encounter encounter on 03/04/22    XR ABD (KUB)    Narrative  EXAM:  Abdomen    CLINICAL INDICATION/HISTORY: OGT placement after advancement  -Additional: None    COMPARISON: Prior imaging of the same date    TECHNIQUE:  A single AP view of the abdomen was performed.    _______________    FINDINGS: The bowel gas pattern is normal. No organomegaly or abnormal soft  tissue masses are present. No gross free air is demonstrated. The gastric tube  has been advanced and the catheter tip is now located within the stomach. Chronic spondylosis is present.    _______________    Impression  Gastric catheter tip now appears to be located within the stomach           ·Please note: Voice-recognition software may have been used to generate this report, which may have resulted in some phonetic-based errors in grammar and contents. Even though attempts were made to correct all the mistakes, some may have been missed, and remained in the body of the document.       Yanet Talley MD  3/8/2022

## 2022-03-08 NOTE — PROGRESS NOTES
2000- Report and care received, assessment completed per flow sheet. Intubated, sedated, calm, responds to noxious stimuli only, + gag. Skin with scattered bruising. BLE with scattered small scabs, intact blister to right shin, left shin mepilex/kerlix dressing D/I, sacral mepilex D/I. NAD. 2035- OGT advanced per radiology recommendation. 2300- Reassessment without change. 0400- Reassessment without change.

## 2022-03-08 NOTE — PROGRESS NOTES
Discontinuing current PT order as patient had a code blue called yesterday and is now in ICU, intubated and sedated. Please re-order PT when appropriate.   Theresa Engel, PT

## 2022-03-08 NOTE — PROGRESS NOTES
Palliative Medicine    CODE STATUS: partial code (continue intubation, no chest compressions/ACLS medications/electric shocks)    AMD Status: on file naming his daughter and son as his Babak     3/8/2022 7612 Seen today in room ICU 7 along with Marion Mendenhall NP. Lying in bed. Intubated/ventialted/sedated. FiO2 30% PEEP 5 Versed infusion running. Pale    Yesterday during his dialysis catheter insertion procedure, he suffered cardiac arrest. He achieved ROSC after 5 minutes and was intubated for airway protection. 1115: Met with his wife, Ridge Romero, and daughter, Magali Horn, in the ICU waiting room. Reviewed the discussion with patient yesterday, specifically when he said he was agreeable to a one-time resuscitation but did not want to have multiple resuscitations and did not want to linger on machines for an extended period of time. They were asked their thoughts about how the medical team should proceed in the event of another cardiac arrest and they agreed to no chest compressions, no ACLS medications, and no electric shocks. They want to proceed with the current medical plan to include hemodialysis. We will continue to support the family during these challenging times and have goals of care discussions as situations change. Disposition plan: to be determined based on response to treatment and family decisions      Palliative care will continue to follow Chidi Ann  and his family during his hospitalization and support them as they make healthcare decisions and define goals of care.       Gretchen Cash RN, MSN  Palliative Medicine  P: [phone]

## 2022-03-08 NOTE — PROGRESS NOTES
TREATMENT SUMMARY     This is this patient's first Hemodialysis treatment. He is status post cardiac arrest after [address] placement. Placement of same verified. Verbal consent for treatment obtained from Wife Ede Hwang). Patient in room 107 dialyzed for 2.5 hours. Tolerated tx well with without complaint or complications. RIJ CVC functioning without complication accessing or BFR      2000 mL UF removed with a net UF removal of 778 mL. Report given to Liban Rose RN with all questions answered. TREATMENT NOTES      13:11 B/P 84/54 pulse 84 UF off, gal decreased to a NET of 1000 mL. 13:30 UF on B/P 99/60 pulse 84.    15:08 Dressing to RIJ CVC changed aseptically and dated 03/07/2022. No s/s of infection noted. No active bleeding present. ACUTE HEMODIALYSIS FLOW SHEET       PATIENT INFORMATION   [facility]       DR. Carvalho    [x]1st Time Acute  []Stat[x] Routine []Urgent []Chronic Unit   []Acute Room []Bedside  [x]ICU/CCU []ER   Isolation Precautions: [x]Dialysis[] Airborne []Contact []Droplet []Reverse    Special Considerations:_______  [] Blood Consent Verified  [x]N/A   Allergies:[] NKA                 [x] _Adhesive Tape-silicones Adhesive Tape-siliconesOther (comments)Not Specified7/6/2017Raw skinDeletion Reason: Codeine CodeineNausea and VomitingNot Specified8/9/2012Deletion Reason: Iodinated Contrast Media Iodinated Contrast MediaRashNot Specified8/7/2012____________ Code Status []Full Code [] DNR  [x] Other_Partial (has ACP docs)____   Diet: [] Renal [x] NPO [] Diabetic   [] Enteral Feeding [] Cardiac Diabetic: [x]Yes []No     [x]Signed Treatment Consent Verified   [x] Time Out/ Safety Check   PRIMARY NURSE REPORT: FIRST INITIAL/ LAST NAME/TITLE  PRE DIALYSIS:    Liban Rose RN                                       TIME: 11:44 am   ACCESS   CATHETER ACCESS: [] N/A  [x] RIGHT  [] LEFT  [x] IJ  [] SUBCL [] FEM                    [] First use X-ray  [x] Tunnel     [] Non-Tunneled      [x] No S/S infection  [] Redness [] Drainage  [] Cultured [] Swelling [] Pain                    [] Medical Aseptic [] Prep Dressing Changed                  [] Clotted [x] Patent []      Flows: [x] Good [] Poor [] Reversed                 If Access Problem Dr. Alhaji Otero: [] Yes [] No    Date:_____  [x] N/A[]   GRAFT/FISTULA ACCESS:  [x] N/A  [] RIGHT  [] LEFT  [] UE   [] LE       [] AVG  [] AVF [] BUTTONHOLE    [] +BRUIT/THRILL [] MEDICAL ASEPTIC PREP     [] No S/S infection  [] Redness [] Drainage  [] Cultured [] Swelling [] Pain              If Access Problem Dr. Alhaji Otero: [] Yes [] No    Date:______ [] N/A   GENERAL ASSESSMENT   LUNGS:  SaO2% _100___ [] Clear [] Coarse [] Crackles [] Wheezing               [x] Diminished Location: [] RLL [] LLL [] RUL [] OREN    COUGH:  [] Productive  [] Loose[] Dry [x] N/A  RESPIRATIONS: [x] Easy [] Labored    THERAPY: [] RA   [] NC _____.  L/min    Mask: [] NRB [x] Venti  __30___O2%                  [x] Ventilator [] Intubated [] Trach [] BiPap [] CPap [] HI Flow   CARDIAC: [x] Regular [] Irregular [] Pericardial Rub [] JVD               Monitored Rhythm:______ [] N/A   EDEMA: [] None [x] Generalized [] Facial [] Pedal [x] UE [x] LE             [] Pitting [x] 1 [] 2 [] 3 [] 4    [] Right [] Left [] Bilateral   SKIN:    [x] Warm [] Hot [] Cold  [x] Dry [] Pale [] Diaphoretic              [] Flushed [] Jaundiced [] Cyanotic [] Rash [] Weeping     LOC:    [] Alert  Oriented to: [] Person [] Place [] Time             [] Confused [] Lethargic [] Medicated [x] Non-responsive    GI/ABDOMEN: [] Flat [x] Distended [x] Soft [] Firm [] Diarrhea [x] Bowel Sounds Present [] Nausea [] Vomiting    PAIN: [x] 0 [] 1 [] 2 [] 3 [] 4 [] 5 [] 6 [] 7 [] 8 [] 9 [] 10          Scale 1-10 Action/Follow Up_____   MOBILITY: [] Amb [] Amb/Assist [x] Bed  [] Wheelchair    CURRENT LABS   HBsAg ONLY: Date Drawn: 03/07/2022            [x] Negative [] Positive [] Unknown. HBsAb: Date Drawn: 03/07/2022             [x] Susceptible <10 [] Immune ?10 [] Unknown   Date of Current Labs:  ATTACHED     EDUCATION   Person Educated: [x] Patient [] Other_________   Knowledge base: [x] None [] Minimal [] Substantial (1st treatment HD treatment. Sedated intubated on a mechanical ventilator)   Barriers to learning  [] None ___Sedated , Intubated on a mechanical ventilator____________   Preferred method of learning: [] Written [] Oral [x] Visual [] Hands on    Topic: [x] Access Care [x] S&S of infection [x] Fluid Management   [] K+  [x] Procedural  [] Albumin [] Medications [x] Tx Options   [] Transplant [x] Diet [] Other    Teaching Tools: [x] Explain [] Demonstration [] Handout_____ [] Video______  CARE PLAN    [x] Renal Failure (Adult)  Interdisciplinary  · Fluid and electrolytes stabilized  ? Interventions  · Dehydration signs and symptoms (eg: Weight/lab monitoring; vomiting/diarrhea/urine; tenting; mucous membranes; dizziness/lethargy/irritability/confusion; weak pulse; tachycardia; blood pressure; I&O)  · Fluid overload signs and symptoms assessment (eg: Body weight increased; dyspnea; edema; hypertension; respiratory crackles/wheezing; JVD; lab monitoring; mental status changes; I&O)  · Monitor appropriate lab values  · COMPLIANCE WITH PRESCRIBED THERAPY  · ARTERIAL ACCESS SITE ASSESSMENT  · NUTRITION SCREENING  · Vital signs monitoring per assessed patient condition or unit standard  · Cardiac monitoring  · Hydration management  · Intake and output measurement  · Body weight monitoring  · Skin care  · DIALYSIS  · Nutrition Care Process per nutrition screen  · Oral hygiene care every 2 hours  · Pain management     · Outcome   ? [x] Progressing Towards Goal  ? [] Not Progressing Towards Goals  ? [] Goals Met/Resolved  ? [] Goals Not Met/ Resolved        · Patient/ Family Education  ?  Progressing Towards Goals          RO/HEMODIAYLSIS MACHINE SAFETY CHECKS- BEFORE EACH TREATMENT       Alarm Test: [x] Pass  Time_1215_______  [x] RO/Machine Log Complete    [x] Extracorporeal circuit Tested for integrity           Dialyzer___[id number]_______   Tubing__21I02-11__________    Dialysate: pH_7.4__  Temp. _36___Conductivity: Meter __14__ HD Machine_14.2__   CHLORINE TESTING- BEFORE EACH TREATMENT AND EVERY 4 HOURS   Total Chlorine: [x] Less than 0.1 ppm Time:_1215____2nd Check Time:_N/A_____  (If greater than 0.1 ppm from Primary then every 30 minutes from Secondary)   TREATMENT INIATION-WITH DIALYSIS PRECAUTIONS   [x] All Connections Secured   [x] Saline Line Double Clamped    [x] Venous Parameters Set [x] Arterial Parameters Set    [x] Prime Given 250 ml     [x] Air Foam Detector Engaged   PRE-TREATMENT   UF Calculations: Wt to lose:_1500___ml(+) Oral:__ml(+)IV Meds/Fluids/Blood prods___ml(+) Prime/Rinse_500__ml(=)Total UF Goal_2000___mL   Scale Type:[x] Bed scale [] Sling Scale [] Wheel Chair Scale [x]  Not Ordered [] [] Unable to obtain pt on stretcher/ bed scale malfunctioning   Tx Initiation Note:  Tx Initiation Note: Received patient in bed low fowlers position. Right IJ CVC catheter remains intact. No s/s of infection noted. Dressing to same in place. No date noted, wii change dressing. Treatment initiated as per MD order without incident. Plan to remove 1.5 mL liters for 3 hours while monitoring patient's well-being and vital signs.       [x] Time Out/Safety Check  Time:1224_____   INTRADIALYTIC MONITORING  (SEE ATTACHED FLOWSHEET)     POST TREATMENT    Time Medication Dose Volume Route    Initials                                           [facility] Signatures Title Initials Time   Carrie Portillo RN GM [id number]               Dialyzer cleared: [] Good [x] Fair [] Poor     Blood Processed 36.7__Liters    Net UF Removed __1000__mL  Post Tx Access:                  AVF/AVG: Bleeding Stop       Art.___min Juan.____min []+bruit/thrill                Catheter: Locking Solution [x] Heparin 1 ml/1000 units  [] Normal Saline                                                                               Art.__1.8___ ml Juan._1.8____ml  Post Assessment:              Skin: [x]Warm [x]Dry []Diaphoretic []Flushed [] Pale []Cyanotic            Lungs: [x]Clear []Coarse []Crackles []Wheezing             Cardiac: []Regular []Irregular  []Monitored rhythm____ []N/A            Edema: []None [x]General []Facial []Pedal  [x]UE [x]LE [x]RIGHT [x]LEFT            Pain: [x]0 []1 []2 []3 []4 []5 []6 []7 []8 []9 []10   POST Tx Note:  HD treatment completed and tolerated well. Patient rinsed with 250 mL 0.9% N/S. Heparin [place] 1.8 mL instilled in arterial lumen, 1.8mL instilled in venous lumen of Right IJ CVC. Caps applied to lumen ends securely. Dressing to [address] changed and dated 03/08/22. No active bleeding noted. No s/s of infection present. Patient remains in ICU bed 107 in stable condition intubated, sedated on a mechanical ventilator. O2 Sat 100 %. No acute distress noted. Bed in lowest position and locked. Call bell within reach.                Primary Nurse Report: First initial/Last name/Title    Post Dialysis:___Camille Coronado RN_______________________         Time:____1515____________    Abbreviations: AVG-arterial venous graft, AVF-arterial venous fistula, IJ-Internal Jugular,  Subcl-Subclavian, Fem-Femoral, Tx-treatment, AP/HR-apical heart rate, DFR-dialysate flow rate, BFR-blood flow rate, AP-arterial pressure, -venous pressure, UF-ultrafiltrate, TMP-transmembrane pressure, Juan-Venous, Art-Arterial, RO-Reverse Osmosis

## 2022-03-08 NOTE — PROGRESS NOTES
CM notes patient remains on ventilator in ICU, patient received dialysis today. CM notes plans to have dialysis tomorrow, and to reattempt SBT and evaluate for extubation/liberation from ventilator. CM to continue to monitor and remain available. Care Management Interventions  PCP Verified by CM:  Yes  Mode of Transport at Discharge: Self  Transition of Care Consult (CM Consult): Discharge Planning  Support Systems: Spouse/Significant Other,Child(nicko)  Confirm Follow Up Transport: Family  The Plan for Transition of Care is Related to the Following Treatment Goals : acute on chronic CHF  The Patient and/or Patient Representative was Provided with a Choice of Provider and Agrees with the Discharge Plan?: Yes  Name of the Patient Representative Who was Provided with a Choice of Provider and Agrees with the Discharge Plan: Audie Ardon, patient  Discharge Location  Patient Expects to be Discharged to[de-identified] Home with family assistance

## 2022-03-08 NOTE — PROGRESS NOTES
Hospitalist Progress Note-critical care note     Patient: Jennifer Gallardo MRN: [id number]  [facility]: [id number]    YOB: 1940  Age: 79 y.o. Sex: male    DOA: 3/4/2022 LOS:  LOS: 4 days            Chief complaint: anasarca, cornelio on ckd3,  chf exacerbation, leg ulcer , afib with rvr , cardiac arrest , acute respiratory failure     Assessment/Plan         Hospital Problems  Date Reviewed: 7/20/2017          Codes Class Noted POA    Acute respiratory failure with hypoxia [facility]) ICD-10-CM: J96.01  ICD-9-CM: 518.81  3/8/2022 Unknown        Anasarca ICD-10-CM: R60.1  ICD-9-CM: 782.3  3/7/2022 Unknown        Cardiac arrest ([facility] ICD-10-CM: I46.9  ICD-9-CM: 427.5  3/7/2022 Unknown        Acute renal failure superimposed on stage 3 chronic kidney disease ([facility] ICD-10-CM: N17.9, N18.30  ICD-9-CM: 584.9, 585.3  3/4/2022 Unknown        Bleeding nose ICD-10-CM: R04.0  ICD-9-CM: 784.7  3/4/2022 Unknown        Leg ulcer ([facility] ICD-10-CM: L97.909  ICD-9-CM: 707.10  3/4/2022 Unknown        Hypokalemia ICD-10-CM: E87.6  ICD-9-CM: 276.8  3/4/2022 Unknown        Cardiomyopathy ([facility] ICD-10-CM: I42.9  ICD-9-CM: 425.4  7/15/2021 Yes        Debility ICD-10-CM: R53.81  ICD-9-CM: 799.3  Unknown Yes        * (Principal) Acute on chronic combined systolic and diastolic congestive heart failure (HCC) ICD-10-CM: I50.43  ICD-9-CM: 428.43, 428.0  7/12/2021 Yes        Atrial fibrillation with RVR ([facility] ICD-10-CM: I48.91  ICD-9-CM: 427.31  7/11/2021 Yes        CML (chronic myelocytic leukemia) ([facility] ICD-10-CM: C92.10  ICD-9-CM: 205.10  9/2/2016 Yes        Chronic atrial fibrillation (HCC) ICD-10-CM: I48.20  ICD-9-CM: 427.31  9/2/2016 Yes        Type II diabetes mellitus, uncontrolled ([facility] ICD-10-CM: E11.65  ICD-9-CM: 250.02  8/11/2016 Yes               Jennifer Gallardo is a 79 y.o. male with chronic A. fib on Eliquis, cardiomyopathy with EF 15 to 20%, CKD, diabetes was admitted due to chf exacerbation, cornelio on ckd3.  Cardiologist and nephrologist were on board. Pt decided to start HD and \"code blue\" was called on 3/7 during [facility] procedure due to PEA. He was intubated during the code and transferred to icu after intubation    Respiratory   Acute respiratory failure with hypoxia   Intubated during \"code blue\"   Continue vent support -managed per intensivist   hypoxia due to chf exacerbation   Continue wean off nc o2   cxr pleural effusion        Card:    Acute diastolic /systolic CHF extubation-acute on chronic combined, cardiomegaly   Ef 15-20 %   On bumex Coreg,  declined the offer of acid   Cardiologist f/u      New onset atrial fibrillation  Coreg for rate control,   On eliquis at home , hold due to hematuria          Hypertension  On coreg      Heme    History of CML    Gleevec hold per hematologist recommendation      Renal   Acute kidney injury with chronic kidney disease 4   Nephrologist consult -planning hd      Anasarca   bumex , albumin, planning hd        FEN   Hypokalemia  K replacement . ICU electrolytes replace protocol     Endo      DM ssi     Neuro   On versed and fentanyl      Leg ulcer   Continue Wound care     Prognosis is very poor. Palliative care team f/u      30 minutes of critical care time spent in the direct evaluation and treatment of this high risk patient. The reason for providing this level of medical care for this critically ill patient was due a critical illness that impaired one or more vital organ systems such that there was a high probability of imminent or life threatening deterioration in the patients condition. This care involved high complexity decision making to assess, manipulate, and support vital system functions, to treat this degreee vital organ system failure and to prevent further life threatening deterioration of the patients condition.     Disposition :tbd,   Review of systems:  Limited due to intubation and on sedation     Vital signs/Intake and Output:  Visit Vitals  BP (!) 115/58   Pulse 73   Temp 97.6 °F (36.4 °C)   Resp 14   Wt 95.9 kg (211 lb 8 oz)   SpO2 100%   BMI 30.35 kg/m²     Current Shift:  No intake/output data recorded. Last three shifts:  03/06 1901 - 03/08 0700  In: 408.8 [I.V.:408.8]  Out: 3850 [Urine:3850]    Physical Exam:  General: Intubated on vent   HEENT: NC, Atraumatic. PERRLA, anicteric sclerae. ET tube noted   Lungs: Coarse bs   Heart:  Regular  rhythm,  No murmur, No Rubs, No Gallops  Abdomen: Soft, Non distended, Non tender. +Bowel sounds, swelling   Extremities: No c/c/edema , ulcer noted on leg   Psych:   Calm   Neurologic:  On sedation           Labs: Results:       Chemistry Recent Labs     03/08/22 0447 03/07/22 1502 03/07/22 0245 03/06/22 0341 03/06/22 0341   * 198* 158*   < > 158*    135* 133*   < > 133*   K 2.6* 3.0* 3.2*   < > 3.3*   CL 90* 91* 90*   < > 88*   CO2 38* 37* 36*   < > 35*   * 125* 122*   < > 120*   CREA 2.93* 3.36* 3.37*   < > 3.14*   CA 8.8 8.7 8.9   < > 9.0   AGAP 9 7 7   < > 10   BUCR 38* 37* 36*   < > 38*   AP  --  95  --   --  99   TP  --  6.4  --   --  6.7   ALB  --  2.8*  --   --  2.8*   GLOB  --  3.6  --   --  3.9   AGRAT  --  0.8  --   --  0.7*    < > = values in this interval not displayed. CBC w/Diff Recent Labs     03/07/22 1502 03/07/22 0245 03/06/22 0341   WBC 12.8 11.2 10.4   RBC 4.02* 3.94* 3.97*   HGB 8.8* 8.7* 8.8*   HCT 30.7* 30.2* 30.6*   PLT 93* 93* 89*   GRANS 68 76* 74*   LYMPH 20* 9* 9*   EOS 1 0 0      Cardiac Enzymes No results for input(s): CPK, CKND1, TARA in the last 72 hours.     No lab exists for component: CKRMB, TROIP   Coagulation Recent Labs     03/07/22  1030   PTP 20.7*   INR 1.9*       Lipid Panel Lab Results   Component Value Date/Time    Cholesterol, total 107 08/12/2016 03:20 AM    HDL Cholesterol 56 08/12/2016 03:20 AM    LDL, calculated 26.4 08/12/2016 03:20 AM    VLDL, calculated 24.6 08/12/2016 03:20 AM    Triglyceride 123 08/12/2016 03:20 AM    CHOL/HDL Ratio 1.9 08/12/2016 03:20 AM BNP No results for input(s): BNPP in the last 72 hours. Liver Enzymes Recent Labs     03/07/22  1502   TP 6.4   ALB 2.8*   AP 95      Thyroid Studies Lab Results   Component Value Date/Time    TSH 1.34 07/12/2021 02:55 AM        Procedures/imaging: see electronic medical records for all procedures/Xrays and details which were not copied into this note but were reviewed prior to creation of Plan    XR CHEST PORT    Result Date: 3/4/2022  EXAM: XR CHEST PORT CLINICAL INDICATION/HISTORY: sob -Additional: None COMPARISON: 2/17/2022 TECHNIQUE: Frontal view of the chest _______________ FINDINGS: HEART AND MEDIASTINUM: Cardiomegaly. Primary sternotomy. LUNGS AND PLEURAL SPACES: Moderate right and small left pleural effusions. Bilateral parenchymal opacities. No pneumothorax. BONY THORAX AND SOFT TISSUES: No acute osseous abnormality _______________     Moderate right and small left pleural effusions. Bilateral parenchymal opacities possibly representing atelectasis versus edema. 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 Cardiomegaly and mediastinal contours are within normal limits for portable radiograph. There is small right pleural effusion with right lung base airspace disease. There is left lung base atelectasis or minimal infiltrate. There is a background of moderate emphysema. No acute osseous findings. ________________      Small right effusion with right lung base atelectasis and/or infiltrate. Minimal atelectasis and/or infiltrate left lung base.       Consuella Claude, MD

## 2022-03-09 NOTE — PROGRESS NOTES
RESPIRATORY NOTE:          SBT initiated post dialysis, awake and tolerating well at this time, will monitor.

## 2022-03-09 NOTE — PROGRESS NOTES
@2000 pt taken over drowsy in bed fla-cc 0, remains on ventilator via et tube. OG tube remains clamped. Versed drip remains in progress. Assessment completed and charted in relevant flow sheets. Nursing management continues. @0000 reassessment completed no changes. Pt continues to be monitored. @0400pt reassessed no changes care  continues. @6140 Bedside and Verbal shift change report given to Liban Rose (oncoming nurse) by Darlene Malave (offgoing nurse). Report included the following information SBAR, Kardex, Intake/Output, MAR, Recent Results, Med Rec Status and Alarm Parameters .

## 2022-03-09 NOTE — PROGRESS NOTES
RESPIRATORY NOTE:       Pt extubated to nasal cannula, BIPAP on S/B at the bedside, no respiratory distress noted at this time, RN and I agreed to hold off on BIPAP due to large amount of secretions that patient is coughing out at this time, will continue to assess and monitor respiratory status, BIPAP if needed, family at bedside.

## 2022-03-09 NOTE — PROGRESS NOTES
Status post hemodialysis with 3 L fluid removed; patient tolerated well. Patient remains off sedation since morning. After hemodialysis, patient is on SBT and tolerating well since 1 hour. Patient is alert, awake and responsive appropriately. He has good strong cough. Patient's wife and their family , Griselda Rose RN at bedside; I discussed with patient directly in presence of all these members about liberation from ventilation; its consequences including worsening CHF exacerbation and respiratory failure; if so patient agreed to be on CPAP/BiPAP but he does not want to be reintubated if he fails CPAP/BiPAP or liberation from ventilation. Discussed with patient directly and mentioned that if he needs endotracheal intubation and if he does not want intubation/ventilator support, then he may die; I asked patient if he understood appropriately, patient responded yes by nodding the head which was witnessed by wife and the family  and RN. After each question to patient, he was given time to respond yes or no by nodding his head and patient appropriately responded. I recommended extubate to BiPAP directly; which patient/wife/ agreed. Wife wants all the 8 family members to visit before liberation from ventilator; who are all in the parking lot; D/w ICU staff to let them coming in visit patient quickly. CODE STATUS changed from partial code to DNR/DNI. Order placed. Answered all questions to their satisfaction. Discussed with RT: Extubate directly to BiPAP.     Taylor Gonzalez MD 3/9/2022 4:43 PM

## 2022-03-09 NOTE — PROGRESS NOTES
Hospitalist Progress Note    Patient: Alissa Gunter MRN: [id number]  CSN: [id number]    YOB: 1940  Age: 79 y.o. Sex: male    DOA: 3/4/2022 LOS:  LOS: 5 days          Chief Complaint:    arrest      Assessment/Plan        John estes 79 y. o. male with chronic A. fib on Eliquis, cardiomyopathy with EF 15 to 20%, CKD, diabetes was admitted due to chf exacerbation, cornelio on ckd3. Cardiologist and nephrologist were on board. Pt decided to start HD and \"code blue\" was called on 3/7 during [facility] procedure due to PEA.  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  Dialyzed for first time yesterday, will go again today as per neprology    CML   Gleevec hold per hematologist recommendation       Anasarca   bumex      Hypokalemia  repeltion and dialysis planned     Diabetes type 2 with renal disease-SSI PRN     Awakening at this juncture, remains intuabted     Leg ulcer   Continue Wound care     Partial code status  Guarded prognosis     Disposition :  Patient Active Problem List   Diagnosis Code    TIA (transient ischemic attack) G45.9    Coronary artery disease involving native coronary artery I25.10    Type II diabetes mellitus, uncontrolled ([address] E11.65    Dyslipidemia E78.5    Pancytopenia ([facility] D61.818    CML (chronic myelocytic leukemia) ([facility] C92.10    Chronic atrial fibrillation (HCC) I48.20    Stage 3 chronic kidney disease ([facility] N18.30    Hyperglycemia R73.9    Elevated blood pressure reading with diagnosis of hypertension I10    Community acquired pneumonia J18.9    Chronic congestive heart failure (HCC) I50.9    SOB (shortness of breath) R06.02    On supplemental oxygen by nasal cannula Z78.9    Pneumonia J18.9    Pulmonary edema J81.1    Sepsis ([facility]) A41.9    Atrial fibrillation with RVR ([facility]) I48.91    Encounter for palliative care Z51.5    Debility R53.81    Acute on chronic combined systolic and diastolic congestive heart failure (HCC) I50.43    Pulmonary hypertension ([facility]) I27.20    Cardiomyopathy ([facility]) I42.9    ELIZABETH (acute kidney injury) ([facility]) N17.9    Pressure injury of sacral region, stage 1 L89.151    Chronic ulcer of left calf limited to breakdown of skin ([facility]) L97.221    Leg edema R60.0    Pressure injury of right buttock, stage 2 ([facility]) L89.312    Acute exacerbation of CHF (congestive heart failure) ([facility]) I50.9    Acute renal failure superimposed on stage 3 chronic kidney disease ([facility]) N17.9, N18.30    Bleeding nose R04.0    Leg ulcer ([facility]) L97.909    Hypokalemia E87.6    Anasarca R60.1    Cardiac arrest ([facility]) I46.9    Acute respiratory failure with hypoxia ([facility]) J96.01       Subjective:    No new nursing issues  Awakening, opens eyes on command, squeezes my fingers  intubated    Review of systems:    Limited by intubated status      Vital signs/Intake and Output:  Visit Vitals  /63   Pulse 83   Temp 98 °F (36.7 °C)   Resp 8   Wt 91.2 kg (201 lb 1 oz)   SpO2 100%   BMI 28.85 kg/m²     Current Shift:  03/09 0701 - 03/09 1900  In: 414 [I.V.:314]  Out: -   Last three shifts:  03/07 1901 - 03/09 0700  In: 749.1 [I.V.:649.1]  Out: 5628 [Urine:4850]    Exam:    General: elderly awake WM, intubated, NAD  CVS:Regular rate and rhythm, no M/R/G, S1/S2 heard, no thrill  Lungs:Clear to auscultation bilaterally, no wheezes, rhonchi, or rales  Abdomen: Soft, Nontender, No distention  Extremities: No C/C/E, pulses palpable 2+  Neuro:grossly normal , follows commands                  Labs: Results: Chemistry Recent Labs     03/09/22  0455 03/08/22  1500 03/08/22  0447 03/07/22  1502 03/07/22  1502   *  --  140*  --  198*     --  137  --  135*   K 2.9* 3.3* 2.6*   < > 3.0*   CL 96*  --  90*  --  91*   CO2 32  --  38*  --  37*   BUN 79*  --  110*  --  125*   CREA 2.26*  --  2.93*  --  3.36*   CA 9.1  --  8.8  --  8.7   AGAP 10  --  9  --  7   BUCR 35*  --  38*  --  37*   AP  --   --   --   --  95   TP  --   --   --   --  6.4   ALB  --   --   --   --  2.8*   GLOB  --   --   --   --  3.6   AGRAT  --   --   --   --  0.8    < > = values in this interval not displayed. CBC w/Diff Recent Labs     03/07/22  1502 03/07/22  0245   WBC 12.8 11.2   RBC 4.02* 3.94*   HGB 8.8* 8.7*   HCT 30.7* 30.2*   PLT 93* 93*   GRANS 68 76*   LYMPH 20* 9*   EOS 1 0      Cardiac Enzymes No results for input(s): CPK, CKND1, TARA in the last 72 hours. No lab exists for component: CKRMB, TROIP   Coagulation Recent Labs     03/07/22  1030   PTP 20.7*   INR 1.9*       Lipid Panel Lab Results   Component Value Date/Time    Cholesterol, total 107 08/12/2016 03:20 AM    HDL Cholesterol 56 08/12/2016 03:20 AM    LDL, calculated 26.4 08/12/2016 03:20 AM    VLDL, calculated 24.6 08/12/2016 03:20 AM    Triglyceride 123 08/12/2016 03:20 AM    CHOL/HDL Ratio 1.9 08/12/2016 03:20 AM      BNP No results for input(s): BNPP in the last 72 hours.    Liver Enzymes Recent Labs     03/07/22  1502   TP 6.4   ALB 2.8*   AP 95      Thyroid Studies Lab Results   Component Value Date/Time    TSH 1.34 07/12/2021 02:55 AM        Procedures/imaging: see electronic medical records for all procedures/Xrays and details which were not copied into this note but were reviewed prior to creation of Seda Kaufman MD

## 2022-03-09 NOTE — PROGRESS NOTES
CM notes patient on SBT today with plans to have dialysis today with evaluation for possible extubation/libertaion from ventilator today. CM to continue to monitor and remain available. Care Management Interventions  PCP Verified by CM:  Yes  Mode of Transport at Discharge: Self  Transition of Care Consult (CM Consult): Discharge Planning  Support Systems: Spouse/Significant Other,Child(nicko)  Confirm Follow Up Transport: Family  The Plan for Transition of Care is Related to the Following Treatment Goals : acute on chronic CHF  The Patient and/or Patient Representative was Provided with a Choice of Provider and Agrees with the Discharge Plan?: Yes  Name of the Patient Representative Who was Provided with a Choice of Provider and Agrees with the Discharge Plan: Bria Sam, patient  Discharge Location  Patient Expects to be Discharged to[de-identified] Home with family assistance

## 2022-03-09 NOTE — PROGRESS NOTES
RESPIRATORY NOTE:          Pt awake and responding to verbal stimuli, initiated SBT, tolerating well at this time, suctioned for small amount thick yellow, good strong cough noted, will monitor.

## 2022-03-09 NOTE — PROGRESS NOTES
Cardiology Progress Note        Patient: Eladio Pleitez        Sex: male          DOA: 3/4/2022  YOB: 1940      Age:  79 y.o.        LOS:  LOS: 5 days   Assessment/Plan     Principal Problem:    Acute on chronic combined systolic and diastolic congestive heart failure ([address] (7/12/2021)    Active Problems:    Type II diabetes mellitus, uncontrolled ([address] (8/11/2016)      CML (chronic myelocytic leukemia) ([address] (9/2/2016)      Chronic atrial fibrillation (HCC) (9/2/2016)      Atrial fibrillation with RVR ([address] (7/11/2021)      Debility ()      Cardiomyopathy ([address] (7/15/2021)      Acute renal failure superimposed on stage 3 chronic kidney disease ([address] (3/4/2022)      Bleeding nose (3/4/2022)      Leg ulcer ([address] (3/4/2022)      Hypokalemia (3/4/2022)      Anasarca (3/7/2022)      Cardiac arrest ([address] (3/7/2022)      Acute respiratory failure with hypoxia ([address] (3/8/2022)        Plan:  Patient is extubated  Denied any chest pain  Maintaining blood pressure and rate controlled A. Fib  Continue with current treatment  Discussed with patient and daughter-in-law      Patient is status post hemodialysis with 700 mL fluid removal  BP stable  Chronic atrial fibrillation      Discussed with the patient's wife and daughter in the room  Discussed with Dr. Aurelia Gooden with current medical treatment. Hypoxic respiratory failure and PEA arrest  Patient is intubated  Continue with supportive treatment  Resume Eliquis when permissive    Patient is being scheduled for possible dialysis tomorrow  Eliquis is on hold  A. fib is rate controlled  Discussed with patient and wife  patient reiterated he does not want future ICD  Continue with current medication treatment. Acute on chronic CHF  Chronic A.  Fib  Chronic cardiomyopathy patient have declined ICD in the past  CAD  CABG  Acute on chronic renal failure      Continue with current diuretic  Discussed with patient and wife.                      Subjective:    cc:  Shortness of breath  Leg swelling      REVIEW OF SYSTEMS:     Extubated no chest pain      Objective:      Visit Vitals  BP (!) 104/37   Pulse (!) 101   Temp 97.6 °F (36.4 °C)   Resp 22   Wt 91.2 kg (201 lb 1 oz)   SpO2 100%   BMI 28.85 kg/m²     Body mass index is 28.85 kg/m². Physical Exam:  General Appearance: Extubated   Forearm bruises looks better  NECK: No JVD, no thyroidomeglay. LUNGS: Clear bilaterally. HEART: S1 irregular+S2 audible,    ABD: Mildly distended   EXT: + edema, and no cysnosis. VASCULAR EXAM: Pulses are intact.     PSYCHIATRIC EXAM:  intubated    Medication:  Current Facility-Administered Medications   Medication Dose Route Frequency    pantoprazole (PROTONIX) 40 mg in 0.9% sodium chloride 10 mL injection  40 mg IntraVENous DAILY    heparin (porcine) 1,000 unit/mL injection 3,600 Units  3,600 Units Hemodialysis DIALYSIS CONTINUOUS    naloxone (NARCAN) injection 0.1 mg  0.1 mg IntraVENous Multiple    heparin (porcine) injection 5,000 Units  5,000 Units SubCUTAneous Q8H    midazolam (VERSED) injection 1 mg  1 mg IntraVENous Q2H PRN    fentaNYL citrate (PF) injection 25 mcg  25 mcg IntraVENous Q4H PRN    chlorhexidine (PERIDEX) 0.12 % mouthwash 10 mL  10 mL Oral Q12H    ELECTROLYTE REPLACEMENT PROTOCOL - Potassium Renal Dosing  1 Each Other PRN    ELECTROLYTE REPLACEMENT PROTOCOL  - Phosphorus Renal Dosing  1 Each Other PRN    ELECTROLYTE REPLACEMENT PROTOCOL - Magnesium   1 Each Other PRN    ELECTROLYTE REPLACEMENT PROTOCOL - Calcium   1 Each Other PRN    insulin lispro (HUMALOG) injection   SubCUTAneous Q6H    bumetanide (BUMEX) injection 2 mg  2 mg IntraVENous BID    metOLazone (ZAROXOLYN) tablet 10 mg  10 mg Oral DAILY    oxymetazoline (AFRIN) 0.05 % nasal spray 2 Spray  2 Spray Both Nostrils DAILY PRN    tamsulosin (FLOMAX) capsule 0.8 mg  0.8 mg Oral DAILY    sodium chloride (NS) flush 5-40 mL  5-40 mL IntraVENous Q8H  sodium chloride (NS) flush 5-40 mL  5-40 mL IntraVENous PRN    acetaminophen (TYLENOL) tablet 650 mg  650 mg Oral Q6H PRN    Or    acetaminophen (TYLENOL) suppository 650 mg  650 mg Rectal Q6H PRN    bisacodyL (DULCOLAX) suppository 10 mg  10 mg Rectal DAILY PRN    promethazine (PHENERGAN) tablet 12.5 mg  12.5 mg Oral Q6H PRN    Or    ondansetron (ZOFRAN) injection 4 mg  4 mg IntraVENous Q6H PRN    atorvastatin (LIPITOR) tablet 40 mg  40 mg Oral DAILY    carvediloL (COREG) tablet 25 mg  25 mg Oral BID    nitroglycerin (NITROSTAT) tablet 0.4 mg  0.4 mg SubLINGual PRN    glucose chewable tablet 16 g  4 Tablet Oral PRN    glucagon (GLUCAGEN) injection 1 mg  1 mg IntraMUSCular PRN    dextrose 10% infusion 0-250 mL  0-250 mL IntraVENous PRN    [Held by provider] apixaban (ELIQUIS) tablet 2.5 mg  2.5 mg Oral Q12H               Lab/Data Reviewed:  Procedures/imaging: see electronic medical records for all procedures/Xrays   and details which were not copied into this note but were reviewed prior to creation of Plan       All lab results for the last 24 hours reviewed. Recent Labs     03/07/22  1502 03/07/22  0245   WBC 12.8 11.2   HGB 8.8* 8.7*   HCT 30.7* 30.2*   PLT 93* 93*     Recent Labs     03/09/22  1448 03/09/22  0455 03/08/22  1500 03/08/22  0447 03/08/22  0447 03/07/22  1502 03/07/22  1502   NA  --  138  --   --  137  --  135*   K 3.6 2.9* 3.3*   < > 2.6*   < > 3.0*   CL  --  96*  --   --  90*  --  91*   CO2  --  32  --   --  38*  --  37*   GLU  --  147*  --   --  140*  --  198*   BUN  --  79*  --   --  110*  --  125*   CREA  --  2.26*  --   --  2.93*  --  3.36*   CA  --  9.1  --   --  8.8  --  8.7    < > = values in this interval not displayed. RADIOLOGY:  CT Results  (Last 48 hours)    None        CXR Results  (Last 48 hours)               03/09/22 0630  XR CHEST PORT Final result    Impression:      Moderate loculated right and small left pleural effusions with adjacent   atelectasis. Narrative:  EXAM: XR CHEST PORT       CLINICAL INDICATION/HISTORY: Intubated, on ventilator   -Additional: None       COMPARISON: One day prior       TECHNIQUE: Portable frontal view of the chest       _______________       FINDINGS:       SUPPORT DEVICES: Endotracheal tube in the upper thoracic trachea. Enteric tube   in the stomach. Right IJV tunneled dialysis catheter at the cavoatrial junction. HEART AND MEDIASTINUM: Cardiomegaly. LUNGS AND PLEURAL SPACES: Moderate loculated right effusion adjacent   atelectasis. Small left effusion/atelectasis. No pneumothorax.       _______________           03/08/22 1858  XR CHEST PORT Final result    Impression:      1. Patchy bilateral lung parenchymal opacity, not appreciably changed. Differential includes pulmonary edema and atypical infection. 2. Bilateral pleural effusions, larger on the right. Narrative:  CLINICAL HISTORY:  Shortness of breath. Tube placement. COMPARISONS:  Chest x-ray 3/8/2022       TECHNIQUE:  single frontal view of the chest       ------------------------------------------       FINDINGS:       Lungs:  Mild to moderate patchy parenchymal opacity in both lungs, greater on   the right, not appreciably changed from prior radiograph. Small to   moderate-sized right and small left pleural effusion, not appreciably changed. Mediastinum: Moderate cardiomegaly. Atherosclerotic arterial calcification. Endotracheal tube tip approximately 5.5 cm above the bryce. NG/OG tube passes   beyond edge of film in expected location of stomach. Dual-lumen central venous   catheter, tip slightly entering right atrium. Bones: No evidence of fracture or suspicious bone lesion.           ------------------------------------------       03/08/22 0618  XR CHEST PORT Final result    Impression:      Moderate loculated right and small left pleural effusions with adjacent   atelectasis.             Narrative:  EXAM: XR CHEST PORT       CLINICAL INDICATION/HISTORY: Intubated, on ventilator   -Additional: None       COMPARISON: One day prior       TECHNIQUE: Portable frontal view of the chest       _______________       FINDINGS:       SUPPORT DEVICES: Endotracheal tube in the upper thoracic trachea. Enteric tube   in the stomach. Right IJV tunneled dialysis catheter at the cavoatrial junction. HEART AND MEDIASTINUM: Cardiomegaly. LUNGS AND PLEURAL SPACES: Moderate loculated right effusion adjacent   atelectasis. Small left effusion/atelectasis.  No pneumothorax.       _______________                   Cardiology Procedures:   Results for orders placed or performed during the hospital encounter of 03/04/22   EKG, 12 LEAD, INITIAL   Result Value Ref Range    Ventricular Rate 100 BPM    QRS Duration 96 ms    Q-T Interval 392 ms    QTC Calculation (Bezet) 505 ms    Calculated R Axis -55 degrees    Calculated T Axis 141 degrees    Diagnosis       Atrial fibrillation with premature ventricular or aberrantly conducted   complexes  Left axis deviation  Anterior infarct (cited on or before 11-JUL-2021)  Prolonged QT  Abnormal ECG  When compared with ECG of 17-FEB-2022 17:43,  QT has lengthened  Confirmed by Britton Monsalve MD. ([id number]) on 3/6/2022 10:43:55 PM        Echo Results  (Last 48 hours)    None       Cardiolite (Tc-99m Sestamibi) stress test    Signed By: Bartolome Yeboah MD     March 9, 2022

## 2022-03-09 NOTE — DIALYSIS
TREATMENT SUMMARY   Patient dialyzed in room ICU 7  Tolerated treatment well without complaint or complications.    RIJ TDC functioning well without complication of BFR or accessing       3000 ml removed via UF with a with a net removal 2500 ml  Report given to Mike Christianson RN with all questions answered     TREATMENT  NOTES                                                                                                     ACUTE HEMODIALYSIS FLOW SHEET       ACCESS   CATHETER ACCESS: [] N/A  [x] RIGHT  [] LEFT  [x] IJ  [] SUBCL [] FEM                    [] First use X-ray  [x] Tunnel     [] Non-Tunneled      [x] No S/S infection  [] Redness [] Drainage  [] Cultured [] Swelling [] Pain                    [x] Medical Aseptic [] Prep Dressing Changed                  [] Clotted [] Patent []      Flows: [x] Good [] Poor [] Reversed                 If Access Problem Dr. Christiano Bhandari: [] Yes [] No    Date:_____  [] N/A[]   GRAFT/FISTULA ACCESS:  [x] N/A  [] RIGHT  [] LEFT  [] UE   [] LE       [] AVG  [] AVF [] BUTTONHOLE    [] +BRUIT/THRILL [] MEDICAL ASEPTIC PREP     [] No S/S infection  [] Redness [] Drainage  [] Cultured [] Swelling [] Pain              If Access Problem Dr. Alvarado Cap: [] Yes [] No    Date:______ [] N/A     RO/HEMODIAYLSIS MACHINE SAFETY CHECKS- BEFORE EACH TREATMENT          [] [facility]: Machine Serial :  [id number]   RO Serial : [id number]     Alarm Test: [x] Pass  Time__1000______  [x] RO/Machine Log Complete    [x] Extracorporeal circuit Tested for integrity           Dialyzer_[id number]_________   Tubing__21h-12__________    Dialysate: pH__7.2_  Temp.__37___Conductivity: Meter __14__ HD Machine__13.8_   CHLORINE TESTING- BEFORE EACH TREATMENT AND EVERY 4 HOURS   Total Chlorine: [x] Less than 0.1 ppm Time:_1000____2nd Check Time:_nr_____  (If greater than 0.1 ppm from Primary then every 30 minutes from Secondary)   TREATMENT INIATION-WITH DIALYSIS PRECAUTIONS   [x] All Connections Secured [x] Saline Line Double Clamped    [x] Venous Parameters Set [x] Arterial Parameters Set    [x] Prime Given 250 ml     [x] Air Foam Detector Engaged   PRE-TREATMENT   UF Calculations: Wt to lose:__2500__ml(+) Oral:_0_ml(+)IV Meds/Fluids/Blood prods_0__ml(+) Prime/Rinse__500_ml(=)Total UF Goal__3000__mL     Tx Initiation Note: RECEIVED REPORT. PATIENT ALERT AND ORIENTED. VITAL SIGNS WNL. NAD. ALL QUESTIONS ANSWERED WITH PATIENT  SAFETY CHECKS COMPLETE. TIME OUT COMPLETE. TREATMENT INITIATED VIA _RIJ TDC____. NO CONCERNS NOTED. [x] Time Out/Safety Check  Time:__1005___     Dialyzer cleared: [x] Good [] Fair [] Poor     Blood Volume Processed ____L   Net UF Removed _2500___mL  Post Tx Access:                  AVF/AVG: Bleeding Stop Time      Art. NA_min Juan.__NA_min []+bruit/thrill                              Catheter: Locking Solution  [] Heparin 1 ml/1000 units                                                             [] Normal Saline                                                                      Art._1.8____ ml Juan._1.8____ml                                                           [x] New caps placed       Abbreviations: AVG-arterial venous graft, AVF-arterial venous fistula, IJ-Internal Jugular,  Subcl-Subclavian, Fem-Femoral, Tx-treatment, AP/HR-apical heart rate, DFR-dialysate flow rate, BFR-blood flow rate, AP-arterial pressure, -venous pressure, UF-ultrafiltrate, TMP-transmembrane pressure, Juan-Venous, Art-Arterial, RO-Reverse Osmosis

## 2022-03-09 NOTE — PROGRESS NOTES
03/08/22 1845   Airway - Endotracheal Tube 03/07/22 Oral   Placement Date/Time: 03/07/22 1450   Number of Attempts: 1  Inserted By: Jenny Pierson CRNA  Present on Admission/Arrival: No  Location: Oral  Placement Verified: Auscultation; Chest x-ray;EtCO2  Airway Types: Endotracheal, cuffed   Insertion Depth (cm) 24 cm   Line Alex Lips   Side Secured Left   Cuff Pressure 30 cmH20   Site Assessment Clean, dry, & intact   Patient tube migrated out of airway, Patient volumes were reduced and audibile leak was heard. Tube was advanced to 24 at lip, CRNA called and assessed after reposition. CRX done, volumes returned to set volumes.

## 2022-03-09 NOTE — PROGRESS NOTES
Problem: Chronic Renal Failure  Goal: *Fluid and electrolytes stabilized  Outcome: Progressing Towards Goal     Problem: Patient Education: Go to Patient Education Activity  Goal: Patient/Family Education  Outcome: Not Progressing Towards Goal

## 2022-03-09 NOTE — WOUND CARE
IP WOUND CONSULT    Shima Abbott  MEDICAL RECORD NUMBER:  [id number]  AGE: 79 y.o.    GENDER: male  : 1940  TODAY'S DATE:  3/9/2022    GENERAL     [] Follow-up   [x] New Consult    Shima Abbott is a 79 y.o. male referred by:   [] Physician  [x] Nursing  [] Other:         PAST MEDICAL HISTORY    Past Medical History:   Diagnosis Date    CAD (coronary artery disease)     [facility] 2016    CML (chronic myelocytic leukemia) [facility]     Coagulation disorder (HCC)     bruises easily    Diabetes ([facility] 2012    Hypertension     Nausea & vomiting     with most recent surgery-carotid endardarectomy    TIA (transient ischemic attack) 2016        PAST SURGICAL HISTORY    Past Surgical History:   Procedure Laterality Date    HX CAROTID ENDARTERECTOMY Left 2017    HX CATARACT REMOVAL Bilateral     HX CORONARY ARTERY BYPASS GRAFT      HX HERNIA REPAIR Right     hernia    HX UROLOGICAL      greenlight laser    HX UROLOGICAL      kidney stone    HX UROLOGICAL      Lithotripsy x 2    IR INSERT TUNL CVC W/O PORT OVER 5 YR  3/7/2022    [place] CARDIAC SURG PROCEDURE UNLIST  3/2014    quadruple bypass       FAMILY HISTORY    Family History   Problem Relation Age of Onset    Post-op Nausea/Vomiting Brother        SOCIAL HISTORY    Social History     Tobacco Use    Smoking status: Former Smoker     Packs/day: 1.00     Years: 60.00     Pack years: 60.00     Quit date: 3/23/2014     Years since quittin.9    Smokeless tobacco: Never Used   Substance Use Topics    Alcohol use: Not Currently     Alcohol/week: 2.0 standard drinks     Types: 2 Shots of liquor per week     Comment: per day    Drug use: No       ALLERGIES    Allergies   Allergen Reactions    Adhesive Tape-Silicones Other (comments)     Raw skin    Codeine Nausea and Vomiting    Iodinated Contrast Media Rash     patient states this was years ago - he states that he can tolerate the newer agents MEDICATIONS    No current facility-administered medications on file prior to encounter. Current Outpatient Medications on File Prior to Encounter   Medication Sig Dispense Refill    bumetanide (BUMEX) 2 mg tablet Take 1 Tablet by mouth two (2) times a day. 60 Tablet 0    metOLazone (ZAROXOLYN) 2.5 mg tablet Take 2 Tablets by mouth daily. 30 Tablet 0    imatinib (Gleevec) 400 mg tablet Take 200 mg by mouth daily.  mecobalamin, vitamin B12, 1,000 mcg TbDi Take 1 Tablet by mouth daily.  glucose 4 gram chewable tablet Take 4 g by mouth daily as needed for Other (low BS).  OXYGEN-AIR DELIVERY SYSTEMS 2 L by IntraNASal route continuous.  insulin glargine (LANTUS,BASAGLAR) 100 unit/mL (3 mL) inpn 3-4 Units by SubCUTAneous route daily as needed for Other (high BS). patient reporting he has no actual sliding scale for medication- takes 3-4 units depending on his BS, only takes if greater than 150.  carvediloL (COREG) 25 mg tablet Take 1 Tablet by mouth two (2) times a day. 60 Tablet 0    apixaban (ELIQUIS) 2.5 mg tablet Take 1 Tablet by mouth every twelve (12) hours. 60 Tablet 0    gabapentin (NEURONTIN) 100 mg capsule Take 1 Capsule by mouth nightly. Max Daily Amount: 100 mg. (Patient taking differently: Take 300 mg by mouth nightly.) 30 Capsule 0    aspirin delayed-release 81 mg tablet Take 81 mg by mouth daily.  vitamin E (AQUA GEMS) 400 unit capsule Take 400 Units by mouth daily.  tamsulosin (FLOMAX) 0.4 mg capsule Take 0.4 mg by mouth daily.  nitroglycerin (NITROSTAT) 0.4 mg SL tablet 1 Tab by SubLINGual route as needed for Chest Pain. Up to 3 doses. 20 Tab 0    atorvastatin (LIPITOR) 40 mg tablet Take 40 mg by mouth daily.          Wt Readings from Last 3 Encounters:   03/08/22 91.2 kg (201 lb 1 oz)   02/17/22 94.8 kg (209 lb)   02/03/22 93.9 kg (207 lb)       [unfilled]  Visit Vitals  /70   Pulse (!) 102   Temp 98 °F (36.7 °C)   Resp 20   Wt 91.2 kg (201 lb 1 oz)   SpO2 100%   BMI 28.85 kg/m²       ASSESSMENT     Skin impairment Identification:  Type: venous, pressure and traumatic    Contributing Factors: decreased tissue oxygenation and renal failure heart failure    Wound Leg lower Left; Anterior;Medial ruptured blister (Active)   Wound Image   03/09/22 1030   Wound Etiology Venous 03/09/22 1030   Dressing Status Breakthrough drainage noted;New drainage noted 03/09/22 1030   Dressing/Treatment Alginate with Ag;Silicone border 60/07/55 1030   Dressing Change Due 03/11/22 03/09/22 1030   Wound Length (cm) 3.1 cm 03/09/22 1030   Wound Width (cm) 2.5 cm 03/09/22 1030   Wound Depth (cm) 0.1 cm 03/09/22 1030   Wound Surface Area (cm^2) 7.75 cm^2 03/09/22 1030   Change in Wound Size % -29.17 03/09/22 1030   Wound Volume (cm^3) 0.775 cm^3 03/09/22 1030   Wound Healing % -29 03/09/22 1030   Wound Assessment Pale granulation tissue 03/09/22 1030   Drainage Amount Moderate 03/09/22 1030   Drainage Description Serous 03/09/22 1030   Wound Odor None 03/09/22 1030   Huong-Wound/Incision Assessment Intact 03/09/22 1030   Edges Attached edges 03/09/22 1030   Wound Thickness Description Partial thickness 03/09/22 1030   Number of days: 16       Wound Leg lower Left;Medial ruptured blister (Active)   Wound Image   03/09/22 1030   Wound Etiology Venous 03/09/22 1030   Dressing Status Breakthrough drainage noted;New drainage noted 03/09/22 1030   Dressing/Treatment Alginate with Ag;Silicone border 18/49/59 1030   Dressing Change Due 03/11/22 03/09/22 1030   Wound Length (cm) 2.6 cm 03/09/22 1030   Wound Width (cm) 2.7 cm 03/09/22 1030   Wound Depth (cm) 0.1 cm 03/09/22 1030   Wound Surface Area (cm^2) 7.02 cm^2 03/09/22 1030   Change in Wound Size % 22 03/09/22 1030   Wound Volume (cm^3) 0.702 cm^3 03/09/22 1030   Wound Healing % 22 03/09/22 1030   Wound Assessment Pale granulation tissue 03/09/22 1030   Drainage Amount Moderate 03/09/22 1030   Drainage Description Serous 03/09/22 1030   Wound Odor None 03/09/22 1030   Huong-Wound/Incision Assessment Intact 03/09/22 1030   Edges Attached edges 03/09/22 1030   Wound Thickness Description Partial thickness 03/09/22 1030   Number of days: 16       Wound Leg lower Left; Anterior;Mid ruptured blister (Active)   Number of days: 16       Wound Sacral/coccyx stage II (Active)   Wound Image   03/09/22 1030   Wound Etiology Deep Tissue/Injury 03/09/22 1030   Dressing Status Intact;Dry;Clean 03/09/22 1030   Cleansed Other (Comment) 03/09/22 1030   Dressing/Treatment Silicone border 41/05/10 1030   Wound Assessment Epithelialization 03/09/22 1030   Drainage Amount None 03/09/22 1030   Wound Odor None 03/09/22 1030   Edges Attached edges 03/09/22 1030   Number of days: 9       Wound Leg lower Anterior;Mid intact old blister (Active)   Wound Image   03/09/22 1030   Wound Etiology Venous 03/09/22 1030   Dressing Status New drainage noted;Breakthrough drainage noted 03/09/22 1030   Cleansed Other (Comment) 03/09/22 1030   Dressing/Treatment Silicone border 37/23/59 1030   Dressing Change Due 03/11/22 03/09/22 1030   Wound Length (cm) 1 cm 03/09/22 1030   Wound Width (cm) 1 cm 03/09/22 1030   Wound Surface Area (cm^2) 1 cm^2 03/09/22 1030   Wound Assessment Epithelialization 03/09/22 1030   Drainage Amount None 03/09/22 1030   Wound Odor None 03/09/22 1030   Huong-Wound/Incision Assessment Intact 03/09/22 1030   Edges Attached edges 03/09/22 1030   Wound Thickness Description Partial thickness 03/09/22 1030   Number of days: 0       Wound Knee Anterior abrasion (Active)   Wound Image   03/09/22 1030   Wound Etiology Traumatic 03/09/22 1030   Dressing Status Old drainage noted 03/09/22 1030   Dressing/Treatment Alginate with Ag;Silicone border 27/56/70 1030   Dressing Change Due 03/11/22 03/09/22 1030   Wound Length (cm) 1.5 cm 03/09/22 1030   Wound Width (cm) 1.5 cm 03/09/22 1030   Wound Depth (cm) 0.1 cm 03/09/22 1030   Wound Surface Area (cm^2) 2.25 cm^2 03/09/22 1030   Wound Volume (cm^3) 0.225 cm^3 03/09/22 1030   Wound Assessment Pale granulation tissue 03/09/22 1030   Drainage Amount Small 03/09/22 1030   Wound Odor None 03/09/22 1030   Huong-Wound/Incision Assessment Intact 03/09/22 1030   Edges Attached edges 03/09/22 1030   Wound Thickness Description Partial thickness 03/09/22 1030   Number of days: 0          PLAN     Skin Care & Pressure Relief Recommendations  Minimize layers of linen  Pads under patient to optimize support surface  Turn/reposition approximately every 2 hours  Pillow wedges  Manage incontinence   Please use in bed position system  Offload heels pillows    Recommendations: keep dressings in place if leg dressing become saturated apply alginate with silver and cover with ABD pad and secure loosely with Suly    Teaching completed with:   [] Patient           [] Family member       [] Caregiver          [x] Nursing  [] Other    Patient/Caregiver Teaching:  Level of patient/caregiver understanding able to:   [x] Indicates understanding       [] Needs reinforcement  [] Unsuccessful      [] Verbal Understanding  [] Demonstrated understanding       [] No evidence of learning  [] Refused teaching         [] N/A       Electronically signed by Cortez Lundborg, RN on 3/9/2022 at 10:50 AM

## 2022-03-09 NOTE — PROGRESS NOTES
Assessment:     Audie Ardon is a 79 y.o. male with chronic A. fib on Eliquis, cardiomyopathy with EF 15 to 20%, CKD stage 4 , diabetes presented to ER due to worsening shortness of breath for 3 days prior to admission       Acute on chronic combined systolic and diastolic congestive heart failure   CML (chronic myelocytic leukemia)      Chronic atrial fibrillation       CKD stage 4      ROS/Relevant events:   He had cardiac arrest after [facility] placement, 3 rounds of epi and CPR on 3/7  Remained in ICU in mechanical ventilator  BP stable not on vasopressors   Decent urine output overnight on diuretics   Unable to obtained review of systems         Plan:-  - He tolerated first session of dialysis yesterday. Still has significant fluid overload. Will plan for second session HD today for 3 hours . Case management consult for outpatient dialysis chair in [facility]    Continue Iv bumex and metolazone for now   Appreciate cardiology assistance  Intake and output   Less than 2 gram salt per day   fluid restriction to 1.2 liter  Dose all meds for current eGFR   Hold apixaban he has hematuria    Hypokalemia- Agree with replace to keep level around 4-4.5 meq/lt    Will dialyze him on 4 k bath     Rest of the management per primary team        Subjective:   Unable to verbalize complaints as he is on vent       Blood pressure 116/63, pulse 100, temperature 98 °F (36.7 °C), resp. rate 16, weight 91.2 kg (201 lb 1 oz), SpO2 100 %.     Sedated on mechanical vent   Crackles in lung exam   3+ Edema in B/L LE  Abdomen is distended and edematous          Intake/Output Summary (Last 24 hours) at 3/9/2022 1003  Last data filed at 3/9/2022 0800  Gross per 24 hour   Intake 1028 ml   Output 3628 ml   Net -2600 ml      Recent Labs     03/07/22  1502   WBC 12.8     Lab Results   Component Value Date/Time    Sodium 138 03/09/2022 04:55 AM    Potassium 2.9 (LL) 03/09/2022 04:55 AM    Chloride 96 (L) 03/09/2022 04:55 AM    CO2 32 03/09/2022 04:55 AM    Anion gap 10 03/09/2022 04:55 AM    Glucose 147 (H) 03/09/2022 04:55 AM    BUN 79 (H) 03/09/2022 04:55 AM    Creatinine 2.26 (H) 03/09/2022 04:55 AM    BUN/Creatinine ratio 35 (H) 03/09/2022 04:55 AM    GFR est AA 34 (L) 03/09/2022 04:55 AM    GFR est non-AA 28 (L) 03/09/2022 04:55 AM    Calcium 9.1 03/09/2022 04:55 AM        Current Facility-Administered Medications   Medication Dose Route Frequency Provider Last Rate Last Admin    pantoprazole (PROTONIX) 40 mg in 0.9% sodium chloride 10 mL injection  40 mg IntraVENous DAILY Rupali Lacy MD   40 mg at 03/08/22 0933    heparin (porcine) 1,000 unit/mL injection 3,600 Units  3,600 Units Hemodialysis DIALYSIS CONTINUOUS Marty Carvalho MD   3,600 Units at 03/08/22 1500    naloxone (NARCAN) injection 0.1 mg  0.1 mg IntraVENous Multiple Giovanna Kaur MD   0.1 mg at 03/07/22 1429    heparin (porcine) injection 5,000 Units  5,000 Units SubCUTAneous Q8H Rupali Lacy MD   5,000 Units at 03/09/22 0503    fentaNYL (PF) 900 mcg/30 ml infusion soln  0-200 mcg/hr IntraVENous TITRATE Mirela Frye MD   Stopped at 03/08/22 0800    midazolam in normal saline (VERSED) 1 mg/mL infusion  0-10 mg/hr IntraVENous TITRATE Mirela Frye MD 1 mL/hr at 03/07/22 2257 1 mg/hr at 03/07/22 2257    midazolam (VERSED) injection 1 mg  1 mg IntraVENous Q2H PRN Mirela Frye MD        fentaNYL citrate (PF) injection 25 mcg  25 mcg IntraVENous Q4H PRN Mirela Frye MD   25 mcg at 03/08/22 1612    chlorhexidine (PERIDEX) 0.12 % mouthwash 10 mL  10 mL Oral Q12H Mirela Frye MD   10 mL at 03/08/22 2106    ELECTROLYTE REPLACEMENT PROTOCOL - Potassium Renal Dosing  1 Each Other PRN Rupali Lacy MD        ELECTROLYTE REPLACEMENT PROTOCOL  - Phosphorus Renal Dosing  1 Each Other PRN Rupali Lacy MD        ELECTROLYTE REPLACEMENT PROTOCOL - Magnesium   1 Each Other PRN [place] MD Regino        ELECTROLYTE REPLACEMENT PROTOCOL - Calcium   1 Each Other PRN [place] MD Regino        insulin lispro (HUMALOG) injection SubCUTAneous Q6H Halina Perkins MD   2 Units at 03/09/22 0600    bumetanide (BUMEX) injection 2 mg  2 mg IntraVENous BID You Gómez DO   2 mg at 03/08/22 2105    metOLazone (ZAROXOLYN) tablet 10 mg  10 mg Oral DAILY You Gómez R, DO   10 mg at 03/08/22 9512    oxymetazoline (AFRIN) 0.05 % nasal spray 2 Spray  2 Spray Both Nostrils DAILY PRN Simon Cabrales MD   2 [place] at 03/05/22 0138    tamsulosin (FLOMAX) capsule 0.8 mg  0.8 mg Oral DAILY You Gómez DO   0.8 mg at 03/08/22 0939    sodium chloride (NS) flush 5-40 mL  5-40 mL IntraVENous Q8H Halina Perkins MD   10 mL at 03/09/22 0504    sodium chloride (NS) flush 5-40 mL  5-40 mL IntraVENous PRN Halina Perkins MD        acetaminophen (TYLENOL) tablet 650 mg  650 mg Oral Q6H PRN Halina Perkins MD   650 mg at 03/07/22 1311    Or    acetaminophen (TYLENOL) suppository 650 mg  650 mg Rectal Q6H PRN Halina Perkins MD        bisacodyL (DULCOLAX) suppository 10 mg  10 mg Rectal DAILY PRN Halina Perkins MD   10 mg at 03/07/22 0645    promethazine (PHENERGAN) tablet 12.5 mg  12.5 mg Oral Q6H PRN Halina Perkins MD   12.5 mg at 03/06/22 1618    Or    ondansetron (ZOFRAN) injection 4 mg  4 mg IntraVENous Q6H PRN Halina Perkins MD   4 mg at 03/05/22 0345    atorvastatin (LIPITOR) tablet 40 mg  40 mg Oral DAILY Halina Perkins MD   40 mg at 03/08/22 4362    carvediloL (COREG) tablet 25 mg  25 mg Oral BID Halina Perkins MD   25 mg at 03/08/22 2105    nitroglycerin (NITROSTAT) tablet 0.4 mg  0.4 mg SubLINGual PRN Halina Perkins MD        glucose chewable tablet 16 g  4 Tablet Oral PRN Halina Perkins MD        glucagon [facility]) injection 1 mg  1 mg IntraMUSCular PRN Halina Perkins MD        dextrose 10% infusion 0-250 mL  0-250 mL IntraVENous PRN Halina Perkins MD        [Held by provider] apixaban [place]) tablet 2.5 mg  2.5 mg Oral Q12H Halina Perkins MD            Approx 30 minutes of critical care time spent in the direct evaluation and formulation of treatment plan of this high risk patient.  The reason for providing this level of medical care was due a critical illness that impaired one or more vital organ systems such that there was a high probability of imminent or life threatening deterioration in the patients condition. This care involved high complexity decision making to assess, manipulate, and support vital system functions, to treat this degreee vital organ system failure and to prevent further life threatening deterioration of the patients condition.          Zahira Ha MD  [facility].- Nephrology

## 2022-03-09 NOTE — PROGRESS NOTES
Palliative Medicine    CODE STATUS: partial code (continue intubation, no chest compressions/ACLS medications/electric shocks)     AMD Status: on file naming his daughter and son as his MPOAs     3/9/2022 0930 Seen today in room ICU 7 along with Emily Kinsey NP. Lying in bed. Opens eyes when directed. Completed SBT successfully today. Dialysis team in room preparing for his second dialysis event. Yesterday's dialysis was shortened 2/2 hypotension. 1430: Met with Mrs Belinda Josue and other family members in the waiting room. Discussed that he tolerated his dialysis better today and was on a 2nd SBT. Did discuss that before he can be extubated, a decision will have to be made about whether he should be reintubated. Mr Belinda Josue was awake and able to nod to questions during his SBT. Mrs Belinda Josue wants to talk to him about whether he would find reintubation acceptable. Encouraged her to ask only yes/no questions as he may become frustrated wit his anability to phonate. Did discuss that if the decision is made for \"do not reintubate\", and he does decline to a point where re-intubation would be necessary, it would be prudent to change the goals of care to a comfort based plan. If he did decline to that point, there is a high likelihood that he could not be liberated from the ventilator. Mrs Belinda Josue, her , sister-in-law, and niece all verbalized understanding. Disposition plan: to be determined based on response to treatment and patient/family decisions    Palliative care will continue to follow Khurram Edwards  and his family during his hospitalization and support them as they make healthcare decisions and define goals of care.       Satish Finnegan RN, MSN  Palliative Medicine  P: [phone]

## 2022-03-09 NOTE — DIABETES MGMT
Diabetes/ Glycemic Control Plan of Care  Recommendations:    Continue Corrective Humalog as ordered    Assessment:   Patient NPO since 3/7/22. POC glucose has been within target since (ranging 135-155mg/dL over the past 24 hours) on minimal corrective insulin. Patient has received a total of 2 units Corrective Humalog in the past 24 hours. Anticipate BG elevation when diet resumed - continue monitoring for additional glycemic needs. Recent Glucose Results:   Lab Results   Component Value Date/Time     (H) 03/09/2022 04:55 AM    GLUCPOC 149 (H) 03/09/2022 11:32 AM    GLUCPOC 155 (H) 03/09/2022 05:09 AM    GLUCPOC 147 (H) 03/08/2022 11:14 PM          Within target range (70-180mg/dL):  Yes - for the past 24 hours  (prior hyperglycemia >180)  Current insulin orders:    Corrective Humalog Q6 hours - normal sensitivity scale  Total Daily Dose previous 24 hours =  2 units     Plan/Goals:   Blood glucose will be within target of 70 - 180 mg/dl within 72 hours      [address] [place], BSN, [address]   Glycemic Control Team   Phone:  [phone]  Tues - Thurs 8:30 - 4:30

## 2022-03-09 NOTE — PROGRESS NOTES
Palliative Medicine Consult    Patient Name: Alissa Gunter  YOB: 1940    Date of Initial Consult: 3/7/2022  Date of follow-up: 3/8/2022, 3/9/2022  Reason for Consult: Goals of care discussions  Requesting Provider: Dr. Mabel Sky   Primary Care Physician: Kurt Nieves DO      SUMMARY:   Ailssa Gunter is a 79 y.o. with a past history of chronic CHF, HTN, CML, CKD stage 4, DM2, and cardiomyopathy, who was admitted on 3/4/2022 from home with a diagnosis of acute on chronic combined systolic and diastolic congestive heart failure, acute on chronic kidney disease stage IV, and A. fib. Current medical issues leading to Palliative Medicine involvement include: Goals of care discussions. 3/8/2022: Patient  is orally intubated on mechanical ventilator. Noted events yesterday- patient had a cardiac arrest with ROSC within approximately 5 minutes. 3/9/2022:  Mr. Meme Zamora is lying in bed, orally intubated on mechanical ventilator. Able to follow simple commands such as open eyes and squeeze fingers. SBT this morning, for dialysis again today. Appears comfortable. PALLIATIVE DIAGNOSES:   1. Encounter for palliative care/Goals of care discussions  2. Acute on chronic combined systolic and diastolic congestive heart failure  3. Acute on chronic kidney disease stage IV  4. Advanced age/debility       PLAN:   3/9/2022:  Seen at bedside along with Ms. Genesis Ward RN. Staff at bedside providing care. Mr. Meme Zamora is lying in bed, orally intubated on mechanical ventilator. FiO2 30%, Peep 5. SBT this morning, dialysis planned for today. Patient opens eyes on command and is able to follow simple commands such as squeeze fingers. Able to weakly nod head. Appears comfortable. No family present. Palliative team will follow to see how well patient tolerates dialysis and if he will be a candidate for medical extubation.   At this time goals of care remain Partial Code:  DNR (including no chest compressions, no ACLS meds and no shock) in the event of cardiac arrest.  Continue full interventions prearrest.  Addendum @1500:  Rounded on patient who has completed dialysis and tolerated it well. Per record review, net removal of 2500ml. Patient's sister, Vanita Argueta, at bedside with patient. Met with patient's wife, Cayden Borrego, her  and other family in follow-up from yesterday's visit. Discussed that patient is on a breathing trial since completing dialysis and talked about whether or not patient would want to be re-intubated if it became necessary. Wife shared that she is going to talk with her  about this when she goes in to see him this afternoon. Discussed option of comfort measures and stopping dialysis if patient does NOT wish to be reintubated. At this time, continue with Partial Code:  DNR (including no chest compressions, no ACLS meds, and no shock) in the event of cardiac arrest.  Continue full interventions prearrest.    Please see below for previous conversations with the palliative medicine team:    3/8/2022: Palliative medicine team including Neftali Viera RN and I met with patient at patient's bedside. He is orally intubated on mechanical ventilator. Noted events yesterday- patient had a cardiac arrest with ROSC within approximately 5 minutes. Met with patient's family today including daughter/medical power of  Adrian Nieto, and patient's wife Cayden Borrego. Medical update provided, and reviewed discussion that we had with patient yesterday (see below). Family acknowledged that patient has been tired for quite some time. Family reports that patient has also shared with his family numerous times that he would want to be resuscitated in the event of cardiopulmonary arrest, but if he were not doing well post resuscitation he would not want to stay on machines or be resuscitated again.   In honoring patient's wishes, family would like to continue full aggressive measures while alive, including continued intubation, and dialysis initiation. Family would like DNR in the event of cardiopulmonary arrest.  Orders placed for Partial code: DNR (including no chest compressions, no ACLS meds, and no shock) in the event of cardiac arrest. Continue full interventions prearrest.  Family would like to give some patient some time to see if he is able to liberate from ventilator, but they understand that there is a risk he may not be able to wean from a ventilator safely. Briefly discussed compassionate extubation as a future option, as family is clear in honoring his wish to not maintain life on life support should patient not recover enough to be liberated from the ventilator. We will continue to follow closely with you. See previous discussions below    3/7/2022: Goals of care discussions: Palliative medicine team including Lazarus Stephenson RN and I met with patient at patient's bedside. Patient is awake, alert, and oriented x4. He has a flat affect, but did engage in goals of care discussions. Patient has an AMD on file naming his daughter Ynes Neville, and then son Tiffanie Juarez. He confirms this is correct. He reviewed his current health situation, and states that he has declined an AICD placement for his cardiomyopathy. Patient states he does not recognize the benefit of AICD, as he already knows his heart is weak and recognizes his life may be short. Patient also states he is starting dialysis today or tomorrow, and is open to try it as it is uncomfortable and difficult for him to breathe with fluid overload. He understands that dialysis is his choice, and if he is having difficulty tolerating it he may stop at any time. Discussed the benefits and burdens of CPR in the event of cardiopulmonary arrest in the setting of advanced age with chronic comorbidities.  Discussed the benefits and burdens of intubation in the event of respiratory decline pre-arrest.  Patient did not understand that successful resuscitation includes intubation. At this time he wants efforts at resuscitation, but would not want to be on the ventilator long-term should he not be able to liberate from the ventilator. He would like more time to think about this, and we will revisit again during this hospitalization. Continue full code with full interventions at this time. Support offered to patient who is overall worried about how his wife is going to handle his declining health. 1. Acute on chronic combined systolic and diastolic congestive heart failure: With cardiomyopathy, patient has declined an AICD in the past.  Followed by cardiology. Plan for dialysis for fluid removal, nephrology following. 2. Acute on chronic kidney disease stage IV: Plan for dialysis today for fluid removal, nephrology following. 3. Advanced age/debility: 79-year-old male who lives at home with his wife. He was ambulatory without any assistive devices prior to admission, but notes that he has been progressively getting weaker and is interested in working with PT/OT for cane and walker suggestions. 4. Initial consult note routed to primary continuity provider  5. Communicated plan of care with: Palliative IDT       GOALS OF CARE / TREATMENT PREFERENCES:   [====Goals of Care====]  GOALS OF CARE: Partial code: DNR (including no chest compressions, no ACLS meds, and no shock) in the event of cardiac arrest. Continue intubation at this time.   Patient/Health Care Proxy Stated Goals: Prolong life      TREATMENT PREFERENCES:   Code Status: Partial Code    Advance Care Planning:  Advance Care Planning 3/7/2022   Patient's Healthcare Decision Maker is: Named in scanned ACP document   Primary Decision Maker Name -   Primary Decision Maker Phone Number -   Primary Decision Maker Relationship to Patient -   Confirm Advance Directive Yes, on file   Does the patient have other document types -       Medical Interventions: Full interventions            The palliative care team has discussed with patient / health care proxy about goals of care / treatment preferences for patient.  [====Goals of Care====]         HISTORY:     History obtained from: patient, chart, family    CHIEF COMPLAINT: fluid overload, cardiac arrest 3/7/2022    HPI/SUBJECTIVE:    The patient is:   [] Verbal and participatory  [x] Non-participatory due to:   Orally intubated on mechanical ventilator, sedated    Clinical Pain Assessment (nonverbal scale for severity on nonverbal patients):   Clinical Pain Assessment  Severity: 0    Adult Nonverbal Pain Scale  Face: No particular expression or smile  Activity (Movement): Lying quietly, normal position  Guarding: Lying quietly, no positioning of hands over areas of body  Physiology (Vital Signs): Change in any of the following: SBP > 20 mm HG or HR > 20/minute  Respiratory: Baseline RR/SpO2 compliant with ventilator  Total Score: 1       FUNCTIONAL ASSESSMENT:     Palliative Performance Scale (PPS):  PPS: 30        PSYCHOSOCIAL/SPIRITUAL SCREENING:     Advance Care Planning:  Advance Care Planning 3/7/2022   Patient's Healthcare Decision Maker is: Named in scanned ACP document   Primary Decision Maker Name -   Primary Decision Maker Phone Number -   Primary Decision Maker Relationship to Patient -   Confirm Advance Directive Yes, on file   Does the patient have other document types -        Any spiritual / Episcopal concerns: 3/9/2022 - unable to assess  [] Yes /  [] No    Caregiver Burnout:  [] Yes /  [] No /  [x] No Caregiver Present      Anticipatory grief assessment: 3/9/2022 - unable to assess  [] Normal  / [] Maladaptive            REVIEW OF SYSTEMS:     Positive and pertinent negative findings in ROS are noted above in HPI. The following systems were [] reviewed / [x] unable to be reviewed as noted in HPI  Other findings are noted below.   Systems: constitutional, ears/nose/mouth/throat, respiratory, gastrointestinal, genitourinary, musculoskeletal, integumentary, neurologic, psychiatric, endocrine. Positive findings noted below. Modified ESAS Completed by: provider   Fatigue: 5       Pain: 0   Anxiety: 0 Nausea: 0     Dyspnea: 0     Constipation: No              PHYSICAL EXAM:     From RN flowsheet:  Wt Readings from Last 3 Encounters:   03/08/22 91.2 kg (201 lb 1 oz)   02/17/22 94.8 kg (209 lb)   02/03/22 93.9 kg (207 lb)     Blood pressure 103/62, pulse (!) 102, temperature 97.7 °F (36.5 °C), resp. rate 16, weight 91.2 kg (201 lb 1 oz), SpO2 100 %.     Pain Scale 1: Adult Nonverbal Pain Scale  Pain Intensity 1: 0              Pain Intervention(s) 1: Medication (see MAR)     Constitutional: lying in bed, drowsy but opens eyes and alerts to command  Eyes: closed but opens on command, pupils equal, anicteric  ENMT: Orally intubated  Cardiovascular: regular rhythm, distal pulses intact  Respiratory: Orally intubated on mechanical ventilator  Gastrointestinal: soft   Musculoskeletal: no deformity  Skin: warm, dry  Neurologic: alerts to verbal stimuli and able to follow simple commands         HISTORY:     Principal Problem:    Acute on chronic combined systolic and diastolic congestive heart failure ([address] (7/12/2021)    Active Problems:    Type II diabetes mellitus, uncontrolled ([address] (8/11/2016)      CML (chronic myelocytic leukemia) ([address] (9/2/2016)      Chronic atrial fibrillation ([address] (9/2/2016)      Atrial fibrillation with RVR ([address] (7/11/2021)      Debility ()      Cardiomyopathy ([address] (7/15/2021)      Acute renal failure superimposed on stage 3 chronic kidney disease ([address] (3/4/2022)      Bleeding nose (3/4/2022)      Leg ulcer ([address] (3/4/2022)      Hypokalemia (3/4/2022)      Anasarca (3/7/2022)      Cardiac arrest ([address] (3/7/2022)      Acute respiratory failure with hypoxia ([address] (3/8/2022)      Past Medical History:   Diagnosis Date    CAD (coronary artery disease) 2014    Chronic atrial fibrillation ([facility] 9/2/2016    [facility]     Coagulation disorder ([facility]     bruises easily    Diabetes ([facility] 2012    Hypertension     Nausea & vomiting     with most recent surgery-carotid endardarectomy    TIA (transient ischemic attack) 2016      Past Surgical History:   Procedure Laterality Date    HX CAROTID ENDARTERECTOMY Left 2017    HX CATARACT REMOVAL Bilateral     HX CORONARY ARTERY BYPASS GRAFT      HX HERNIA REPAIR Right     hernia    HX UROLOGICAL      greenlight laser    HX UROLOGICAL      kidney stone    HX UROLOGICAL      Lithotripsy x 2    IR INSERT TUNL CVC W/O PORT OVER 5 YR  3/7/2022    UT CARDIAC SURG PROCEDURE UNLIST  3/2014    quadruple bypass      Family History   Problem Relation Age of Onset    Post-op Nausea/Vomiting Brother       History reviewed, no pertinent family history.   Social History     Tobacco Use    Smoking status: Former Smoker     Packs/day: 1.00     Years: 60.00     Pack years: 60.00     Quit date: 3/23/2014     Years since quittin.9    Smokeless tobacco: Never Used   Substance Use Topics    Alcohol use: Not Currently     Alcohol/week: 2.0 standard drinks     Types: 2 Shots of liquor per week     Comment: per day     Allergies   Allergen Reactions    Adhesive Tape-Silicones Other (comments)     Raw skin    Codeine Nausea and Vomiting    Iodinated Contrast Media Rash     patient states this was years ago - he states that he can tolerate the newer agents      Current Facility-Administered Medications   Medication Dose Route Frequency    pantoprazole (PROTONIX) 40 mg in 0.9% sodium chloride 10 mL injection  40 mg IntraVENous DAILY    heparin (porcine) 1,000 unit/mL injection 3,600 Units  3,600 Units Hemodialysis DIALYSIS CONTINUOUS    naloxone (NARCAN) injection 0.1 mg  0.1 mg IntraVENous Multiple    heparin (porcine) injection 5,000 Units  5,000 Units SubCUTAneous Q8H    fentaNYL (PF) 900 mcg/30 ml infusion soln  0-200 mcg/hr IntraVENous TITRATE    midazolam in normal saline (VERSED) 1 mg/mL infusion  0-10 mg/hr IntraVENous TITRATE    midazolam (VERSED) injection 1 mg  1 mg IntraVENous Q2H PRN    fentaNYL citrate (PF) injection 25 mcg  25 mcg IntraVENous Q4H PRN    chlorhexidine (PERIDEX) 0.12 % mouthwash 10 mL  10 mL Oral Q12H    ELECTROLYTE REPLACEMENT PROTOCOL - Potassium Renal Dosing  1 Each Other PRN    ELECTROLYTE REPLACEMENT PROTOCOL  - Phosphorus Renal Dosing  1 Each Other PRN    ELECTROLYTE REPLACEMENT PROTOCOL - Magnesium   1 Each Other PRN    ELECTROLYTE REPLACEMENT PROTOCOL - Calcium   1 Each Other PRN    insulin lispro (HUMALOG) injection   SubCUTAneous Q6H    bumetanide (BUMEX) injection 2 mg  2 mg IntraVENous BID    metOLazone (ZAROXOLYN) tablet 10 mg  10 mg Oral DAILY    oxymetazoline (AFRIN) 0.05 % nasal spray 2 Spray  2 Spray Both Nostrils DAILY PRN    tamsulosin (FLOMAX) capsule 0.8 mg  0.8 mg Oral DAILY    sodium chloride (NS) flush 5-40 mL  5-40 mL IntraVENous Q8H    sodium chloride (NS) flush 5-40 mL  5-40 mL IntraVENous PRN    acetaminophen (TYLENOL) tablet 650 mg  650 mg Oral Q6H PRN    Or    acetaminophen (TYLENOL) suppository 650 mg  650 mg Rectal Q6H PRN    bisacodyL (DULCOLAX) suppository 10 mg  10 mg Rectal DAILY PRN    promethazine (PHENERGAN) tablet 12.5 mg  12.5 mg Oral Q6H PRN    Or    ondansetron (ZOFRAN) injection 4 mg  4 mg IntraVENous Q6H PRN    atorvastatin (LIPITOR) tablet 40 mg  40 mg Oral DAILY    carvediloL (COREG) tablet 25 mg  25 mg Oral BID    nitroglycerin (NITROSTAT) tablet 0.4 mg  0.4 mg SubLINGual PRN    glucose chewable tablet 16 g  4 Tablet Oral PRN    glucagon (GLUCAGEN) injection 1 mg  1 mg IntraMUSCular PRN    dextrose 10% infusion 0-250 mL  0-250 mL IntraVENous PRN    [Held by provider] apixaban (ELIQUIS) tablet 2.5 mg  2.5 mg Oral Q12H          LAB AND IMAGING FINDINGS:     Lab Results   Component Value Date/Time    WBC 12.8 03/07/2022 03:02 PM    HGB 8.8 (L) 03/07/2022 03:02 PM    PLATELET 93 (L) 68/37/0010 03:02 PM Lab Results   Component Value Date/Time    Sodium 138 03/09/2022 04:55 AM    Potassium 2.9 (LL) 03/09/2022 04:55 AM    Chloride 96 (L) 03/09/2022 04:55 AM    CO2 32 03/09/2022 04:55 AM    BUN 79 (H) 03/09/2022 04:55 AM    Creatinine 2.26 (H) 03/09/2022 04:55 AM    Calcium 9.1 03/09/2022 04:55 AM    Magnesium 2.4 03/09/2022 04:55 AM    Phosphorus 3.0 03/09/2022 04:55 AM      Lab Results   Component Value Date/Time    Alk. phosphatase 95 03/07/2022 03:02 PM    Protein, total 6.4 03/07/2022 03:02 PM    Albumin 2.8 (L) 03/07/2022 03:02 PM    Globulin 3.6 03/07/2022 03:02 PM     Lab Results   Component Value Date/Time    INR 1.9 (H) 03/07/2022 10:30 AM    Prothrombin time 20.7 (H) 03/07/2022 10:30 AM    aPTT 39.0 (H) 03/04/2022 03:16 PM      No results found for: IRON, FE, TIBC, IBCT, PSAT, FERR   No results found for: PH, PCO2, PO2  No components found for: [place]   Lab Results   Component Value Date/Time    CK 62 02/03/2022 07:00 PM    CK - MB 3.0 02/03/2022 07:00 PM                Total time: 35 minutes  Counseling / coordination time, spent as noted above:   > 50% counseling / coordination?: yes, patient, and medical team    Prolonged service was provided for  []30 min   []75 min in face to face time in the presence of the patient, spent as noted above. Time Start:   Time End:   Note: this can only be billed with 82505 (initial) or 68911 (follow up). If multiple start / stop times, list each separately.

## 2022-03-09 NOTE — PROGRESS NOTES
RESPIRATORY NOTE:        Pt put back on original settings per RN as plans to have dialysis today, will continue to monitor.

## 2022-03-10 NOTE — PROGRESS NOTES
@9638 pt taken over awake in bed fla-cc 0, remains on 4L/nc. Padilla remains in-situ dainning clear yellow urine. Assessment carried out and recorded in appropriate flow sheets. Nursing management continues. @ 2111 pt placed on BIPAP by RT care continues. @6939 pt pulled BIPAP off and refused to have it replaced pt placed on 4L/nc. Spo2 remains on 98%. @2217   Pt BP fluctuates , MD called and updated, Albumin administered as prescribed. Care continues. @6002 personal hygiene needs being met pt is now unresponsive, placed on 100%non re-breather. MD called and informed, Nicole Johnson was called  Care continues. @0033 pt no heart rate, no BP, no respiration no pulse. DR.Teule Gomez present, family informed and is present , life [facility] ws called recording  Informed that pt can be released and is no suitable for eyes nor organs based on age.

## 2022-03-10 NOTE — PROGRESS NOTES
Called to examine patient who has . No response to verbal and tactile stimuli. No respiratory effort. Absent heart sounds and pulses. Pupils fixed and dilated. Patient pronounced dead at 1233AM hours.      Coral Quinones MD   Hospitalist, Medicine

## 2022-03-10 NOTE — PROGRESS NOTES
Hospitalist Overnight Coverage:     [facility] Castaneda [phone]     Called to let pt's wife know that he has become unresponsive. 02 sats 96% on BIPAP. Placing NRB. Wife is on her way.

## 2022-03-10 NOTE — DISCHARGE SUMMARY
Death Summary     Patient: Marlon Hernandez       MRN: [id number]       YOB: 1940       Age: 79 y.o. Date of admission:  3/4/2022    Date of death:  3/10/2022    Primary care provider:  Rama Modi DO     Admitting provider:  Yajaira Hampton MD    Discharging provider:  Sarita Richardson MD     Consultations  IP CONSULT TO CARDIOLOGY  IP CONSULT TO HOSPITALIST  IP CONSULT TO NEPHROLOGY  IP CONSULT TO PALLIATIVE CARE - PROVIDER  IP CONSULT TO PALLIATIVE CARE - PROVIDER  IP CONSULT TO INTENSIVIST  IP CONSULT TO HEMATOLOGY  IP CONSULT TO VASCULAR SURGERY    Procedures  · Intubation   · Extubation   · Hemodialysis     Admission diagnoses  Acute exacerbation of CHF (congestive heart failure) ([facility] [I50.9]    Please refer to the admission history and physical for details on the presenting problem. Final discharge diagnoses and brief hospital course    Patient is a 79 y.o. 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 fib, CAD, CABG, HTN, DM, CML, ARF on CKD, anasarca; admitted with CHF exacerbation; underwent right chest TDC placement by IR; had PEA cardiac arrest; In IR, patient became bradycardic and had PEA cardiac arrest; received 2 rounds of epinephrine and 3 rounds of CPR, about 5 minutes of CPR; intubated; regained cardiac rhythm; transferred to ICU. Cardiology and nephrology consulted. Status post hemodialysis with 3 L fluid removed; patient tolerated well. After hemodialysis, patient was on SBT and tolerating well so was successfully extubated. Patient agreed to be on CPAP/BiPAP but he decided earlier that he did not want to be reintubated if he failed CPAP/BiPAP. Patient ultimately failed BIPAP, became unresponsive and loss both pulse and spontaneous breaths. Family called and came to beside.  paged.      Last vital signs recorded:  Visit Vitals  BP (!) 0/0   Pulse (!) 0   Temp 0   Resp 0   Wt 91.2 kg (201 lb 1 oz)   SpO2 0   BMI 28.85 kg/m²        Recent Labs     03/07/22  1502 03/07/22  0245   WBC 12.8 11.2   HGB 8.8* 8.7*   HCT 30.7* 30.2*   PLT 93* 93*     Recent Labs     03/09/22  1448 03/09/22  0455 03/08/22  1500 03/08/22  0447 03/08/22  0447 03/07/22  1502 03/07/22  1502   NA  --  138  --   --  137  --  135*   K 3.6 2.9* 3.3*   < > 2.6*   < > 3.0*   CL  --  96*  --   --  90*  --  91*   CO2  --  32  --   --  38*  --  37*   BUN  --  79*  --   --  110*  --  125*   CREA  --  2.26*  --   --  2.93*  --  3.36*   GLU  --  147*  --   --  140*  --  198*   CA  --  9.1  --   --  8.8  --  8.7   MG  --  2.4  --   --  2.8*  --  2.8*   PHOS  --  3.0  --   --   --   --   --     < > = values in this interval not displayed. Recent Labs     03/07/22  1502   AP 95   TP 6.4   ALB 2.8*   GLOB 3.6     Recent Labs     03/07/22  1030   INR 1.9*   PTP 20.7*      No results for input(s): FE, TIBC, PSAT, FERR in the last 72 hours. No results for input(s): PH, PCO2, PO2 in the last 72 hours. No results for input(s): CPK, CKMB in the last 72 hours.     No lab exists for component: TROPONINI  No components found for: [place]    Pertinent imaging studies:    ---------------------------------    Chronic Diagnoses:    Problem List as of 3/10/2022 Date Reviewed: 7/20/2017          Codes Class Noted - Resolved    Acute respiratory failure with hypoxia [facility]) ICD-10-CM: J96.01  ICD-9-CM: 518.81  3/8/2022 - Present        Anasarca ICD-10-CM: R60.1  ICD-9-CM: 782.3  3/7/2022 - Present        Cardiac arrest ([facility] ICD-10-CM: I46.9  ICD-9-CM: 427.5  3/7/2022 - Present        Acute exacerbation of CHF (congestive heart failure) (HCC) ICD-10-CM: I50.9  ICD-9-CM: 428.0  3/4/2022 - Present        Acute renal failure superimposed on stage 3 chronic kidney disease ([facility] ICD-10-CM: N17.9, N18.30  ICD-9-CM: 584.9, 585.3  3/4/2022 - Present        Bleeding nose ICD-10-CM: R04.0  ICD-9-CM: 784.7  3/4/2022 - Present        Leg ulcer ([facility] ICD-10-CM: L97.909  ICD-9-CM: 707.10  3/4/2022 - Present        Hypokalemia ICD-10-CM: E87.6  ICD-9-CM: 276.8  3/4/2022 - Present        Pressure injury of right buttock, stage 2 (HCC) ICD-10-CM: D05.513  ICD-9-CM: 707.05, 707.22  2/28/2022 - Present        Pressure injury of sacral region, stage 1 ICD-10-CM: L89.151  ICD-9-CM: 707.03, 707.21  2/21/2022 - Present        Chronic ulcer of left calf limited to breakdown of skin ([facility] ICD-10-CM: I94.695  ICD-9-CM: 707.12  2/21/2022 - Present        Leg edema ICD-10-CM: R60.0  ICD-9-CM: 782.3  2/21/2022 - Present        ELIZABETH (acute kidney injury) ([facility] ICD-10-CM: N17.9  ICD-9-CM: 584.9  7/22/2021 - Present        Pulmonary hypertension ([facility] ICD-10-CM: I27.20  ICD-9-CM: 416.8  7/15/2021 - Present        Cardiomyopathy ([facility] ICD-10-CM: I42.9  ICD-9-CM: 425.4  7/15/2021 - Present        Encounter for palliative care ICD-10-CM: Z51.5  ICD-9-CM: V66.7  Unknown - Present        Debility ICD-10-CM: R53.81  ICD-9-CM: 799.3  Unknown - Present        * (Principal) Acute on chronic combined systolic and diastolic congestive heart failure ([facility] ICD-10-CM: I50.43  ICD-9-CM: 428.43, 428.0  7/12/2021 - Present        Pneumonia ICD-10-CM: J18.9  ICD-9-CM: 061  7/11/2021 - Present        Pulmonary edema ICD-10-CM: J81.1  ICD-9-CM: 923  7/11/2021 - Present        Sepsis ([facility] ICD-10-CM: A41.9  ICD-9-CM: 038.9, 995.91  7/11/2021 - Present        Atrial fibrillation with RVR ([facility] ICD-10-CM: I48.91  ICD-9-CM: 427.31  7/11/2021 - Present        Community acquired pneumonia ICD-10-CM: J18.9  ICD-9-CM: 638  12/12/2020 - Present        Chronic congestive heart failure ([facility] ICD-10-CM: I50.9  ICD-9-CM: 428.0  12/12/2020 - Present        SOB (shortness of breath) ICD-10-CM: R06.02  ICD-9-CM: 786.05  12/12/2020 - Present        On supplemental oxygen by nasal cannula ICD-10-CM: Z78.9  ICD-9-CM: V49.89  12/12/2020 - Present        Hyperglycemia ICD-10-CM: R73.9  ICD-9-CM: 790.29  9/6/2018 - Present        Elevated blood pressure reading with diagnosis of hypertension ICD-10-CM: I10  ICD-9-CM: 401.9  9/6/2018 - Present        Stage 3 chronic kidney disease ([facility] ICD-10-CM: N18.30  ICD-9-CM: 585.3  8/10/2018 - Present        Pancytopenia ([facility] ICD-10-CM: Q04.054  ICD-9-CM: 284.19  9/2/2016 - Present        CML (chronic myelocytic leukemia) ([facility] ICD-10-CM: C92.10  ICD-9-CM: 205.10  9/2/2016 - Present        Chronic atrial fibrillation (HCC) ICD-10-CM: I48.20  ICD-9-CM: 427.31  9/2/2016 - Present        TIA (transient ischemic attack) ICD-10-CM: G45.9  ICD-9-CM: 435.9  8/11/2016 - Present        Coronary artery disease involving native coronary artery ICD-10-CM: I25.10  ICD-9-CM: 414.01  8/11/2016 - Present        Type II diabetes mellitus, uncontrolled ([facility] ICD-10-CM: E11.65  ICD-9-CM: 250.02  8/11/2016 - Present        Dyslipidemia ICD-10-CM: E78.5  ICD-9-CM: 272.4  8/11/2016 - Present        RESOLVED: HTN (hypertension) ICD-10-CM: I10  ICD-9-CM: 401.9  9/2/2016 - 8/3/2021                Signed:  Ailyn Wall MD                 Hospitalist                 3/10/2022                     Cc: Divya CHRISTIAN DO

## 2022-03-10 NOTE — PROGRESS NOTES
At bedside. Pt unresponsive. No pulse, but electrical activity still present on monitor. No breath sounds. 02 sats 0%  Updated wife, Kian Gomez, and let her know that pt is unresponsive without breath or heart sounds. She is still on her way with her daughter.

## 2023-01-13 NOTE — PROGRESS NOTES
[facility]  Pulmonary, Critical Care, and Sleep Medicine    Name: Toma Torres MRN: [id number]   : 1940 Hospital: [facility] [place]    Date: 3/9/2022  Room: [address] [place] [address] Note                                              Consult requesting physician: Dr. Thelma Vivas  Reason for Consult: Cardiac arrest    IMPRESSION:   Cardiac arrest.  Acute renal failure superimposed on stage III chronic kidney disease. Anasarca. Acute on chronic combined systolic and diastolic congestive heart failure. Atrial fibrillation with RVR.       Active Hospital Problems    Diagnosis Date Noted    Acute respiratory failure with hypoxia ([facility] 2022    Anasarca 2022    Cardiac arrest ([facility] 2022    Acute renal failure superimposed on stage 3 chronic kidney disease ([facility] 2022    Bleeding nose 2022    Leg ulcer ([address] 2022    Hypokalemia 2022    Cardiomyopathy ([facility] 07/15/2021    Acute on chronic combined systolic and diastolic congestive heart failure ([facility] 2021    Debility     Atrial fibrillation with RVR ([facility]) 2021    Chronic atrial fibrillation ([facility] 2016    CML (chronic myelocytic leukemia) ([facility] [facility] 2016    Type II diabetes mellitus, uncontrolled ([facility] 2016   ·      Patient Active Problem List   Diagnosis Code    TIA (transient ischemic attack) G45.9    Coronary artery disease involving native coronary artery I25.10    Type II diabetes mellitus, uncontrolled ([address] E11.65    Dyslipidemia E78.5    Pancytopenia ([facility]) D61.818    CML (chronic myelocytic leukemia) ([facility]) C92.10    Chronic atrial fibrillation (HCC) I48.20    Stage 3 chronic kidney disease (HCC) N18.30    Hyperglycemia R73.9    Elevated blood pressure reading with diagnosis of hypertension I10    Chronic congestive heart failure (HCC) I50.9    Pulmonary edema J81.1    Sepsis (HCC) A41.9    Atrial fibrillation with RVR ([facility]) I48.91    Encounter for palliative care Z51.5    Debility R53.81    Acute on chronic combined systolic and diastolic congestive heart failure (HCC) I50.43    Pulmonary hypertension ([facility]) I27.20    Cardiomyopathy ([facility]) I42.9    ELIZABETH (acute kidney injury) ([facility] [address] [facility] N17.9    Pressure injury of sacral region, stage 1 L89.151    Chronic ulcer of left calf limited to breakdown of skin ([facility]) L97.221    Leg edema R60.0    Acute exacerbation of CHF (congestive heart failure) ([facility]) I50.9    Acute renal failure superimposed on stage 3 chronic kidney disease (HCC) N17.9, N18.30    Leg ulcer ([facility]) L97.909    Anasarca R60.1    Cardiac arrest ([facility]) I46.9         · Code status: Partial Code       RECOMMENDATIONS:     Respiratory: Acute respiratory failure; intubated during PEA cardiac arrest.  CXR reviewed: Moderate loculated right and small left effusion. Right TDC, OGT and ETT in good position. ABG with metabolic alkalosis with respiratory compensation. On SBT with pressure 7, FiO2 30, PEEP 5. Discussed with RN to place back on assist control ventilator and reattempt SBT after dialysis. Sedation: On Versed drip at 1 mg; discussed with RN to DC Versed drip. Continue fentanyl and Versed as needed. After dialysis will reattempt SBT. Ventilator bundle & Sedation protocol followed. Daily sedation holiday, assessment for readiness for SBT and then re-titrate if required. Chlorhexidine mouth washes. Keep SPO2 >=92%. HOB 30 degree elevation all the time. Aggressive pulmonary toileting. Aspiration precautions. CVS:   HTN, A. 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 PEA cardiac arrest on 3/7/2022 while placing TDC, PEA cardiac arrest for about 5 minutes with 3 rounds of CPR and 2 epi. Troponin mildly elevated; improving. Continue diuretics for anasarca and CHF; monitor urine output and electrolytes. Blood pressure stable; monitor.   Continue IV albumin. Continue Lipitor, Coreg. Diuretics: Bumex 2 mg twice daily, metolazone 10 mg daily; per cardiologist and nephrologist.  Eliquis on hold due to hematuria; restart when hematuria resolves. Heparin drip was not started due to hematuria. Currently on heparin 5000 units subcutaneously every 8 hours; monitor for hematuria worsening. Restart anticoagulation when hematuria resolves and patient is stable. Echo 7/12/2021: LVEF 15 to 20%. Estimated PASP 55 mmHg. PVL LE 2/3/2022: No DVT. Cardiologist on board. ID:   No fever or leukocytosis. No sign of infection. Follow cultures. Hematology/Oncology:   History of CML; [address] held. Dr. Marcy Bowman appreciated. Renal:   ARF on CKD; with anasarca recommended hemodialysis. S/p right chest TDC placed by IR 3/7/2022. S/p hemodialysis with fluid removed on 3/8/2022. Getting second hemodialysis today. Hypokalemia replaced. Nephrology Dr. Lazarus Sena on board. GI/:   Mild hematuria; resolved. Ascites due to volume overload/anasarca. Continue Flomax. Endocrine: Monitor BS. SSI. Neurology: Moving all 4 extremities and following simple commands, while on minimal sedation on ventilator. Psychiatry: No acute issues. Toxicology: No acute issues. Pain/Sedation: Sedation protocol as above. Skin/Wound: Continue local care at the leg wounds. Electrolytes: Replace electrolytes per ICU electrolyte replacement protocol. IVF: None    Nutrition: N.p.o. if not extubated today, then start OGT feeds; discussed with RN. Prophylaxis: DVT Prophylaxis: SCD/anticoagulation as above. GI Prophylaxis: Protonix. Restraints:   Wrist soft restraints for patient interfering with medical therapy/management and patient safety. Lines/Tubes: PIV  Midline in place. ETT: 3/7/2022. OGT: 3/7/2022. Padilla: In place before coming to ICU (Medically necessary for strict input/output monitoring in critically ill patient, will remove it when not needed. Padilla bundle followed). Very poor overall prognosis. Advance Directive/Palliative Care: Discussed with palliative care team who discussed with family, patient is partial code. No CPR in the event of cardiac arrest.    Will defer respective systems problem management to primary and other respective consultant and follow patient in ICU with primary and other medical team.  Further recommendations will be based on the patient's response to recommended treatment and results of the investigation ordered. Quality Care: PPI, DVT prophylaxis, HOB elevated, Infection control all reviewed and addressed. Care of plan d/w RN, RT, MDR, hospitalist team  D/w family-wife (answered all questions to satisfaction). High complexity decision making was performed during the evaluation of this patient at high risk for decompensation with multiple organ involvement. Total critical care time spent rendering care exclusive of procedures/family discussion/coordination of care: 39 minutes. Polly Redmond Spouse [phone]   gurdeep Chapman Daughter [phone]   Basil Juares [phone]     Family discussion: I called and spoke with patient's wife Yariel Pretty and updated with all above, very poor prognosis and critical conditions discussed and explained; answered all questions to her satisfaction. Subjective/History of Present Illness:     Patient is a 79 y.o. 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 fib, CAD, CABG, HTN, DM, CML, ARF on CKD, anasarca; admitted with CHF exacerbation; underwent right chest TDC placement by IR; had PEA cardiac arrest; intubated and transferred to ICU. Patient is intubated and sedated and so history/review of system taken from Dr. Raji Pack and EMR.     In IR, patient became bradycardic and had PEA cardiac arrest; received 2 rounds of epinephrine and 3 rounds of CPR, about 5 minutes of CPR; intubated; regained all of cardiac rhythm; transferred to ICU.      3/9/2022 :     Remains in ICU room 107. Remains on ventilator. Sedated with low-dose of Versed 1 mg and requiring fentanyl as needed. No fever. Mild bloody sediment in the Padilla catheter. Blood pressure stable. Getting hemodialysis now. No vomiting, diarrhea, abdominal pain. Persistent leg edema and anasarca and ascites. No other overnight issues reported. [facility] was not called for any issues overnight. I/O last 24 hrs: Intake/Output Summary (Last 24 hours) at 3/9/2022 1207  Last data filed at 3/9/2022 1150  Gross per 24 hour   Intake 424 ml   Output 4128 ml   Net -3704 ml         The patient is critically ill and can not provide additional history due to Ventilated    History taken from EMR, Dr. Tyrese Mccarthy, Dr. Jonny Mays    Review of Systems:  ROS not obtained due to patient factor.        Allergies   Allergen Reactions    Adhesive Tape-Silicones Other (comments)     Raw skin    Codeine Nausea and Vomiting    Iodinated Contrast Media Rash     patient states this was years ago - he states that he can tolerate the newer agents      Past Medical History:   Diagnosis Date    CAD (coronary artery disease) 2014    Chronic atrial fibrillation ([address] 9/2/2016    CML (chronic myelocytic leukemia) ([address]     Coagulation disorder ([address]     bruises easily    Diabetes ([address] 2012    Hypertension     Nausea & vomiting     with most recent surgery-carotid endardarectomy    TIA (transient ischemic attack) 8/11/2016      Past Surgical History:   Procedure Laterality Date    HX CAROTID ENDARTERECTOMY Left 05/2017    HX CATARACT REMOVAL Bilateral     HX CORONARY ARTERY BYPASS GRAFT      HX HERNIA REPAIR Right     hernia    HX UROLOGICAL      greenlight laser    HX UROLOGICAL      kidney stone    HX UROLOGICAL      Lithotripsy x 2    IR INSERT TUNL CVC W/O PORT OVER 5 YR  3/7/2022    FL CARDIAC SURG PROCEDURE UNLIST  3/2014 quadruple bypass      Social History     Tobacco Use    Smoking status: Former Smoker     Packs/day: 1.00     Years: 60.00     Pack years: 60.00     Quit date: 3/23/2014     Years since quittin.9    Smokeless tobacco: Never Used   Substance Use Topics    Alcohol use: Not Currently     Alcohol/week: 2.0 standard drinks     Types: 2 Shots of liquor per week     Comment: per day      Family History   Problem Relation Age of Onset    Post-op Nausea/Vomiting Brother       Prior to Admission medications    Medication Sig Start Date End Date Taking? Authorizing Provider   bumetanide (BUMEX) 2 mg tablet Take 1 Tablet by mouth two (2) times a day. 22   Jun Umana MD   metOLazone (ZAROXOLYN) 2.5 mg tablet Take 2 Tablets by mouth daily. 22   Jun Umana MD   imatinib (Gleevec) 400 mg tablet Take 200 mg by mouth daily. Provider, Historical   mecobalamin, vitamin B12, 1,000 mcg TbDi Take 1 Tablet by mouth daily. Provider, Historical   glucose 4 gram chewable tablet Take 4 g by mouth daily as needed for Other (low BS). Provider, Historical   OXYGEN-AIR DELIVERY SYSTEMS 2 L by IntraNASal route continuous. Provider, Historical   insulin glargine (LANTUS,BASAGLAR) 100 unit/mL (3 mL) inpn 3-4 Units by SubCUTAneous route daily as needed for Other (high BS). patient reporting he has no actual sliding scale for medication- takes 3-4 units depending on his BS, only takes if greater than 150. Provider, Historical   carvediloL (COREG) 25 mg tablet Take 1 Tablet by mouth two (2) times a day. 21   Fausto Rodriguez MD   apixaban (ELIQUIS) 2.5 mg tablet Take 1 Tablet by mouth every twelve (12) hours. 21   Fausot Rodriguez MD   gabapentin (NEURONTIN) 100 mg capsule Take 1 Capsule by mouth nightly. Max Daily Amount: 100 mg. Patient taking differently: Take 300 mg by mouth nightly.  21   Fausto Rodriguez MD   aspirin delayed-release 81 mg tablet Take 81 mg by mouth daily. Stephan Nance MD   vitamin E (AQUA GEMS) 400 unit capsule Take 400 Units by mouth daily. Stephan Nance MD   tamsulosin (FLOMAX) 0.4 mg capsule Take 0.4 mg by mouth daily. Stephan Nance MD   nitroglycerin (NITROSTAT) 0.4 mg SL tablet 1 Tab by SubLINGual route as needed for Chest Pain. Up to 3 doses. 18   Grace Cespedes MD   atorvastatin (LIPITOR) 40 mg tablet Take 40 mg by mouth daily.     Stephan Nance MD     Current Facility-Administered Medications   Medication Dose Route Frequency    pantoprazole (PROTONIX) 40 mg in 0.9% sodium chloride 10 mL injection  40 mg IntraVENous DAILY    heparin (porcine) 1,000 unit/mL injection 3,600 Units  3,600 Units Hemodialysis DIALYSIS CONTINUOUS    heparin (porcine) injection 5,000 Units  5,000 Units SubCUTAneous Q8H    fentaNYL (PF) 900 mcg/30 ml infusion soln  0-200 mcg/hr IntraVENous TITRATE    midazolam in normal saline (VERSED) 1 mg/mL infusion  0-10 mg/hr IntraVENous TITRATE    chlorhexidine (PERIDEX) 0.12 % mouthwash 10 mL  10 mL Oral Q12H    insulin lispro (HUMALOG) injection   SubCUTAneous Q6H    bumetanide (BUMEX) injection 2 mg  2 mg IntraVENous BID    metOLazone (ZAROXOLYN) tablet 10 mg  10 mg Oral DAILY    tamsulosin (FLOMAX) capsule 0.8 mg  0.8 mg Oral DAILY    sodium chloride (NS) flush 5-40 mL  5-40 mL IntraVENous Q8H    atorvastatin (LIPITOR) tablet 40 mg  40 mg Oral DAILY    carvediloL (COREG) tablet 25 mg  25 mg Oral BID    [Held by provider] apixaban (ELIQUIS) tablet 2.5 mg  2.5 mg Oral Q12H         Objective:   Vital Signs:    Visit Vitals  BP (!) 102/43   Pulse (!) 101   Temp 97.7 °F (36.5 °C)   Resp 13   Wt 91.2 kg (201 lb 1 oz)   SpO2 100%   BMI 28.85 kg/m²       O2 Device: Endotracheal tube   O2 Flow Rate (L/min): 3 l/min   Temp (24hrs), Av.8 °F (36.6 °C), Min:97.5 °F (36.4 °C), Max:98.3 °F (36.8 °C)       Intake/Output:   Last shift:      701 - 1900  In: 414 [I.V.:314]  Out: 500 [Urine:500]    Last 3 shifts: 03/07 1901 - 03/09 0700  In: 749.1 [I.V.:649.1]  Out: 5628 [Urine:4850]      Intake/Output Summary (Last 24 hours) at 3/9/2022 1207  Last data filed at 3/9/2022 1150  Gross per 24 hour   Intake 424 ml   Output 4128 ml   Net -3704 ml       Last 3 Recorded Weights in this Encounter    03/05/22 2353 03/07/22 0404 03/08/22 1515   Weight: 95.2 kg (209 lb 12.8 oz) 95.9 kg (211 lb 8 oz) 91.2 kg (201 lb 1 oz)         Ventilator Settings:  Mode Rate Tidal Volume Pressure FiO2 PEEP   Assist control,VC+   400 ml  7 cm H2O 30 % 5 cm H20     Peak airway pressure: 21 cm H2O    Plateau pressure:     Tidal volume:    Minute ventilation: 6.68 l/min     Recent Labs     03/09/22  0513 03/08/22  0431 03/07/22  1744   PHI 7.56* 7.58* 7.53*   PCO2I 35.8 42.2 47.2*   PO2I 105* 89 143*   HCO3I 32.3* 39.7* 39.5*   FIO2I 30 30 40       Physical Exam:       General/Neurology: On ventilator, sedated but arousable and following simple commands. Moving all 4 extremities spontaneously and appropriately on verbal instructions. Head:   NCAT. Eye:   No icterus/pallor/cyanosis. Nose:   No nasal drainage/discharge. Neck:   Trachea midline. Lung: Moderate air entry bilateral equal.  No rales, rhonchi. No wheezing or stridor. No prolonged expiration or accessory muscle use. Heart:   S1 S2 present. No murmur or JVD. Abdomen:  Soft. NT. No palpable masses. Distended with ascites. Extremities:  No cyanosis or clubbing. 2+ bilateral lower more than upper extremity edema. Leg wound under dressing. Pulses: 2+ and symmetric in DP.         Data:       Recent Results (from the past 24 hour(s))   POTASSIUM    Collection Time: 03/08/22  3:00 PM   Result Value Ref Range    Potassium 3.3 (L) 3.5 - 5.5 mmol/L   GLUCOSE, POC    Collection Time: 03/08/22  6:06 PM   Result Value Ref Range    Glucose (POC) 135 (H) 70 - 110 mg/dL   GLUCOSE, POC    Collection Time: 03/08/22 11:14 PM   Result Value Ref Range    Glucose (POC) 147 (H) 70 - 110 mg/dL MAGNESIUM    Collection Time: 03/09/22  4:55 AM   Result Value Ref Range    Magnesium 2.4 1.6 - 2.6 mg/dL   METABOLIC PANEL, BASIC    Collection Time: 03/09/22  4:55 AM   Result Value Ref Range    Sodium 138 136 - 145 mmol/L    Potassium 2.9 (LL) 3.5 - 5.5 mmol/L    Chloride 96 (L) 100 - 111 mmol/L    CO2 32 21 - 32 mmol/L    Anion gap 10 3.0 - 18 mmol/L    Glucose 147 (H) 74 - 99 mg/dL    BUN 79 (H) 7.0 - 18 MG/DL    Creatinine 2.26 (H) 0.6 - 1.3 MG/DL    BUN/Creatinine ratio 35 (H) 12 - 20      GFR est AA 34 (L) >60 ml/min/1.73m2    GFR est non-AA 28 (L) >60 ml/min/1.73m2    Calcium 9.1 8.5 - 10.1 MG/DL   PHOSPHORUS    Collection Time: 03/09/22  4:55 AM   Result Value Ref Range    Phosphorus 3.0 2.5 - 4.9 MG/DL   CALCIUM, IONIZED    Collection Time: 03/09/22  4:56 AM   Result Value Ref Range    Ionized Calcium 1.10 (L) 1.12 - 1.32 MMOL/L   GLUCOSE, POC    Collection Time: 03/09/22  5:09 AM   Result Value Ref Range    Glucose (POC) 155 (H) 70 - 110 mg/dL   BLOOD GAS, ARTERIAL POC    Collection Time: 03/09/22  5:13 AM   Result Value Ref Range    Device: ADULT VENT      FIO2 (POC) 30 %    pH (POC) 7.56 (HH) 7.35 - 7.45      pCO2 (POC) 35.8 35.0 - 45.0 MMHG    pO2 (POC) 105 (H) 80 - 100 MMHG    HCO3 (POC) 32.3 (H) 22 - 26 MMOL/L    sO2 (POC) 98.8 (H) 92 - 97 %    Base excess (POC) 9.4 mmol/L    Mode ASSIST CONTROL      Tidal volume 400 ml    Set Rate 12 bpm    PEEP/CPAP (POC) 5 cmH2O    Allens test (POC) Positive      Site LEFT RADIAL      Patient temp.  98      Specimen type (POC) ARTERIAL      Performed by Sky Alcala    GLUCOSE, POC    Collection Time: 03/09/22 11:32 AM   Result Value Ref Range    Glucose (POC) 149 (H) 70 - 110 mg/dL         Chemistry Recent Labs     03/09/22  0455 03/08/22  1500 03/08/22  0447 03/07/22  1502 03/07/22  1502   *  --  140*  --  198*     --  137  --  135*   K 2.9* 3.3* 2.6*   < > 3.0*   CL 96*  --  90*  --  91*   CO2 32  --  38*  --  37*   BUN 79*  --  110*  --  125* CREA 2.26*  --  2.93*  --  3.36*   CA 9.1  --  8.8  --  8.7   MG 2.4  --  2.8*  --  2.8*   PHOS 3.0  --   --   --   --    AGAP 10  --  9  --  7   BUCR 35*  --  38*  --  37*   AP  --   --   --   --  95   TP  --   --   --   --  6.4   ALB  --   --   --   --  2.8*   GLOB  --   --   --   --  3.6   AGRAT  --   --   --   --  0.8    < > = values in this interval not displayed. Lactic Acid Lactic acid   Date Value Ref Range Status   07/11/2021 1.3 0.4 - 2.0 MMOL/L Final     No results for input(s): LAC in the last 72 hours. Liver Enzymes Protein, total   Date Value Ref Range Status   03/07/2022 6.4 6.4 - 8.2 g/dL Final     Albumin   Date Value Ref Range Status   03/07/2022 2.8 (L) 3.4 - 5.0 g/dL Final     Globulin   Date Value Ref Range Status   03/07/2022 3.6 2.0 - 4.0 g/dL Final     A-G Ratio   Date Value Ref Range Status   03/07/2022 0.8 0.8 - 1.7   Final     Alk.  phosphatase   Date Value Ref Range Status   03/07/2022 95 45 - 117 U/L Final     Recent Labs     03/07/22  1502   TP 6.4   ALB 2.8*   GLOB 3.6   AGRAT 0.8   AP 95        CBC w/Diff Recent Labs     03/07/22  1502 03/07/22  0245   WBC 12.8 11.2   RBC 4.02* 3.94*   HGB 8.8* 8.7*   HCT 30.7* 30.2*   PLT 93* 93*   GRANS 68 76*   LYMPH 20* 9*   EOS 1 0        Cardiac Enzymes No results found for: CPK, CK, CKMMB, CKMB, RCK3, CKMBT, CKNDX, CKND1, TARA, TROPT, TROIQ, CIARRA, TROPT, TNIPOC, BNP, BNPP     BNP No results found for: BNP, BNPP, XBNPT     Coagulation Recent Labs     03/07/22  1030   PTP 20.7*   INR 1.9*         Thyroid  Lab Results   Component Value Date/Time    TSH 1.34 07/12/2021 02:55 AM       No results found for: T4     Urinalysis Lab Results   Component Value Date/Time    Color YELLOW 03/04/2022 05:00 PM    Appearance CLEAR 03/04/2022 05:00 PM    Specific gravity 1.009 03/04/2022 05:00 PM    pH (UA) 7.0 03/04/2022 05:00 PM    Protein Negative 03/04/2022 05:00 PM    Glucose Negative 03/04/2022 05:00 PM    Ketone Negative 03/04/2022 05:00 PM Bilirubin Negative 03/04/2022 05:00 PM    Urobilinogen 0.2 03/04/2022 05:00 PM    Nitrites Negative 03/04/2022 05:00 PM    Leukocyte Esterase Negative 03/04/2022 05:00 PM    Epithelial cells Negative 03/04/2022 05:00 PM    Bacteria Negative 03/04/2022 05:00 PM    WBC 0 to 3 03/04/2022 05:00 PM    RBC 0 to 5 03/04/2022 05:00 PM        Micro  No results for input(s): SDES, CULT in the last 72 hours. No results for input(s): CULT in the last 72 hours. Culture data during this hospitalization. All Micro Results     None             XR CHEST PORT    Result Date: 3/4/2022  EXAM: XR CHEST PORT CLINICAL INDICATION/HISTORY: sob -Additional: None COMPARISON: 2/17/2022 TECHNIQUE: Frontal view of the chest _______________ FINDINGS: HEART AND MEDIASTINUM: Cardiomegaly. Primary sternotomy. LUNGS AND PLEURAL SPACES: Moderate right and small left pleural effusions. Bilateral parenchymal opacities. No pneumothorax. BONY THORAX AND SOFT TISSUES: No acute osseous abnormality _______________     Moderate right and small left pleural effusions. Bilateral parenchymal opacities possibly representing atelectasis versus edema. 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 Cardiomegaly and mediastinal contours are within normal limits for portable radiograph. There is small right pleural effusion with right lung base airspace disease. There is left lung base atelectasis or minimal infiltrate. There is a background of moderate emphysema. No acute osseous findings. ________________      Small right effusion with right lung base atelectasis and/or infiltrate. Minimal atelectasis and/or infiltrate left lung base. LE Doppler 2/3/2022 · No evidence of deep vein thrombosis in the right lower extremity.   · No evidence of deep vein thrombosis in the left lower extremity. · Subcutaneous fluid noted throughout bilateral lower extremities. ECHO 7/12/2021: Result status: Final result  · LV: Estimated LVEF is 15 - 20%. Normal wall thickness. Mildly dilated left ventricle. Severely reduced systolic function. Left ventricular diastolic dysfunction E'E= 15.30. · Contrast used: DEFINITY. · LA: Moderately dilated left atrium. · AV: Aortic valve leaflet calcification present. Mild aortic valve regurgitation is present. · MV: Mitral valve thickening. Moderate mitral valve regurgitation is present. · TV: Moderate tricuspid valve regurgitation is present. · PV: Mild pulmonic valve regurgitation is present. · PA: Pulmonary arterial systolic pressure is 55 mmHg. · IVC: Moderately elevated central venous pressure (8 mmHg); IVC diameter is larger than 21 mm and collapses more than 50% with respiration. Images report reviewed by me:  CT (Most Recent) (CT chest reviewed by me) Results from Hospital Encounter encounter on 09/06/18    CT HEAD WO CONT    Narrative  EXAM: CT head    INDICATION: Slurred speech. COMPARISON: CT 8/11/16 and MRI brain 8/12/2016    TECHNIQUE: Axial CT imaging of the head was performed without intravenous  contrast. Sagittal and coronal reconstructions were obtained. One or more dose reduction techniques were used on this CT: automated exposure  control, adjustment of the mAs and/or kVp according to patient's size, and  iterative reconstruction techniques. The specific techniques utilized on this CT  exam have been documented in the patient's electronic medical record.    _______________    FINDINGS:    BRAIN AND POSTERIOR FOSSA: The sulci, folia, ventricles and basal cisterns are  within normal limits for the patient?s age. There is no intracranial hemorrhage,  mass effect, or midline shift. Minimal low-attenuation changes deep white matter  compatible with chronic microvascular insufficiency.  Moderate cortical volume  loss but within normal range for age. EXTRA-AXIAL SPACES AND MENINGES: There are no abnormal extra-axial fluid  collections. CALVARIUM: Intact. SINUSES: Clear. OTHER: None.    _______________    Impression  IMPRESSION:    No acute intracranial abnormalities. Stable findings. CRITICAL RESULT: Critical result called to Dr. George Cabrales at 10:43 AM on 9/6/2018. Results clearly understood and acknowledged. CXR reviewed by me:  XR (Most Recent). CXR  reviewed by me and compared with previous CXR Results from Hospital Encounter encounter on 03/04/22    XR CHEST PORT    Narrative  CLINICAL HISTORY:  Shortness of breath. Tube placement. COMPARISONS:  Chest x-ray 3/8/2022    TECHNIQUE:  single frontal view of the chest    ------------------------------------------    FINDINGS:    Lungs:  Mild to moderate patchy parenchymal opacity in both lungs, greater on  the right, not appreciably changed from prior radiograph. Small to  moderate-sized right and small left pleural effusion, not appreciably changed. Mediastinum: Moderate cardiomegaly. Atherosclerotic arterial calcification. Endotracheal tube tip approximately 5.5 cm above the bryce. NG/OG tube passes  beyond edge of film in expected location of stomach. Dual-lumen central venous  catheter, tip slightly entering right atrium. Bones: No evidence of fracture or suspicious bone lesion.      ------------------------------------------    Impression  1. Patchy bilateral lung parenchymal opacity, not appreciably changed. Differential includes pulmonary edema and atypical infection. 2. Bilateral pleural effusions, larger on the right. ·Please note: Voice-recognition software may have been used to generate this report, which may have resulted in some phonetic-based errors in grammar and contents. Even though attempts were made to correct all the mistakes, some may have been missed, and remained in the body of the document.       Conrad Glover, MD  3/9/2022 [Joint Pain] : joint pain [Negative] : Heme/Lymph

## 2023-04-10 NOTE — PROGRESS NOTES
Sinusitis (Antibiotic Treatment)    The sinuses are air-filled spaces within the bones of the face. They connect to the inside of the nose. Sinusitis is an inflammation of the tissue lining the sinus cavity. Sinus inflammation can occur during a cold. It can also be due to allergies to pollens and other particles in the air. Sinusitis can cause symptoms of sinus congestion and fullness. A sinus infection causes fever, headache and facial pain. There is often green or yellow drainage from the nose or into the back of the throat (post-nasal drip). You have been given antibiotics to treat this condition.  Home care:  · Take the full course of antibiotics as instructed. Do not stop taking them, even if you feel better.  · Drink plenty of water, hot tea, and other liquids. This may help thin mucus. It also may promote sinus drainage.  · Heat may help soothe painful areas of the face. Use a towel soaked in hot water. Or,  the shower and direct the hot spray onto your face. Using a vaporizer along with a menthol rub at night may also help.   · An expectorant containing guaifenesin may help thin the mucus and promote drainage from the sinuses.  · Over-the-counter decongestants may be used unless a similar medicine was prescribed. Nasal sprays work the fastest. Use one that contains phenylephrine or oxymetazoline. First blow the nose gently. Then use the spray. Do not use these medicines more often than directed on the label or symptoms may get worse. You may also use tablets containing pseudoephedrine. Avoid products that combine ingredients, because side effects may be increased. Read labels. You can also ask the pharmacist for help. (NOTE: Persons with high blood pressure should not use decongestants. They can raise blood pressure.)  · Over-the-counter antihistamines may help if allergies contributed to your sinusitis.    · Do not use nasal rinses or irrigation during an acute sinus infection, unless told to by  Bereavement Note:     responded to the death of Lizzie Ann, who is a 79 y.o., male, offering [facility] to patient and family. Date of Death: 3/10/2022    Extended Emergency Contact Information  Primary Emergency Contact: Apley,Joan  Address: [address], [address] [place] [address] [address] [place] [address] Phone: [phone]  Mobile Phone: [phone]  Relation: Spouse  Secondary Emergency Contact: gurdeep Chapman  Home Phone: [phone]  Mobile Phone: [phone]  Relation: Daughter                 YES      NO  UNKNOWN  Life Net   []        [x]    []   Eye Bank   [] [x] []   Medical Examiner  []        [x]  []   Going to [employer]  []        [x] []      Autopsy   []        [x]         []   Sympathy Card  [x]        []  Bereavement Materials  [x]        []           Business Card Provided  [x]        []              Home: Ishan Lindsey and Mateuszoscar Olvera [phone]  [facility] will continue to follow family and will provide spiritual care as needed. Rev. Radha Ty.  Emy Durham, [address] [place] [address] [phone]  Pager :[phone] your health care provider. Rinsing may spread the infection to other sinuses.  · Use acetaminophen or ibuprofen to control pain, unless another pain medicine was prescribed. (If you have chronic liver or kidney disease or ever had a stomach ulcer, talk with your doctor before using these medicines. Aspirin should never be used in anyone under 18 years of age who is ill with a fever. It may cause severe liver damage.)  · Don't smoke. This can worsen symptoms.  Follow-up care  Follow up with your healthcare provider or our staff if you are not improving within the next week.  When to seek medical advice  Call your healthcare provider if any of these occur:  · Facial pain or headache becoming more severe  · Stiff neck  · Unusual drowsiness or confusion  · Swelling of the forehead or eyelids  · Vision problems, including blurred or double vision  · Fever of 100.4ºF (38ºC) or higher, or as directed by your healthcare provider  · Seizure  · Breathing problems  · Symptoms not resolving within 10 days  Date Last Reviewed: 4/13/2015  © 7301-6859 [employer]. [address] [place] [address], [place] [address]. All rights reserved. This information is not intended as a substitute for professional medical care. Always follow your healthcare professional's instructions.         DISPLAY PLAN FREE TEXT

## 2023-04-28 NOTE — CONSULTS
RENAL CONSULT  2021    Patient:  Hector Goldberg  :  1940  Gender:  male  MRN #:  [id number]    Reason for Consult: evaluation and management of ELIZABETH on CKD     History of Present Illness:    Hector Goldberg is a 79y.o. 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 He was brought in hospital due to  2 to 3 days history of progressive shortness of breath with the cough and increasing lower extremity swelling subjective fever and chills. In ED he was found to have to have a bilateral pneumonia in CXR  along with pulmonary congestion   ECHO showed EF 15-20 % with intravascular volume overload   On prevention creatinine was 1.67 mg/dl which improved to 1.52 mg/dl now and is 1.90 mg/dl    When I saw him this  morning he reports that he is not making much urine overnight . He reported past admission in [facility] for fluid overload when he had ELIZABETH with peaked creatinine to 3.3 mg/dl  He takes lasix at home , does not take NSAIDS . Admits using oxygen at home intermittently .  Still has SOB but denied chest pain , nausea, vomiting and skin itching         Past Medical History:   Diagnosis Date    CAD (coronary artery disease)     Chronic atrial fibrillation ([address] 2016    CML (chronic myelocytic leukemia) ([address]     Coagulation disorder ([address]     bruises easily    Diabetes ([address] 2012    Hypertension     Nausea & vomiting     with most recent surgery-carotid endardarectomy    TIA (transient ischemic attack) 2016     Past Surgical History:   Procedure Laterality Date    HX CAROTID ENDARTERECTOMY Left 2017    HX CATARACT REMOVAL Bilateral     HX CORONARY ARTERY BYPASS GRAFT      HX HERNIA REPAIR Right     hernia    HX UROLOGICAL      greenlight laser    HX UROLOGICAL      kidney stone    HX UROLOGICAL      Lithotripsy x 2  HI CARDIAC SURG PROCEDURE UNLIST  3/2014    quadruple bypass     Family History   Problem Relation Age of Onset    Post-op Nausea/Vomiting Brother      Allergies   Allergen Reactions    Adhesive Tape-Silicones Other (comments)     Raw skin    Codeine Nausea and Vomiting    Iodinated Contrast Media Rash     patient states this was years ago - he states that he can tolerate the newer agents     Current Facility-Administered Medications   Medication Dose Route Frequency Provider Last Rate Last Admin    doxycycline (VIBRAMYCIN) 100 mg in 0.9% sodium chloride (MBP/ADV) 100 mL MBP  100 mg IntraVENous Q12H Calista Martinez  mL/hr at 07/14/21 0822 100 mg at 07/14/21 5263    imatinib (GLEEVEC) chemo tablet 100 mg (Patient Supplied)  100 mg Oral DAILY Calista Martinez MD   100 mg at 07/14/21 3090    cyanocobalamin tablet 2,000 mcg  2,000 mcg Oral DAILY Calista Martinez MD   2,000 mcg at 07/14/21 8340    vitamin E (AQUA GEMS) capsule 400 Units  400 Units Oral DAILY Calista Martinez MD   400 Units at 07/14/21 0823    albuterol-ipratropium (DUO-NEB) 2.5 MG-0.5 MG/3 ML  3 mL Nebulization Q4H PRN Calista Martinez MD   3 mL at 07/13/21 1539    milrinone (PRIMACOR) 20 MG/100 ML D5W infusion  0.2 mcg/kg/min IntraVENous CONTINUOUS Alexandria Orta MD 5.3 mL/hr at 07/14/21 0841 0.2 mcg/kg/min at 07/14/21 0841    guaiFENesin ER (MUCINEX) tablet 600 mg  600 mg Oral Q12H Calista Martinez MD   600 mg at 07/14/21 0823    famotidine (PEPCID) tablet 20 mg  20 mg Oral DAILY Socorro Jones MD   20 mg at 07/14/21 0823    enoxaparin (LOVENOX) injection 90 mg  1 mg/kg SubCUTAneous Q12H Calista Martinez MD   90 mg at 07/14/21 0780    furosemide (LASIX) injection 40 mg  40 mg IntraVENous Q12H Mat Arenas MD   40 mg at 07/14/21 8352    magnesium hydroxide (MILK OF MAGNESIA) 400 mg/5 mL oral suspension 30 mL  30 mL Oral DAILY PRN Teule-Hekima, Debbrah Boroughs, MD   30 mL at 07/14/21 0841    [Held by provider] amLODIPine (NORVASC) tablet 5 mg  5 mg Oral DAILY Socorro Jones MD   5 mg at 07/12/21 0834    aspirin delayed-release tablet 81 mg  81 mg Oral DAILY Se Parnell MD   81 mg at 07/14/21 0824    atorvastatin (LIPITOR) tablet 40 mg  40 mg Oral DAILY Se Parnell MD   40 mg at 07/14/21 0824    carvediloL (COREG) tablet 25 mg  25 mg Oral BID Se Parnell MD   25 mg at 07/14/21 0824    tamsulosin (FLOMAX) capsule 0.4 mg  0.4 mg Oral DAILY Se Parnell MD   0.4 mg at 07/14/21 1018    sodium chloride (NS) flush 5-40 mL  5-40 mL IntraVENous Q8H Se Parnell MD   10 mL at 07/13/21 2209    sodium chloride (NS) flush 5-40 mL  5-40 mL IntraVENous PRN Se Parnell MD        sodium chloride (NS) flush 5-40 mL  5-40 mL IntraVENous PRN Se Parnell MD        acetaminophen (TYLENOL) tablet 650 mg  650 mg Oral Q6H PRN Se Parnell MD        Or   Cindi Blaleeroy acetaminophen (TYLENOL) suppository 650 mg  650 mg Rectal Q6H PRN Se Parnell MD        polyethylene glycol (MIRALAX) packet 17 g  17 g Oral DAILY PRN Se Parnell MD        ondansetron (ZOFRAN ODT) tablet 4 mg  4 mg Oral Q8H PRN Se Parnell MD        Or    ondansetron [facility] injection 4 mg  4 mg IntraVENous Q6H PRN Se Parnell MD   4 mg at 07/13/21 1257    cefTRIAXone (ROCEPHIN) 1 g in sterile water (preservative free) 10 mL IV syringe  1 g IntraVENous Q24H Se Parnell  mL/hr at 07/13/21 1058 1 g at 07/13/21 1058    insulin lispro (HUMALOG) injection   SubCUTAneous AC&HS Pradeep Del Valle MD   6 Units at 07/13/21 1652         Review of Symptoms:     Consitutional Symptoms: No fever, weight loss, hasweight gain and fatigue  Eyes:- No change in vision , no itching   Ears, Nose, Mouth, Throat:  No neck pain , swelling ,hearing loss and nose bleed. Pulmonary: No cough and shortness of breath with activity  .   CVS: No  Chest pain , palpitation and  Has orthopnea  GI: No nausea, vomiting, abdominal pain, and blood in stool   - Thinks making less urine , no urgency and burning   Neurological:No dizzy ness, syncope, focal weakness and numbness . Skin : No rash and erythema   Endocrine: No feeling of excessive cold or warmth, hot flushes  Psychiatric: Denied feeling depressed    Objective:    Visit Vitals  /62   Pulse (!) 106   Temp 97.3 °F (36.3 °C)   Resp 18   Ht 5' 8\" (1.727 m)   Wt 88.9 kg (196 lb)   SpO2 99%   BMI 29.80 kg/m²       Physical Exam:    Pt awake,  alert and comfortable  HEENT: No JVD, anicteric sclera, no neck swelling  Lung: crackles at lung base  Heart: s1s2 heard with systolic murmur   Abdomen: soft, non tender, no guarding, normal bowel sounds. Ext: b/l LE edema   CNS- Oriented to time , place and person     Laboratory Data:    Lab Results   Component Value Date    BUN 50 (H) 07/14/2021    BUN 45 (H) 07/13/2021    BUN 43 (H) 07/12/2021     07/14/2021     07/13/2021     07/12/2021    CO2 32 07/14/2021    CO2 31 07/13/2021    CO2 31 07/12/2021     Lab Results   Component Value Date    WBC 46.4 (H) 07/14/2021    HGB 11.8 (L) 07/14/2021    HCT 40.4 07/14/2021     No components found for: CALCIUM, PHOSPHORUS, MAGNESIUM  Lab Results   Component Value Date    HDL 56 08/12/2016     No results found for: SPECIMENTYP, TURBIDITY, UGLU    Imaging Reveiwed:  CXR 7/11/21\"- 1. Bilateral airspace disease   Small bilateral pleural effusions    · ECHO 7/12/21- LV: Estimated LVEF is 15 - 20%. Normal wall thickness. Mildly dilated left ventricle. Severely reduced systolic function. Left ventricular diastolic dysfunction E'E= 15.30. · Contrast used: DEFINITY. · LA: Moderately dilated left atrium. · AV: Aortic valve leaflet calcification present. Mild aortic valve regurgitation is present. · MV: Mitral valve thickening. Moderate mitral valve regurgitation is present. · TV: Moderate tricuspid valve regurgitation is present. · PV: Mild pulmonic valve regurgitation is present. · PA: Pulmonary arterial systolic pressure is 55 mmHg.   · IVC: Moderately elevated central venous pressure (8 mmHg); IVC diameter is larger than 21 mm and collapses more than 50% with respiration. Assessment:    Katrin Noyola is a 79y.o. 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 He was brought in hospital due to  2 to 3 days history of progressive shortness of breath with the cough and increasing lower extremity swelling subjective fever and chills. CXR consistent with pneumonia and pulmonary congestion . ECHO demonstrated - cardiomyopathy with EF 15-20 %     During hospital stays developed ELIZABETH on CKD , non oliguric type most likely due to cardiorenal syndrome.  He has evidence of fluid overload, crackles on lung exam and b/l LE edema        Plan:   - will get Urine electrolytes, especially urine sodium , UPC, Retroperitoneal USG    - He does not have clinical and metabolic indications of dialysis for now   - Bladder scan to ensure he is not retaining urine   - Appreciate cardiology recs , on milrinone drip for cardiomyopathy and coreg   -Urine output is only 675 cc overnight   - would consider to increase lasix dose to 80 mg IV BID , if adequate diuresis is not achieved , will add diuril 500 mg iv   - Target urine output in next 24 hours 2-3 liter  - Strict intake and output charting   - Fluid restriction to 1 liter, low salt diet   - Avoid NSAIDs, contrast and nephrotoxin   -Dose all meds and antibiotics for current eGFR     - other medical management per Primary team      Thank you for consult       Winifred Bliss MD   [facility] normal...

## 2023-10-05 NOTE — PROGRESS NOTES
He was discharged in morning before my evaluation . Unable to see him today . [id number]: previous_has_had_botox Have You Had Botox Before?: has had botox

## (undated) DEVICE — 100% SILICONE FOLEY CATHETER,5-10 ML, 2-WAY: Brand: DOVER

## (undated) DEVICE — TRAY PREP DRY W/ PREM GLV 2 APPL 6 SPNG 2 UNDPD 1 OVERWRAP

## (undated) DEVICE — DEVON™ KNEE AND BODY STRAP 60" X 3" (1.5 M X 7.6 CM): Brand: DEVON

## (undated) DEVICE — CYSTO PACK: Brand: MEDLINE INDUSTRIES, INC.

## (undated) DEVICE — INFLATION DEVICE: Brand: ENCORE 26

## (undated) DEVICE — SINGLE-USE DIGITAL FLEXIBLE URETEROSCOPE: Brand: LITHOVUE

## (undated) DEVICE — SEAL ENDOSCP INSTR 7FR BX SELF SEAL

## (undated) DEVICE — PACK,CYSTOSCOPY,PK III,AURORA: Brand: MEDLINE

## (undated) DEVICE — SOLUTION IRRIG 3000ML 0.9% SOD CHL FLX CONT 0797208] ICU MEDICAL INC]

## (undated) DEVICE — SYR LR LCK 1ML GRAD NSAF 30ML --

## (undated) DEVICE — Y-TYPE TUR/BLADDER IRRIGATION SET, REGULATING CLAMP

## (undated) DEVICE — DUAL LUMEN URETERAL CATHETER

## (undated) DEVICE — GDWIRE 3CM FLX-TIP 0.038X150CM -- BX/5 SENSOR

## (undated) DEVICE — (D)SYR 10ML 1/5ML GRAD NSAF -- PKGING CHANGE USE ITEM 338027

## (undated) DEVICE — CATHETER URET L70CM OD6FR OPN END FLEXIMA

## (undated) DEVICE — STERILE MEDICINE CUP 2 OZ KIT: Brand: CARDINAL HEALTH

## (undated) DEVICE — DRAPE TWL SURG 16X26IN BLU ORB04] ALLCARE INC]

## (undated) DEVICE — SEAL INSTR CYSTO ADJ BX PRT SEAL FOR ACC UP TO 6FR

## (undated) DEVICE — SPONGE GZ W4XL4IN COT 12 PLY TYP VII WVN C FLD DSGN

## (undated) DEVICE — MASTISOL ADHESIVE LIQ 2/3ML

## (undated) DEVICE — (D)STRIP SKN CLSR 0.5X4IN WHT --

## (undated) DEVICE — URETERAL ACCESS SHEATH SET: Brand: NAVIGATOR HD